# Patient Record
Sex: FEMALE | Race: WHITE | Employment: OTHER
[De-identification: names, ages, dates, MRNs, and addresses within clinical notes are randomized per-mention and may not be internally consistent; named-entity substitution may affect disease eponyms.]

---

## 2017-03-03 PROBLEM — M51.26 DISPLACEMENT OF LUMBAR INTERVERTEBRAL DISC: Status: ACTIVE | Noted: 2017-03-03

## 2017-03-03 PROBLEM — M48.00 SPINAL STENOSIS: Status: ACTIVE | Noted: 2017-03-03

## 2017-04-28 ENCOUNTER — TELEPHONE (OUTPATIENT)
Dept: CASE MANAGEMENT | Age: 62
End: 2017-04-28

## 2017-06-19 PROBLEM — Z72.0 TOBACCO USE: Status: ACTIVE | Noted: 2017-06-19

## 2017-10-23 ENCOUNTER — TELEPHONE (OUTPATIENT)
Dept: CASE MANAGEMENT | Age: 62
End: 2017-10-23

## 2017-10-23 NOTE — TELEPHONE ENCOUNTER
No call was made, encounter was opened for documentation in the lung nodule flow sheet. Lung Cancer Screening patient.     CT Lung Screening 10/28/16:  Lung - RADS Category 3 :  Probably benign finding (s) - short term   follow - up suggested, includes nodules with a low likelihood of   becoming a clinically active cancer - Follow-up CT scan in 6 months.       1. Pulmonary nodule measuring approximately 0.32 cm, continued CT   surveillance within 6 months is recommended. Further followup   guidelines provided below. 2. Mild cardiomegaly. 3. Previous cholecystectomy. 4. The remainder of the upper     are not evaluated secondary to the   specificity of the exam.          Reminder routed to NANCY CHAVIS MD 4/28/2017.     10/23/2017 -   Annual CT lung screening reminder faxed to NANCY CHAVIS MD  and mailed to patient.       [address] [facility] Imaging Services

## 2017-10-27 ENCOUNTER — TELEPHONE (OUTPATIENT)
Dept: CASE MANAGEMENT | Age: 62
End: 2017-10-27

## 2017-10-27 NOTE — LETTER
Exp Hx:  None  Silica  Arsenic Cad  Beryll Asbestos Chromium Nickel DF Radon       Family CA Hx__________________________    None  COPD       Pulm Fib            SHS    Date entered into NRDR____________________    Req Smoking Cessation_____      Packet mailed____________  Referral _________                     [facility] [address] Screening Patient Education Sheet    1)  Should I be screened for Lung Cancer?  - Are you between 54and 68years old? - Do you currently smoke or have you quit in the last 15 years? - Do you have a history of smoking a pack per day for 30 years (see back for tip sheet)? - Are you free of lung cancer symptoms (new cough, chest pain or shortness of breath)? If you answered YES to ALL of the above, you should be screened. 2) What are the risks versus benefits of having low dose CT lung screening? -  This scan can detect pulmonary nodules which may be an early sign of lung cancer:   - Radiation Exposure: A low-dose CT scan of the chest will expose you to about the same level of radiation that a mammogram does. - False Positive Results: There is, approximately, a 25% chance that a nodule will be detected on a screening CT scan in a high risk individual. Most nodules are benign.   - Emotional Stress: Some individuals may experience anxiety from finding a nodule and any associated follow-up. - Additional Testing: detecting a nodule may require more CT scans or surgical procedures for further evaluation. Lung screening may also lead to the detection of other unrelated diseases. This is an additional benefit of low-dose CT scanning for lung cancer; however, it may lead to additional testing, treatment and cost. If you are opposed to these, you should reconsider being screened.      3) Are you currently smoking?  - If you currently smoke, the best action you can take to reduce your risk of lung cancer is to stop smoking. Please contact Casey at [phone] or [phone] for help and tips. 4) What to expect after the screening? - This screening will show if a pulmonary nodule is present, but it alone cannot determine if the nodule is malignant or benign. This may require further follow up with a physician who may be your primary care provider or this service may be provided by our multidisciplinary team.   - A Radiologist will read your exam and send results to your physician. You should contact your physician to discuss any results and or follow up recommendations if needed. - If you have any concerns or questions regarding lung cancer screening, please be sure to discuss this with your primary care provider or our lung screening program navigator at [phone]. [address] [facility] Lung Screening Program Committee                    Medical Imaging Services                        10/27/2017           Flavio Martínez MD  [facility], [address]       Dear Flavio Martínez MD    An appointment has been scheduled for your patient Silvina Castellon for a CT Lung Screening. Appointment Details:  November 03 2017  [address]   Radiology Department  Time: 11:30 AM   (Please arrive 30 minutes early for registration)        Sincerely,    [address] [place] [address] Street:  [phone]  F:  [phone]                                       CT Lung Screening/ Lung Nodule Program  [address] [place] [address]. [place], [address]       TO:   Flavio Martínez MD    FAX:   [phone]        FROM:   [facility], [address] Lung Screening Program    DATE:   10/27/2017    PHONE:  [phone]    FAX:    [phone]      Comments:       Thank you for your referral to our [address].  If you have any questions please call [phone] or fax [phone]. The information contained in this fax message is intended only for Personal and Confidential use of the designated recipient(s) names above. This information is to be handled as Privileged and Confidential. If the reader of this message is not the intended recipient, you are hereby notified that you have received this document in error, and that any review, dissemination, distribution, or copying of this message is strictly prohibited. If you receive this communication in error, please notify us immediately at the above number and return the original message to us by mail. Thank you. Member of [facility].

## 2017-11-09 ENCOUNTER — TELEPHONE (OUTPATIENT)
Dept: CASE MANAGEMENT | Age: 62
End: 2017-11-09

## 2017-11-09 NOTE — LETTER
Medical Imaging Services      11/9/2017  Javier Saeed  [address] [facility]        Dear Jerald Parker,       RE: Your screening low-dose chest CT done on: 11/8/17                    We are writing to let you know that your recent CT lung screening shows one or more small/stable lung nodules which is likely benign (not cancer). Lung nodules are very common and many people without cancer have  nodules. To make sure this nodule is benign (not cancer) we recommend you have another CT scan on or around :  11/8/18      We also ask if you have questions about the results, please make sure you discuss them with your referring physician. Here are some other important points you should know:      Your full low-dose Chest CT report, including any minor observations, has been sent to your health care provider. Your exam report and images will be kept on file at [facility]) as part of your permanent record and are available for your continuing care.  Although low-dose chest CT is very effective at finding lung cancer early, it cannot find all lung cancers. If you develop any new symptoms such as shortness of breath, chest pain, or coughing up blood, please call your doctor.  Please keep in mind that good health involves quitting smoking (for help, call [address] at [phone]), an annual physical exam, and continued screening with low-dose chest CT. If you have any questions about this letter or have difficulty in contacting your health care provider please call our patient navigator, Irina Royal at [phone].       Sincerely, The [facility] Lung Screening Program                          Medical Imaging Services            11/9/2017                       Omero Escobar MD  [facility], [address] [place] [address] Dear Simran Cruz MD:                                                                                                                                                     Thank you for referring your patient, Johnny Garcia (1955) to our [address] [facility].   The results of the screening are available in [facility] and have been faxed to your office. Please contact us if you did not receive this report. Sam Calderon has been notified that her screening results have been sent to your office. We have encouraged her to contact you for these results and further instructions if necessary. Our program is a [address] [facility] designated [address] and our goal in addition to saving lives through screening is to; offer education, follow up reminders, assistance with scheduling and resources when needed. Please note, this screening examination is not intended as a diagnostic study or as a substitute for evaluation by a health care provider. We appreciate your support with this valuable and lifesaving program. If you have any questions concerning the report results or would like more information regarding our screening program, please contact our patient navigator at [phone].      With regard,        [address] Screening Program        [phone]

## 2018-03-21 DIAGNOSIS — N32.81 OVERACTIVE BLADDER: ICD-10-CM

## 2018-03-21 DIAGNOSIS — K29.70 GASTRITIS WITHOUT BLEEDING, UNSPECIFIED CHRONICITY, UNSPECIFIED GASTRITIS TYPE: ICD-10-CM

## 2018-03-21 DIAGNOSIS — Z76.0 MEDICATION REFILL: ICD-10-CM

## 2018-03-21 RX ORDER — TOLTERODINE 2 MG/1
CAPSULE, EXTENDED RELEASE ORAL
Qty: 90 CAPSULE | Refills: 0 | Status: SHIPPED
Start: 2018-03-21 | End: 2020-04-27 | Stop reason: SDUPTHER

## 2018-03-21 RX ORDER — TOPIRAMATE 100 MG/1
100 TABLET, FILM COATED ORAL 2 TIMES DAILY
Qty: 180 TABLET | Refills: 0 | Status: SHIPPED
Start: 2018-03-21 | End: 2020-04-27 | Stop reason: SDUPTHER

## 2018-03-21 RX ORDER — OMEPRAZOLE 40 MG/1
CAPSULE, DELAYED RELEASE ORAL
Qty: 90 CAPSULE | Refills: 0 | Status: SHIPPED | OUTPATIENT
Start: 2018-03-21 | End: 2020-01-29 | Stop reason: ALTCHOICE

## 2018-10-30 ENCOUNTER — TELEPHONE (OUTPATIENT)
Dept: CASE MANAGEMENT | Age: 63
End: 2018-10-30

## 2018-10-30 NOTE — TELEPHONE ENCOUNTER
No call, encounter opened to process CT Lung Screening.       CT Lung Screen 11/8/17 :  Impression   Solid 3 mm subpleural nodule along the right minor fissure.       RECOMMENDATION:   Continued annual screening with low-dose CT in 12 months.       Lung - RADS Category 2 :  Benign Appearance or Behavior - Nodules with   a very low likelihood of becoming a clinically active cancer due to   size or lack of growth.             Amada Avendano is a  58 y.o. female who is a current smoker with a 32 pack year smoking history.      Results have been faxed/routed to ordering physician Surekha Marinelli MD .  Letter mailed to patient  11/9/2017 encouraging her  to call her physician for results and follow up recommendations if needed.             10/30/2018 No call, encounter opened for documentation in the lung nodule navigator flow sheet/ Lung screening patient. Annual CT lung screening reminder faxed to ordering physician and mailed to patient.     [address]

## 2018-10-30 NOTE — LETTER
Lung Screening Program      10/30/2018      Emma Mariann Cruz  [id number]  [place] [address]      Dear Lorraine Lamar,        Our records indicate that based on your lung screen performed at [facility] ( [facility]), it is time to schedule your yearly lung screen. National guidelines for preventive care encourage patients who smoke, or who have a history of smoking, to receive yearly lung screens if you:    ? are between the ages of 50-69  ? have smoked a pack a day or more for 30 years (or its equivalent)  ? continue to smoke or have quit within the past 15 year    To schedule a lung screen you first need to have a discussion with your primary care physician and obtain an order. Screenings are now billed to your insurance, call our patient navigator for more information at [phone]. To schedule the screen call [phone] at your earliest convenience. If you had scans that were done elsewhere,  please call to inform me; we value you as a patient and want to ensure that you are getting the appropriate care. If you have any questions or need assistance in scheduling, please dont hesitate to call.            Naatlie    P:  [phone]  F:  [phone]        CC: Mala Esparza MD

## 2019-09-12 ENCOUNTER — TELEPHONE (OUTPATIENT)
Dept: ADMINISTRATIVE | Age: 64
End: 2019-09-12

## 2019-11-02 ENCOUNTER — APPOINTMENT (OUTPATIENT)
Dept: GENERAL RADIOLOGY | Age: 64
End: 2019-11-02
Payer: MEDICARE

## 2019-11-02 ENCOUNTER — HOSPITAL ENCOUNTER (EMERGENCY)
Age: 64
Discharge: HOME | End: 2019-11-02
Payer: MEDICARE

## 2019-11-02 VITALS
HEIGHT: 68 IN | BODY MASS INDEX: 44.41 KG/M2 | OXYGEN SATURATION: 100 % | DIASTOLIC BLOOD PRESSURE: 69 MMHG | RESPIRATION RATE: 18 BRPM | HEART RATE: 84 BPM | SYSTOLIC BLOOD PRESSURE: 130 MMHG | WEIGHT: 293 LBS | TEMPERATURE: 97.7 F

## 2019-11-02 DIAGNOSIS — M54.50 CHRONIC BILATERAL LOW BACK PAIN WITHOUT SCIATICA: ICD-10-CM

## 2019-11-02 DIAGNOSIS — M79.672 LEFT FOOT PAIN: Primary | ICD-10-CM

## 2019-11-02 DIAGNOSIS — G89.29 CHRONIC BILATERAL LOW BACK PAIN WITHOUT SCIATICA: ICD-10-CM

## 2019-11-02 PROCEDURE — 99283 EMERGENCY DEPT VISIT LOW MDM: CPT

## 2019-11-02 PROCEDURE — 6370000000 HC RX 637 (ALT 250 FOR IP): Performed by: NURSE PRACTITIONER

## 2019-11-02 PROCEDURE — 73630 X-RAY EXAM OF FOOT: CPT

## 2019-11-02 RX ORDER — HYDROCODONE BITARTRATE AND ACETAMINOPHEN 5; 325 MG/1; MG/1
1 TABLET ORAL EVERY 6 HOURS PRN
Qty: 12 TABLET | Refills: 0 | Status: SHIPPED | OUTPATIENT
Start: 2019-11-02 | End: 2019-11-05

## 2019-11-02 RX ORDER — HYDROCODONE BITARTRATE AND ACETAMINOPHEN 5; 325 MG/1; MG/1
1 TABLET ORAL ONCE
Status: COMPLETED | OUTPATIENT
Start: 2019-11-02 | End: 2019-11-02

## 2019-11-02 RX ADMIN — HYDROCODONE BITARTRATE AND ACETAMINOPHEN 1 TABLET: 5; 325 TABLET ORAL at 18:18

## 2019-11-02 ASSESSMENT — PAIN SCALES - GENERAL
PAINLEVEL_OUTOF10: 5
PAINLEVEL_OUTOF10: 8
PAINLEVEL_OUTOF10: 8

## 2019-11-02 ASSESSMENT — PAIN DESCRIPTION - ORIENTATION: ORIENTATION: LEFT

## 2019-11-02 ASSESSMENT — PAIN DESCRIPTION - LOCATION: LOCATION: ARM;BACK

## 2019-11-02 ASSESSMENT — PAIN DESCRIPTION - PAIN TYPE: TYPE: CHRONIC PAIN

## 2020-01-16 ENCOUNTER — OFFICE VISIT (OUTPATIENT)
Dept: INTERNAL MEDICINE CLINIC | Age: 65
End: 2020-01-16
Payer: MEDICARE

## 2020-01-16 VITALS
HEART RATE: 60 BPM | BODY MASS INDEX: 44.41 KG/M2 | SYSTOLIC BLOOD PRESSURE: 135 MMHG | HEIGHT: 68 IN | WEIGHT: 293 LBS | OXYGEN SATURATION: 96 % | TEMPERATURE: 97.7 F | DIASTOLIC BLOOD PRESSURE: 63 MMHG

## 2020-01-16 PROCEDURE — 99204 OFFICE O/P NEW MOD 45 MIN: CPT | Performed by: FAMILY MEDICINE

## 2020-01-16 PROCEDURE — G0296 VISIT TO DETERM LDCT ELIG: HCPCS | Performed by: FAMILY MEDICINE

## 2020-01-16 PROCEDURE — 99212 OFFICE O/P EST SF 10 MIN: CPT | Performed by: FAMILY MEDICINE

## 2020-01-16 RX ORDER — HYDROCHLOROTHIAZIDE 12.5 MG/1
12.5 TABLET ORAL DAILY
Qty: 30 TABLET | Refills: 0 | Status: SHIPPED
Start: 2020-01-16 | End: 2020-03-04

## 2020-01-16 ASSESSMENT — ENCOUNTER SYMPTOMS
CHOKING: 0
WHEEZING: 0
COUGH: 1
NAUSEA: 0
EYE PAIN: 0
SORE THROAT: 0
BACK PAIN: 1
SHORTNESS OF BREATH: 1
PHOTOPHOBIA: 0
TROUBLE SWALLOWING: 0
CHEST TIGHTNESS: 0
RHINORRHEA: 0
ABDOMINAL PAIN: 0
DIARRHEA: 0
EYE DISCHARGE: 0
ABDOMINAL DISTENTION: 0
VOMITING: 0
BLOOD IN STOOL: 0
CONSTIPATION: 0

## 2020-01-16 NOTE — PROGRESS NOTES
every 8 hours as needed (PRN) Yes Melissa Isbell MD   indomethacin (INDOCIN) 50 MG capsule Take 1 capsule by mouth 3 times daily Yes Melissa Isbell MD   traZODone (DESYREL) 50 MG tablet Take 1 or 2 tablets twice daily as needed for anxiety and sleep Yes Kj Medina MD   fluticasone (FLONASE) 50 MCG/ACT nasal spray 1 spray by Nasal route daily Yes Melissa Isbell MD   meclizine (ANTIVERT) 25 MG tablet Take 1 tablet by mouth 3 times daily as needed Yes Kj Medina MD        Allergies   Allergen Reactions    Mobic [Meloxicam] Other (See Comments)     Abdominal pain    Ultram [Tramadol]      Abdominal pain    Nickel Itching and Rash       Past Medical History:   Diagnosis Date    Anxiety     Arthralgia 10/8/2013    Arthritis     Bone avascular necrosis ([facility] [address] 3/23/2015    Breast lesion 12/1/2015    Chronic back pain     Chronic fatigue 10/8/2013    Daytime somnolence 10/8/2013    Depression     Fibromyalgia     Fracture, fibula     right    GERD (gastroesophageal reflux disease)     Headache     History of fractured kneecap     (R) 2 hair line fractures    History of total knee arthroplasty 3/19/2015    Hyperlipidemia     Morbid obesity with BMI of 45.0-49.9, adult ([facility] [address] 10/9/2015    Obesity     Osteoarthritis     Overactive bladder 1/22/2014    Reflux        Past Surgical History:   Procedure Laterality Date    CARPAL TUNNEL RELEASE Bilateral     CHOLECYSTECTOMY      COLONOSCOPY  2011    COLONOSCOPY  11/22/2016    Dr. Leif Georges    benign reasons     LUMBAR FUSION      L3-5    ROTATOR CUFF REPAIR Left     left    TOTAL KNEE ARTHROPLASTY Bilateral 2010    did both at the same time    TUBAL LIGATION         Social History     Socioeconomic History    Marital status:      Spouse name: Not on file    Number of children: Not on file    Years of education: Not on file    Highest education level: Associate degree: academic program Occupational History    Not on file   Social Needs    Financial resource strain: Not on file    Food insecurity:     Worry: Not on file     Inability: Not on file    Transportation needs:     Medical: Not on file     Non-medical: Not on file   Tobacco Use    Smoking status: Former Smoker     Packs/day: 1.00     Years: 35.00     Pack years: 35.00     Types: Cigarettes     Start date:      Last attempt to quit: 2020     Years since quittin.0    Smokeless tobacco: Never Used   Substance and Sexual Activity    Alcohol use: No     Alcohol/week: 0.0 standard drinks    Drug use: No    Sexual activity: Never   Lifestyle    Physical activity:     Days per week: Not on file     Minutes per session: Not on file    Stress: Not on file   Relationships    Social connections:     Talks on phone: Not on file     Gets together: Not on file     Attends Protestant service: Not on file     Active member of club or organization: Not on file     Attends meetings of clubs or organizations: Not on file     Relationship status: Not on file    Intimate partner violence:     Fear of current or ex partner: Not on file     Emotionally abused: Not on file     Physically abused: Not on file     Forced sexual activity: Not on file   Other Topics Concern    Not on file   Social History Narrative    Not on file        Family History   Problem Relation Age of Onset    High Blood Pressure Mother     Diabetes Mother     Heart Disease Mother     Diabetes Father     Heart Disease Father     High Blood Pressure Father     Cancer Other 48        breast       Vitals:    20 1345   BP: 135/63   Pulse: 60   Temp: 97.7 °F (36.5 °C)   TempSrc: Oral   SpO2: 96%   Weight: (!) 327 lb 3.2 oz (148.4 kg)   Height: 5' 8\" (1.727 m)     Estimated body mass index is 49.75 kg/m² as calculated from the following:    Height as of this encounter: 5' 8\" (1.727 m). Weight as of this encounter: 327 lb 3.2 oz (148.4 kg).     Physical Exam  Vitals signs reviewed. Constitutional:       General: She is not in acute distress. Appearance: She is well-developed. She is morbidly obese. She is not ill-appearing. HENT:      Head: Normocephalic and atraumatic. Right Ear: External ear normal.      Left Ear: External ear normal.      Nose: Nose normal.   Eyes:      Conjunctiva/sclera: Conjunctivae normal.      Pupils: Pupils are equal, round, and reactive to light. Comments: + cataract in right eye + decreased visual acuity   Neck:      Musculoskeletal: Normal range of motion and neck supple. Thyroid: No thyromegaly. Cardiovascular:      Rate and Rhythm: Normal rate and regular rhythm. Heart sounds: Normal heart sounds. No murmur. No friction rub. No gallop. Pulmonary:      Effort: Pulmonary effort is normal. No respiratory distress. Breath sounds: Decreased breath sounds, wheezing and rales (mildly coarse breath sounds) present. No rhonchi. Abdominal:      General: Bowel sounds are normal. There is no distension. Palpations: Abdomen is soft. There is no mass. Tenderness: There is no abdominal tenderness. Musculoskeletal: Normal range of motion. General: Swelling (bilateral lower extremities) present. No tenderness. Skin:     General: Skin is warm and dry. Findings: No rash. Neurological:      Mental Status: She is alert and oriented to person, place, and time. Deep Tendon Reflexes: Reflexes are normal and symmetric. Psychiatric:         Attention and Perception: Perception normal.         Mood and Affect: Mood and affect normal.         Speech: Speech is delayed. Thought Content: Thought content normal.         Cognition and Memory: Cognition is impaired. Memory is impaired. Judgment: Judgment normal.         ASSESSMENT/PLAN:  1. Encounter to establish care with new doctor  - Patient signed medical records release form for previous providers.  Records will be

## 2020-01-17 ENCOUNTER — TELEPHONE (OUTPATIENT)
Dept: INTERNAL MEDICINE CLINIC | Age: 65
End: 2020-01-17

## 2020-01-17 NOTE — TELEPHONE ENCOUNTER
Attempted to contact patient regarding appointments for cardiac stress test and echo. Patient has no voicemail set up. Appointments are set up for Wednesday, 1/29/20 @ 8:30 am at [facility].   [place] at 8:00 am.  Then dye given @ 8:30 am, then have echo done at 9:00 am then go back for cardiac stress test at 10:30 am.    Patient called back and appointment times and date were given and phone number if needs to reschedule appointment, [phone]

## 2020-01-24 NOTE — TELEPHONE ENCOUNTER
Patient called and stated that she needs her lyrica prescription called into pharmacy stating she only has a few pills left. I told her that YOU have never prescribed this medication for her nor does she have a pain contract on file. Patient stated that she needs this medication that she cant be without. I stated that I would send a message to you but I cant guarantee her that you will ok this.  Please advise

## 2020-01-29 ENCOUNTER — HOSPITAL ENCOUNTER (OUTPATIENT)
Dept: NON INVASIVE DIAGNOSTICS | Age: 65
Discharge: HOME | End: 2020-01-29
Payer: MEDICARE

## 2020-01-29 ENCOUNTER — HOSPITAL ENCOUNTER (OUTPATIENT)
Dept: NUCLEAR MEDICINE | Age: 65
Discharge: HOME | End: 2020-01-29
Payer: MEDICARE

## 2020-01-29 LAB
LV EF: 50 %
LV EF: 74 %
LVEF MODALITY: NORMAL
LVEF MODALITY: NORMAL

## 2020-01-29 PROCEDURE — A9500 TC99M SESTAMIBI: HCPCS | Performed by: RADIOLOGY

## 2020-01-29 PROCEDURE — 78452 HT MUSCLE IMAGE SPECT MULT: CPT

## 2020-01-29 PROCEDURE — 6360000002 HC RX W HCPCS: Performed by: FAMILY MEDICINE

## 2020-01-29 PROCEDURE — 93306 TTE W/DOPPLER COMPLETE: CPT

## 2020-01-29 PROCEDURE — 93017 CV STRESS TEST TRACING ONLY: CPT

## 2020-01-29 PROCEDURE — 3430000000 HC RX DIAGNOSTIC RADIOPHARMACEUTICAL: Performed by: RADIOLOGY

## 2020-01-29 RX ORDER — PANTOPRAZOLE SODIUM 40 MG/1
40 TABLET, DELAYED RELEASE ORAL 2 TIMES DAILY
COMMUNITY
End: 2020-04-27 | Stop reason: SDUPTHER

## 2020-01-29 RX ADMIN — Medication 30 MILLICURIE: at 10:29

## 2020-01-29 RX ADMIN — REGADENOSON 0.4 MG: 0.08 INJECTION, SOLUTION INTRAVENOUS at 10:25

## 2020-01-29 RX ADMIN — Medication 10 MILLICURIE: at 08:46

## 2020-01-29 NOTE — PROCEDURES
[address]                              CARDIAC STRESS TEST    PATIENT NAME: Yola Kendall                     :        1955  MED REC NO:   [id number]                            ROOM:  ACCOUNT NO:   [de-identified]                           ADMIT DATE: 2020  PROVIDER:     Georges Schneider DO    DATE OF PROCEDURE:  2020    LEXISCAN STRESS TEST    INDICATIONS:  The patient is a very polite and pleasant 70-year-old  female who follows with Dr. Fany Mckeon. She has been having intermittent  periods of shortness of breath. Her cardiac risk factors include  hyperlipidemia and a strong family history of cardiovascular disease. PROCEDURE:  The patient was brought to the cardiac stress lab and  connected to continuous cardiac monitoring. Resting EKG indicated  normal sinus rhythm. She was administered Lexiscan over 10-15 seconds  followed by injection of Cardiolite. She was placed in recovery at 1  minute. Throughout the examination, she had no unusual symptomatology. There was no active chest pain or shortness of breath. There was no  ectopy or dysrhythmia identified. There were no ST or T-wave  abnormalities identified. She had physiologic response to both blood  pressure and heart rate. She tolerated the procedure well. There was  no evidence of Lexiscan-induced ischemia. She will now be transferred  to the Imaging Lab for final stress pictures.         Dena Robertson DO    D: 2020 10:30:48       T: 2020 11:28:17     [email or web address]  Job#: [id number]     Doc#: [id number]    CC:  _____ Anton Weeks

## 2020-02-06 ENCOUNTER — TELEPHONE (OUTPATIENT)
Dept: ADMINISTRATIVE | Age: 65
End: 2020-02-06

## 2020-02-14 ENCOUNTER — HOSPITAL ENCOUNTER (OUTPATIENT)
Age: 65
Discharge: HOME | End: 2020-02-16
Payer: MEDICARE

## 2020-02-14 ENCOUNTER — OFFICE VISIT (OUTPATIENT)
Dept: FAMILY MEDICINE CLINIC | Age: 65
End: 2020-02-14
Payer: MEDICARE

## 2020-02-14 VITALS
TEMPERATURE: 98.3 F | HEART RATE: 62 BPM | WEIGHT: 293 LBS | SYSTOLIC BLOOD PRESSURE: 124 MMHG | HEIGHT: 68 IN | OXYGEN SATURATION: 95 % | DIASTOLIC BLOOD PRESSURE: 84 MMHG | RESPIRATION RATE: 18 BRPM | BODY MASS INDEX: 44.41 KG/M2

## 2020-02-14 LAB
ALBUMIN SERPL-MCNC: 4.3 G/DL (ref 3.5–5.2)
ALP BLD-CCNC: 91 U/L (ref 35–104)
ALT SERPL-CCNC: 12 U/L (ref 0–32)
ANION GAP SERPL CALCULATED.3IONS-SCNC: 16 MMOL/L (ref 7–16)
AST SERPL-CCNC: 13 U/L (ref 0–31)
BILIRUB SERPL-MCNC: 0.2 MG/DL (ref 0–1.2)
BUN BLDV-MCNC: 16 MG/DL (ref 8–23)
CALCIUM SERPL-MCNC: 9.6 MG/DL (ref 8.6–10.2)
CHLORIDE BLD-SCNC: 103 MMOL/L (ref 98–107)
CHOLESTEROL, TOTAL: 168 MG/DL (ref 0–199)
CO2: 23 MMOL/L (ref 22–29)
CREAT SERPL-MCNC: 1.2 MG/DL (ref 0.5–1)
FOLATE: 10.7 NG/ML (ref 4.8–24.2)
GFR AFRICAN AMERICAN: 55
GFR NON-AFRICAN AMERICAN: 45 ML/MIN/1.73
GLUCOSE BLD-MCNC: 93 MG/DL (ref 74–99)
HBA1C MFR BLD: 5.7 % (ref 4–5.6)
HCT VFR BLD CALC: 42 % (ref 34–48)
HDLC SERPL-MCNC: 76 MG/DL
HEMOGLOBIN: 12.7 G/DL (ref 11.5–15.5)
LDL CHOLESTEROL CALCULATED: 67 MG/DL (ref 0–99)
MCH RBC QN AUTO: 28 PG (ref 26–35)
MCHC RBC AUTO-ENTMCNC: 30.2 % (ref 32–34.5)
MCV RBC AUTO: 92.5 FL (ref 80–99.9)
PDW BLD-RTO: 14.1 FL (ref 11.5–15)
PLATELET # BLD: 126 E9/L (ref 130–450)
PMV BLD AUTO: 13.6 FL (ref 7–12)
POTASSIUM SERPL-SCNC: 3.6 MMOL/L (ref 3.5–5)
PRO-BNP: 197 PG/ML (ref 0–125)
RBC # BLD: 4.54 E12/L (ref 3.5–5.5)
SODIUM BLD-SCNC: 142 MMOL/L (ref 132–146)
TOTAL PROTEIN: 7.4 G/DL (ref 6.4–8.3)
TRIGL SERPL-MCNC: 124 MG/DL (ref 0–149)
TSH SERPL DL<=0.05 MIU/L-ACNC: 1.96 UIU/ML (ref 0.27–4.2)
VITAMIN B-12: 345 PG/ML (ref 211–946)
VITAMIN D 25-HYDROXY: 29 NG/ML (ref 30–100)
VLDLC SERPL CALC-MCNC: 25 MG/DL
WBC # BLD: 6.5 E9/L (ref 4.5–11.5)

## 2020-02-14 PROCEDURE — 3017F COLORECTAL CA SCREEN DOC REV: CPT | Performed by: FAMILY MEDICINE

## 2020-02-14 PROCEDURE — 82607 VITAMIN B-12: CPT

## 2020-02-14 PROCEDURE — G8484 FLU IMMUNIZE NO ADMIN: HCPCS | Performed by: FAMILY MEDICINE

## 2020-02-14 PROCEDURE — G8427 DOCREV CUR MEDS BY ELIG CLIN: HCPCS | Performed by: FAMILY MEDICINE

## 2020-02-14 PROCEDURE — 83036 HEMOGLOBIN GLYCOSYLATED A1C: CPT

## 2020-02-14 PROCEDURE — 1036F TOBACCO NON-USER: CPT | Performed by: FAMILY MEDICINE

## 2020-02-14 PROCEDURE — 80061 LIPID PANEL: CPT

## 2020-02-14 PROCEDURE — 85027 COMPLETE CBC AUTOMATED: CPT

## 2020-02-14 PROCEDURE — 82306 VITAMIN D 25 HYDROXY: CPT

## 2020-02-14 PROCEDURE — 80053 COMPREHEN METABOLIC PANEL: CPT

## 2020-02-14 PROCEDURE — G8417 CALC BMI ABV UP PARAM F/U: HCPCS | Performed by: FAMILY MEDICINE

## 2020-02-14 PROCEDURE — 99204 OFFICE O/P NEW MOD 45 MIN: CPT | Performed by: FAMILY MEDICINE

## 2020-02-14 PROCEDURE — 82746 ASSAY OF FOLIC ACID SERUM: CPT

## 2020-02-14 PROCEDURE — 84443 ASSAY THYROID STIM HORMONE: CPT

## 2020-02-14 PROCEDURE — 84425 ASSAY OF VITAMIN B-1: CPT

## 2020-02-14 PROCEDURE — 83880 ASSAY OF NATRIURETIC PEPTIDE: CPT

## 2020-02-14 PROCEDURE — 36415 COLL VENOUS BLD VENIPUNCTURE: CPT

## 2020-02-14 RX ORDER — PREGABALIN 150 MG/1
150 CAPSULE ORAL 2 TIMES DAILY
Qty: 40 CAPSULE | Refills: 0 | Status: CANCELLED | OUTPATIENT
Start: 2020-02-14 | End: 2020-03-05

## 2020-02-15 ENCOUNTER — PATIENT MESSAGE (OUTPATIENT)
Dept: FAMILY MEDICINE CLINIC | Age: 65
End: 2020-02-15

## 2020-02-17 RX ORDER — PREGABALIN 150 MG/1
CAPSULE ORAL
Qty: 23 CAPSULE | Refills: 0 | Status: SHIPPED
Start: 2020-02-17 | End: 2020-03-04 | Stop reason: SDUPTHER

## 2020-02-17 NOTE — TELEPHONE ENCOUNTER
From: Emma Holder  To: Lei Patel DO  Sent: 2/15/2020 6:44 PM EST  Subject: Prescription Question    Why are all my PRESCRIPTIONS locked? I need my sinus pills, and the LYRICA. I'm out of both of them since [address] [place] [address]. I get mine delivered from [employer]. They only DELIVER Monday Wednesday and Friday! Edilberto leaves at 1 to start delivery. Please I started going through withdrawals on LYRICA before and it made me sick as a dog. I don't want to end up in ER. Any questions [phone].     Quinn Lama

## 2020-02-18 PROBLEM — M79.604 PAIN IN BOTH LOWER EXTREMITIES: Status: ACTIVE | Noted: 2020-02-18

## 2020-02-18 PROBLEM — Z12.39 SCREENING FOR BREAST CANCER: Status: ACTIVE | Noted: 2020-02-18

## 2020-02-18 PROBLEM — M79.89 LEG SWELLING: Status: ACTIVE | Noted: 2020-02-18

## 2020-02-18 PROBLEM — Z72.0 TOBACCO ABUSE: Status: ACTIVE | Noted: 2020-02-18

## 2020-02-18 PROBLEM — M79.605 PAIN IN BOTH LOWER EXTREMITIES: Status: ACTIVE | Noted: 2020-02-18

## 2020-02-18 ASSESSMENT — ENCOUNTER SYMPTOMS
EYE DISCHARGE: 0
ANAL BLEEDING: 0
ABDOMINAL DISTENTION: 0
ALLERGIC/IMMUNOLOGIC NEGATIVE: 1
COUGH: 0
PHOTOPHOBIA: 0
ABDOMINAL PAIN: 0
EYE REDNESS: 0
WHEEZING: 0
FACIAL SWELLING: 0
SINUS PRESSURE: 0
BLOOD IN STOOL: 0
SHORTNESS OF BREATH: 1
EYE ITCHING: 0
SINUS PAIN: 0
BACK PAIN: 0
CHEST TIGHTNESS: 0
NAUSEA: 0
STRIDOR: 0
DIARRHEA: 0
CHOKING: 0
COLOR CHANGE: 0
SORE THROAT: 0
VOMITING: 0
EYE PAIN: 0
RECTAL PAIN: 0
VOICE CHANGE: 0
RHINORRHEA: 0
CONSTIPATION: 0
TROUBLE SWALLOWING: 0

## 2020-02-19 LAB — VITAMIN B1 WHOLE BLOOD: 121 NMOL/L (ref 70–180)

## 2020-02-19 NOTE — PROGRESS NOTES
Genitourinary: Negative. Negative for decreased urine volume, difficulty urinating, dysuria, flank pain, frequency, genital sores, hematuria, menstrual problem, pelvic pain and urgency. Musculoskeletal: Positive for arthralgias and myalgias. Negative for back pain, gait problem, joint swelling, neck pain and neck stiffness. Skin: Negative. Negative for color change, pallor, rash and wound. Allergic/Immunologic: Negative. Neurological: Negative. Negative for dizziness, tremors, seizures, syncope, facial asymmetry, speech difficulty, weakness, light-headedness, numbness and headaches. Hematological: Negative. Negative for adenopathy. Does not bruise/bleed easily. Psychiatric/Behavioral: Positive for decreased concentration, dysphoric mood and sleep disturbance. Negative for agitation, behavioral problems, confusion, hallucinations, self-injury and suicidal ideas. The patient is nervous/anxious. The patient is not hyperactive.          Past Medical/Surgical Hx;  Reviewed with patient      Diagnosis Date    Anxiety     Arthralgia 10/8/2013    Arthritis     Bone avascular necrosis ([facility] [address] 3/23/2015    Breast lesion 12/1/2015    Chronic back pain     Chronic fatigue 10/8/2013    Daytime somnolence 10/8/2013    Depression     Fibromyalgia     Fracture, fibula     right    GERD (gastroesophageal reflux disease)     H/O cardiovascular stress test 01/29/2020    Lexiscan    Headache     History of fractured kneecap     (R) 2 hair line fractures    History of total knee arthroplasty 3/19/2015    Hyperlipidemia     Morbid obesity with BMI of 45.0-49.9, adult ([facility] [address] 10/9/2015    Obesity     Osteoarthritis     Overactive bladder 1/22/2014    Reflux      Past Surgical History:   Procedure Laterality Date    CARPAL TUNNEL RELEASE Bilateral     CHOLECYSTECTOMY      COLONOSCOPY  2011    COLONOSCOPY  11/22/2016    Dr. Jose Michele    benign reasons     LUMBAR FUSION      L3-5  ROTATOR CUFF REPAIR Left     left    TOTAL KNEE ARTHROPLASTY Bilateral 2010    did both at the same time    TUBAL LIGATION         Past Family Hx:  Reviewed with patient      Problem Relation Age of Onset    High Blood Pressure Mother     Diabetes Mother     Heart Disease Mother     Diabetes Father     Heart Disease Father     High Blood Pressure Father     Cancer Other 50        breast       Social Hx:  Reviewed with patient  Social History     Tobacco Use    Smoking status: Former Smoker     Packs/day: 1.00     Years: 35.00     Pack years: 35.00     Types: Cigarettes     Start date:      Last attempt to quit: 2020     Years since quittin.1    Smokeless tobacco: Never Used   Substance Use Topics    Alcohol use: No     Alcohol/week: 0.0 standard drinks       OBJECTIVE  /84   Pulse 62   Temp 98.3 °F (36.8 °C) (Temporal)   Resp 18   Ht 5' 8\" (1.727 m)   Wt (!) 328 lb (148.8 kg)   LMP  (LMP Unknown)   SpO2 95%   Breastfeeding No   BMI 49.87 kg/m²     Problem List:  Dain Shabazz does not have any pertinent problems on file. PHYS EX:  Physical Exam  Vitals signs and nursing note reviewed. Constitutional:       General: She is not in acute distress. Appearance: Normal appearance. She is well-developed. She is obese. She is not ill-appearing, toxic-appearing or diaphoretic. Comments: Patient has morbid obesity. Patient instructed on low calorie, healthy diet. HENT:      Head: Normocephalic and atraumatic. Right Ear: Tympanic membrane, ear canal and external ear normal.      Left Ear: Tympanic membrane, ear canal and external ear normal.      Nose: Nose normal. No congestion or rhinorrhea. Mouth/Throat:      Mouth: Mucous membranes are moist.      Pharynx: Oropharynx is clear. No oropharyngeal exudate or posterior oropharyngeal erythema. Eyes:      General: No scleral icterus. Right eye: No discharge. Left eye: No discharge. Conjunctiva/sclera: Conjunctivae normal.      Pupils: Pupils are equal, round, and reactive to light. Comments: Pt has right cataract. Neck:      Musculoskeletal: Normal range of motion and neck supple. No neck rigidity or muscular tenderness. Thyroid: No thyromegaly. Vascular: No carotid bruit or JVD. Trachea: No tracheal deviation. Cardiovascular:      Rate and Rhythm: Normal rate and regular rhythm. Pulses: Normal pulses. Heart sounds: Normal heart sounds. No murmur. No friction rub. No gallop. Pulmonary:      Effort: Pulmonary effort is normal. No respiratory distress. Breath sounds: No stridor. No wheezing, rhonchi or rales. Comments: Pt has decreased breath sounds. Chest:      Chest wall: No tenderness. Abdominal:      General: Bowel sounds are normal. There is no distension. Palpations: Abdomen is soft. There is no mass. Tenderness: There is no abdominal tenderness. There is no guarding or rebound. Hernia: No hernia is present. Musculoskeletal:         General: Tenderness present. No swelling, deformity or signs of injury. Right lower leg: Edema present. Left lower leg: Edema present. Comments: Pain and decreased ROM multiple joints. Pt has mariam. Knee replacements. Lymphadenopathy:      Cervical: No cervical adenopathy. Skin:     General: Skin is warm. Coloration: Skin is not jaundiced or pale. Findings: No bruising, erythema, lesion or rash. Neurological:      General: No focal deficit present. Mental Status: She is alert and oriented to person, place, and time. Cranial Nerves: No cranial nerve deficit. Sensory: No sensory deficit. Motor: No weakness or abnormal muscle tone. Coordination: Coordination normal.      Gait: Gait normal.      Deep Tendon Reflexes: Reflexes are normal and symmetric.  Reflexes normal.   Psychiatric:         Behavior: Behavior normal.         Thought Content: Thought content normal.         Judgment: Judgment normal.         ASSESSMENT/PLAN  Sacha was seen today for chronic pain, medication refill and health maintenance. Diagnoses and all orders for this visit:    Tobacco abuse  -     CT Lung Screen (Initial/Annual); Future  Not controlled. Pt instructed to DC SMOKING. Fibromyalgia  Not controlled. Pain management. Leg swelling  -     US DUP LOWER EXTREMITIES BILATERAL VENOUS; Future  Not controlled. Pain in both lower extremities  -     US DUP LOWER EXTREMITIES BILATERAL VENOUS; Future  Not controlled. Overactive bladder  Stable. Detrol. History of total bilateral knee replacement  Stable. Nodule of left lung  Stable. Pt instructed on lung screening. Tobacco use  Not controlled. Pt instructed to DC SMOKING. Screening for breast cancer  -     JANAE DIGITAL SCREEN BILATERAL PER PROTOCOL; Future  Pt instructed on mariam. Janae.     Pt instructed if any worse go ED ASAP. Outpatient Encounter Medications as of 2/14/2020   Medication Sig Dispense Refill    pantoprazole (PROTONIX) 40 MG tablet Take 40 mg by mouth 2 times daily      hydrochlorothiazide (HYDRODIURIL) 12.5 MG tablet Take 1 tablet by mouth daily 30 tablet 0    topiramate (TOPAMAX) 100 MG tablet Take 1 tablet by mouth 2 times daily 180 tablet 0    tolterodine (DETROL LA) 2 MG extended release capsule TAKE 1 TABLET BY MOUTH EVERY DAY 90 capsule 0    busPIRone (BUSPAR) 15 MG tablet TAKE 1 TABLET BY MOUTH THREE TIMES A  tablet 1    [DISCONTINUED] pregabalin (LYRICA) 150 MG capsule Take 1 capsule by mouth 3 times daily .  270 capsule 1    albuterol sulfate HFA (VENTOLIN HFA) 108 (90 Base) MCG/ACT inhaler Inhale 2 puffs into the lungs every 6 hours as needed for Wheezing or Shortness of Breath 1 Inhaler 5    tiZANidine (ZANAFLEX) 4 MG tablet Take 1 tablet by mouth every 8 hours as needed (PRN) 270 tablet 1    fluticasone (FLONASE) 50 MCG/ACT nasal spray 1 spray by Nasal route daily 1 Bottle 3    meclizine (ANTIVERT) 25 MG tablet Take 1 tablet by mouth 3 times daily as needed 90 tablet 0    [DISCONTINUED] hydrOXYzine (ATARAX) 25 MG tablet TAKE 1 OR 2 TABLETS BY MOUTH EVERY 6 HOURS AS NEEDED FOR ANXIETY. 180 tablet 1    [DISCONTINUED] Cetirizine HCl (ZYRTEC ALLERGY) 10 MG CAPS Take 1 tablet by mouth daily 30 capsule 5    [DISCONTINUED] pravastatin (PRAVACHOL) 40 MG tablet Take 1 tablet by mouth daily 30 tablet 5     No facility-administered encounter medications on file as of 2/14/2020. Return in about 6 weeks (around 3/27/2020).         Reviewed recent labs related to Sheree's current problems      Discussed importance of regular Health Maintenance follow up  Health Maintenance   Topic    HIV screen     Shingles Vaccine (1 of 2)    Breast cancer screen     Low dose CT lung screening     Annual Wellness Visit (AWV)     Flu vaccine (1)    A1C test (Diabetic or Prediabetic)     Potassium monitoring     Creatinine monitoring     Lipid screen     DTaP/Tdap/Td vaccine (2 - Td)    Colon cancer screen colonoscopy     Hepatitis C screen     Hepatitis A vaccine     Hepatitis B vaccine     Hib vaccine     Meningococcal (ACWY) vaccine     Pneumococcal 0-64 years Vaccine

## 2020-02-24 ENCOUNTER — TELEPHONE (OUTPATIENT)
Dept: ADMINISTRATIVE | Age: 65
End: 2020-02-24

## 2020-02-26 ENCOUNTER — TELEPHONE (OUTPATIENT)
Dept: ADMINISTRATIVE | Age: 65
End: 2020-02-26

## 2020-02-26 ENCOUNTER — HOSPITAL ENCOUNTER (OUTPATIENT)
Dept: CT IMAGING | Age: 65
Discharge: HOME | End: 2020-02-26
Payer: MEDICARE

## 2020-02-26 ENCOUNTER — HOSPITAL ENCOUNTER (OUTPATIENT)
Dept: PULMONOLOGY | Age: 65
Discharge: HOME | End: 2020-02-26
Payer: MEDICARE

## 2020-02-26 PROCEDURE — 94729 DIFFUSING CAPACITY: CPT

## 2020-02-26 PROCEDURE — 94726 PLETHYSMOGRAPHY LUNG VOLUMES: CPT

## 2020-02-26 PROCEDURE — 94060 EVALUATION OF WHEEZING: CPT

## 2020-02-26 PROCEDURE — G0297 LDCT FOR LUNG CA SCREEN: HCPCS

## 2020-02-26 NOTE — TELEPHONE ENCOUNTER
Spoke with pt and let her know unable to accommodate due to meds. Advised pt she may want to contact [address] [place], [place] [address]e [facility] for further assistance and gave her their phone number.

## 2020-02-28 NOTE — PROCEDURES
[address]                               PULMONARY FUNCTION    PATIENT NAME: Gemma Rivers                     :        1955  MED REC NO:   [id number]                            ROOM:  ACCOUNT NO:   [de-identified]                           ADMIT DATE: 2020  PROVIDER:     Nicola Wiley DO    DATE OF PROCEDURE:  2020    This is a pulmonary function interpretation with regards to a request by  Dr. Kyle Foster to evaluate the patient with regards to a  pulmonary function evaluation and this is apparently with her history of  tobacco abuse. DEMOGRAPHICS ON THE PATIENT:  The patient is a 78-year-old and she is a   female. Her height is 5 feet 8 inches and her weight is 327  pounds. Body mass index of 50 is considered morbidly obese. REASON FOR THE PULMONARY FUNCTION STUDY:  To evaluate major symptom of  shortness of breath but this is also complicated by wheezing and  coughing. Has she ever had a pulmonary function in the past, the answer is no. MEDICATION ALLERGIES:  Medication that she is allergic to is ULTRAM and  apparently NICKEL and that might be topical dermatitis from the NICKEL. CURRENT MEDICATIONS:  I am going to try to list these but I will just  make note of these for her nasal airways, she takes Flonase as needed  only. She has a rescue inhaler called albuterol and uses it as needed. For GI prophylaxis, she is on Protonix. She is also on Topamax, Detrol,  BuSpar, Lyrica, Zanaflex and meclizine as needed. Those are medicines  that she is presently on. Was she in a chronic stable state, the answer is yes. She has had no  recent community respiratory infections, which may influence the  interpretation of the study. PULMONARY SYMPTOMS:  1.   COUGH:  She does have a productive cough, clear sputum and sometimes  noted to be streaked with black color which may be black because of  blood, it is usually small amount, so that needs to be concerning. She  stated she has never had any hemoptysis, but the black character to the  sputum is concerning. 2.  WHEEZING:  She says when smoking she will wheeze. 3.  SHORTNESS OF BREATH:  She says she had it for many years with  moderate activity such as increase in the pace of her walking and stair  climbing. As far as the patient's constitutional symptoms, she denies any  excessive weight loss, fever, but she does admit to night sweats which  could be related to her airway disease. SMOKING HISTORY:  She said she smoked for 35 years, two packs a day  equating to at least a 70-pack-year smoking history. She did quit two  months prior, so that would be probably the first of the year in  01/2020. ATOPIC HISTORY:  She says she might be allergic because she is sensitive  to the dust and animal dander, so she may have an atopic history. PET EXPOSURE:  She has cats. ENVIRONMENTAL FACTORS THAT INFLUENCE HER:  She says that she gets more  breathless when it is hot, when it is humid and even when it is very  cold, so that does influence her breathing. 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  work she was exposed to paint fumes, asbestos dust in the [employer]. REVIEW OF SYSTEMS:  She says that she has a history of arthritis and  apparently heart palpitations and upper respiratory sinus condition. As far as her sleep, she only sleeps on one pillow. Her house is one  that is heated with steam heat. FAMILY HISTORY:  Mother, dad, and brothers all had heart disease, but  her dad also had COPD.     TB HISTORY:  She had exposure because she did work in the health field  for short period, home health and also with  but she says  that she never had a positive PPD skin test, so she had exposure some emphysematous features with her smoking history  that could be better assessed with a chest CT scan. Now the airway  conductance is normal as is airway resistance normal.  4.  FLOW VOLUME CURVE:  She has a fairly normal flow curve. We could  see a little notching and that might be best served with a sleep study  to see if she has signs of obstructive sleep apnea but the reactivity of  the airways would benefit from medication because there appears to be  some improvement after bronchodilator. The flow curve has a little  increase in the concavity of the mid expiratory flow curve and that  might suggest a little subtle small airway changes and that could be  related to her weight, it could be related to her smoking history. Impression:  So my concluding remarks on the patient here today is that looking at  her smoking history, which is quite extensive with symptoms I would say  that she probably has based on these pulmonary functions relatively  well-preserved lung function. There is some mild airway variability  that would benefit from stabilization with bronchodilator and I guess  the question is there is gas transfer impairment of a mild degree along  with some air trapping and hyperinflation that suggest that she may have  some early changes of centrilobular emphysema. RECOMMENDATIONS:  1.   Because she has this questionable atopic disposition and that is not  well-defined but I think with her history of animal and dust, it appears  that she might have some atopy and with her underlying flows being with  some mild variability, I would suggest an ICS/LABA to start with any of  the ICS/LABAs but because of her weight I would choose an aerosol  ICS/LABA and because of her history of centrilobular emphysema and her  weight, I think that the aerosol indicated for centrilobular emphysema  and with a possible component of asthma would be an ICS/LABA, The one in  the market that is presently an aerosol form

## 2020-03-04 ENCOUNTER — HOSPITAL ENCOUNTER (OUTPATIENT)
Age: 65
Discharge: HOME | End: 2020-03-06
Payer: MEDICARE

## 2020-03-04 ENCOUNTER — OFFICE VISIT (OUTPATIENT)
Dept: PAIN MANAGEMENT | Age: 65
End: 2020-03-04
Payer: MEDICARE

## 2020-03-04 VITALS
RESPIRATION RATE: 16 BRPM | BODY MASS INDEX: 44.41 KG/M2 | DIASTOLIC BLOOD PRESSURE: 70 MMHG | HEIGHT: 68 IN | SYSTOLIC BLOOD PRESSURE: 108 MMHG | WEIGHT: 293 LBS | TEMPERATURE: 97.8 F | HEART RATE: 72 BPM

## 2020-03-04 PROBLEM — M48.061 SPINAL STENOSIS OF LUMBAR REGION WITHOUT NEUROGENIC CLAUDICATION: Status: ACTIVE | Noted: 2020-03-04

## 2020-03-04 PROBLEM — G89.4 CHRONIC PAIN SYNDROME: Status: ACTIVE | Noted: 2020-03-04

## 2020-03-04 PROBLEM — M47.816 LUMBAR FACET ARTHROPATHY: Status: ACTIVE | Noted: 2020-03-04

## 2020-03-04 PROBLEM — M79.18 MYOFASCIAL PAIN SYNDROME: Status: ACTIVE | Noted: 2020-03-04

## 2020-03-04 PROBLEM — M51.9 LUMBAR DISC DISORDER: Status: ACTIVE | Noted: 2020-03-04

## 2020-03-04 PROBLEM — M47.816 LUMBAR SPONDYLOSIS: Status: ACTIVE | Noted: 2020-03-04

## 2020-03-04 LAB — SPECIFIC GRAVITY UA: 1.01 (ref 1–1.03)

## 2020-03-04 PROCEDURE — G8417 CALC BMI ABV UP PARAM F/U: HCPCS | Performed by: PAIN MEDICINE

## 2020-03-04 PROCEDURE — G0480 DRUG TEST DEF 1-7 CLASSES: HCPCS

## 2020-03-04 PROCEDURE — 99204 OFFICE O/P NEW MOD 45 MIN: CPT | Performed by: PAIN MEDICINE

## 2020-03-04 PROCEDURE — 1036F TOBACCO NON-USER: CPT | Performed by: PAIN MEDICINE

## 2020-03-04 PROCEDURE — 80307 DRUG TEST PRSMV CHEM ANLYZR: CPT

## 2020-03-04 PROCEDURE — 3017F COLORECTAL CA SCREEN DOC REV: CPT | Performed by: PAIN MEDICINE

## 2020-03-04 PROCEDURE — 99204 OFFICE O/P NEW MOD 45 MIN: CPT

## 2020-03-04 PROCEDURE — G8484 FLU IMMUNIZE NO ADMIN: HCPCS | Performed by: PAIN MEDICINE

## 2020-03-04 PROCEDURE — G8427 DOCREV CUR MEDS BY ELIG CLIN: HCPCS | Performed by: PAIN MEDICINE

## 2020-03-04 RX ORDER — ATORVASTATIN CALCIUM 20 MG/1
20 TABLET, FILM COATED ORAL DAILY
COMMUNITY
Start: 2020-03-02 | End: 2020-04-27 | Stop reason: SDUPTHER

## 2020-03-04 RX ORDER — BACLOFEN 10 MG/1
5 TABLET ORAL NIGHTLY
COMMUNITY
Start: 2020-02-19 | End: 2020-04-01 | Stop reason: SDUPTHER

## 2020-03-04 RX ORDER — DULOXETINE 40 MG/1
40 CAPSULE, DELAYED RELEASE ORAL DAILY
COMMUNITY
Start: 2020-02-26 | End: 2020-04-27 | Stop reason: SDUPTHER

## 2020-03-04 RX ORDER — PREGABALIN 150 MG/1
CAPSULE ORAL
Qty: 90 CAPSULE | Refills: 0 | Status: SHIPPED
Start: 2020-03-04 | End: 2020-04-01 | Stop reason: SDUPTHER

## 2020-03-04 NOTE — PROGRESS NOTES
 TOTAL KNEE ARTHROPLASTY Bilateral 2010    did both at the same time    TUBAL LIGATION       Prior to Admission medications    Medication Sig Start Date End Date Taking? Authorizing Provider   LEVOCETIRIZINE DIHYDROCHLORIDE PO Take 5 mg by mouth daily   Yes Historical Provider, MD   DULoxetine HCl 40 MG CPEP Take 40 mg by mouth daily 2/26/20  Yes Historical Provider, MD   baclofen (LIORESAL) 10 MG tablet Take 5 mg by mouth nightly 2/19/20  Yes Historical Provider, MD   atorvastatin (LIPITOR) 20 MG tablet Take 20 mg by mouth daily 3/2/20  Yes Historical Provider, MD   pregabalin (LYRICA) 150 MG capsule Take one tablet twice a day for 7 days; then reduce to one tablet daily.  2/17/20 3/4/20 Yes Carlitos Egan DO   pantoprazole (PROTONIX) 40 MG tablet Take 40 mg by mouth 2 times daily   Yes Historical Provider, MD   topiramate (TOPAMAX) 100 MG tablet Take 1 tablet by mouth 2 times daily 3/21/18  Yes Tiffany Mejía MD   tolterodine (DETROL LA) 2 MG extended release capsule TAKE 1 TABLET BY MOUTH EVERY DAY 3/21/18  Yes Tiffany Mejía MD   busPIRone (BUSPAR) 15 MG tablet TAKE 1 TABLET BY MOUTH THREE TIMES A DAY 2/7/18  Yes Miki York MD   albuterol sulfate HFA (VENTOLIN HFA) 108 (90 Base) MCG/ACT inhaler Inhale 2 puffs into the lungs every 6 hours as needed for Wheezing or Shortness of Breath 12/15/17  Yes Miki York MD   tiZANidine (ZANAFLEX) 4 MG tablet Take 1 tablet by mouth every 8 hours as needed (PRN) 11/20/17  Yes Miki York MD   fluticasone [facility]) 50 MCG/ACT nasal spray 1 spray by Nasal route daily 1/23/17  Yes Miki York MD   meclizine (ANTIVERT) 25 MG tablet Take 1 tablet by mouth 3 times daily as needed 5/77/42  Yes Gerard Garcia MD      Allergies   Allergen Reactions    Mobic [Meloxicam] Other (See Comments)     Abdominal pain    Ultram [Tramadol]      Abdominal pain    Nickel Itching and Rash     Social History     Socioeconomic History    Marital status:      Spouse name: Not on file    Number of children: Not on file    Years of education: Not on file    Highest education level: Associate degree: academic program   Occupational History    Not on file   Social Needs    Financial resource strain: Not on file    Food insecurity:     Worry: Not on file     Inability: Not on file    Transportation needs:     Medical: Not on file     Non-medical: Not on file   Tobacco Use    Smoking status: Former Smoker     Packs/day: 2.00     Years: 30.00     Pack years: 60.00     Types: Cigarettes     Start date:      Last attempt to quit: 2020     Years since quittin.1    Smokeless tobacco: Never Used   Substance and Sexual Activity    Alcohol use: No     Alcohol/week: 0.0 standard drinks    Drug use: No    Sexual activity: Never   Lifestyle    Physical activity:     Days per week: Not on file     Minutes per session: Not on file    Stress: Not on file   Relationships    Social connections:     Talks on phone: Not on file     Gets together: Not on file     Attends Congregation service: Not on file     Active member of club or organization: Not on file     Attends meetings of clubs or organizations: Not on file     Relationship status: Not on file    Intimate partner violence:     Fear of current or ex partner: Not on file     Emotionally abused: Not on file     Physically abused: Not on file     Forced sexual activity: Not on file   Other Topics Concern    Not on file   Social History Narrative    Not on file     Family History   Problem Relation Age of Onset    High Blood Pressure Mother     Diabetes Mother     Heart Disease Mother     Cancer Mother     Diabetes Father     Heart Disease Father     High Blood Pressure Father     Cancer Other 50        breast    Depression Brother      REVIEW OF SYSTEMS:     Patient specifically denies fever/chills, chest pain, shortness of breath, new bowel or bladder complaints.   All other review of systems was negative. PHYSICAL EXAMINATION:      /70   Pulse 72   Temp 97.8 °F (36.6 °C)   Resp 16   Ht 5' 8\" (1.727 m)   Wt (!) 327 lb (148.3 kg)   LMP  (LMP Unknown)   BMI 49.72 kg/m²     General:      General appearance:   elderly, pleasant and well-hydrated. , in moderate discomfort and A & O x3  Build:Overweight    HEENT:    Head:normocephalic and atraumatic  Pupils:regular, round and equal.  Sclera: icterus absent,     Lungs:    Breathing:Breathing Pattern: regular, no distress    Abdomen:    Shape:non-distended and normal  Tenderness:none    Cervical spine:    Inspection:normal  Palpation:tenderness paravertebral muscles, trigger points paravertebral muscles, facet loading, left, right, negative, tenderness and non-tender paraspinals. Range of motion:normal not flexion, extension rotation bilateral and is not painful. Lumbar spine:    Spine inspection:surgical incision scar   CVA tenderness:No   Palpation:tenderness paravertebral muscles, facet loading, left, right, positive and tenderness. Range of motion:abnormal moderately Lateral bending, flexion, extension rotation bilateral and is  painful. Musculoskeletal:    Trigger points in Paraveteral:present bilaterally  Burton's:negative right, negative left   SI joint tenderness:negative right, negative left              SULTANA test:negative right, negative             left  Piriformis tenderness:negative right, negative left  Trochanteric bursa tenderness:negative right, negative left  SLR:negative right, negative left, sitting     Extremities:    Tremors:None bilaterally upper and lower  Range of motion:Generally normal shoulders, pain with internal rotation of hips negative. Intact:Yes  Edema:Normal    Knee:     Inspection:bilateral knee replaced    Neurological:    Sensory:normal to light touch bilateral upper and lower extremities  Motor:              Right Bicep5/5           Left Bicep5/5              Right Triceps5/5 Left Triceps5/5          Right Deltoid5/5     Left Deltoid5/5                  Right Quadriceps5/5          Left Quadriceps5/5           Right Gastrocnemius5/5    Left Gastrocnemius5/5  Right Ant Tibialis5/5  Left Ant Tibialis5/5  Reflexes:    Right Brachioradialis reflex2+  Left Brachioradialis reflex2+  Right Biceps reflex2+  Left Biceps reflex2+  Right Triceps reflex2+  Left Triceps reflex2+  Right Quadriceps reflexNOT DONE  Left Quadriceps reflexNOT DONE  Right Achilles reflex1+  Left Achilles reflex1+  Gait:antalgic    Dermatology:    Skin:no unusual rashes and no skin lesions    Impression:  Chronic low back and allover body pain with h/o L3-5 fusion/bilateral knees replaced  Apprehensive about interventional procedures   Plan:  Myofascial body pain   Discussed treatment options and expectations from pain management   Will schedule patient for lumbar spine Xray  Will take over Lyrica 150 mg TID  Currently on Zanaflex 4mg QHS/Baclofen 5 mg QD  Continue with Cymbalta 40 mg QD  Urine screen today  [facility] report reviewed  Patient encouraged to stay active and to lose weight  Against referral to physical therapy  Treatment plan discussed with the patient including medications side effects     We discussed with the patient that combining opioids, benzodiazepines, alcohol, illicit drugs or sleep aids increases the risk of respiratory depression including death. We discussed that these medications may cause drowsiness, sedation or dizziness and have counseled the patient not to drive or operate machinery. We have discussed that these medications will be prescribed only by one provider. We have discussed with the patient about age related risk factors and have thoroughly discussed the importance of taking these medications as prescribed. The patient verbalizes understanding. lucius Naik M.D.

## 2020-03-04 NOTE — PROGRESS NOTES
Donald Shields presents to the [address] on 3/4/2020. Chichi Hannon is complaining of fibromyalgia pain . The pain is persistent. The pain is described as burning. Pain is rated on her best day at a 5, on her worst day at a 8, and on average at a 8 on the VAS scale. She took her last dose of Lyrica last night. Chichi Hannon does not have issues with constipation. She is not on NSAIDS and  is  on anticoagulation medications to include ASA and is managed by Juan Luna,      Pacemaker or defibrilator: No Physician managing device is N/A.       /70   Pulse 72   Temp 97.8 °F (36.6 °C)   Resp 16   Ht 5' 8\" (1.727 m)   Wt (!) 327 lb (148.3 kg)   LMP  (LMP Unknown)   BMI 49.72 kg/m²      No LMP recorded (lmp unknown). Patient has had a hysterectomy.

## 2020-03-08 LAB
6AM URINE: <10 NG/ML
7-AMINOCLONAZEPAM, URINE: <5 NG/ML
ALPHA-HYDROXYALPRAZOLAM, URINE: <5 NG/ML
ALPHA-HYDROXYMIDAZOLAM, URINE: <20 NG/ML
ALPRAZOLAM, URINE: <5 NG/ML
CHLORDIAZEPOXIDE, URINE: <20 NG/ML
CLONAZEPAM, URINE: <5 NG/ML
CODEINE, URINE: <20 NG/ML
DIAZEPAM, URINE: <20 NG/ML
HYDROCODONE, URINE: <20 NG/ML
HYDROMORPHONE, URINE: <20 NG/ML
LORAZEPAM, URINE: <20 NG/ML
MIDAZOLAM, URINE: <20 NG/ML
MORPHINE URINE: <20 NG/ML
NORDIAZEPAM, URINE: <20 NG/ML
NORHYDROCODONE, URINE: <20 NG/ML
NOROXYCODONE, URINE: <20 NG/ML
NOROXYMORPHONE, URINE: <20 NG/ML
OXAZEPAM, URINE: <20 NG/ML
OXYCODONE, URINE CONFIRMATION: <20 NG/ML
OXYMORPHONE, URINE: <20 NG/ML
TEMAZEPAM, URINE: <20 NG/ML

## 2020-03-09 LAB
Lab: NORMAL
REPORT: NORMAL
THIS TEST SENT TO: NORMAL

## 2020-03-19 PROBLEM — Z12.39 SCREENING FOR BREAST CANCER: Status: RESOLVED | Noted: 2020-02-18 | Resolved: 2020-03-19

## 2020-03-26 ENCOUNTER — TELEPHONE (OUTPATIENT)
Dept: PAIN MANAGEMENT | Age: 65
End: 2020-03-26

## 2020-04-01 ENCOUNTER — VIRTUAL VISIT (OUTPATIENT)
Dept: PAIN MANAGEMENT | Age: 65
End: 2020-04-01
Payer: MEDICARE

## 2020-04-01 PROCEDURE — 99442 PR PHYS/QHP TELEPHONE EVALUATION 11-20 MIN: CPT | Performed by: PHYSICIAN ASSISTANT

## 2020-04-01 RX ORDER — BACLOFEN 10 MG/1
5 TABLET ORAL NIGHTLY
Qty: 15 TABLET | Refills: 2 | Status: SHIPPED | OUTPATIENT
Start: 2020-04-01 | End: 2020-05-01

## 2020-04-01 RX ORDER — BUDESONIDE AND FORMOTEROL FUMARATE DIHYDRATE 160; 4.5 UG/1; UG/1
2 AEROSOL RESPIRATORY (INHALATION) 2 TIMES DAILY
Qty: 1 INHALER | Refills: 3 | Status: SHIPPED
Start: 2020-04-01 | End: 2020-04-27

## 2020-04-01 RX ORDER — PREGABALIN 150 MG/1
CAPSULE ORAL
Qty: 90 CAPSULE | Refills: 2 | Status: SHIPPED
Start: 2020-04-06 | End: 2020-06-30 | Stop reason: SDUPTHER

## 2020-04-01 NOTE — PROGRESS NOTES
Zuly Beck was read the following message We want to confirm that, for purposes of billing, this is a virtual visit with your provider for which we will submit a claim for reimbursement with your insurance company. You will be responsible for any copays, coinsurance amounts or other amounts not covered by your insurance company. If you do not accept this, unfortunately we will not be able to schedule a virtual visit with the provider. Do you accept? Judi Mclean responded Yes .

## 2020-04-01 NOTE — PROGRESS NOTES
[address]  [address] [facility], [address] [place] [address] Alberto  [phone]  Telephone follow up visit      Date of Visit:  2020    CC: Follow Up    Consent:  The patient and/or health care decision maker is aware that that he may receive a bill for this telephone service, depending on his insurance coverage, and has provided verbal consent to proceed: Yes  I have considered the risks of abuse, dependence, addiction and diversion. My patient is aware that they will need a follow-up visit (in-person or virtually) at the appropriate time indicated for continued medications. Further, my patient is aware that when this acute crisis has lifted, they will be expected to return for an in-person visit and all elements of standard local and hospital guidelines in order to continue this medication. HPI:  Pain is unchanged to the lower back. Lyrica is helping. Reports that she feels stable on her current regimen. Appropriate analgesia with current medications regimen: yes. Change in quality of symptoms:no. Medication side effects:none. Recent diagnostic testing:none. Recent interventional procedures:none. .    Imaging:    Lumbar spine CT 2016      1. Status post lumbar spine fusion from L3-L5 appearing in stable,   satisfactory alignment.       2. Lumbar spondylosis as described above in part due to congenital   shortened pedicles and notable stenosis             Potential Aberrant Drug-Related Behavior:  None    Urine Drug Screenin2020 Negative for all which is consistent    OARRS report:  2020 Consistent    Past Medical History: Reviewed    Past Surgical History: Reviewed     Home Medications: Reviewed    Allergies: Reviewed     Social History: Reviewed     REVIEW OF SYSTEMS:     Peg Courser denies fever/chills, chest pain, shortness of breath, new bowel or bladder complaints. All other review of systems was negative.     PHYSICAL EXAMINATION:     General:       A & O

## 2020-04-27 ENCOUNTER — OFFICE VISIT (OUTPATIENT)
Dept: FAMILY MEDICINE CLINIC | Age: 65
End: 2020-04-27
Payer: MEDICARE

## 2020-04-27 VITALS
BODY MASS INDEX: 44.41 KG/M2 | OXYGEN SATURATION: 98 % | HEIGHT: 68 IN | SYSTOLIC BLOOD PRESSURE: 141 MMHG | TEMPERATURE: 98 F | DIASTOLIC BLOOD PRESSURE: 68 MMHG | HEART RATE: 68 BPM | WEIGHT: 293 LBS | RESPIRATION RATE: 16 BRPM

## 2020-04-27 PROCEDURE — 99204 OFFICE O/P NEW MOD 45 MIN: CPT | Performed by: PHYSICIAN ASSISTANT

## 2020-04-27 PROCEDURE — 1090F PRES/ABSN URINE INCON ASSESS: CPT | Performed by: PHYSICIAN ASSISTANT

## 2020-04-27 PROCEDURE — 4040F PNEUMOC VAC/ADMIN/RCVD: CPT | Performed by: PHYSICIAN ASSISTANT

## 2020-04-27 PROCEDURE — G8427 DOCREV CUR MEDS BY ELIG CLIN: HCPCS | Performed by: PHYSICIAN ASSISTANT

## 2020-04-27 PROCEDURE — 3017F COLORECTAL CA SCREEN DOC REV: CPT | Performed by: PHYSICIAN ASSISTANT

## 2020-04-27 PROCEDURE — G8400 PT W/DXA NO RESULTS DOC: HCPCS | Performed by: PHYSICIAN ASSISTANT

## 2020-04-27 PROCEDURE — 1036F TOBACCO NON-USER: CPT | Performed by: PHYSICIAN ASSISTANT

## 2020-04-27 PROCEDURE — 1123F ACP DISCUSS/DSCN MKR DOCD: CPT | Performed by: PHYSICIAN ASSISTANT

## 2020-04-27 PROCEDURE — G8417 CALC BMI ABV UP PARAM F/U: HCPCS | Performed by: PHYSICIAN ASSISTANT

## 2020-04-27 RX ORDER — FLUTICASONE PROPIONATE 50 MCG
1 SPRAY, SUSPENSION (ML) NASAL DAILY
Qty: 3 BOTTLE | Refills: 1 | Status: SHIPPED | OUTPATIENT
Start: 2020-04-27 | End: 2021-11-09 | Stop reason: SDUPTHER

## 2020-04-27 RX ORDER — TIZANIDINE 4 MG/1
4 TABLET ORAL EVERY 8 HOURS PRN
Qty: 270 TABLET | Refills: 1 | Status: SHIPPED
Start: 2020-04-27 | End: 2020-12-14

## 2020-04-27 RX ORDER — CETIRIZINE HYDROCHLORIDE 10 MG/1
10 TABLET ORAL DAILY
Qty: 90 TABLET | Refills: 1 | Status: SHIPPED
Start: 2020-04-27 | End: 2020-10-14

## 2020-04-27 RX ORDER — ATORVASTATIN CALCIUM 20 MG/1
20 TABLET, FILM COATED ORAL NIGHTLY
Qty: 90 TABLET | Refills: 1 | Status: SHIPPED
Start: 2020-04-27 | End: 2020-07-27

## 2020-04-27 RX ORDER — PANTOPRAZOLE SODIUM 40 MG/1
40 TABLET, DELAYED RELEASE ORAL 2 TIMES DAILY
Qty: 180 TABLET | Refills: 1 | Status: SHIPPED
Start: 2020-04-27 | End: 2020-10-06 | Stop reason: SDUPTHER

## 2020-04-27 RX ORDER — BUSPIRONE HYDROCHLORIDE 15 MG/1
TABLET ORAL
Qty: 270 TABLET | Refills: 1 | Status: SHIPPED
Start: 2020-04-27 | End: 2020-10-06 | Stop reason: SDUPTHER

## 2020-04-27 RX ORDER — DULOXETINE 40 MG/1
40 CAPSULE, DELAYED RELEASE ORAL DAILY
Qty: 90 CAPSULE | Refills: 1 | Status: SHIPPED
Start: 2020-04-27 | End: 2020-08-27 | Stop reason: ALTCHOICE

## 2020-04-27 RX ORDER — TOPIRAMATE 100 MG/1
100 TABLET, FILM COATED ORAL 2 TIMES DAILY
Qty: 180 TABLET | Refills: 0 | Status: SHIPPED
Start: 2020-04-27 | End: 2020-07-12

## 2020-04-27 RX ORDER — TOLTERODINE 2 MG/1
CAPSULE, EXTENDED RELEASE ORAL
Qty: 90 CAPSULE | Refills: 1 | Status: SHIPPED
Start: 2020-04-27 | End: 2020-10-06 | Stop reason: SDUPTHER

## 2020-04-27 RX ORDER — BUDESONIDE AND FORMOTEROL FUMARATE DIHYDRATE 80; 4.5 UG/1; UG/1
2 AEROSOL RESPIRATORY (INHALATION) 2 TIMES DAILY
Qty: 1 INHALER | Refills: 1 | Status: SHIPPED
Start: 2020-04-27 | End: 2021-02-24

## 2020-04-27 ASSESSMENT — ENCOUNTER SYMPTOMS
STRIDOR: 0
VOMITING: 0
CHEST TIGHTNESS: 0
CONSTIPATION: 0
SINUS PRESSURE: 0
EYE DISCHARGE: 0
BACK PAIN: 1
NAUSEA: 0
ABDOMINAL PAIN: 0
WHEEZING: 0
EYE PAIN: 0
SHORTNESS OF BREATH: 0
EYE REDNESS: 0
BLOOD IN STOOL: 0
SINUS PAIN: 0
DIARRHEA: 0
COUGH: 1
SORE THROAT: 0

## 2020-04-27 NOTE — PATIENT INSTRUCTIONS
go to all appointments, and call your doctor if you are having problems. It's also a good idea to know your test results and keep a list of the medicines you take. How can you care for yourself at home? · Exercise often. Walk, swim, or bike to help with pain and sleep problems and to make you feel better. · Try to get a good night's sleep. Go to bed and get up at the same time each day, whether you feel rested or not. Make sure you have a good mattress and pillow. · Reduce stress. Avoid things that cause you stress, if you can. If not, work at making them less stressful. Learn to use biofeedback, guided imagery, meditation, or other methods to relax. · Make healthy changes. Eat a balanced diet, quit smoking, and limit alcohol and caffeine. · Use a heating pad set on low or take warm baths or showers for pain. Using cold packs for up to 20 minutes at a time can also relieve pain. Put a thin cloth between the cold pack and your skin. A gentle massage might help too. · Be safe with medicines. Take your medicines exactly as prescribed. Call your doctor if you think you are having a problem with your medicine. Your doctor may talk to you about taking antidepressant medicines. These medicines may improve sleep, relieve pain, and in some cases treat depression. · Learn about fibromyalgia. This makes coping easier. Then, take an active role in your treatment. · Think about joining a support group with others who have fibromyalgia to learn more and get support. When should you call for help? Watch closely for changes in your health, and be sure to contact your doctor if:    · You feel sad, helpless, or hopeless; lose interest in things you used to enjoy; or have other symptoms of depression.     · Your fibromyalgia symptoms get worse. Where can you learn more? Go to [email or web address].[email or web address] org and sign in to your [employer] account.  Enter V003 in the [employer] box to learn more about as of: June 26, 2019Content Version: 12.4  © 1305-9706 [employer]. Care instructions adapted under license by [facility] ([facility]). If you have questions about a medical condition or this instruction, always ask your healthcare professional. [address] any warranty or liability for your use of this information.

## 2020-04-27 NOTE — PROGRESS NOTES
Effort: Pulmonary effort is normal.      Breath sounds: Normal breath sounds. No wheezing, rhonchi or rales. Abdominal:      General: Bowel sounds are normal.      Palpations: Abdomen is soft. Tenderness: There is no abdominal tenderness. There is no right CVA tenderness or left CVA tenderness. Comments: Obese, no localized tenderness     Musculoskeletal: Normal range of motion. General: Swelling (trace swelling to lower legs/ankles) and tenderness (diffuse tenderness to palpation of upper and lower extremities to light tough) present. Lymphadenopathy:      Cervical: No cervical adenopathy. Skin:     General: Skin is warm and dry. Capillary Refill: Capillary refill takes less than 2 seconds. Findings: No bruising or erythema. Neurological:      General: No focal deficit present. Mental Status: She is alert and oriented to person, place, and time. Cranial Nerves: No cranial nerve deficit. Psychiatric:         Attention and Perception: Attention and perception normal.         Mood and Affect: Mood is depressed. Speech: Speech normal.         Behavior: Behavior normal. Behavior is not withdrawn. Thought Content: Thought content normal.         Cognition and Memory: Cognition and memory normal.         Judgment: Judgment normal.         ASSESSMENT/PLAN:  Haim Horton was seen today for medication refill and abdominal pain. Diagnoses and all orders for this visit:    Morbid obesity with BMI of 45.0-49.9, adult [facility])  -     Santiago Frederick MD, Bariatrics[facility]    Borderline diabetes  -     COMPREHENSIVE METABOLIC PANEL; Future  -     HEMOGLOBIN A1C; Future    Fibromyalgia  -     DULoxetine HCl 40 MG CPEP; Take 40 mg by mouth daily    Chronic low back pain, unspecified back pain laterality, unspecified whether sciatica present  -     tiZANidine (ZANAFLEX) 4 MG tablet;  Take 1 tablet by mouth every 8 hours as needed (PRN)  -

## 2020-05-11 ENCOUNTER — TELEPHONE (OUTPATIENT)
Dept: FAMILY MEDICINE CLINIC | Age: 65
End: 2020-05-11

## 2020-05-11 RX ORDER — SUCRALFATE 1 G/1
1 TABLET ORAL 4 TIMES DAILY
Qty: 120 TABLET | Refills: 0 | Status: SHIPPED
Start: 2020-05-11 | End: 2020-10-06 | Stop reason: ALTCHOICE

## 2020-05-11 RX ORDER — ONDANSETRON 4 MG/1
4 TABLET, FILM COATED ORAL 3 TIMES DAILY PRN
Qty: 30 TABLET | Refills: 0 | Status: SHIPPED
Start: 2020-05-11 | End: 2020-09-28

## 2020-05-11 NOTE — TELEPHONE ENCOUNTER
Call placed to patient about My chart message I sent her about need for medication change. Patient reports she has been having some abdominal pain and reflux symptoms. She does have problems having BM. She has tried Miralax in the past, but it made her go \"too much\". She denies any fever,  No vomiting, but some nausea. She reports some abdominal bloating. She reports no chest pain or SOB. Her colonoscopy is UTD. She was advised to continue her Protonix, will send in some Carafate and Zofran. She will set up Video visit for this Friday to recheck as well as discuss medication change due to her insurance- letters I received.

## 2020-05-26 ENCOUNTER — TELEPHONE (OUTPATIENT)
Dept: FAMILY MEDICINE CLINIC | Age: 65
End: 2020-05-26

## 2020-05-26 NOTE — TELEPHONE ENCOUNTER
Pt needs a new A1c order due to she changed from [employer] to [address]. Please notify patient when order is placed.     [phone]

## 2020-06-09 ENCOUNTER — TELEPHONE (OUTPATIENT)
Dept: ADMINISTRATIVE | Age: 65
End: 2020-06-09

## 2020-06-09 NOTE — TELEPHONE ENCOUNTER
Patient called stated she has a mole on her back that she wants Dr Diaz Heller to look at, and it seems to be getting larger

## 2020-06-12 NOTE — TELEPHONE ENCOUNTER
Not aware of a Dr Cecilia Barnhart dermatology is this an [place] patient?     Thanks,  Yen Weldon

## 2020-06-16 NOTE — TELEPHONE ENCOUNTER
Siria Neville MD   Doctor in [facility]   Address: [address] [facility] [address], [address]   Hours:   [address]   Phone: [phone]

## 2020-06-30 NOTE — TELEPHONE ENCOUNTER
Carolyn Honeycutt had to change her appointment to July 22nd, but will be out of the Pregabalin. I did pend it for you.

## 2020-07-01 RX ORDER — PREGABALIN 150 MG/1
150 CAPSULE ORAL 3 TIMES DAILY
Qty: 90 CAPSULE | Refills: 0 | Status: SHIPPED
Start: 2020-07-01 | End: 2020-07-22 | Stop reason: SDUPTHER

## 2020-07-08 RX ORDER — BACLOFEN 10 MG/1
5 TABLET ORAL NIGHTLY
Qty: 15 TABLET | Refills: 0 | Status: SHIPPED
Start: 2020-07-08 | End: 2020-07-23

## 2020-07-12 RX ORDER — TOPIRAMATE 100 MG/1
100 TABLET, FILM COATED ORAL 2 TIMES DAILY
Qty: 180 TABLET | Refills: 0 | Status: SHIPPED
Start: 2020-07-12 | End: 2020-10-08 | Stop reason: SDUPTHER

## 2020-07-22 ENCOUNTER — OFFICE VISIT (OUTPATIENT)
Dept: PAIN MANAGEMENT | Age: 65
End: 2020-07-22
Payer: MEDICARE

## 2020-07-22 ENCOUNTER — HOSPITAL ENCOUNTER (OUTPATIENT)
Age: 65
Discharge: HOME | End: 2020-07-24
Payer: MEDICARE

## 2020-07-22 VITALS
OXYGEN SATURATION: 98 % | HEIGHT: 68 IN | HEART RATE: 100 BPM | WEIGHT: 293 LBS | RESPIRATION RATE: 16 BRPM | SYSTOLIC BLOOD PRESSURE: 130 MMHG | DIASTOLIC BLOOD PRESSURE: 76 MMHG | BODY MASS INDEX: 44.41 KG/M2 | TEMPERATURE: 97.3 F

## 2020-07-22 LAB
ALBUMIN SERPL-MCNC: 4.2 G/DL (ref 3.5–5.2)
ALP BLD-CCNC: 174 U/L (ref 35–104)
ALT SERPL-CCNC: 118 U/L (ref 0–32)
ANION GAP SERPL CALCULATED.3IONS-SCNC: 14 MMOL/L (ref 7–16)
AST SERPL-CCNC: 154 U/L (ref 0–31)
BILIRUB SERPL-MCNC: 0.5 MG/DL (ref 0–1.2)
BUN BLDV-MCNC: 13 MG/DL (ref 8–23)
CALCIUM SERPL-MCNC: 9.3 MG/DL (ref 8.6–10.2)
CHLORIDE BLD-SCNC: 108 MMOL/L (ref 98–107)
CO2: 22 MMOL/L (ref 22–29)
CREAT SERPL-MCNC: 1.1 MG/DL (ref 0.5–1)
GFR AFRICAN AMERICAN: >60
GFR NON-AFRICAN AMERICAN: 50 ML/MIN/1.73
GLUCOSE BLD-MCNC: 114 MG/DL (ref 74–99)
HBA1C MFR BLD: 6.1 % (ref 4–5.6)
POTASSIUM SERPL-SCNC: 4.1 MMOL/L (ref 3.5–5)
SODIUM BLD-SCNC: 144 MMOL/L (ref 132–146)
TOTAL PROTEIN: 7 G/DL (ref 6.4–8.3)

## 2020-07-22 PROCEDURE — 99213 OFFICE O/P EST LOW 20 MIN: CPT | Performed by: PHYSICIAN ASSISTANT

## 2020-07-22 PROCEDURE — 36415 COLL VENOUS BLD VENIPUNCTURE: CPT

## 2020-07-22 PROCEDURE — 80053 COMPREHEN METABOLIC PANEL: CPT

## 2020-07-22 PROCEDURE — 99214 OFFICE O/P EST MOD 30 MIN: CPT | Performed by: NURSE PRACTITIONER

## 2020-07-22 PROCEDURE — 83036 HEMOGLOBIN GLYCOSYLATED A1C: CPT

## 2020-07-22 RX ORDER — PREGABALIN 150 MG/1
150 CAPSULE ORAL 3 TIMES DAILY
Qty: 90 CAPSULE | Refills: 2 | Status: CANCELLED | OUTPATIENT
Start: 2020-07-31 | End: 2020-08-30

## 2020-07-22 RX ORDER — PREGABALIN 150 MG/1
150 CAPSULE ORAL 3 TIMES DAILY
Qty: 90 CAPSULE | Refills: 0 | Status: SHIPPED
Start: 2020-07-31 | End: 2020-08-26 | Stop reason: SDUPTHER

## 2020-07-22 NOTE — PROGRESS NOTES
[address] [place] [address], [address] [place] [address]  [phone]    Follow up Note      [facility]     Date of Visit:  2020    CC:  Patient presents for follow up   Chief Complaint   Patient presents with    Follow-up    Facial Pain    Pain       HPI:  Pain is unchanged to the lower back, mainly axial. Intermittent n/t feet  Pt notes she received a call from [facility] about interest product. Pt interested in learning more. I brought Dr Shan Ross in the room to speak with pt. Explained to the patient that first step is obtaining MRI of L/S and then revisit. Lyrica is helping. Reports that she feels stable on her current regimen. Appropriate analgesia with current medications regimen: yes. Change in quality of symptoms:no. Medication side effects:none. Recent diagnostic testing: YES, xray of L/S 2020. Recent interventional procedures:none.     Imaging:      LUMBAR SPINE XRAY 2020  Intact lumbar vertebral height and alignment. Relative severe    multilevel disc and facet joint narrowing with subchondral sclerosis    and spurring. Previous discectomy with posterior pedicle screw plates    extending from L3 through L5. Symmetric osteoarthritis at the    sacroiliac joints manifesting as joint space narrowing and subchondral    sclerosis.           Lumbar spine CT 2016      1.  Status post lumbar spine fusion from L3-L5 appearing in stable,   satisfactory alignment.       2. Lumbar spondylosis as described above in part due to congenital   shortened pedicles and notable stenosis                                                Potential Aberrant Drug-Related Behavior:  None     Urine Drug Screenin2020 Negative for all which is consistent     OARRS report:  2020-2020:Consistent      Past Medical History:   Diagnosis Date    Anxiety     Arthralgia 10/8/2013    Arthritis     Bone avascular necrosis ([address] 3/23/2015    Breast lesion 12/1/2015    Chronic back pain     Chronic fatigue 10/8/2013    Daytime somnolence 10/8/2013    Depression     Fibromyalgia     Fracture, fibula     right    GERD (gastroesophageal reflux disease)     H/O cardiovascular stress test 01/29/2020    Lexiscan    Headache     History of fractured kneecap     (R) 2 hair line fractures    History of total knee arthroplasty 3/19/2015    Hyperlipidemia     Morbid obesity with BMI of 45.0-49.9, adult ([facility] [address] 10/9/2015    Obesity     Osteoarthritis     Overactive bladder 1/22/2014    Reflux        Past Surgical History:   Procedure Laterality Date    CARPAL TUNNEL RELEASE Bilateral     CHOLECYSTECTOMY      COLONOSCOPY  2011    COLONOSCOPY  11/22/2016    Dr. [facility]ins [facility]    benign reasons     LUMBAR FUSION      L3-5    ROTATOR CUFF REPAIR Left     left    TOTAL KNEE ARTHROPLASTY Bilateral 2010    did both at the same time    TUBAL LIGATION         Prior to Admission medications    Medication Sig Start Date End Date Taking? Authorizing Provider   topiramate (TOPAMAX) 100 MG tablet Take 1 tablet by mouth 2 times daily 7/12/20  Yes Andrew Tafoya, APRN - CNP   baclofen (LIORESAL) 10 MG tablet Take 0.5 tablets by mouth nightly 7/8/20  Yes DEANDRE Simon   pregabalin (LYRICA) 150 MG capsule Take 1 capsule by mouth 3 times daily for 30 days.  TID 7/1/20 7/31/20 Yes DEANDRE Simon   ondansetron (ZOFRAN) 4 MG tablet Take 1 tablet by mouth 3 times daily as needed for Nausea or Vomiting 5/11/20  Yes Salvador Mayes PA-C   busPIRone (BUSPAR) 15 MG tablet TAKE 1 TABLET BY MOUTH THREE TIMES A DAY 4/27/20  Yes Salvador Mayes PA-C   fluticasone [facility]) 50 MCG/ACT nasal spray 1 spray by Nasal route daily 4/27/20  Yes Salvador Mayes PA-C   tiZANidine (ZANAFLEX) 4 MG tablet Take 1 tablet by mouth every 8 hours as needed (PRN) 4/27/20  Yes Salvador Mayes PA-C   tolterodine (DETROL [place]) 2 MG extended release capsule TAKE 1 TABLET BY MOUTH EVERY DAY 4/27/20  Yes Diya Roman PA-C   atorvastatin (LIPITOR) 20 MG tablet Take 1 tablet by mouth nightly 4/27/20  Yes Diya Roman PA-C   DULoxetine HCl 40 MG CPEP Take 40 mg by mouth daily 4/27/20  Yes Diya Roman PA-C   pantoprazole (PROTONIX) 40 MG tablet Take 1 tablet by mouth 2 times daily 4/27/20  Yes Diya Roman PA-C   budesonide-formoterol [facility]) 80-4.5 MCG/ACT AERO Inhale 2 puffs into the lungs 2 times daily 4/27/20  Yes Diya Roman PA-C   Handicap Placard MISC Handicap Placard  Patient unable to walk more than 200ft without stopping.   Dx: Chronic lower back pain, spinal stenosis, lumbar spondylosis  Recommend 5 year placard 4/27/20  Yes Diya Roman PA-C   cetirizine (ZYRTEC) 10 MG tablet Take 1 tablet by mouth daily 4/27/20  Yes Diya Roman PA-C   albuterol sulfate HFA (VENTOLIN HFA) 108 (90 Base) MCG/ACT inhaler Inhale 2 puffs into the lungs every 6 hours as needed for Wheezing or Shortness of Breath 12/15/17  Yes Katlyn Schultz MD   meclizine (ANTIVERT) 25 MG tablet Take 1 tablet by mouth 3 times daily as needed 9/83/80  Yes Javed Moore MD   sucralfate (CARAFATE) 1 GM tablet Take 1 tablet by mouth 4 times daily  Patient not taking: Reported on 7/22/2020 5/11/20   Diya Roman PA-C       Allergies   Allergen Reactions    Mobic [Meloxicam] Other (See Comments)     Abdominal pain    Ultram [Tramadol]      Abdominal pain    Nickel Itching and Rash       Social History     Socioeconomic History    Marital status:      Spouse name: Not on file    Number of children: Not on file    Years of education: Not on file    Highest education level: Associate degree: academic program   Occupational History    Not on file   Social Needs    Financial resource strain: Not on file    Food insecurity     Worry: Not on file     Inability: Not on file    Transportation needs     Medical: Not on file     Non-medical: Not on file   Tobacco Use    Smoking status: Current Every Day Smoker     Packs/day: 1.00     Years: 30.00     Pack years: 30.00     Types: Cigarettes     Start date: 5    Smokeless tobacco: Never Used   Substance and Sexual Activity    Alcohol use: No     Alcohol/week: 0.0 standard drinks    Drug use: No    Sexual activity: Never   Lifestyle    Physical activity     Days per week: Not on file     Minutes per session: Not on file    Stress: Not on file   Relationships    Social connections     Talks on phone: Not on file     Gets together: Not on file     Attends Yarsani service: Not on file     Active member of club or organization: Not on file     Attends meetings of clubs or organizations: Not on file     Relationship status: Not on file    Intimate partner violence     Fear of current or ex partner: Not on file     Emotionally abused: Not on file     Physically abused: Not on file     Forced sexual activity: Not on file   Other Topics Concern    Not on file   Social History Narrative    Not on file       Family History   Problem Relation Age of Onset    High Blood Pressure Mother     Diabetes Mother     Heart Disease Mother     Cancer Mother     Diabetes Father     Heart Disease Father     High Blood Pressure Father     Cancer Other 50        breast    Depression Brother        REVIEW OF SYSTEMS:     Omie Ganser denies fever/chills, chest pain, shortness of breath, new bowel or bladder complaints or suicidal ideations. All other review of systems wasnegative.       PHYSICAL EXAMINATION:      /76   Pulse 100   Temp 97.3 °F (36.3 °C) (Oral)   Resp 16   Ht 5' 8\" (1.727 m)   Wt (!) 326 lb (147.9 kg)   LMP  (LMP Unknown)   SpO2 98%   BMI 49.57 kg/m²     General:       General appearance:   elderly, pleasant and well-hydrated.   ,in moderate discomfort and A & O x3  Build:Overweight     HEENT:     Head:normocephalic and atraumatic  Pupils:regular, round and equal.  Sclera: icterus absent,    Lungs:     Breathing:Breathing Pattern: regular, no distress     Abdomen:     Shape:non-distended and normal  Tenderness:none     Cervical spine:     Inspection:normal  Palpation:tenderness paravertebral muscles, trigger points paravertebral muscles, facet loading, left, right, negative, tenderness and non-tender paraspinals. Range of motion:normal not flexion, extension rotation bilateral and is not painful.     Lumbar spine:     Spine inspection:surgical incision scar   CVA tenderness:No   Palpation:tenderness paravertebral muscles, facet loading, left and right, positive and tenderness. Range of motion:abnormal moderately Lateral bending, flexion, extension rotation bilateral and is  painful.     Musculoskeletal:     Trigger points in Paraveteral:present bilaterally  Burton's:negative right, negative left   SI joint tenderness:positive right, positive left              SULTANA test:negative right, negative left  Piriformis tenderness:negative right, negative left  Trochanteric bursa tenderness:positve right, positive left  SLR:negative right, negative left, sitting      Extremities:     Tremors:None bilaterally upper and lower  Range of motion:Generally normal shoulders, pain with internal rotation of hips negative.   Intact:Yes  Edema: +2 pitting edema bilateral LE     Knee:     Inspection:bilateral knee replaced     Neurological:     Sensory:normal to light touch bilateral upper and lower extremities  Motor:              Right Bicep5/5           Left Bicep5/5              Right Triceps5/5       Left Triceps5/5          Right Deltoid5/5     Left Deltoid5/5                  Right Quadriceps 5/5          Left Quadriceps4/5           Right Gastrocnemius5/5    Left Gastrocnemius5/5  Right Ant Tibialis5/5  Left Ant Tibialis5/5    Gait:antalgic     Dermatology:     Skin:no unusual rashes and no skin lesions  Redness noted bilateral LE calf to ankles      Impression:      Chronic low back with h/o L3-5 fusion/bilateral knees replaced  Refuses to have anymore injections, [place] and reports so painful during and after, refuses to have again      Plan:               Chronic low back and all over body pain               Stable on this mediation. MRI of L/S with/without,   Dr. Liudmila Amato here to discuss SCS but explained MRI first then can discuss,   pt verbalized understanding and agreed  Myofascial body pain and chronic low back pain axial in nature    Continue and refill Lyrica 150 mg TID. Ordered with 2 refills. RTC in 3 months for medication refill and follow up,   Continue and refill Baclofen 5 mg QD - with 1 refill (takes only when pain severe). Continue with Cymbalta 40 mg QD from PCP                Currently on Zanaflex 4mg QHS gets from PCP  OARRS report reviewed 07/2020  Patient encouraged to stay active and to lose weight  Against referral to physical therapy  Treatment plan discussed with the patient including medications side effects                       Seen with DR Liudmila Amato     We discussed with the patient that combining opioids, benzodiazepines, alcohol, illicit drugs or sleep aids increases the risk of respiratory depression including death. We discussed that these medications may cause drowsiness, sedation or dizziness and have counseled the patient not to drive or operate machinery. We have discussed that these medications will be prescribed only by one provider. We have discussed with the patient about age related risk factors and have thoroughly discussed the importance of taking these medications as prescribed. The patient verbalizes understanding.             Electronically signed by GIUSEPPE Bolton CNP on 7/22/20 at 1:52 PM EDT

## 2020-07-22 NOTE — PROGRESS NOTES
Do you currently have any of the following:    Fever: No  Headache:  No  Cough: No  Shortness of breath: No  Exposed to anyone with these symptoms: No                                                                                                                Zhanna Jeong presents to the [facility] on 7/22/2020. Lavonne Valenzuela is complaining of pain fibromyalgia. The pain is persistent. The pain is described as colicky and burning. Pain is rated on her best day at a 6, on her worst day at a 10, and on average at a 8 on the VAS scale. She took her last dose of Lyrica this morning. Lavonne Valenzuela does not have issues with constipation. Any procedures since your last visit:  She is not on NSAIDS and  is not on anticoagulation medications to include none. Pacemaker or defibrilator: No.       /76   Pulse 100   Temp 97.3 °F (36.3 °C) (Oral)   Resp 16   Ht 5' 8\" (1.727 m)   Wt (!) 326 lb (147.9 kg)   LMP  (LMP Unknown)   SpO2 98%   BMI 49.57 kg/m²      No LMP recorded (lmp unknown). Patient has had a hysterectomy.

## 2020-07-23 RX ORDER — BACLOFEN 10 MG/1
5 TABLET ORAL NIGHTLY
Qty: 15 TABLET | Refills: 1 | Status: SHIPPED
Start: 2020-07-23 | End: 2020-09-04 | Stop reason: SDUPTHER

## 2020-07-27 ENCOUNTER — TELEPHONE (OUTPATIENT)
Dept: FAMILY MEDICINE CLINIC | Age: 65
End: 2020-07-27

## 2020-07-27 NOTE — TELEPHONE ENCOUNTER
Called patient and advised the patient, She stated she would come in tomorrow to get blood work done.

## 2020-07-27 NOTE — TELEPHONE ENCOUNTER
Liver enzymes are crazy elevated stop the atorvastatin immediately. I also ordered hepatitis panel on you please come tomorrow to get drawn.

## 2020-07-28 ENCOUNTER — HOSPITAL ENCOUNTER (OUTPATIENT)
Age: 65
Discharge: HOME | End: 2020-07-30
Payer: MEDICARE

## 2020-07-28 PROCEDURE — 80074 ACUTE HEPATITIS PANEL: CPT

## 2020-07-28 PROCEDURE — 36415 COLL VENOUS BLD VENIPUNCTURE: CPT

## 2020-07-29 LAB
HAV IGM SER IA-ACNC: NORMAL
HEPATITIS B CORE IGM ANTIBODY: NORMAL
HEPATITIS B SURFACE ANTIGEN INTERPRETATION: NORMAL
HEPATITIS C ANTIBODY INTERPRETATION: NORMAL

## 2020-08-03 ENCOUNTER — TELEPHONE (OUTPATIENT)
Dept: FAMILY MEDICINE CLINIC | Age: 65
End: 2020-08-03

## 2020-08-04 ENCOUNTER — TELEPHONE (OUTPATIENT)
Dept: FAMILY MEDICINE CLINIC | Age: 65
End: 2020-08-04

## 2020-08-04 ENCOUNTER — VIRTUAL VISIT (OUTPATIENT)
Dept: FAMILY MEDICINE CLINIC | Age: 65
End: 2020-08-04
Payer: MEDICARE

## 2020-08-04 ENCOUNTER — TELEPHONE (OUTPATIENT)
Dept: PAIN MANAGEMENT | Age: 65
End: 2020-08-04

## 2020-08-04 PROCEDURE — G2012 BRIEF CHECK IN BY MD/QHP: HCPCS | Performed by: PHYSICIAN ASSISTANT

## 2020-08-04 RX ORDER — ZOLPIDEM TARTRATE 5 MG/1
5 TABLET ORAL NIGHTLY PRN
Qty: 30 TABLET | Refills: 0 | Status: SHIPPED | OUTPATIENT
Start: 2020-08-04 | End: 2020-09-03

## 2020-08-04 ASSESSMENT — ENCOUNTER SYMPTOMS
DIARRHEA: 0
ABDOMINAL PAIN: 0
VOMITING: 0
NAUSEA: 0
EYE REDNESS: 0
BACK PAIN: 1
CHEST TIGHTNESS: 0
SHORTNESS OF BREATH: 0
COUGH: 0
EYE DISCHARGE: 0
EYE PAIN: 0

## 2020-08-04 NOTE — TELEPHONE ENCOUNTER
I called patient and scheduled F/U 9/15/20 at 12:30 pm.  Advised patient Cira Cabrera will mail info on diabetes, handicap placard RX and lab orders.

## 2020-08-04 NOTE — PATIENT INSTRUCTIONS
Patient Education        Insomnia: Care Instructions  Your Care Instructions     Insomnia is the inability to sleep well. It is a common problem for most people at some time. Insomnia may make it hard for you to get to sleep, stay asleep, or sleep as long as you need to. This can make you tired and grouchy during the day. It can also make you forgetful, less effective at work, and unhappy. Insomnia can be caused by conditions such as depression or anxiety. Pain can also affect your ability to sleep. When these problems are solved, the insomnia usually clears up. But sometimes bad sleep habits can cause insomnia. If insomnia is affecting your work or your enjoyment of life, you can take steps to improve your sleep. Follow-up care is a key part of your treatment and safety. Be sure to make and go to all appointments, and call your doctor if you are having problems. It's also a good idea to know your test results and keep a list of the medicines you take. How can you care for yourself at home? What to avoid   · Do not have drinks with caffeine, such as coffee or black tea, for 8 hours before bed. · Do not smoke or use other types of tobacco near bedtime. Nicotine is a stimulant and can keep you awake. · Avoid drinking alcohol late in the evening, because it can cause you to wake in the middle of the night. · Do not eat a big meal close to bedtime. If you are hungry, eat a light snack. · Do not drink a lot of water close to bedtime, because the need to urinate may wake you up during the night. · Do not read or watch TV in bed. Use the bed only for sleeping and sexual activity. What to try   · Go to bed at the same time every night, and wake up at the same time every morning. Do not take naps during the day. · Keep your bedroom quiet, dark, and cool. · Sleep on a comfortable pillow and mattress. · If watching the clock makes you anxious, turn it facing away from you so you cannot see the time.   · If you average total nightly sleep time is 7½ to 8 hours. Healthy adults may need a little more or a little less than this. Why is getting enough sleep important? Getting enough quality sleep is a basic part of good health. When your sleep suffers, your mood and your thoughts can suffer too. You may find yourself feeling more grumpy or stressed. Not getting enough sleep also can lead to serious problems, including injury, accidents, anxiety, and depression. What might cause poor sleeping? Many things can cause sleep problems, including:  · Stress. Stress can be caused by fear about a single event, such as giving a speech. Or you may have ongoing stress, such as worry about work or school. · Depression, anxiety, and other mental or emotional conditions. · Changes in your sleep habits or surroundings. This includes changes that happen where you sleep, such as noise, light, or sleeping in a different bed. It also includes changes in your sleep pattern, such as having jet lag or working a late shift. · Health problems, such as pain, breathing problems, and restless legs syndrome. · Lack of regular exercise. How can you help yourself? Here are some tips that may help you sleep more soundly and wake up feeling more refreshed. Your sleeping area   · Use your bedroom only for sleeping and sex. A bit of light reading may help you fall asleep. But if it doesn't, do your reading elsewhere in the house. Don't watch TV in bed. · Be sure your bed is big enough to stretch out comfortably, especially if you have a sleep partner. · Keep your bedroom quiet, dark, and cool. Use curtains, blinds, or a sleep mask to block out light. To block out noise, use earplugs, soothing music, or a \"white noise\" machine. Your evening and bedtime routine   · Create a relaxing bedtime routine. You might want to take a warm shower or bath, listen to soothing music, or drink a cup of noncaffeinated tea. · Go to bed at the same time every night. Instructions     Stenosis in the spine is a narrowing of the canal that is around the spinal cord and nerve roots in your back. It can happen as part of aging. Sometimes bone and other tissue grow into this canal and press on the nerves that branch out from the spinal cord. This can cause pain, numbness, and weakness. When it happens in the lower part of your back, it is called lumbar spinal stenosis. It can cause problems in the legs, feet, and rear end (buttocks). You may be able to get relief from the symptoms of spinal stenosis by taking pain medicine. Your doctor may suggest physical therapy and exercises to keep your spine strong and flexible. Some people try steroid shots to reduce swelling. If pain and numbness in your legs are still so bad that you cannot do your normal activities, you may need surgery. Follow-up care is a key part of your treatment and safety. Be sure to make and go to all appointments, and call your doctor if you are having problems. It's also a good idea to know your test results and keep a list of the medicines you take. How can you care for yourself at home? · Take an over-the-counter pain medicine. Nonsteroidal anti-inflammatory drugs (NSAIDs) such as ibuprofen or naproxen seem to work best. But if you can't take NSAIDs, you can try acetaminophen. Be safe with medicines. Read and follow all instructions on the label. · Do not take two or more pain medicines at the same time unless the doctor told you to. Many pain medicines have acetaminophen, which is Tylenol. Too much acetaminophen (Tylenol) can be harmful. · Stay at a healthy weight. Being overweight puts extra strain on your spine. · Change positions often when you sit or stand. This can ease pain. It may also reduce pressure on the spinal cord and its nerves. · Avoid doing things that make your symptoms worse. Walking downhill and standing for a long time may cause pain.   · Stretch and strengthen your back muscles as your doctor or physical therapist recommends. If your doctor says it is okay to do them, these exercises may help. ? Lie on your back with your knees bent. Gently pull one bent knee to your chest. Put that foot back on the floor, and then pull the other knee to your chest.  ? Do pelvic tilts. Lie on your back with your knees bent. Tighten your stomach muscles. Pull your belly button (navel) in and up toward your ribs. You should feel like your back is pressing to the floor and your hips and pelvis are slightly lifting off the floor. Hold for 6 seconds while breathing smoothly. ? Stand with your back flat against a wall. Slowly slide down until your knees are slightly bent. Hold for 10 seconds, then slide back up the wall. · Remove or change anything in your house that may cause you to fall. Keep walkways clear of clutter, electrical cords, and throw rugs. When should you call for help? [id number] anytime you think you may need emergency care. For example, call if:  · You are unable to move a leg at all. Call your doctor now or seek immediate medical care if:  · You have new or worse symptoms in your legs, belly, or buttocks. Symptoms may include:  ? Numbness or tingling. ? Weakness. ? Pain. · You lose bladder or bowel control. Watch closely for changes in your health, and be sure to contact your doctor if:  · You have a fever, lose weight, or don't feel well. · You are not getting better as expected. Where can you learn more? Go to https://TrelliSoft.[employer]. org and sign in to your [employer] account. Enter P565 in the Expert Networks box to learn more about \"Lumbar Spinal Stenosis: Care Instructions. \"     If you do not have an account, please click on the \"Sign Up Now\" link. Current as of: March 2, 2020               Content Version: 12.5  © 0635-3175 Healthwise, Incorporated. Care instructions adapted under license by [facility] ([facility]).  If you have questions about a medical condition or this of carbs you eat. This is called carbohydrate counting. · If you are not sure how to count carbohydrate grams, use the Plate Method to plan meals. It is a good, quick way to make sure that you have a balanced meal. It also helps you spread carbs throughout the day. ? Divide your plate by types of foods. Put non-starchy vegetables on half the plate, meat or other protein food on one-quarter of the plate, and a grain or starchy vegetable in the final quarter of the plate. To this you can add a small piece of fruit and 1 cup of milk or yogurt, depending on how many carbs you are supposed to eat at a meal.  · Try to eat about the same amount of carbs at each meal. Do not \"save up\" your daily allowance of carbs to eat at one meal.  · Proteins have very little or no carbs per serving. Examples of proteins are beef, chicken, turkey, fish, eggs, tofu, cheese, cottage cheese, and peanut butter. A serving size of meat is 3 ounces, which is about the size of a deck of cards. Examples of meat substitute serving sizes (equal to 1 ounce of meat) are 1/4 cup of cottage cheese, 1 egg, 1 tablespoon of peanut butter, and ½ cup of tofu. How can you eat out and still eat healthy? · Learn to estimate the serving sizes of foods that have carbohydrate. If you measure food at home, it will be easier to estimate the amount in a serving of restaurant food. · If the meal you order has too much carbohydrate (such as potatoes, corn, or baked beans), ask to have a low-carbohydrate food instead. Ask for a salad or green vegetables. · If you use insulin, check your blood sugar before and after eating out to help you plan how much to eat in the future. · If you eat more carbohydrate at a meal than you had planned, take a walk or do other exercise. This will help lower your blood sugar. What else should you know? · Limit saturated fat, such as the fat from meat and dairy products.  This is a healthy choice because people who have diabetes are at higher risk of heart disease. So choose lean cuts of meat and nonfat or low-fat dairy products. Use olive or canola oil instead of butter or shortening when cooking. · Don't skip meals. Your blood sugar may drop too low if you skip meals and take insulin or certain medicines for diabetes. · Check with your doctor before you drink alcohol. Alcohol can cause your blood sugar to drop too low. Alcohol can also cause a bad reaction if you take certain diabetes medicines. Follow-up care is a key part of your treatment and safety. Be sure to make and go to all appointments, and call your doctor if you are having problems. It's also a good idea to know your test results and keep a list of the medicines you take. Where can you learn more? Go to https://LionWorks.[employer]. org and sign in to your [email or web address] account. Enter K984 in the TopFun box to learn more about \"Learning About Diabetes Food Guidelines. \"     If you do not have an account, please click on the \"Sign Up Now\" link. Current as of: December 20, 2019               Content Version: 12.5  © 5134-5642 Healthwise, [employer]. Care instructions adapted under license by [facility] ([facility]). If you have questions about a medical condition or this instruction, always ask your healthcare professional. [address] any warranty or liability for your use of this information. Patient Education        Learning About Meal Planning for Diabetes  Why plan your meals? Meal planning can be a key part of managing diabetes. Planning meals and snacks with the right balance of carbohydrate, protein, and fat can help you keep your blood sugar at the target level you set with your doctor. You don't have to eat special foods. You can eat what your family eats, including sweets once in a while. But you do have to pay attention to how often you eat and how much you eat of certain foods.   You may want to work with a dietitian or a certified diabetes educator. He or she can give you tips and meal ideas and can answer your questions about meal planning. This health professional can also help you reach a healthy weight if that is one of your goals. What plan is right for you? Your dietitian or diabetes educator may suggest that you start with the plate format or carbohydrate counting. The plate format  The plate format is a simple way to help you manage how you eat. You plan meals by learning how much space each food should take on a plate. Using the plate format helps you spread carbohydrate throughout the day. It can make it easier to keep your blood sugar level within your target range. It also helps you see if you're eating healthy portion sizes. To use the plate format, you put non-starchy vegetables on half your plate. Add meat or meat substitutes on one-quarter of the plate. Put a grain or starchy vegetable (such as brown rice or a potato) on the final quarter of the plate. You can add a small piece of fruit and some low-fat or fat-free milk or yogurt, depending on your carbohydrate goal for each meal.  Here are some tips for using the plate format:  · Make sure that you are not using an oversized plate. A 9-inch plate is best. Many restaurants use larger plates. · Get used to using the plate format at home. Then you can use it when you eat out. · Write down your questions about using the plate format. Talk to your doctor, a dietitian, or a diabetes educator about your concerns. Carbohydrate counting  With carbohydrate counting, you plan meals based on the amount of carbohydrate in each food. Carbohydrate raises blood sugar higher and more quickly than any other nutrient. It is found in desserts, breads and cereals, and fruit. It's also found in starchy vegetables such as potatoes and corn, grains such as rice and pasta, and milk and yogurt.  Spreading carbohydrate throughout the day helps keep your blood sugar levels within your target range. Your daily amount depends on several things, including your weight, how active you are, which diabetes medicines you take, and what your goals are for your blood sugar levels. A registered dietitian or diabetes educator can help you plan how much carbohydrate to include in each meal and snack. A guideline for your daily amount of carbohydrate is:  · 45 to 60 grams at each meal. That's about the same as 3 to 4 carbohydrate servings. · 15 to 20 grams at each snack. That's about the same as 1 carbohydrate serving. The Nutrition Facts label on packaged foods tells you how much carbohydrate is in a serving of the food. First, look at the serving size on the food label. Is that the amount you eat in a serving? All of the nutrition information on a food label is based on that serving size. So if you eat more or less than that, you'll need to adjust the other numbers. Total carbohydrate is the next thing you need to look for on the label. If you count carbohydrate servings, one serving of carbohydrate is 15 grams. For foods that don't come with labels, such as fresh fruits and vegetables, you'll need a guide that lists carbohydrate in these foods. Ask your doctor, dietitian, or diabetes educator about books or other nutrition guides you can use. If you take insulin, you need to know how many grams of carbohydrate are in a meal. This lets you know how much rapid-acting insulin to take before you eat. If you use an insulin pump, you get a constant rate of insulin during the day. So the pump must be programmed at meals to give you extra insulin to cover the rise in blood sugar after meals. When you know how much carbohydrate you will eat, you can take the right amount of insulin. Or, if you always use the same amount of insulin, you need to make sure that you eat the same amount of carbohydrate at meals.   If you need more help to understand carbohydrate counting and food labels, ask your doctor, dietitian, or diabetes educator. How do you get started with meal planning? Here are some tips to get started:  · Plan your meals a week at a time. Don't forget to include snacks too. · Use cookbooks or online recipes to plan several main meals. Plan some quick meals for busy nights. You also can double some recipes that freeze well. Then you can save half for other busy nights when you don't have time to cook. · Make sure you have the ingredients you need for your recipes. If you're running low on basic items, put these items on your shopping list too. · List foods that you use to make breakfasts, lunches, and snacks. List plenty of fruits and vegetables. · Post this list on the refrigerator. Add to it as you think of more things you need. · Take the list to the store to do your weekly shopping. Follow-up care is a key part of your treatment and safety. Be sure to make and go to all appointments, and call your doctor if you are having problems. It's also a good idea to know your test results and keep a list of the medicines you take. Where can you learn more? Go to https://ChainalyticspepicewSarsys.BeautyCon. org and sign in to your [employer] account. Enter E401 in the XipLink box to learn more about \"Learning About Meal Planning for Diabetes. \"     If you do not have an account, please click on the \"Sign Up Now\" link. Current as of: December 20, 2019               Content Version: 12.5  © 1817-1933 Healthwise, Incorporated. Care instructions adapted under license by [facility] [place] ([facility]). If you have questions about a medical condition or this instruction, always ask your healthcare professional. [place] [address] any warranty or liability for your use of this information. Patient Education        metformin  Pronunciation:  met FOR min  Brand:  Fortamet, Glucophage, Glucophage XR, Glumetza, Riomet  What is the most important information I should know about metformin?   You risk.  Follow your doctor's instructions about using this medicine if you are pregnant. Blood sugar control is very important during pregnancy, and your dose needs may be different during each trimester of pregnancy. Tell your doctor if you become pregnant while taking metformin. Metformin may stimulate ovulation in a premenopausal woman and may increase the risk of unintended pregnancy. Talk to your doctor about your risk. You should not breastfeed while using this medicine. Metformin should not be given to a child younger than 8years old. Some forms of metformin are not approved for use by anyone younger than 25years old. How should I take metformin? Follow all directions on your prescription label and read all medication guides or instruction sheets. Your doctor may occasionally change your dose. Use the medicine exactly as directed. Take metformin with a meal, unless your doctor tells you otherwise. Some forms of metformin are taken only once daily with the evening meal. Follow your doctor's instructions. Do not crush, chew, or break an extended-release tablet. Swallow it whole. Measure liquid medicine carefully. Use the dosing syringe provided, or use a medicine dose-measuring device (not a kitchen spoon). Some tablets are made with a shell that is not absorbed or melted in the body. Part of this shell may appear in your stool. This is normal and will not make the medicine less effective. You may have low blood sugar (hypoglycemia) and feel very hungry, dizzy, irritable, confused, anxious, or shaky. To quickly treat hypoglycemia, eat or drink a fast-acting source of sugar (fruit juice, hard candy, crackers, raisins, or non-diet soda). Your doctor may prescribe a glucagon injection kit in case you have severe hypoglycemia. Be sure your family or close friends know how to give you this injection in an emergency.   Blood sugar levels can be affected by stress, illness, surgery, exercise, alcohol use, or skipping meals. Ask your doctor before changing your dose or medication schedule. Metformin is only part of a complete treatment program that may also include diet, exercise, weight control, blood sugar testing, and special medical care. Follow your doctor's instructions very closely. Store at room temperature away from moisture, heat, and light. Your doctor may have you take extra vitamin B12 while you are taking metformin. Take only the amount of vitamin B12 that your doctor has prescribed. What happens if I miss a dose? Take the medicine as soon as you can, but skip the missed dose if it is almost time for your next dose. Do not take two doses at one time. What happens if I overdose? Seek emergency medical attention or call the Poison Help line at 1-603.909.5590. An overdose can cause severe hypoglycemia or lactic acidosis. What should I avoid while taking metformin? Avoid drinking alcohol. It lowers blood sugar and may increase your risk of lactic acidosis. What are the possible side effects of metformin? Get emergency medical help if you have signs of an allergic reaction:  hives; difficult breathing; swelling of your face, lips, tongue, or throat. Some people using metformin develop lactic acidosis, which can be fatal. Get emergency medical help if you have even mild symptoms such as:  · unusual muscle pain;  · feeling cold;  · trouble breathing;  · feeling dizzy, light-headed, tired, or very weak;  · stomach pain, vomiting; or  · slow or irregular heart rate. Common side effects may include:  · low blood sugar;  · nausea, upset stomach; or  · diarrhea. This is not a complete list of side effects and others may occur. Call your doctor for medical advice about side effects. You may report side effects to FDA at 5-902-YFX-5056. What other drugs will affect metformin? Many drugs can affect metformin, making this medicine less effective or increasing your risk of lactic acidosis.  This includes prescription and over-the-counter medicines, vitamins, and herbal products. Not all possible interactions are listed here. Tell your doctor about all your current medicines and any medicine you start or stop using. Where can I get more information? Your pharmacist can provide more information about metformin. Remember, keep this and all other medicines out of the reach of children, never share your medicines with others, and use this medication only for the indication prescribed. Every effort has been made to ensure that the information provided by [address] is accurate, up-to-date, and complete, but no guarantee is made to that effect. Drug information contained herein may be time sensitive. [facility] information has been compiled for use by healthcare practitioners and consumers in the [place] and therefore [facility] does not warrant that uses outside of the [place] are appropriate, unless specifically indicated otherwise. [facility]'s drug information does not endorse drugs, diagnose patients or recommend therapy. [facility] drug information is an informational resource designed to assist licensed healthcare practitioners in caring for their patients and/or to serve consumers viewing this service as a supplement to, and not a substitute for, the expertise, skill, knowledge and judgment of healthcare practitioners. The absence of a warning for a given drug or drug combination in no way should be construed to indicate that the drug or drug combination is safe, effective or appropriate for any given patient. [facility] does not assume any responsibility for any aspect of healthcare administered with the aid of information [facility] provides. The information contained herein is not intended to cover all possible uses, directions, precautions, warnings, drug interactions, allergic reactions, or adverse effects.  If you have questions about the drugs you are taking, check with your doctor, nurse or pharmacist.  Copyright 5635-6006 [employer] Version: 17.01. Revision date: 9/10/2019. Care instructions adapted under license by [facility] ([facility]). If you have questions about a medical condition or this instruction, always ask your healthcare professional. [address] any warranty or liability for your use of this information.

## 2020-08-04 NOTE — TELEPHONE ENCOUNTER
I did call Kalin Gonzalez and explained that she is not to take the Zanaflex at [facility] while she is on the Ambien. Kalin Gonzalez verbalized understanding.

## 2020-08-04 NOTE — TELEPHONE ENCOUNTER
----- Message from Alejandrina Quiñonez PA-C sent at 8/4/2020 10:42 AM EDT -----  Regarding: [place] patient Oscar Randolph,  This is Marr Kenton. Hope all is well with you. Just reaching out as I just finished a telephone visit with Martha Ashley. She is struggling to sleep at night. She is going through a lot and I know she sees Dr. Zoraida Vasquez. I am wanting to try for just a short time some Ambien at night so she can rest. I just wanted to check with office if it is ok with doctor since she is on a pain management agreement. She is already taking Buspar and she is unable to tolerate Trazadone. If it is not ok, I can try and come up with another option, but I know she is able to tolerate the Ambien and therefore hoping this can just be a short term fix until I work up some other things with her once I am able to see her face to face. Please let me know at your earliest convenience.   Marni Neves PA-C

## 2020-08-04 NOTE — PROGRESS NOTES
Zhanna Jeong is a 72 y.o. female evaluated via telephone on 2020. Consent:  She and/or health care decision maker is aware that that she may receive a bill for this telephone service, depending on her insurance coverage, and has provided verbal consent to proceed: Yes      Documentation:  I communicated with the patient and/or health care decision maker about her telephone communication and addressed her current concerns. Details of this discussion including any medical advice provided:Please see chart documentation      I affirm this is a Patient Initiated Episode with a Patient who has not had a related appointment within my department in the past 7 days or scheduled within the next 24 hours. Patient identification was verified at the start of the visit: Yes    Total Time: minutes: 5-10 minutes    Note: not billable if this call serves to triage the patient into an appointment for the relevant concern      Salvador Sugey          2020     Zhanna Jeong (:  1955) is a 72 y.o. female, requested evaluation of the following medical concerns:    HPI  Patient known to me. Patient with extensive PMH of multiple medical issues. She is currently struggling with insomnia and extreme fatigue. She currently is seeing Dr. Judy Flanagan from pain management and pending MRI this week for her lumbar spine. She did not do well with previous injections to her back. She reports that she is \"sleepy all the time\" and then cannot sleep at night. She does take several medications during the day that have sedative effects, but if she does not take them, she cannot function \"at all\". She did recently have some labs and noted to have elevated LFT's. Her cholesterol medication was held. She is having no current complaints of abdominal pain, nausea or vomiting.     She reports with all the stress she has been having she is back to smoking tobacco, not that much but she is upset that she went back to smoking. I did discuss with patient that her hemoglobin A1c was elevated at 6.1. She admits that she has not been following a very good diet. I advised her that we can try her on metformin 500 mg once daily with her breakfast and this may help some with her weight. I will also send her written information about diabetes and how to \"balance her plate\", portion sizes, etc..    She reports that for the insomnia she has taken Ambien in the past and tolerated it very well for sleep. She is aware that she is on a lot of sedating medications and if we would use this, it would only be temporary and with much caution. Review of Systems   Constitutional: Positive for activity change (reports very limited activity due to her back issues) and fatigue (tired \"all the time\"). Negative for chills, fever and unexpected weight change (can't lose weight, reports she is eating too much). HENT: Negative for congestion. Eyes: Negative for pain, discharge, redness and visual disturbance. Respiratory: Negative for cough, chest tightness and shortness of breath. Cardiovascular: Negative for chest pain. Gastrointestinal: Negative for abdominal pain, diarrhea, nausea and vomiting. Genitourinary: Negative for dysuria. Musculoskeletal: Positive for arthralgias, back pain (constant per patient) and gait problem (due to chronic spinal stenosis). Skin: Negative for rash and wound. Neurological: Positive for weakness (generalized). Negative for dizziness, syncope and headaches. Hematological: Negative for adenopathy. Psychiatric/Behavioral: Positive for sleep disturbance. The patient is nervous/anxious. Admits to feeling depressed, she doesn't like the apartment she is currently in. She has to climb 2 sets of stairs to do her laundry, having problems taking care of herself. All other systems reviewed and are negative. Prior to Visit Medications    Medication Sig Taking?  Authorizing Provider baclofen (LIORESAL) 10 MG tablet Take 0.5 tablets by mouth nightly  GIUSEPPE Gipson CNP   pregabalin (LYRICA) 150 MG capsule Take 1 capsule by mouth 3 times daily for 30 days. DEANDRE Khan   topiramate (TOPAMAX) 100 MG tablet Take 1 tablet by mouth 2 times daily  GIUSEPPE Bangura CNP   sucralfate (CARAFATE) 1 GM tablet Take 1 tablet by mouth 4 times daily  Patient not taking: Reported on 7/22/2020  Daylin Church PA-C   ondansetron (ZOFRAN) 4 MG tablet Take 1 tablet by mouth 3 times daily as needed for Nausea or Vomiting  Daylin Church PA-C   busPIRone (BUSPAR) 15 MG tablet TAKE 1 TABLET BY MOUTH THREE TIMES A DAY  Daylin Church PA-C   fluticasone (FLONASE) 50 MCG/ACT nasal spray 1 spray by Nasal route daily  Daylin Church PA-C   tiZANidine (ZANAFLEX) 4 MG tablet Take 1 tablet by mouth every 8 hours as needed (PRN)  Daylin Church PA-C   tolterodine (DETROL LA) 2 MG extended release capsule TAKE 1 TABLET BY MOUTH EVERY DAY  Daylin Church PA-C   DULoxetine HCl 40 MG CPEP Take 40 mg by mouth daily  Daylin Church PA-C   pantoprazole (PROTONIX) 40 MG tablet Take 1 tablet by mouth 2 times daily  Daylin Church PA-C   budesonide-formoterol (SYMBICORT) 80-4.5 MCG/ACT AERO Inhale 2 puffs into the lungs 2 times daily  Daylin Church PA-C   Handicap Placard MISC Handicap Placard  Patient unable to walk more than 200ft without stopping.   Dx: Chronic lower back pain, spinal stenosis, lumbar spondylosis  Recommend 5 year raghavendra Church PA-C   cetirizine (ZYRTEC) 10 MG tablet Take 1 tablet by mouth daily  Daylin Church PA-C   albuterol sulfate HFA (VENTOLIN HFA) 108 (90 Base) MCG/ACT inhaler Inhale 2 puffs into the lungs every 6 hours as needed for Wheezing or Shortness of Breath  Danika Acosta MD   meclizine (ANTIVERT) 25 MG tablet Take 1 tablet by mouth 3 times daily as needed  Levon Lundborg, MD          There were no vitals filed for this visit. Telephone call only. Recent vitals noted from July visit in [address] Estimated body mass index is 49.57 kg/m² as calculated from the following:    Height as of 7/22/20: 5' 8\" (1.727 m). Weight as of 7/22/20: 326 lb (147.9 kg). No physical exam, telephone visit only. ASSESSMENT/PLAN:   Diagnosis Orders   1. Insomnia, unspecified type     2. Borderline diabetes     3. Elevated LFTs     4. Spinal stenosis of lumbar region, unspecified whether neurogenic claudication present         Controlled Substance Monitoring:    Acute and Chronic Pain Monitoring:   RX Monitoring 8/4/2020   Attestation -   Periodic Controlled Substance Monitoring No signs of potential drug abuse or diversion identified. Long discussion with patient about my concerns with her current medical issues as well as verbal review of most recent labs. Patient is currently holding her cholesterol medication due to elevated LFT's. Will recheck lab for Liver function in a few weeks. If not improving, will need to check imaging of her liver/RUQ and possible referral for evaluation by specialist. She has had previous cholecystectomy, denies any current abdominal pain or symptoms. She was noted to have HGBA1C of 6.1, has been increasing each time. Will start patient on Metformin 500mg once daily in the morning, take with food/breakfast. She was advised of potential diarrhea at first which should improve. She was advised I do not feel she needs to check daily home blood sugars at this time, but I will send her out information via mail concerning diabetes as well as balancing her plate, etc.. tomorrow. She also was given Rx previous visit for a handicap placard and she reportedly sent it in to [address] [facility], but It was apparently lost and she is need of a replacement.  I advised her that I will print and send via mail tomorrow when I am at the office/facility as I am currently working remotely and trying to return to in office appointments. She does have My chart and will communicate any concerns with myself. I advised her that I would agree for a short term Rx for the Ambien, but I would need to check with pain management first to make sure it is ok as I don't want to jeopardize her agreement with them. Patient aware if I hear back from office, I will send in Ambien for her to try, but only short term. She will make follow up in the office for September and we will repeat lab at that time, even prior to her appointment. I will send her lab slip as well with her handicap placard tomorrow    She will start the Metformin tomorrow and let me know how she is doing and continue her current medications for now. We will discuss and review further health maintenance next visit. An electronic signature was used to authenticate this note.     --Felipe Hernandez PA-C on 8/4/2020 at 11:29 AM

## 2020-08-04 NOTE — PROGRESS NOTES
Message received from Erendira at Dr. Shanthi Silverman. They are ok with temporary Rx for Ambien, but patient to hold the Zanaflex if taking the Ambien to sleep.     Rx sent to [facility] pharmacy for patient at 4:49PM.    Dionne Wu PA-C

## 2020-08-12 ENCOUNTER — TELEPHONE (OUTPATIENT)
Dept: FAMILY MEDICINE CLINIC | Age: 65
End: 2020-08-12

## 2020-08-13 NOTE — PROGRESS NOTES
Call placed to patient at 1:25PM after multiple my chart messages. Patient reports she is having extreme issues with daytime sleepiness and she gets up in the morning, drinks 3 cups of coffee and then falls asleep. She had sleep study done about 3 years ago in [place], but never had follow up and never got machine. She is NOT taking the Tizanidine or the Ambien and only taking the Baclofen at . She was advised of need to have sleep study locally and stressed importance how sleep apnea can affect her chronic pain as well as her heart, BP and many other medical issues. - her doctor at that time was [facility]. She also reports she has a history of IBS and saw a GI in [place] and all they gave her was Dicyclomine. She is requesting to see Dr. Ricardo Cha for possible scope and further recommendations.

## 2020-08-14 ENCOUNTER — TELEPHONE (OUTPATIENT)
Dept: SLEEP CENTER | Age: 65
End: 2020-08-14

## 2020-08-24 RX ORDER — GLIPIZIDE 2.5 MG/1
2.5 TABLET, EXTENDED RELEASE ORAL DAILY
Qty: 30 TABLET | Refills: 1 | Status: SHIPPED
Start: 2020-08-24 | End: 2020-09-24 | Stop reason: SDUPTHER

## 2020-08-26 RX ORDER — PREGABALIN 150 MG/1
150 CAPSULE ORAL 3 TIMES DAILY
Qty: 90 CAPSULE | Refills: 0 | Status: SHIPPED
Start: 2020-08-28 | End: 2020-09-04 | Stop reason: SDUPTHER

## 2020-08-27 DIAGNOSIS — G47.33 OSA (OBSTRUCTIVE SLEEP APNEA): Primary | ICD-10-CM

## 2020-08-27 RX ORDER — DULOXETIN HYDROCHLORIDE 30 MG/1
30 CAPSULE, DELAYED RELEASE ORAL DAILY
Qty: 90 CAPSULE | Refills: 1 | Status: SHIPPED
Start: 2020-08-27 | End: 2021-01-11

## 2020-08-28 ENCOUNTER — HOSPITAL ENCOUNTER (OUTPATIENT)
Age: 65
Discharge: HOME | End: 2020-08-30
Payer: MEDICARE

## 2020-08-28 PROCEDURE — U0003 INFECTIOUS AGENT DETECTION BY NUCLEIC ACID (DNA OR RNA); SEVERE ACUTE RESPIRATORY SYNDROME CORONAVIRUS 2 (SARS-COV-2) (CORONAVIRUS DISEASE [COVID-19]), AMPLIFIED PROBE TECHNIQUE, MAKING USE OF HIGH THROUGHPUT TECHNOLOGIES AS DESCRIBED BY CMS-2020-01-R: HCPCS

## 2020-08-31 ENCOUNTER — TELEPHONE (OUTPATIENT)
Dept: SLEEP CENTER | Age: 65
End: 2020-08-31

## 2020-08-31 LAB
SARS-COV-2: NOT DETECTED
SOURCE: NORMAL

## 2020-09-03 ENCOUNTER — TELEPHONE (OUTPATIENT)
Dept: SLEEP CENTER | Age: 65
End: 2020-09-03

## 2020-09-04 ENCOUNTER — HOSPITAL ENCOUNTER (OUTPATIENT)
Dept: SLEEP CENTER | Age: 65
Discharge: HOME | End: 2020-09-04
Payer: MEDICARE

## 2020-09-04 PROCEDURE — 95810 POLYSOM 6/> YRS 4/> PARAM: CPT

## 2020-09-04 RX ORDER — BACLOFEN 10 MG/1
5 TABLET ORAL NIGHTLY
Qty: 15 TABLET | Refills: 1 | Status: SHIPPED
Start: 2020-09-04 | End: 2020-10-14

## 2020-09-04 RX ORDER — PREGABALIN 150 MG/1
150 CAPSULE ORAL 3 TIMES DAILY
Qty: 90 CAPSULE | Refills: 0 | Status: SHIPPED
Start: 2020-09-04 | End: 2020-11-03 | Stop reason: SDUPTHER

## 2020-09-04 RX ORDER — HYDROCODONE BITARTRATE AND ACETAMINOPHEN 5; 325 MG/1; MG/1
1 TABLET ORAL DAILY
Qty: 30 TABLET | Refills: 0 | Status: SHIPPED
Start: 2020-09-04 | End: 2020-10-14 | Stop reason: SDUPTHER

## 2020-09-04 NOTE — TELEPHONE ENCOUNTER
I called patient to discuss recent MRI report findings. Discussed case with DR Arina Reeder who agreed to call patient to inform and to initiate referral to neurosurgeon. I called and spoke to patient about MRI report and red flag symptoms in regards to cauda equina symptoms. Pt is experiencing severe low back pain but denies Saddle anesthesia, bowel/bladder incontinence or sudden lower limb weakness. Pt advised if she experiences any symptoms above to go to the ER immediately. Pt asking for refill on Cymbalta and baclofen and medication sent. Pt asking for something for pain. Pt cannot take Tramadol. Initial urine drug screen in chart and consistent. Pt had hydrocodone in the past and agreed to send 30 tablets only at this time. Pt okay with plan and also advised that I will also initiate referral to neurosurgeon at this time. Pt thanked me and no further question or concerns noted at this time.

## 2020-09-10 NOTE — PROGRESS NOTES
occasional.  The periodic limb movement index was normal.    OXYGEN SATURATION:  Average oxygen saturation while awake was not  recorded. Lowest saturation was 77%. The patient spent 3% of the time  with saturation less than 90%. HEART RATE SUMMARY:  Average heart rate while awake was 74 beats per  minute. Maximum heart rate during sleep was 93 beats per minute and  minimum heart rate during sleep was 51 beats per minute. The patient  was in sinus rhythm. MISCELLANEOUS:  Logan Sleepiness Scale score is 16/24. Snoring was  moderately loud. This was rated as 7 on a 1 to 10 scale. There was no  bruxism present. Bedtime is listed as 11 p.m. with rise time 5 a.m. IMPRESSION:  1. Mild obstructive sleep apnea. 2.  Borderline insufficient sleep time. 3.  Good oxygen saturation. 4.  No cardiac dysrhythmia. 5.  Moderately loud snoring. 6.  Hypersomnolence indicated by a high sleep efficiency and a short   sleep latency    DISCUSSION:  This patient has an apnea/hypopnea index of 9 and a  respiratory disturbance index of 15. Normal is less than 5 and mild is  5-15 leaving this patient on the border between mild and moderate  disease. As the Logan Sleepiness Scale score is greater than 10,  treatment is indicated as the patient is felt to be symptomatic. Also,  it should be noted that the patient gets approximately six hours of  sleep per night. A normal adult requires at least 6 and preferably 7 to  8 hours of sleep on a nightly basis. Therefore, it is likely that a  portion of this patient's sleepiness may be related to insufficient  sleep time. SUGGESTIONS:  1. Charmayne Morales, PA-C, to discuss results of study with the patient. 2.  The patient should return to [facility] for positive  airway pressure titration. 3.  Increased sleep time should be encouraged.         Vasu Duran MD    D: 09/10/2020 9:10:28       T: 09/10/2020 9:50:31     [email or web address]  Job#: [id number]     Doc#:

## 2020-09-14 ENCOUNTER — INITIAL CONSULT (OUTPATIENT)
Dept: SURGERY | Age: 65
End: 2020-09-14
Payer: MEDICARE

## 2020-09-14 VITALS
WEIGHT: 293 LBS | SYSTOLIC BLOOD PRESSURE: 143 MMHG | HEART RATE: 71 BPM | HEIGHT: 68 IN | DIASTOLIC BLOOD PRESSURE: 71 MMHG | OXYGEN SATURATION: 97 % | BODY MASS INDEX: 44.41 KG/M2 | TEMPERATURE: 97.2 F

## 2020-09-14 DIAGNOSIS — G47.33 OSA (OBSTRUCTIVE SLEEP APNEA): Primary | ICD-10-CM

## 2020-09-14 PROCEDURE — G8427 DOCREV CUR MEDS BY ELIG CLIN: HCPCS | Performed by: SURGERY

## 2020-09-14 PROCEDURE — G8400 PT W/DXA NO RESULTS DOC: HCPCS | Performed by: SURGERY

## 2020-09-14 PROCEDURE — 4040F PNEUMOC VAC/ADMIN/RCVD: CPT | Performed by: SURGERY

## 2020-09-14 PROCEDURE — 99204 OFFICE O/P NEW MOD 45 MIN: CPT | Performed by: SURGERY

## 2020-09-14 PROCEDURE — G8417 CALC BMI ABV UP PARAM F/U: HCPCS | Performed by: SURGERY

## 2020-09-14 PROCEDURE — 4004F PT TOBACCO SCREEN RCVD TLK: CPT | Performed by: SURGERY

## 2020-09-14 PROCEDURE — 1090F PRES/ABSN URINE INCON ASSESS: CPT | Performed by: SURGERY

## 2020-09-14 PROCEDURE — 1123F ACP DISCUSS/DSCN MKR DOCD: CPT | Performed by: SURGERY

## 2020-09-14 PROCEDURE — 3017F COLORECTAL CA SCREEN DOC REV: CPT | Performed by: SURGERY

## 2020-09-14 NOTE — PROGRESS NOTES
General Surgery History and Physical    Patient's Name/Date of Birth: Adalgisa Leiva / 1955    Date: September 14, 2020     Surgeon: Flakita Duran M.D.    PCP: Vandana Jack PA-C     Chief Complaint: hx of colon polyps    HPI:   Adalgisa Leiva is a 72 y.o. female who presents for evaluation of hx of colon polyps. And time for repeat colonoscopy and has hx of IBS. Past Medical History:   Diagnosis Date    Anxiety     Arthralgia 10/8/2013    Arthritis     Bone avascular necrosis ([facility] [address] 3/23/2015    Breast lesion 12/1/2015    Chronic back pain     Chronic fatigue 10/8/2013    Daytime somnolence 10/8/2013    Depression     Fibromyalgia     Fracture, fibula     right    GERD (gastroesophageal reflux disease)     H/O cardiovascular stress test 01/29/2020    Lexiscan    Headache     History of fractured kneecap     (R) 2 hair line fractures    History of total knee arthroplasty 3/19/2015    Hyperlipidemia     Morbid obesity with BMI of 45.0-49.9, adult ([facility] [address] 10/9/2015    Obesity     Osteoarthritis     Overactive bladder 1/22/2014    Reflux        Past Surgical History:   Procedure Laterality Date    CARPAL TUNNEL RELEASE Bilateral     CHOLECYSTECTOMY      COLONOSCOPY  2011    COLONOSCOPY  11/22/2016    Dr. Claudio Helder    benign reasons     LUMBAR FUSION      L3-5    ROTATOR CUFF REPAIR Left     left    TOTAL KNEE ARTHROPLASTY Bilateral 2010    did both at the same time    TUBAL LIGATION         Current Outpatient Medications   Medication Sig Dispense Refill    baclofen (LIORESAL) 10 MG tablet Take 0.5 tablets by mouth nightly 15 tablet 1    pregabalin (LYRICA) 150 MG capsule Take 1 capsule by mouth 3 times daily for 30 days. TID 90 capsule 0    HYDROcodone-acetaminophen (NORCO) 5-325 MG per tablet Take 1 tablet by mouth Daily for 30 days.  Take lowest dose possible to manage pain 30 tablet 0    DULoxetine (CYMBALTA) 30 MG extended release capsule Take 1 Ultram [Tramadol]      Abdominal pain    Nickel Itching and Rash       The patient has a family history that is negative for severe cardiovascular or respiratory issues, negative for reaction to anesthesia.     Social History     Socioeconomic History    Marital status:      Spouse name: Not on file    Number of children: Not on file    Years of education: Not on file    Highest education level: Associate degree: academic program   Occupational History    Not on file   Social Needs    Financial resource strain: Not on file    Food insecurity     Worry: Not on file     Inability: Not on file   [employer] needs     Medical: Not on file     Non-medical: Not on file   Tobacco Use    Smoking status: Current Every Day Smoker     Packs/day: 1.00     Years: 30.00     Pack years: 30.00     Types: Cigarettes     Start date: 1967    Smokeless tobacco: Never Used   Substance and Sexual Activity    Alcohol use: No     Alcohol/week: 0.0 standard drinks    Drug use: No    Sexual activity: Never   Lifestyle    Physical activity     Days per week: Not on file     Minutes per session: Not on file    Stress: Not on file   Relationships    Social connections     Talks on phone: Not on file     Gets together: Not on file     Attends Christianity service: Not on file     Active member of club or organization: Not on file     Attends meetings of clubs or organizations: Not on file     Relationship status: Not on file    Intimate partner violence     Fear of current or ex partner: Not on file     Emotionally abused: Not on file     Physically abused: Not on file     Forced sexual activity: Not on file   Other Topics Concern    Not on file   Social History Narrative    Not on file           Review of Systems  Review of Systems -  General ROS: negative for - chills, fatigue or malaise  ENT ROS: negative for - hearing change, nasal congestion or nasal discharge  Allergy and Immunology ROS: negative for - hives, itchy/watery eyes or nasal congestion  Hematological and Lymphatic ROS: negative for - blood clots, blood transfusions, bruising or fatigue  Endocrine ROS: negative for - malaise/lethargy, mood swings, palpitations or polydipsia/polyuria  Respiratory ROS: negative for - sputum changes, stridor, tachypnea or wheezing  Cardiovascular ROS: negative for - irregular heartbeat, loss of consciousness, murmur or orthopnea  Gastrointestinal ROS: negative for - constipation, diarrhea, gas/bloating, heartburn or hematemesis  Genito-Urinary ROS: negative for -  genital discharge, genital ulcers or hematuria  Musculoskeletal ROS: negative for - gait disturbance, muscle pain or muscular weakness    Physical exam:   BP (!) 143/71   Pulse 71   Temp 97.2 °F (36.2 °C) (Temporal)   Ht 5' 8\" (1.727 m)   Wt (!) 326 lb (147.9 kg)   LMP  (LMP Unknown)   SpO2 97%   BMI 49.57 kg/m²   General appearance:  NAD  Head: NCAT, PERRLA, EOMI, red conjunctiva  Neck: supple, no masses  Lungs: CTAB, equal chest rise bilateral  Heart: Reg rate  Abdomen: soft, nontender, nondistended  Skin; no lesions  Gu: no cva tenderness  Extremities: extremities normal, atraumatic, no cyanosis or edema      Assessment:  72 y.o. female with hx of colon polyps and due for colonoscopy, ibs    Plan:  We discussed repeat colonoscopy and she refused. I discussed upper endoscopy and CT scan and she did not want these either. I asked if there was any test she wanted me to do or what she thought she was coming in for to see me and she said she didn't know.   Mary Castro MD  3:53 PM  9/14/2020

## 2020-09-16 ENCOUNTER — TELEPHONE (OUTPATIENT)
Dept: SLEEP CENTER | Age: 65
End: 2020-09-16

## 2020-09-17 ENCOUNTER — TELEPHONE (OUTPATIENT)
Dept: FAMILY MEDICINE CLINIC | Age: 65
End: 2020-09-17

## 2020-09-17 NOTE — TELEPHONE ENCOUNTER
Reached out to Dr. Dereck Mccrary via [facility] to let him know that patient is willing to do EGD and CT imaging. He will contact patient and get these scheduled. I advised him that patient had misunderstanding and she continues to have abdominal pain and is ok with testing, but refuses just colonoscopy for now.     Amrit Junior PA-C

## 2020-09-17 NOTE — TELEPHONE ENCOUNTER
Call placed to patient as she did not get her labs and wanted patient to get her labs when she came to office for visit today. Patient had day mixed up for visit and thought it was tomorrow. Patient asked as to why she did not consent to any testing with Dr. Maddison Pedersen at her visit on 9-14 and she reports she does not want colonoscopy, but will do other testing. She agrees for me to reach out to Dr. Maddison Pedersen concerning this as she said she would do EGD and/or CT imaging of abdomen. I also advised her to keep her sleep study appointment as well as to [id number] [facility]. We will reschedule her for October 14th with me in the office. We will cancel her appointment today.     Trenia Skiff PA-C

## 2020-09-18 ENCOUNTER — TELEPHONE (OUTPATIENT)
Dept: SURGERY | Age: 65
End: 2020-09-18

## 2020-09-18 NOTE — TELEPHONE ENCOUNTER
Patient is scheduled for a CT abdomen and pelvis with IV and oral contrast at [address] [place] [address] on 09/23/2020 at [phone] with an arrival time of 1445 and is to be NPO after 1245. Patient is to follow up on 10/12/2020 for results.     Electronically signed by Kelsea Cohen MA on 9/18/2020 at 12:17 PM

## 2020-09-18 NOTE — TELEPHONE ENCOUNTER
Prior Authorization Form:      DEMOGRAPHICS:                     Patient Name:  Shraddha Morris  Patient :  1955            Insurance:  Payor: [facility] / Plan: [facility] HMO / Product Type: *No Product type* /   Insurance ID Number:    Payor/Plan Subscr  Sex Relation Sub. Ins. ID Effective Group Num   1.  [facility] MEDICABetha Mate 1955 Female  [id number]                                    [id number]         DIAGNOSIS & PROCEDURE:                       Procedure/Operation: EGD           CPT Code: 82415    Diagnosis:  diffuse abdominal pain    ICD10 Code: R10.84    Location:  [address] [place] [address]    Surgeon:  Jose Armando Stewart INFORMATION:                          Date: 2020    Time:               Anesthesia:                                                         Status:  Outpatient        Special Comments:         Electronically signed by Get Sheldon MA on 2020 at 12:11 PM

## 2020-09-22 ENCOUNTER — PREP FOR PROCEDURE (OUTPATIENT)
Dept: SURGERY | Age: 65
End: 2020-09-22

## 2020-09-22 RX ORDER — SODIUM CHLORIDE, SODIUM LACTATE, POTASSIUM CHLORIDE, CALCIUM CHLORIDE 600; 310; 30; 20 MG/100ML; MG/100ML; MG/100ML; MG/100ML
INJECTION, SOLUTION INTRAVENOUS CONTINUOUS
Status: CANCELLED | OUTPATIENT
Start: 2020-09-22

## 2020-09-23 ENCOUNTER — HOSPITAL ENCOUNTER (OUTPATIENT)
Age: 65
Discharge: HOME | End: 2020-09-23
Payer: MEDICARE

## 2020-09-23 ENCOUNTER — HOSPITAL ENCOUNTER (OUTPATIENT)
Age: 65
Discharge: HOME | End: 2020-09-25
Payer: MEDICARE

## 2020-09-23 ENCOUNTER — HOSPITAL ENCOUNTER (OUTPATIENT)
Dept: CT IMAGING | Age: 65
Discharge: HOME | End: 2020-09-23
Payer: MEDICARE

## 2020-09-23 LAB
ALBUMIN SERPL-MCNC: 4.1 G/DL (ref 3.5–5.2)
ALP BLD-CCNC: 91 U/L (ref 35–104)
ALT SERPL-CCNC: 13 U/L (ref 0–32)
ANION GAP SERPL CALCULATED.3IONS-SCNC: 11 MMOL/L (ref 7–16)
AST SERPL-CCNC: 15 U/L (ref 0–31)
BILIRUB SERPL-MCNC: 0.3 MG/DL (ref 0–1.2)
BUN BLDV-MCNC: 16 MG/DL (ref 8–23)
CALCIUM SERPL-MCNC: 9.4 MG/DL (ref 8.6–10.2)
CHLORIDE BLD-SCNC: 108 MMOL/L (ref 98–107)
CO2: 22 MMOL/L (ref 22–29)
CREAT SERPL-MCNC: 1.2 MG/DL (ref 0.5–1)
FOLATE: 13.7 NG/ML (ref 4.8–24.2)
GFR AFRICAN AMERICAN: 54
GFR NON-AFRICAN AMERICAN: 45 ML/MIN/1.73
GLUCOSE BLD-MCNC: 99 MG/DL (ref 74–99)
HBA1C MFR BLD: 5.7 % (ref 4–5.6)
MAGNESIUM: 2 MG/DL (ref 1.6–2.6)
POTASSIUM SERPL-SCNC: 4.3 MMOL/L (ref 3.5–5)
SODIUM BLD-SCNC: 141 MMOL/L (ref 132–146)
TOTAL PROTEIN: 7 G/DL (ref 6.4–8.3)
TSH SERPL DL<=0.05 MIU/L-ACNC: 4.28 UIU/ML (ref 0.27–4.2)
VITAMIN B-12: 324 PG/ML (ref 211–946)

## 2020-09-23 PROCEDURE — 82607 VITAMIN B-12: CPT

## 2020-09-23 PROCEDURE — U0003 INFECTIOUS AGENT DETECTION BY NUCLEIC ACID (DNA OR RNA); SEVERE ACUTE RESPIRATORY SYNDROME CORONAVIRUS 2 (SARS-COV-2) (CORONAVIRUS DISEASE [COVID-19]), AMPLIFIED PROBE TECHNIQUE, MAKING USE OF HIGH THROUGHPUT TECHNOLOGIES AS DESCRIBED BY CMS-2020-01-R: HCPCS

## 2020-09-23 PROCEDURE — 6360000004 HC RX CONTRAST MEDICATION: Performed by: RADIOLOGY

## 2020-09-23 PROCEDURE — 83735 ASSAY OF MAGNESIUM: CPT

## 2020-09-23 PROCEDURE — 80053 COMPREHEN METABOLIC PANEL: CPT

## 2020-09-23 PROCEDURE — 84443 ASSAY THYROID STIM HORMONE: CPT

## 2020-09-23 PROCEDURE — 82746 ASSAY OF FOLIC ACID SERUM: CPT

## 2020-09-23 PROCEDURE — 36415 COLL VENOUS BLD VENIPUNCTURE: CPT

## 2020-09-23 PROCEDURE — 83036 HEMOGLOBIN GLYCOSYLATED A1C: CPT

## 2020-09-23 PROCEDURE — 74177 CT ABD & PELVIS W/CONTRAST: CPT

## 2020-09-23 RX ADMIN — IOHEXOL 50 ML: 240 INJECTION, SOLUTION INTRATHECAL; INTRAVASCULAR; INTRAVENOUS; ORAL at 15:14

## 2020-09-23 RX ADMIN — IOPAMIDOL 75 ML: 755 INJECTION, SOLUTION INTRAVENOUS at 15:14

## 2020-09-24 ENCOUNTER — TELEPHONE (OUTPATIENT)
Dept: FAMILY MEDICINE CLINIC | Age: 65
End: 2020-09-24

## 2020-09-24 RX ORDER — GLIPIZIDE 2.5 MG/1
2.5 TABLET, EXTENDED RELEASE ORAL DAILY
Qty: 30 TABLET | Refills: 1 | Status: CANCELLED | OUTPATIENT
Start: 2020-09-24

## 2020-09-24 RX ORDER — GLIPIZIDE 2.5 MG/1
2.5 TABLET, EXTENDED RELEASE ORAL DAILY
Qty: 90 TABLET | Refills: 1 | Status: SHIPPED
Start: 2020-09-24 | End: 2021-02-01

## 2020-09-24 NOTE — TELEPHONE ENCOUNTER
Joe Watson, [email or web address], called to ask if new RX for Glipizide can be sent for #90, since patient has same co-pay for #30.     Last seen 8/4/2020  Next appt 10/14/2020  [facility]

## 2020-09-25 LAB
SARS-COV-2: NOT DETECTED
SOURCE: NORMAL

## 2020-09-25 RX ORDER — LEVOTHYROXINE SODIUM 0.03 MG/1
25 TABLET ORAL DAILY
Qty: 30 TABLET | Refills: 2 | Status: SHIPPED
Start: 2020-09-25 | End: 2021-02-24

## 2020-09-28 RX ORDER — ASPIRIN 81 MG/1
81 TABLET ORAL NIGHTLY
COMMUNITY
End: 2021-04-28

## 2020-09-28 NOTE — PROGRESS NOTES
[address] [facility]        Date: 9/28/2020    Date of surgery:  9/29/2020 9  Arrival Time: Hospital will call you between 5pm and 7pm with your final arrival time for surgery    1. Do not eat or drink anything after 12 prior to surgery. This includes no water, chewing gum, mints or ice chips. 2. Take the following medications with a small sip of water on the morning of Surgery: inhalers if needed, buspar, cymbalta, pain pill if needed, lyrica , synthroid     3. Diabetics may take evening dose of insulin but none after midnight. If you feel symptomatic or low blood sugar morning of surgery drink 1-2 ounces of apple juice only. 4. Aspirin, Ibuprofen, Advil, Naproxen, Vitamin E and other Anti-inflammatory products should be stopped  before surgery  as directed by your physician. Take Tylenol only unless instructed otherwise by your surgeon. 5. Check with your Doctor regarding stopping Plavix, Coumadin, Lovenox, Eliquis, Effient, or other blood thinners. 6. Do not smoke,use illicit drugs and do not drink any alcoholic beverages 24 hours prior to surgery. 7. You may brush your teeth the morning of surgery. DO NOT SWALLOW WATER    8. You MUST make arrangements for a responsible adult to take you home after your surgery. You will not be allowed to leave alone or drive yourself home. It is strongly suggested someone stay with you the first 24 hrs. Your surgery will be cancelled if you do not have a ride home. 9. PEDIATRIC PATIENTS ONLY:  A parent/legal guardian must accompany a child scheduled for surgery and plan to stay at the hospital until the child is discharged. Please do not bring other children with you.     10. Please wear simple, loose fitting clothing to the hospital.  Do not bring valuables (money, credit cards, checkbooks, etc.) Do not wear any makeup (including no eye makeup) or nail polish on your fingers or toes. 11. DO NOT wear any jewelry or piercings on day of surgery. All body piercing jewelry must be removed. 12. Shower the night before surgery with __x_Antibacterial soap /PATI WIPES________    13. TOTAL JOINT REPLACEMENT/HYSTERECTOMY PATIENTS ONLY---Remember to bring Blood Bank bracelet to the hospital on the day of surgery. 14. If you have a Living Will and Durable Power of  for Healthcare, please bring in a copy. 15. If appropriate bring crutches, inspirex, WALKER, CANE etc... 12. Notify your Surgeon if you develop any illness between now and surgery time, cough, cold, fever, sore throat, nausea, vomiting, etc.  Please notify your surgeon if you experience dizziness, shortness of breath or blurred vision between now & the time of your surgery. 17. If you have ___dentures, they will be removed before going to the OR; we will provide you a container. If you wear ___contact lenses or ___glasses, they will be removed; please bring a case for them. 18. To provide excellent care visitors will be limited to 2 in the room at any given time. 19. Please bring picture ID and insurance card. 20. Sleep apnea patients need to bring CPAP AND SETTINGS to hospital on day of surgery. 21. During flu season no children under the age of 15 are permitted in the hospital for the safety of all patients. 22. Other                  Please call AMBULATORY CARE if you have any further questions.    [address]     [address]

## 2020-09-29 ENCOUNTER — ANESTHESIA EVENT (OUTPATIENT)
Dept: ENDOSCOPY | Age: 65
End: 2020-09-29
Payer: MEDICARE

## 2020-09-29 ENCOUNTER — HOSPITAL ENCOUNTER (OUTPATIENT)
Age: 65
Setting detail: OUTPATIENT SURGERY
Discharge: HOME | End: 2020-09-29
Attending: SURGERY | Admitting: SURGERY
Payer: MEDICARE

## 2020-09-29 ENCOUNTER — ANESTHESIA (OUTPATIENT)
Dept: ENDOSCOPY | Age: 65
End: 2020-09-29
Payer: MEDICARE

## 2020-09-29 ENCOUNTER — TELEPHONE (OUTPATIENT)
Dept: SURGERY | Age: 65
End: 2020-09-29

## 2020-09-29 VITALS — SYSTOLIC BLOOD PRESSURE: 130 MMHG | DIASTOLIC BLOOD PRESSURE: 54 MMHG | OXYGEN SATURATION: 99 %

## 2020-09-29 VITALS
RESPIRATION RATE: 16 BRPM | BODY MASS INDEX: 44.41 KG/M2 | HEIGHT: 68 IN | OXYGEN SATURATION: 99 % | HEART RATE: 66 BPM | WEIGHT: 293 LBS | TEMPERATURE: 97.6 F | SYSTOLIC BLOOD PRESSURE: 101 MMHG | DIASTOLIC BLOOD PRESSURE: 65 MMHG

## 2020-09-29 LAB — METER GLUCOSE: 81 MG/DL (ref 74–99)

## 2020-09-29 PROCEDURE — 88305 TISSUE EXAM BY PATHOLOGIST: CPT

## 2020-09-29 PROCEDURE — 88342 IMHCHEM/IMCYTCHM 1ST ANTB: CPT

## 2020-09-29 PROCEDURE — 6360000002 HC RX W HCPCS: Performed by: NURSE ANESTHETIST, CERTIFIED REGISTERED

## 2020-09-29 PROCEDURE — 3609012400 HC EGD TRANSORAL BIOPSY SINGLE/MULTIPLE: Performed by: SURGERY

## 2020-09-29 PROCEDURE — 2709999900 HC NON-CHARGEABLE SUPPLY: Performed by: SURGERY

## 2020-09-29 PROCEDURE — 82962 GLUCOSE BLOOD TEST: CPT

## 2020-09-29 PROCEDURE — 2580000003 HC RX 258: Performed by: SURGERY

## 2020-09-29 PROCEDURE — 43239 EGD BIOPSY SINGLE/MULTIPLE: CPT | Performed by: SURGERY

## 2020-09-29 PROCEDURE — 7100000010 HC PHASE II RECOVERY - FIRST 15 MIN: Performed by: SURGERY

## 2020-09-29 PROCEDURE — 3700000001 HC ADD 15 MINUTES (ANESTHESIA): Performed by: SURGERY

## 2020-09-29 PROCEDURE — 3700000000 HC ANESTHESIA ATTENDED CARE: Performed by: SURGERY

## 2020-09-29 PROCEDURE — 99024 POSTOP FOLLOW-UP VISIT: CPT | Performed by: SURGERY

## 2020-09-29 PROCEDURE — 7100000011 HC PHASE II RECOVERY - ADDTL 15 MIN: Performed by: SURGERY

## 2020-09-29 RX ORDER — PROPOFOL 10 MG/ML
INJECTION, EMULSION INTRAVENOUS PRN
Status: DISCONTINUED | OUTPATIENT
Start: 2020-09-29 | End: 2020-09-29 | Stop reason: SDUPTHER

## 2020-09-29 RX ORDER — SODIUM CHLORIDE, SODIUM LACTATE, POTASSIUM CHLORIDE, CALCIUM CHLORIDE 600; 310; 30; 20 MG/100ML; MG/100ML; MG/100ML; MG/100ML
INJECTION, SOLUTION INTRAVENOUS CONTINUOUS
Status: DISCONTINUED | OUTPATIENT
Start: 2020-09-29 | End: 2020-09-29 | Stop reason: HOSPADM

## 2020-09-29 RX ADMIN — PROPOFOL 220 MG: 10 INJECTION, EMULSION INTRAVENOUS at 09:11

## 2020-09-29 RX ADMIN — SODIUM CHLORIDE, POTASSIUM CHLORIDE, SODIUM LACTATE AND CALCIUM CHLORIDE: 600; 310; 30; 20 INJECTION, SOLUTION INTRAVENOUS at 08:24

## 2020-09-29 RX ADMIN — SODIUM CHLORIDE, POTASSIUM CHLORIDE, SODIUM LACTATE AND CALCIUM CHLORIDE: 600; 310; 30; 20 INJECTION, SOLUTION INTRAVENOUS at 09:08

## 2020-09-29 ASSESSMENT — PAIN SCALES - GENERAL: PAINLEVEL_OUTOF10: 0

## 2020-09-29 ASSESSMENT — LIFESTYLE VARIABLES: SMOKING_STATUS: 1

## 2020-09-29 ASSESSMENT — PAIN - FUNCTIONAL ASSESSMENT: PAIN_FUNCTIONAL_ASSESSMENT: 0-10

## 2020-09-29 NOTE — ANESTHESIA PRE PROCEDURE
Department of Anesthesiology  Preprocedure Note       Name:  Chandu Duque   Age:  72 y.o.  :  1955                                          MRN:  [id number]         Date:  2020      Surgeon: Morris Vanegas):  Amrit Alvarado MD    Procedure: Procedure(s):  EGD ESOPHAGOGASTRODUODENOSCOPY    Medications prior to admission:   Prior to Admission medications    Medication Sig Start Date End Date Taking? Authorizing Provider   aspirin 81 MG EC tablet Take 81 mg by mouth nightly   Yes Historical Provider, MD   levothyroxine (SYNTHROID) 25 MCG tablet Take 1 tablet by mouth daily 20  Yes Julian Stiles PA-C   glipiZIDE (GLUCOTROL XL) 2.5 MG extended release tablet Take 1 tablet by mouth daily 20  Yes Julian Stiles PA-C   baclofen (LIORESAL) 10 MG tablet Take 0.5 tablets by mouth nightly 20  Yes GIUSEPPE Roach CNP   pregabalin (LYRICA) 150 MG capsule Take 1 capsule by mouth 3 times daily for 30 days. TID 9/4/20 10/4/20 Yes GIUSEPPE Roach CNP   HYDROcodone-acetaminophen (NORCO) 5-325 MG per tablet Take 1 tablet by mouth Daily for 30 days. Take lowest dose possible to manage pain 9/4/20 10/4/20 Yes GIUSEPPE Roach CNP   DULoxetine (CYMBALTA) 30 MG extended release capsule Take 1 capsule by mouth daily 20  Yes Julian Stiles PA-C   Handicap Placard MISC Handicap Placard  Patient unable to walk more than 200ft without stopping. Dx: Chronic lower back pain, spinal stenosis, lumbar spondylosis, Fibromyalgia.   Recommend 5 year placard 20  Yes Julian Stiles PA-C   topiramate (TOPAMAX) 100 MG tablet Take 1 tablet by mouth 2 times daily 20  Yes GIUSEPPE Diaz CNP   busPIRone (BUSPAR) 15 MG tablet TAKE 1 TABLET BY MOUTH THREE TIMES A DAY 20  Yes Julian Stiles PA-C   fluticasone [facility]) 50 MCG/ACT nasal spray 1 spray by Nasal route daily  Patient taking differently: 1 spray by Nasal route as needed  20  Yes Julian Stiles PA-C Depression F32.9    History of total knee arthroplasty Z96.659    Bone avascular necrosis (HCC) M87.00    Morbid obesity with BMI of 45.0-49.9, adult (HCC) E66.01, Z68.42    Breast lesion N64.9    Nodule of left lung R91.1    History of colon polyps Z86.010    Displacement of lumbar intervertebral disc M51.26    Spinal stenosis M48.00    Tobacco use Z72.0    Tobacco abuse Z72.0    Leg swelling M79.89    Pain in both lower extremities M79.604, M79.605    Chronic pain syndrome G89.4    Myofascial pain syndrome M79.18    Lumbar disc disorder M51.9    Lumbar spondylosis M47.816    Lumbar facet arthropathy M47.816    Spinal stenosis of lumbar region without neurogenic claudication M48.061       Past Medical History:        Diagnosis Date    Anxiety     Arthralgia 10/8/2013    Arthritis     Bone avascular necrosis (HCC) 3/23/2015    Breast lesion 12/1/2015    Chronic back pain     Chronic fatigue 10/8/2013    Daytime somnolence 10/8/2013    Depression     Diabetes mellitus ([facility] [address]     Fibromyalgia     Fracture, fibula     right    GERD (gastroesophageal reflux disease)     H/O cardiovascular stress test 01/29/2020    Lexiscan    Headache     History of blood transfusion     History of fractured kneecap     (R) 2 hair line fractures    History of total knee arthroplasty 3/19/2015    Hyperlipidemia     Morbid obesity with BMI of 45.0-49.9, adult ([facility] [address] 10/9/2015    Obesity     Osteoarthritis     Overactive bladder 1/22/2014    Reflux        Past Surgical History:        Procedure Laterality Date    CARPAL TUNNEL RELEASE Bilateral     CHOLECYSTECTOMY      COLONOSCOPY  2011    COLONOSCOPY  11/22/2016    Dr. Ramon Willams    benign reasons     LUMBAR FUSION      L3-5    ROTATOR CUFF REPAIR Left     left    TOTAL KNEE ARTHROPLASTY Bilateral 2010    did both at the same time    TUBAL LIGATION         Social History:    Social History     Tobacco Use    Smoking status: Current Every Day Smoker     Packs/day: 1.00     Years: 30.00     Pack years: 30.00     Types: Cigarettes     Start date: 5    Smokeless tobacco: Never Used   Substance Use Topics    Alcohol use: No     Alcohol/week: 0.0 standard drinks                                Ready to quit: Not Answered  Counseling given: Not Answered      Vital Signs (Current):   Vitals:    09/28/20 1449 09/29/20 0809   BP:  132/69   Pulse:  65   Resp:  18   Temp:  96.2 °F (35.7 °C)   SpO2:  98%   Weight: (!) 326 lb (147.9 kg) (!) 326 lb (147.9 kg)   Height: 5' 8\" (1.727 m) 5' 8\" (1.727 m)                                              BP Readings from Last 3 Encounters:   09/29/20 132/69   09/14/20 (!) 143/71   07/22/20 130/76       NPO Status: Time of last liquid consumption: 2100                        Time of last solid consumption: 1815                        Date of last liquid consumption: 09/28/20                        Date of last solid food consumption: 09/28/20    BMI:   Wt Readings from Last 3 Encounters:   09/29/20 (!) 326 lb (147.9 kg)   09/14/20 (!) 326 lb (147.9 kg)   07/22/20 (!) 326 lb (147.9 kg)     Body mass index is 49.57 kg/m². CBC:   Lab Results   Component Value Date    WBC 6.5 02/14/2020    RBC 4.54 02/14/2020    HGB 12.7 02/14/2020    HCT 42.0 02/14/2020    MCV 92.5 02/14/2020    RDW 14.1 02/14/2020     02/14/2020       CMP:   Lab Results   Component Value Date     09/23/2020    K 4.3 09/23/2020     09/23/2020    CO2 22 09/23/2020    BUN 16 09/23/2020    CREATININE 1.2 09/23/2020    GFRAA 54 09/23/2020    LABGLOM 45 09/23/2020    GLUCOSE 99 09/23/2020    GLUCOSE 93 02/16/2011    PROT 7.0 09/23/2020    CALCIUM 9.4 09/23/2020    BILITOT 0.3 09/23/2020    ALKPHOS 91 09/23/2020    AST 15 09/23/2020    ALT 13 09/23/2020       POC Tests: No results for input(s): POCGLU, POCNA, POCK, POCCL, POCBUN, POCHEMO, POCHCT in the last 72 hours.     Coags:   Lab Results   Component Value Date PROTIME 10.2 07/26/2016    PROTIME 10.2 02/16/2011    INR 0.9 07/26/2016    APTT 30.7 02/16/2011       HCG (If Applicable): No results found for: PREGTESTUR, PREGSERUM, HCG, HCGQUANT     ABGs: No results found for: PHART, PO2ART, VYH8GSU, BBV7SAG, BEART, K5VXDXXV     Type & Screen (If Applicable):  No results found for: LABABO, LABRH    Drug/Infectious Status (If Applicable):  No results found for: HIV, HEPCAB    COVID-19 Screening (If Applicable):   Lab Results   Component Value Date    COVID19 Not Detected 09/23/2020         Anesthesia Evaluation  Patient summary reviewed  Airway: Mallampati: II  TM distance: >3 FB   Neck ROM: full  Mouth opening: > = 3 FB Dental:          Pulmonary:normal exam  breath sounds clear to auscultation  (+) current smoker (1 PPD. )                           Cardiovascular:    (+) hyperlipidemia      ECG reviewed  Rhythm: regular  Rate: normal  Echocardiogram reviewed               ROS comment: EKG: Normal Sinus Rhythm 72. ECHO:   Left ventricular size is grossly normal.   Mild left ventricular concentric hypertrophy noted. Ejection fraction is visually estimated at 50%. No evidence of left ventricular mass or thrombus noted. No regional wall motion abnormalities seen. Normal sized left atrium. Interatrial septum appears intact. No evidence of thrombus within left atrium. Borderline dilated right ventricle. No evidence of a thrombus in the right ventricle. Mildly enlarged right atrium size. Physiologic and/or trace mitral regurgitation is present. No evidence of mitral valve stenosis. Aortic valve opens well. The aortic valve is trileaflet. No hemodynamically significant aortic stenosis is present. Physiologic and/or trace aortic regurgitation is noted. Physiologic and/or trace tricuspid regurgitation. Regular rhythm.      Neuro/Psych:   (+) neuromuscular disease:, headaches:, psychiatric history:depression/anxiety              ROS comment: S/P TKA, with Fracture Knee cap. S/P Fracture Fibula. Daytime somnolence. Chronic Back Problems. Avascular Necrosis. GI/Hepatic/Renal:   (+) GERD:,           Endo/Other:    (+) DiabetesType II DM, , : arthritis: OA., .                  ROS comment: Obesity  Breast lesion. Abdominal:   (+) obese,         Vascular: negative vascular ROS. Anesthesia Plan      MAC     ASA 3       Induction: intravenous. Anesthetic plan and risks discussed with patient. Plan discussed with CRNA.     Attending anesthesiologist reviewed and agrees with Annamary Mohs, MD   9/29/2020

## 2020-09-29 NOTE — H&P
General Surgery History and Physical     Patient's Name/Date of Birth: Garcia Last / 1955     Date: September 29, 2020      Surgeon: Tulio Arreola M.D.     PCP: Olive Patel PA-C     Chief Complaint: hx of colon polyps     HPI:   Garcia Last is a 72 y.o. female who presents for evaluation of hx of colon polyps. And time for repeat colonoscopy and has hx of IBS.       Past Medical History        Past Medical History:   Diagnosis Date    Anxiety      Arthralgia 10/8/2013    Arthritis      Bone avascular necrosis (HCC) 3/23/2015    Breast lesion 12/1/2015    Chronic back pain      Chronic fatigue 10/8/2013    Daytime somnolence 10/8/2013    Depression      Fibromyalgia      Fracture, fibula       right    GERD (gastroesophageal reflux disease)      H/O cardiovascular stress test 01/29/2020     Lexiscan    Headache      History of fractured kneecap       (R) 2 hair line fractures    History of total knee arthroplasty 3/19/2015    Hyperlipidemia      Morbid obesity with BMI of 45.0-49.9, adult ([facility] [address] 10/9/2015    Obesity      Osteoarthritis      Overactive bladder 1/22/2014    Reflux             Past Surgical History         Past Surgical History:   Procedure Laterality Date    CARPAL TUNNEL RELEASE Bilateral      CHOLECYSTECTOMY        COLONOSCOPY   2011    COLONOSCOPY   11/22/2016     Dr. Maggy Ricks     benign reasons     LUMBAR FUSION         L3-5    ROTATOR CUFF REPAIR Left       left    TOTAL KNEE ARTHROPLASTY Bilateral 2010     did both at the same time    TUBAL LIGATION               Current Facility-Administered Medications          Current Outpatient Medications   Medication Sig Dispense Refill    baclofen (LIORESAL) 10 MG tablet Take 0.5 tablets by mouth nightly 15 tablet 1    pregabalin (LYRICA) 150 MG capsule Take 1 capsule by mouth 3 times daily for 30 days.  TID 90 capsule 0    HYDROcodone-acetaminophen (NORCO) 5-325 MG per tablet Take 1 tablet by mouth Daily for 30 days. Take lowest dose possible to manage pain 30 tablet 0    DULoxetine (CYMBALTA) 30 MG extended release capsule Take 1 capsule by mouth daily 90 capsule 1    glipiZIDE (GLUCOTROL XL) 2.5 MG extended release tablet Take 1 tablet by mouth daily 30 tablet 1    Handicap Placard MISC Handicap Placard  Patient unable to walk more than 200ft without stopping. Dx: Chronic lower back pain, spinal stenosis, lumbar spondylosis, Fibromyalgia.   Recommend 5 year placard 1 each 0    topiramate (TOPAMAX) 100 MG tablet Take 1 tablet by mouth 2 times daily 180 tablet 0    sucralfate (CARAFATE) 1 GM tablet Take 1 tablet by mouth 4 times daily 120 tablet 0    ondansetron (ZOFRAN) 4 MG tablet Take 1 tablet by mouth 3 times daily as needed for Nausea or Vomiting 30 tablet 0    busPIRone (BUSPAR) 15 MG tablet TAKE 1 TABLET BY MOUTH THREE TIMES A  tablet 1    fluticasone (FLONASE) 50 MCG/ACT nasal spray 1 spray by Nasal route daily 3 Bottle 1    tiZANidine (ZANAFLEX) 4 MG tablet Take 1 tablet by mouth every 8 hours as needed (PRN) 270 tablet 1    tolterodine (DETROL LA) 2 MG extended release capsule TAKE 1 TABLET BY MOUTH EVERY DAY 90 capsule 1    pantoprazole (PROTONIX) 40 MG tablet Take 1 tablet by mouth 2 times daily 180 tablet 1    budesonide-formoterol (SYMBICORT) 80-4.5 MCG/ACT AERO Inhale 2 puffs into the lungs 2 times daily 1 Inhaler 1    cetirizine (ZYRTEC) 10 MG tablet Take 1 tablet by mouth daily 90 tablet 1    albuterol sulfate HFA (VENTOLIN HFA) 108 (90 Base) MCG/ACT inhaler Inhale 2 puffs into the lungs every 6 hours as needed for Wheezing or Shortness of Breath 1 Inhaler 5    meclizine (ANTIVERT) 25 MG tablet Take 1 tablet by mouth 3 times daily as needed 90 tablet 0      No current facility-administered medications for this visit.                  Allergies   Allergen Reactions    Glucophage [Metformin Hcl]         Severe abdominal pain, constipation       Mobic [Meloxicam] Other (See Comments)       Abdominal pain    Trazodone And Nefazodone         Unable to tolerate, patient reports made her physically ill with abdominal pain       Ultram [Tramadol]         Abdominal pain    Nickel Itching and Rash         The patient has a family history that is negative for severe cardiovascular or respiratory issues, negative for reaction to anesthesia.     Social History               Socioeconomic History    Marital status:        Spouse name: Not on file    Number of children: Not on file    Years of education: Not on file    Highest education level: Associate degree: academic program   Occupational History    Not on file   Social Needs    Financial resource strain: Not on file    Food insecurity       Worry: Not on file       Inability: Not on file    Transportation needs       Medical: Not on file       Non-medical: Not on file   Tobacco Use    Smoking status: Current Every Day Smoker       Packs/day: 1.00       Years: 30.00       Pack years: 30.00       Types: Cigarettes       Start date: 5    Smokeless tobacco: Never Used   Substance and Sexual Activity    Alcohol use:  No       Alcohol/week: 0.0 standard drinks    Drug use: No    Sexual activity: Never   Lifestyle    Physical activity       Days per week: Not on file       Minutes per session: Not on file    Stress: Not on file   Relationships    Social connections       Talks on phone: Not on file       Gets together: Not on file       Attends Zoroastrianism service: Not on file       Active member of club or organization: Not on file       Attends meetings of clubs or organizations: Not on file       Relationship status: Not on file    Intimate partner violence       Fear of current or ex partner: Not on file       Emotionally abused: Not on file       Physically abused: Not on file       Forced sexual activity: Not on file   Other Topics Concern    Not on file   Social History Narrative    Not on

## 2020-09-29 NOTE — TELEPHONE ENCOUNTER
Patient is scheduled for a CT abdomen and pelvis with IV and oral contrast at [facility] on 10/05/2020 at [phone] with an arrival time of 1145 and is to be NPO after 0945. Patient is to follow up on 10/12/2020 for results.     Electronically signed by Roxana Ferreira MA on 9/29/2020 at 3:20 PM

## 2020-09-30 ENCOUNTER — TELEPHONE (OUTPATIENT)
Dept: SLEEP CENTER | Age: 65
End: 2020-09-30

## 2020-09-30 ENCOUNTER — HOSPITAL ENCOUNTER (OUTPATIENT)
Dept: SLEEP CENTER | Age: 65
Discharge: HOME | End: 2020-09-30
Payer: MEDICARE

## 2020-09-30 PROCEDURE — 95811 POLYSOM 6/>YRS CPAP 4/> PARM: CPT

## 2020-10-03 NOTE — PROGRESS NOTES
[address] [place] [address]                               SLEEP STUDY REPORT    PATIENT NAME: Haleigh Hernandez                     :        1955  MED REC NO:   [id number]                            ROOM:  ACCOUNT NO:   [de-identified]                           ADMIT DATE: 2020  PROVIDER:     Varun Sosa MD    DATE OF STUDY:  2020    POLYSOMNOGRAPHY    PATIENT OF:  DEANDRE Morales    INDICATION FOR POLYSOMNOGRAPHY:  Known sleep apnea - for positive airway  pressure titration. CURRENT MEDICATIONS:  Pantoprazole, Pregabalin, topiramate, cetirizine,  glipizide, tizanidine, baclofen, Fluticisone nasal spray. INTERPRETATION:  SLEEP ARCHITECTURE:  This patient had a total time in bed of 430  minutes. Total sleep time was 378 minutes. Sleep efficiency was 88%. Sleep latency was less than 4 minutes. REM latency was 191 minutes. SLEEP STAGING:  The patient was awake 12% of the time in bed. Stage N1  was 2% and N2 was 80% of the total sleep time. Slow-wave sleep was 5%  of the sleep time and stage REM sleep was 13% of the sleep time. RESPIRATION SUMMARY:  APNEA:  There were 57 apneic events, including 9 central and 48  obstructive. Three events occurred during REM stage sleep and maximum  duration was 35 seconds. HYPOPNEA:  There were 3 hypopneic events, all occurred during REM stage  sleep and maximum duration was 82 seconds. APNEA/HYPOPNEA INDEX:  The apnea/hypopnea index was 10. TITRATION OF POSITIVE AIRWAY PRESSURE:  This patient was started on a  CPAP of 5 which was gradually increased to 17 before changing to  Bilevel. The Bilevel was increased to a maximum of 19/15 cm water  pressure. At that level, the patient slept for 20 minutes in REM stage  sleep and 55 minutes in non-REM stage sleep. The apnea/hypopnea index  was less than 1.     AROUSAL ANALYSIS:  There were 50 arousals/awakenings, the

## 2020-10-06 ENCOUNTER — OFFICE VISIT (OUTPATIENT)
Dept: FAMILY MEDICINE CLINIC | Age: 65
End: 2020-10-06
Payer: MEDICARE

## 2020-10-06 VITALS
RESPIRATION RATE: 20 BRPM | HEART RATE: 79 BPM | WEIGHT: 293 LBS | OXYGEN SATURATION: 96 % | TEMPERATURE: 97.5 F | BODY MASS INDEX: 44.41 KG/M2 | HEIGHT: 68 IN | DIASTOLIC BLOOD PRESSURE: 70 MMHG | SYSTOLIC BLOOD PRESSURE: 110 MMHG

## 2020-10-06 PROCEDURE — G8427 DOCREV CUR MEDS BY ELIG CLIN: HCPCS | Performed by: PHYSICIAN ASSISTANT

## 2020-10-06 PROCEDURE — 4004F PT TOBACCO SCREEN RCVD TLK: CPT | Performed by: PHYSICIAN ASSISTANT

## 2020-10-06 PROCEDURE — 1123F ACP DISCUSS/DSCN MKR DOCD: CPT | Performed by: PHYSICIAN ASSISTANT

## 2020-10-06 PROCEDURE — G0008 ADMIN INFLUENZA VIRUS VAC: HCPCS | Performed by: PHYSICIAN ASSISTANT

## 2020-10-06 PROCEDURE — 3017F COLORECTAL CA SCREEN DOC REV: CPT | Performed by: PHYSICIAN ASSISTANT

## 2020-10-06 PROCEDURE — 2022F DILAT RTA XM EVC RTNOPTHY: CPT | Performed by: PHYSICIAN ASSISTANT

## 2020-10-06 PROCEDURE — 99214 OFFICE O/P EST MOD 30 MIN: CPT | Performed by: PHYSICIAN ASSISTANT

## 2020-10-06 PROCEDURE — G8417 CALC BMI ABV UP PARAM F/U: HCPCS | Performed by: PHYSICIAN ASSISTANT

## 2020-10-06 PROCEDURE — 90694 VACC AIIV4 NO PRSRV 0.5ML IM: CPT | Performed by: PHYSICIAN ASSISTANT

## 2020-10-06 PROCEDURE — 3044F HG A1C LEVEL LT 7.0%: CPT | Performed by: PHYSICIAN ASSISTANT

## 2020-10-06 PROCEDURE — 1090F PRES/ABSN URINE INCON ASSESS: CPT | Performed by: PHYSICIAN ASSISTANT

## 2020-10-06 PROCEDURE — G8400 PT W/DXA NO RESULTS DOC: HCPCS | Performed by: PHYSICIAN ASSISTANT

## 2020-10-06 PROCEDURE — 4040F PNEUMOC VAC/ADMIN/RCVD: CPT | Performed by: PHYSICIAN ASSISTANT

## 2020-10-06 PROCEDURE — G8484 FLU IMMUNIZE NO ADMIN: HCPCS | Performed by: PHYSICIAN ASSISTANT

## 2020-10-06 RX ORDER — ZOSTER VACCINE RECOMBINANT, ADJUVANTED 50 MCG/0.5
0.5 KIT INTRAMUSCULAR SEE ADMIN INSTRUCTIONS
Qty: 0.5 ML | Refills: 0 | Status: SHIPPED | OUTPATIENT
Start: 2020-10-06 | End: 2021-02-24

## 2020-10-06 RX ORDER — PANTOPRAZOLE SODIUM 40 MG/1
40 TABLET, DELAYED RELEASE ORAL 2 TIMES DAILY
Qty: 180 TABLET | Refills: 1 | Status: SHIPPED
Start: 2020-10-06 | End: 2021-02-10

## 2020-10-06 RX ORDER — BUSPIRONE HYDROCHLORIDE 15 MG/1
TABLET ORAL
Qty: 270 TABLET | Refills: 1 | Status: SHIPPED
Start: 2020-10-06 | End: 2021-02-10

## 2020-10-06 RX ORDER — MONTELUKAST SODIUM 10 MG/1
10 TABLET ORAL NIGHTLY
Qty: 90 TABLET | Refills: 1 | Status: SHIPPED
Start: 2020-10-06 | End: 2021-02-10

## 2020-10-06 RX ORDER — AZELASTINE 1 MG/ML
2 SPRAY, METERED NASAL 2 TIMES DAILY
Qty: 3 BOTTLE | Refills: 1 | Status: SHIPPED
Start: 2020-10-06 | End: 2021-03-18

## 2020-10-06 RX ORDER — TOLTERODINE 2 MG/1
CAPSULE, EXTENDED RELEASE ORAL
Qty: 90 CAPSULE | Refills: 1 | Status: SHIPPED
Start: 2020-10-06 | End: 2021-03-05

## 2020-10-06 ASSESSMENT — ENCOUNTER SYMPTOMS
COUGH: 0
EYE REDNESS: 0
EYE PAIN: 0
RHINORRHEA: 1
ABDOMINAL PAIN: 0
SHORTNESS OF BREATH: 0
NAUSEA: 0
SORE THROAT: 0
DIARRHEA: 0
VOMITING: 0
CHEST TIGHTNESS: 0
SINUS PRESSURE: 0
BACK PAIN: 1
EYE DISCHARGE: 0

## 2020-10-06 NOTE — PROGRESS NOTES
10/6/2020     Garfield Keith (:  1955) is a 72 y.o. female, here for evaluation of the following medical concerns:    HPI  Patient to office for follow up, recent sleep study. Patient has been seeing Dr. Jenni Miller as well and missed her CT scan yesterday and has to be rescheduled. 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 Jenni Miller was concerned. I advised patient that I did message him today and he agrees that patient should have another EGD in about 3 years to keep a close eye on it. She is taking the Protonix twice daily. I had given her Carafate, but she did not do well with it and she stopped it. She did have recent labs and her HGBA1C was improved, but noted slight elevation of her TSH and small dose of Levothyroxine was given. She reports she has not had her toenails taken care or diabetic foot exam and was trying to take care of them on her own. She agrees to see podiatry. She has been having more issues with her allergies and looking to try something different as well as a different nasal spray. She needs to schedule mammogram with the [facility] as well as Dexa Scan which was never completed. She also sees pain management and they give her several medicattions for her chronic pain issues and fibromyalgia. Review of Systems   Constitutional: Positive for fatigue (all the time). Negative for chills and fever. HENT: Positive for congestion, postnasal drip, rhinorrhea (clear) and sneezing (on and off). Negative for ear pain, sinus pressure and sore throat. Eyes: Negative for pain, discharge, redness and visual disturbance. Respiratory: Negative for cough, chest tightness and shortness of breath. Cardiovascular: Negative for chest pain, palpitations and leg swelling. Gastrointestinal: Negative for abdominal pain, diarrhea, nausea and vomiting.    Endocrine: Negative for polydipsia, polyphagia and polyuria. Genitourinary: Negative for dysuria, flank pain and frequency. Takes medication for her bladder control     Musculoskeletal: Positive for arthralgias, back pain (chronic) and neck pain. Skin: Negative for rash and wound. Allergic/Immunologic: Positive for environmental allergies. Neurological: Positive for numbness (intermittent). Negative for dizziness, syncope, weakness, light-headedness and headaches. Hematological: Negative for adenopathy. Psychiatric/Behavioral: Negative. All other systems reviewed and are negative. Prior to Visit Medications    Medication Sig Taking? Authorizing Provider   busPIRone (BUSPAR) 15 MG tablet TAKE 1 TABLET BY MOUTH THREE TIMES A DAY Yes Santa Padilla PA-C   pantoprazole (PROTONIX) 40 MG tablet Take 1 tablet by mouth 2 times daily Yes Santa Padilla PA-C   tolterodine (DETROL LA) 2 MG extended release capsule TAKE 1 TABLET BY MOUTH EVERY DAY Yes Santa Padilla PA-C   azelastine (ASTELIN) 0.1 % nasal spray 2 sprays by Nasal route 2 times daily Use in each nostril as directed Yes Santa Padilla PA-C   montelukast (SINGULAIR) 10 MG tablet Take 1 tablet by mouth nightly Yes Santa Padilla PA-C   zoster recombinant adjuvanted vaccine [facility]) 50 MCG/0.5ML SUSR injection Inject 0.5 mLs into the muscle See Admin Instructions 1 dose now and repeat in 2-6 months Yes Santa Padilla PA-C   aspirin 81 MG EC tablet Take 81 mg by mouth nightly Yes Historical Provider, MD   levothyroxine (SYNTHROID) 25 MCG tablet Take 1 tablet by mouth daily Yes Santa Padilla PA-C   glipiZIDE (GLUCOTROL XL) 2.5 MG extended release tablet Take 1 tablet by mouth daily Yes Santa Padilla PA-C   baclofen (LIORESAL) 10 MG tablet Take 0.5 tablets by mouth nightly Yes GIUSEPPE Waite CNP   pregabalin (LYRICA) 150 MG capsule Take 1 capsule by mouth 3 times daily for 30 days.  TID Yes GIUSEPPE Waite CNP   DULoxetine (CYMBALTA) 30 MG extended release capsule Take 1 capsule by mouth daily Yes Alfonso Chavez PA-C   Handicap Placard MISC Handicap Placard  Patient unable to walk more than 200ft without stopping. Dx: Chronic lower back pain, spinal stenosis, lumbar spondylosis, Fibromyalgia. Recommend 5 year placard Yes Alfonso Chavez PA-C   topiramate (TOPAMAX) 100 MG tablet Take 1 tablet by mouth 2 times daily Yes GIUSEPPE Shanks - CNP   fluticasone (FLONASE) 50 MCG/ACT nasal spray 1 spray by Nasal route daily  Patient taking differently: 1 spray by Nasal route as needed  Yes Alfonso Chavez PA-C   tiZANidine (ZANAFLEX) 4 MG tablet Take 1 tablet by mouth every 8 hours as needed (PRN) Yes Alfnoso Chavez PA-C   budesonide-formoterol (SYMBICORT) 80-4.5 MCG/ACT AERO Inhale 2 puffs into the lungs 2 times daily  Patient taking differently: Inhale 2 puffs into the lungs as needed  Yes Alfonso Chavez PA-C   cetirizine (ZYRTEC) 10 MG tablet Take 1 tablet by mouth daily Yes Alfonso Chavez PA-C   albuterol sulfate HFA (VENTOLIN HFA) 108 (90 Base) MCG/ACT inhaler Inhale 2 puffs into the lungs every 6 hours as needed for Wheezing or Shortness of Breath Yes Madie Sotelo MD   meclizine (ANTIVERT) 25 MG tablet Take 1 tablet by mouth 3 times daily as needed Yes Nina Phillips MD          Vitals:    10/06/20 1530   BP: 110/70   Site: Left Upper Arm   Position: Sitting   Cuff Size: Large Adult   Pulse: 79   Resp: 20   Temp: 97.5 °F (36.4 °C)   TempSrc: Temporal   SpO2: 96%   Weight: (!) 330 lb (149.7 kg)   Height: 5' 8\" (1.727 m)     Estimated body mass index is 50.18 kg/m² as calculated from the following:    Height as of this encounter: 5' 8\" (1.727 m). Weight as of this encounter: 330 lb (149.7 kg). Physical Exam  Vitals signs and nursing note reviewed. Constitutional:       General: She is not in acute distress. Appearance: She is well-developed. She is obese.    HENT:      Head: Normocephalic and atraumatic. Nose: Nose normal.      Mouth/Throat:      Mouth: Mucous membranes are moist.   Eyes:      General:         Right eye: No discharge. Left eye: No discharge. Extraocular Movements: Extraocular movements intact. Conjunctiva/sclera: Conjunctivae normal.      Pupils: Pupils are equal, round, and reactive to light. Neck:      Musculoskeletal: Normal range of motion and neck supple. No muscular tenderness. Cardiovascular:      Rate and Rhythm: Normal rate and regular rhythm. Pulses: Normal pulses. Heart sounds: Normal heart sounds. Comments: Appears regular    Pulmonary:      Effort: Pulmonary effort is normal.      Breath sounds: Normal breath sounds. No wheezing, rhonchi or rales. Abdominal:      General: Bowel sounds are normal. There is no distension. Palpations: Abdomen is soft. Tenderness: There is no abdominal tenderness. There is no right CVA tenderness or left CVA tenderness. Comments: Obese. Musculoskeletal: Normal range of motion. Right lower leg: Edema (trace) present. Left lower leg: Edema (trace) present. Comments: Toe nails very thickened. Skin to bilateral feet dry and some callous noted. No open sores or lesions noted. Lymphadenopathy:      Cervical: No cervical adenopathy. Skin:     General: Skin is warm and dry. Capillary Refill: Capillary refill takes less than 2 seconds. Findings: No rash. Comments: Skin discoloration to anterior lower legs at ankle area. No open sores or lesions. Neurological:      General: No focal deficit present. Mental Status: She is alert and oriented to person, place, and time. Cranial Nerves: No cranial nerve deficit. Psychiatric:         Attention and Perception: Attention and perception normal.         Mood and Affect: Affect is flat. Speech: Speech normal.         Behavior: Behavior normal. Behavior is cooperative.          Thought Content: Thought content normal.         Cognition and Memory: Cognition and memory normal.         Judgment: Judgment normal.         ASSESSMENT/PLAN:  Trinidad Pandya was seen today for hyperlipidemia, headache and stress. Diagnoses and all orders for this visit:    Type 2 diabetes mellitus with other specified complication, without long-term current use of insulin ([facility].)  -     [facility], [place], [place], Podiatry, [place]    Chronic gastritis without bleeding, unspecified gastritis type    Depression, unspecified depression type  -     busPIRone (BUSPAR) 15 MG tablet; TAKE 1 TABLET BY MOUTH THREE TIMES A DAY    Gastroesophageal reflux disease without esophagitis  -     pantoprazole (PROTONIX) 40 MG tablet; Take 1 tablet by mouth 2 times daily    Overactive bladder  -     tolterodine (DETROL LA) 2 MG extended release capsule; TAKE 1 TABLET BY MOUTH EVERY DAY    Obstructive sleep apnea syndrome    Allergic rhinitis, unspecified seasonality, unspecified trigger  -     azelastine (ASTELIN) 0.1 % nasal spray; 2 sprays by Nasal route 2 times daily Use in each nostril as directed  -     montelukast (SINGULAIR) 10 MG tablet; Take 1 tablet by mouth nightly    Need for shingles vaccine  -     zoster recombinant adjuvanted vaccine [facility]) 50 MCG/0.5ML SUSR injection; Inject 0.5 mLs into the muscle See Admin Instructions 1 dose now and repeat in 2-6 months    Need for influenza vaccination  -     INFLUENZA, QUADV, ADJUVANTED, 65 YRS =, IM, PF, PREFILL SYR, 0.5ML (FLUAD)    Patient given several medication refills. Reviewed recent labs and recent EGD. Patient will reschedule CT imaging. Patient needs to get Mammogram and Dexa scan done, ordered quite some time ago. Patient agrees to see Podiatry for foot and nail care and diabetic check. She has seen ophthalmology and has cataracts, but reports they are not ready as of yet. ( she cannot recall name of eye specialist).   Some medication changes for her allergies given for patient to try. Rx for shingles vaccine given as well as flu shot given today as well. She will recheck in office in about 6 weeks, to call sooner if any problems. An electronic signature was used to authenticate this note.     --Dipika Tompkins PA-C on 10/6/2020 at 8:43 PM

## 2020-10-06 NOTE — PATIENT INSTRUCTIONS
Patient Education        Gastroesophageal Reflux Disease (GERD): Care Instructions  Your Care Instructions     Gastroesophageal reflux disease (GERD) is the backward flow of stomach acid into the esophagus. The esophagus is the tube that leads from your throat to your stomach. A one-way valve prevents the stomach acid from backing up into this tube. When you have GERD, this valve does not close tightly enough. This can also cause pain and swelling in your esophagus (esophagitis). If you have mild GERD symptoms including heartburn, you may be able to control the problem with antacids or over-the-counter medicine. Changing your diet and eating habits, such as not eating late at night, losing weight, and making other lifestyle changes can also help reduce symptoms. Follow-up care is a key part of your treatment and safety. Be sure to make and go to all appointments, and call your doctor if you are having problems. It's also a good idea to know your test results and keep a list of the medicines you take. How can you care for yourself at home? · Take your medicines exactly as prescribed. Call your doctor if you think you are having a problem with your medicine. · Your doctor may recommend over-the-counter medicine. For mild or occasional indigestion, antacids, such as Tums, Gaviscon, Mylanta, or Maalox, may help. Your doctor also may recommend over-the-counter acid reducers, such as Pepcid AC (famotidine), Tagamet HB (cimetidine), or Prilosec (omeprazole). Read and follow all instructions on the label. If you use these medicines often, talk with your doctor. · Change your eating habits. ? It's best to eat several small meals instead of two or three large meals. ? After you eat, wait 2 to 3 hours before you lie down. ? Chocolate, mint, and alcohol can make GERD worse. ? Spicy foods, foods that have a lot of acid (like tomatoes and oranges), and coffee can make GERD symptoms worse in some people.  If your or very bloody stools. Call your doctor now or seek immediate medical care if:  · You start breathing fast and have not produced urine in the last 8 hours. · You cannot keep fluids down. Watch closely for changes in your health, and be sure to contact your doctor if:  · You do not get better as expected. Where can you learn more? Go to https://[employer]pepiceweb."[employer]". org and sign in to your [employer] account. Enter [id number] in the Pango box to learn more about \"Gastritis: Care Instructions. \"     If you do not have an account, please click on the \"Sign Up Now\" link. Current as of: August 12, 2019               Content Version: 12.5  © 5240-9566 [employer]wise, Incorporated. Care instructions adapted under license by [facility] ([facility]). If you have questions about a medical condition or this instruction, always ask your healthcare professional. [address] any warranty or liability for your use of this information.

## 2020-10-07 ENCOUNTER — TELEPHONE (OUTPATIENT)
Dept: FAMILY MEDICINE CLINIC | Age: 65
End: 2020-10-07

## 2020-10-07 NOTE — TELEPHONE ENCOUNTER
Called and left message for patient, I sent out scripts for her to get a Mammo/Dexqa scan. They will be mailed out to her.  Electronically signed by Emely Gomez MA on 10/7/20 at 4:18 PM EDT

## 2020-10-08 ENCOUNTER — TELEPHONE (OUTPATIENT)
Dept: SLEEP CENTER | Age: 65
End: 2020-10-08

## 2020-10-08 RX ORDER — TOPIRAMATE 100 MG/1
100 TABLET, FILM COATED ORAL 2 TIMES DAILY
Qty: 180 TABLET | Refills: 0 | Status: SHIPPED
Start: 2020-10-08 | End: 2021-01-05

## 2020-10-08 NOTE — PROGRESS NOTES
Patient requested refill of her Topamax via My chart message. Sent to [facility] 6:23pm and will be delivered to patient tomorrow.     Blaze Osorio PA-C

## 2020-10-12 ENCOUNTER — HOSPITAL ENCOUNTER (OUTPATIENT)
Dept: CT IMAGING | Age: 65
Discharge: HOME | End: 2020-10-12
Payer: MEDICARE

## 2020-10-12 PROCEDURE — 6360000004 HC RX CONTRAST MEDICATION: Performed by: RADIOLOGY

## 2020-10-12 PROCEDURE — 74177 CT ABD & PELVIS W/CONTRAST: CPT

## 2020-10-12 RX ADMIN — IOPAMIDOL 75 ML: 755 INJECTION, SOLUTION INTRAVENOUS at 14:29

## 2020-10-12 RX ADMIN — IOHEXOL 50 ML: 240 INJECTION, SOLUTION INTRATHECAL; INTRAVASCULAR; INTRAVENOUS; ORAL at 14:29

## 2020-10-13 ENCOUNTER — PATIENT MESSAGE (OUTPATIENT)
Dept: FAMILY MEDICINE CLINIC | Age: 65
End: 2020-10-13

## 2020-10-13 NOTE — RESULT ENCOUNTER NOTE
Lisseth Herr  Your CAT scan was stable, a hiatal hernia was noted. Please keep your appointment with Dr. Kindra Erwin on the 15th as scheduled.     Take Care  Catalina Click

## 2020-10-13 NOTE — TELEPHONE ENCOUNTER
From: Gil Alcantara  To: Aubrey Larios PA-C  Sent: 10/13/2020 2:23 PM EDT  Subject: Visit Follow-Up Question    Sleep apnea machine well that's going to be put on hold!! Didn't know that my insurance only pays 80%! Don't have the rest of it! Not much for the 20%! With my tight budget that's a problem GOEL. .. LMAO I NEED A LIFTOFF OUT A HERE! TALK TO YOU LATER! HAVE A GREAT DAY!      Tasneem Collier  (In case you forgot)  Jose

## 2020-10-14 ENCOUNTER — OFFICE VISIT (OUTPATIENT)
Dept: PAIN MANAGEMENT | Age: 65
End: 2020-10-14
Payer: MEDICARE

## 2020-10-14 VITALS
BODY MASS INDEX: 44.41 KG/M2 | WEIGHT: 293 LBS | TEMPERATURE: 96 F | RESPIRATION RATE: 16 BRPM | HEIGHT: 68 IN | HEART RATE: 63 BPM | DIASTOLIC BLOOD PRESSURE: 72 MMHG | OXYGEN SATURATION: 98 % | SYSTOLIC BLOOD PRESSURE: 118 MMHG

## 2020-10-14 PROCEDURE — 99213 OFFICE O/P EST LOW 20 MIN: CPT | Performed by: NURSE PRACTITIONER

## 2020-10-14 RX ORDER — HYDROCODONE BITARTRATE AND ACETAMINOPHEN 5; 325 MG/1; MG/1
1 TABLET ORAL DAILY
Qty: 30 TABLET | Refills: 0 | Status: SHIPPED
Start: 2020-10-14 | End: 2020-11-13 | Stop reason: SDUPTHER

## 2020-10-14 NOTE — PROGRESS NOTES
Do you currently have any of the following:    Fever: No  Headache:  No  Cough: No  Shortness of breath: No  Exposed to anyone with these symptoms: No                                                                                                                Rosalea Bennett presents to the [facility] on 10/14/2020. Nikki Garibay is complaining of pain in her left lower back, left hip and leg. . The pain is constant. The pain is described as aching, throbbing, shooting, stabbing and sharp. Pain is rated on her best day at a 4, on her worst day at a 8, and on average at a 7 on the VAS scale. She took her last dose of Norco a couple days ago. Debby Solorio does not have issues with constipation. Any procedures since your last visit: No,     She is not on NSAIDS and  is  on anticoagulation medications to include ASA and is managed by Ying Quinonez PA-C  . Pacemaker or defibrilator: No Physician managing device is NA.       /72   Pulse 63   Temp 96 °F (35.6 °C) (Infrared)   Resp 16   Ht 5' 8\" (1.727 m)   Wt (!) 330 lb (149.7 kg)   LMP  (LMP Unknown)   SpO2 98%   BMI 50.18 kg/m²      No LMP recorded (lmp unknown). Patient has had a hysterectomy.

## 2020-10-14 NOTE — PROGRESS NOTES
[facility], [address] [place] [address]  [phone]    Follow up Note      Dal Koyanagi     Date of Visit:  10/14/2020    CC:  Patient presents for follow up low back pain       HPI: Pt referred to Neurosurgeon last month and has appointment this Friday with Iván to discuss recent MRI findings and increased low back pain. Pt notes low back pain with left leg spasms that are very painful started last night. Pt notes she does NOT take medication everyday and only takes when she needs it. Last dose was 3 days ago. Appropriate analgesia with current medications regimen: somewhat    Change in quality of symptoms: yes spasms  Medication side effects:none. Recent diagnostic testing: none  Recent interventional procedures:none.     Imaging:    MRI of Lumbar Spine 09/2020:  1. Postsurgical changes status post L3-L5 posterior spinal fusion. 2. Severe multifactorial spinal canal stenosis at L2-L3 with mass effect on    the cauda equina nerve roots. 3. Moderate multifactorial L1-L2 spinal canal stenosis with moderate right    neural foraminal stenosis. 4. Moderate left L5-S1 neural foraminal stenosis.                 LUMBAR SPINE XRAY 03/4/2020  Intact lumbar vertebral height and alignment. Relative severe    multilevel disc and facet joint narrowing with subchondral sclerosis    and spurring. Previous discectomy with posterior pedicle screw plates    extending from L3 through L5. Symmetric osteoarthritis at the    sacroiliac joints manifesting as joint space narrowing and subchondral    sclerosis.         Lumbar spine CT 07/2016      1.  Status post lumbar spine fusion from L3-L5 appearing in stable,   satisfactory alignment.       2. Lumbar spondylosis as described above in part due to congenital   shortened pedicles and notable stenosis                                                Potential Aberrant Drug-Related Behavior:  None     Urine Drug Screenin2020 Negative for all which is consistent, pt does NOT take every day and does NOT fill exactly 30 days, can go longer than a month for refill      OARRS report:  2020-10/2020:Consistent      Past Medical History:   Diagnosis Date    Anxiety     Arthralgia 10/8/2013    Arthritis     Bone avascular necrosis ([facility] 3/23/2015    Breast lesion 2015    Chronic back pain     Chronic fatigue 10/8/2013    Daytime somnolence 10/8/2013    Depression     Diabetes mellitus ([facility]     Fibromyalgia     Fracture, fibula     right    GERD (gastroesophageal reflux disease)     H/O cardiovascular stress test 2020    Lexiscan    Headache     History of blood transfusion     History of fractured kneecap     (R) 2 hair line fractures    History of total knee arthroplasty 3/19/2015    Hyperlipidemia     Morbid obesity with BMI of 45.0-49.9, adult ([facility] 10/9/2015    Obesity     Osteoarthritis     Overactive bladder 2014    Reflux        Past Surgical History:   Procedure Laterality Date    CARPAL TUNNEL RELEASE Bilateral     CHOLECYSTECTOMY      COLONOSCOPY  2011    COLONOSCOPY  2016    Dr. Kristi Gifford    benign reasons     LUMBAR FUSION      L3-5    ROTATOR CUFF REPAIR Left     left    TOTAL KNEE ARTHROPLASTY Bilateral 2010    did both at the same time   [address] 2020    EGD BIOPSY performed by Adan Ohara MD at [facility]       Prior to Admission medications    Medication Sig Start Date End Date Taking?  Authorizing Provider   topiramate (TOPAMAX) 100 MG tablet Take 1 tablet by mouth 2 times daily 10/8/20   Aubrey Larios PA-C   busPIRone (BUSPAR) 15 MG tablet TAKE 1 TABLET BY MOUTH THREE TIMES A DAY 10/6/20   Aubrey Larios PA-C   pantoprazole (PROTONIX) 40 MG tablet Take 1 tablet by mouth 2 times daily 10/6/20   Aubrey Larios PA-C   tolterodine (DETROL LA) 2 MG extended release capsule TAKE 1 TABLET BY MOUTH EVERY DAY 10/6/20   Aubrey Larios PA-C   azelastine (ASTELIN) 0.1 % nasal spray 2 sprays by Nasal route 2 times daily Use in each nostril as directed 10/6/20   Aubrey Larios PA-C   montelukast (SINGULAIR) 10 MG tablet Take 1 tablet by mouth nightly 10/6/20   Aubrey Larios PA-C   zoster recombinant adjuvanted vaccine [facility]) 50 MCG/0.5ML SUSR injection Inject 0.5 mLs into the muscle See Admin Instructions 1 dose now and repeat in 2-6 months 10/6/20 4/4/21  Aubrey Larios PA-C   aspirin 81 MG EC tablet Take 81 mg by mouth nightly    Historical Provider, MD   levothyroxine (SYNTHROID) 25 MCG tablet Take 1 tablet by mouth daily 9/25/20   Aubrey Larios PA-C   glipiZIDE (GLUCOTROL XL) 2.5 MG extended release tablet Take 1 tablet by mouth daily 9/24/20   Aubrey Larios PA-C   baclofen (LIORESAL) 10 MG tablet Take 0.5 tablets by mouth nightly 9/4/20   GIUSEPPE Mathew CNP   pregabalin (LYRICA) 150 MG capsule Take 1 capsule by mouth 3 times daily for 30 days. TID 9/4/20 10/6/20  GIUSEPPE Mathew CNP   DULoxetine (CYMBALTA) 30 MG extended release capsule Take 1 capsule by mouth daily 8/27/20   Aubrey Larios PA-C   Handicap Placard MISC Handicap Placard  Patient unable to walk more than 200ft without stopping. Dx: Chronic lower back pain, spinal stenosis, lumbar spondylosis, Fibromyalgia.   Recommend 5 year placard 8/5/20   Aubrey Larios PA-C   fluticasone [facility]) 50 MCG/ACT nasal spray 1 spray by Nasal route daily  Patient taking differently: 1 spray by Nasal route as needed  4/27/20   Aubrey Larios PA-C   tiZANidine (ZANAFLEX) 4 MG tablet Take 1 tablet by mouth every 8 hours as needed (PRN) 4/27/20   Aubrey Larios PA-C   budesonide-formoterol [facility]) 80-4.5 MCG/ACT AERO Inhale 2 puffs into the lungs 2 times daily  Patient taking differently: Inhale 2 puffs into the lungs as needed  4/27/20   Aubrey Larios PA-C cetirizine (ZYRTEC) 10 MG tablet Take 1 tablet by mouth daily 4/27/20   Tricia Morales PA-C   albuterol sulfate HFA (VENTOLIN HFA) 108 (90 Base) MCG/ACT inhaler Inhale 2 puffs into the lungs every 6 hours as needed for Wheezing or Shortness of Breath 12/15/17   Deandre Bhagat MD   meclizine (ANTIVERT) 25 MG tablet Take 1 tablet by mouth 3 times daily as needed 3/31/58   Marcella Triana MD       Allergies   Allergen Reactions    Carafate [Sucralfate]      Patient unable to tolerate. Unable to recall her reaction.     Glucophage [Metformin Hcl]      Severe abdominal pain, constipation      Mobic [Meloxicam] Other (See Comments)     Abdominal pain    Trazodone And Nefazodone      Unable to tolerate, patient reports made her physically ill with abdominal pain      Ultram [Tramadol]      Abdominal pain    Nickel Itching and Rash       Social History     Socioeconomic History    Marital status:      Spouse name: Not on file    Number of children: Not on file    Years of education: Not on file    Highest education level: Associate degree: academic program   Occupational History    Not on file   Social Needs    Financial resource strain: Not on file    Food insecurity     Worry: Not on file     Inability: Not on file   [place] needs     Medical: Not on file     Non-medical: Not on file   Tobacco Use    Smoking status: Current Every Day Smoker     Packs/day: 1.00     Years: 30.00     Pack years: 30.00     Types: Cigarettes     Start date: 1967    Smokeless tobacco: Never Used   Substance and Sexual Activity    Alcohol use: No     Alcohol/week: 0.0 standard drinks    Drug use: No    Sexual activity: Never   Lifestyle    Physical activity     Days per week: Not on file     Minutes per session: Not on file    Stress: Not on file   Relationships    Social connections     Talks on phone: Not on file     Gets together: Not on file     Attends [employer] service: Not on file Active member of club or organization: Not on file     Attends meetings of clubs or organizations: Not on file     Relationship status: Not on file    Intimate partner violence     Fear of current or ex partner: Not on file     Emotionally abused: Not on file     Physically abused: Not on file     Forced sexual activity: Not on file   Other Topics Concern    Not on file   Social History Narrative    Not on file       Family History   Problem Relation Age of Onset    High Blood Pressure Mother     Diabetes Mother     Heart Disease Mother     Cancer Mother     Diabetes Father     Heart Disease Father     High Blood Pressure Father     Cancer Other 50        breast    Depression Brother        REVIEW OF SYSTEMS:     Lisseth Herr denies fever/chills, chest pain, shortness of breath, new bowel or bladder complaints or suicidal ideations. All other review of systems wasnegative. PHYSICAL EXAMINATION:      LMP  (LMP Unknown)     General:       General appearance:   elderly, pleasant and well-hydrated.   ,in moderate discomfort and A & O x3  Build:obese     HEENT:     Head:normocephalic and atraumatic  Pupils:regular, round and equal.  Sclera: icterus absent,      Lungs:     Breathing:Breathing Pattern: regular, no distress     Abdomen:     Shape:non-distended and normal  Tenderness:none     Cervical spine:     Inspection:normal  Palpation:tenderness paravertebral muscles, trigger points paravertebral muscles, facet loading, left, right, negative, tenderness and non-tender paraspinals. Range of motion:normal not flexion, extension rotation bilateral and is not painful.     Lumbar spine:     Spine inspection:surgical incision scar   CVA tenderness:No   Palpation:tenderness paravertebral muscles, facet loading, left and right, positive and tenderness.   Range of motion:abnormal moderately Lateral bending, flexion, extension rotation bilateral and is moderately painful.     Musculoskeletal:     Trigger points in Paraveteral:present bilaterally  Burton's:negative right, negative left   SI joint tenderness:positive right, positive left              SULTANA test:negative right, negative left  Piriformis tenderness:negative right, negative left  Trochanteric bursa tenderness:positve right, positive left  SLR:negative right, negative left, sitting      Extremities:     Tremors:None bilaterally upper and lower  Range of motion:Generally normal shoulders, pain with internal rotation of hips negative. Intact:Yes  Edema: +1 pitting edema bilateral LE     Knee:     Inspection:bilateral knee replaced     Neurological:     Sensory:normal to light touch bilateral upper and lower extremities  Motor:              Right Bicep5/5           Left Bicep5/5              Right Triceps5/5       Left Triceps5/5          Right Deltoid5/5     Left Deltoid5/5                  Right Quadriceps 5/5          Left Quadriceps4/5           Right Gastrocnemius5/5    Left Gastrocnemius5/5  Right Ant Tibialis5/5  Left Ant Tibialis5/5    Gait:antalgic     Dermatology:     Skin:no unusual rashes and no skin lesions  Redness noted bilateral LE calf to ankles      Impression:      Chronic low back with h/o L3-5 fusion/bilateral knees replaced  Refuses to have anymore injections, [place] and reports so painful during and after, refuses to have again      Plan:               Chronic low back and all over body pain with left leg spasms started yesterday              Buccal swab today for compliance, last done March 2020, last dose 3 days ago    Appointment scheduled with Neurosurgeon Jennifer Jin 10/16/20  MRI of L/S previously discussed and reviewed with patient, collaborated with Dr Shnu Miller who also recommends waiting to see what Neurosurgeon decides   Continue and  Lyrica 150 mg TID with 1 refill.      Refill Norco 5/325mg po daily prn pain   Increase Baclofen from 5mg to 10mg prn spasms, has meds on hand  Continue with Cymbalta 40 mg QD from PCP                Currently on Zanaflex 4mg QHS gets from PCP  OARRS report reviewed 10/2020   Patient encouraged to stay active and to lose weight  Against referral to physical therapy  Treatment plan discussed with the patient including medications side effects                       We discussed with the patient that combining opioids, benzodiazepines, alcohol, illicit drugs or sleep aids increases the risk of respiratory depression including death. We discussed that these medications may cause drowsiness, sedation or dizziness and have counseled the patient not to drive or operate machinery. We have discussed that these medications will be prescribed only by one provider. We have discussed with the patient about age related risk factors and have thoroughly discussed the importance of taking these medications as prescribed. The patient verbalizes understanding.             Electronically signed by GIUSEPPE Gilliland CNP on 10/14/20 at 1:02 PM EDT

## 2020-10-15 ENCOUNTER — TELEPHONE (OUTPATIENT)
Dept: FAMILY MEDICINE CLINIC | Age: 65
End: 2020-10-15

## 2020-10-15 ENCOUNTER — OFFICE VISIT (OUTPATIENT)
Dept: SURGERY | Age: 65
End: 2020-10-15
Payer: MEDICARE

## 2020-10-15 VITALS
HEART RATE: 66 BPM | TEMPERATURE: 96.8 F | SYSTOLIC BLOOD PRESSURE: 135 MMHG | DIASTOLIC BLOOD PRESSURE: 75 MMHG | BODY MASS INDEX: 44.41 KG/M2 | WEIGHT: 293 LBS | OXYGEN SATURATION: 96 % | HEIGHT: 68 IN

## 2020-10-15 PROCEDURE — G8427 DOCREV CUR MEDS BY ELIG CLIN: HCPCS | Performed by: SURGERY

## 2020-10-15 PROCEDURE — G8484 FLU IMMUNIZE NO ADMIN: HCPCS | Performed by: SURGERY

## 2020-10-15 PROCEDURE — 4040F PNEUMOC VAC/ADMIN/RCVD: CPT | Performed by: SURGERY

## 2020-10-15 PROCEDURE — 1036F TOBACCO NON-USER: CPT | Performed by: SURGERY

## 2020-10-15 PROCEDURE — 1123F ACP DISCUSS/DSCN MKR DOCD: CPT | Performed by: SURGERY

## 2020-10-15 PROCEDURE — 1090F PRES/ABSN URINE INCON ASSESS: CPT | Performed by: SURGERY

## 2020-10-15 PROCEDURE — G8400 PT W/DXA NO RESULTS DOC: HCPCS | Performed by: SURGERY

## 2020-10-15 PROCEDURE — 3017F COLORECTAL CA SCREEN DOC REV: CPT | Performed by: SURGERY

## 2020-10-15 PROCEDURE — 99213 OFFICE O/P EST LOW 20 MIN: CPT | Performed by: SURGERY

## 2020-10-15 PROCEDURE — G8417 CALC BMI ABV UP PARAM F/U: HCPCS | Performed by: SURGERY

## 2020-10-15 NOTE — TELEPHONE ENCOUNTER
My MA Huey Bradley spoke with patient and she only takes the Tizanidine as needed and she is NOT in need of the medication. I will adjust her Rx in her medication list and paperwork faxed to [facility] concerning no need for refill.     John Barrera PA-C

## 2020-10-15 NOTE — TELEPHONE ENCOUNTER
Called and spoke to patient about how she takes her Tizanidine medication. She states she only takes it as needed and that she does not need a refill. We had gotten a request for a refill from her pharmacy and her last note from pain management states she takes it only at night.  Electronically signed by Mehreen Duque MA on 10/15/20 at 8:59 AM EDT

## 2020-10-15 NOTE — PROGRESS NOTES
General Surgery History and Physical    Patient's Name/Date of Birth: Rowdy Blanco / 1955    Date: October 15, 2020     Surgeon: Roney Wall M.D.    PCP: Jasvir Mckinney PA-C     Chief Complaint: hiatal hernia    HPI:   Rowdy Blanco is a 72 y.o. female who presents for evaluation of symptomatic hiatal hernia that was responsive to medical treatment but has become recalcitrant. Has some GERD, EGD showed hh, Has no had manometry. Has had CT imaging.  Timing is constant, radiation to epigastrium, alleviated by nothing and started years ago and severity is 2-7/10       Past Medical History:   Diagnosis Date    Anxiety     Arthralgia 10/8/2013    Arthritis     Bone avascular necrosis ([facility] 3/23/2015    Breast lesion 12/1/2015    Chronic back pain     Chronic fatigue 10/8/2013    Daytime somnolence 10/8/2013    Depression     Diabetes mellitus ([address]     Fibromyalgia     Fracture, fibula     right    GERD (gastroesophageal reflux disease)     H/O cardiovascular stress test 01/29/2020    Lexiscan    Headache     History of blood transfusion     History of fractured kneecap     (R) 2 hair line fractures    History of total knee arthroplasty 3/19/2015    Hyperlipidemia     Morbid obesity with BMI of 45.0-49.9, adult ([facility] 10/9/2015    Obesity     Osteoarthritis     Overactive bladder 1/22/2014    Reflux        Past Surgical History:   Procedure Laterality Date    CARPAL TUNNEL RELEASE Bilateral     CHOLECYSTECTOMY      COLONOSCOPY  2011    COLONOSCOPY  11/22/2016    Dr. Fátima Jones    benign reasons     LUMBAR FUSION      L3-5    ROTATOR CUFF REPAIR Left     left    TOTAL KNEE ARTHROPLASTY Bilateral 2010    did both at the same time   [facility] 9/29/2020    EGD BIOPSY performed by Alexis Goel MD at [facility] ENDOSCOPY       Current Outpatient Medications   Medication Sig Dispense Refill    HYDROcodone-acetaminophen (NORCO) 5-325 MG per tablet Take 1 tablet by mouth Daily for 30 days. Take lowest dose possible to manage pain 30 tablet 0    cetirizine (ZYRTEC) 10 MG tablet Take 1 tablet by mouth daily 90 tablet 1    topiramate (TOPAMAX) 100 MG tablet Take 1 tablet by mouth 2 times daily (Patient taking differently: Take 100 mg by mouth daily as needed Patient reports only taking rarely as needed as she is on Baclofen as well.) 180 tablet 0    busPIRone (BUSPAR) 15 MG tablet TAKE 1 TABLET BY MOUTH THREE TIMES A  tablet 1    pantoprazole (PROTONIX) 40 MG tablet Take 1 tablet by mouth 2 times daily 180 tablet 1    tolterodine (DETROL LA) 2 MG extended release capsule TAKE 1 TABLET BY MOUTH EVERY DAY 90 capsule 1    azelastine (ASTELIN) 0.1 % nasal spray 2 sprays by Nasal route 2 times daily Use in each nostril as directed 3 Bottle 1    montelukast (SINGULAIR) 10 MG tablet Take 1 tablet by mouth nightly 90 tablet 1    aspirin 81 MG EC tablet Take 81 mg by mouth nightly      levothyroxine (SYNTHROID) 25 MCG tablet Take 1 tablet by mouth daily 30 tablet 2    glipiZIDE (GLUCOTROL XL) 2.5 MG extended release tablet Take 1 tablet by mouth daily 90 tablet 1    DULoxetine (CYMBALTA) 30 MG extended release capsule Take 1 capsule by mouth daily 90 capsule 1    Handicap Placard MISC Handicap Placard  Patient unable to walk more than 200ft without stopping. Dx: Chronic lower back pain, spinal stenosis, lumbar spondylosis, Fibromyalgia.   Recommend 5 year placard 1 each 0    fluticasone (FLONASE) 50 MCG/ACT nasal spray 1 spray by Nasal route daily (Patient taking differently: 1 spray by Nasal route as needed ) 3 Bottle 1    tiZANidine (ZANAFLEX) 4 MG tablet Take 1 tablet by mouth every 8 hours as needed (PRN) 270 tablet 1    budesonide-formoterol (SYMBICORT) 80-4.5 MCG/ACT AERO Inhale 2 puffs into the lungs 2 times daily (Patient taking differently: Inhale 2 puffs into the lungs as needed ) 1 since quittin.0    Smokeless tobacco: Never Used   Substance and Sexual Activity    Alcohol use: No     Alcohol/week: 0.0 standard drinks    Drug use: No    Sexual activity: Never   Lifestyle    Physical activity     Days per week: Not on file     Minutes per session: Not on file    Stress: Not on file   Relationships    Social connections     Talks on phone: Not on file     Gets together: Not on file     Attends Congregational service: Not on file     Active member of club or organization: Not on file     Attends meetings of clubs or organizations: Not on file     Relationship status: Not on file    Intimate partner violence     Fear of current or ex partner: Not on file     Emotionally abused: Not on file     Physically abused: Not on file     Forced sexual activity: Not on file   Other Topics Concern    Not on file   Social History Narrative    Not on file           Review of Systems  Review of Systems -  General ROS: negative for - chills, fatigue or malaise  ENT ROS: negative for - hearing change, nasal congestion or nasal discharge  Allergy and Immunology ROS: negative for - hives, itchy/watery eyes or nasal congestion  Hematological and Lymphatic ROS: negative for - blood clots, blood transfusions, bruising or fatigue  Endocrine ROS: negative for - malaise/lethargy, mood swings, palpitations or polydipsia/polyuria  Respiratory ROS: negative for - sputum changes, stridor, tachypnea or wheezing  Cardiovascular ROS: negative for - irregular heartbeat, loss of consciousness, murmur or orthopnea  Gastrointestinal ROS: negative for - constipation, diarrhea, gas/bloating, or hematemesis, positive for heartburn and other epigastric pain  Genito-Urinary ROS: negative for -  genital discharge, genital ulcers or hematuria  Musculoskeletal ROS: negative for - gait disturbance, muscle pain or muscular weakness    Physical exam:   /75   Pulse 66   Temp 96.8 °F (36 °C) (Temporal)   Ht 5' 8\" (1.727 m)   Wt (!) 330 lb (149.7 kg)   LMP  (LMP Unknown)   SpO2 96%   BMI 50.18 kg/m²   General appearance:  NAD  Pyscho/social: negative for tremors and hallucinations  Head: NCAT, PERRLA, EOMI, red conjunctiva  Neck: supple, no masses  Lungs: CTAB, equal chest rise bilateral  Heart: Reg rate  Abdomen: soft, nontender, nondistended  Skin; no lesions  Gu: no cva tenderness  Extremities: extremities normal, atraumatic, no cyanosis or edema      Assessment:  72 y.o. female with  symptomatic hiatal hernia    Plan: we discussed the results and surgery and she will think about how to proceed  Discussed the risk, benefits and alternatives of surgery including wound infections, bleeding, scar and hernia formation and the risks of general anesthetic including MI, CVA, sudden death or reactions to anesthetic medications. The patient understands the risks and alternatives and the possibility of converting to an open procedure. All questions were answered to the patient's satisfaction and they freely signed the consent.       Alexis Goel MD  10:30 AM  10/15/2020

## 2020-10-16 ENCOUNTER — TELEPHONE (OUTPATIENT)
Dept: NEUROSURGERY | Age: 65
End: 2020-10-16

## 2020-10-19 RX ORDER — BACLOFEN 10 MG/1
10 TABLET ORAL 2 TIMES DAILY
Qty: 60 TABLET | Refills: 0 | Status: SHIPPED
Start: 2020-10-19 | End: 2020-11-03

## 2020-10-22 ENCOUNTER — OFFICE VISIT (OUTPATIENT)
Dept: PODIATRY | Age: 65
End: 2020-10-22
Payer: MEDICARE

## 2020-10-22 VITALS — HEIGHT: 68 IN | BODY MASS INDEX: 44.41 KG/M2 | WEIGHT: 293 LBS

## 2020-10-22 PROBLEM — E11.51 TYPE II DIABETES MELLITUS WITH PERIPHERAL CIRCULATORY DISORDER (HCC): Status: ACTIVE | Noted: 2020-10-22

## 2020-10-22 PROBLEM — I87.2 VENOUS INSUFFICIENCY (CHRONIC) (PERIPHERAL): Status: ACTIVE | Noted: 2020-10-22

## 2020-10-22 PROBLEM — B35.3 TINEA PEDIS OF BOTH FEET: Status: ACTIVE | Noted: 2020-10-22

## 2020-10-22 PROBLEM — B35.1 ONYCHOMYCOSIS: Status: ACTIVE | Noted: 2020-10-22

## 2020-10-22 PROCEDURE — 11721 DEBRIDE NAIL 6 OR MORE: CPT | Performed by: PODIATRIST

## 2020-10-22 PROCEDURE — G8427 DOCREV CUR MEDS BY ELIG CLIN: HCPCS | Performed by: PODIATRIST

## 2020-10-22 PROCEDURE — G8417 CALC BMI ABV UP PARAM F/U: HCPCS | Performed by: PODIATRIST

## 2020-10-22 PROCEDURE — G8400 PT W/DXA NO RESULTS DOC: HCPCS | Performed by: PODIATRIST

## 2020-10-22 PROCEDURE — 2022F DILAT RTA XM EVC RTNOPTHY: CPT | Performed by: PODIATRIST

## 2020-10-22 PROCEDURE — 3017F COLORECTAL CA SCREEN DOC REV: CPT | Performed by: PODIATRIST

## 2020-10-22 PROCEDURE — 4040F PNEUMOC VAC/ADMIN/RCVD: CPT | Performed by: PODIATRIST

## 2020-10-22 PROCEDURE — 99203 OFFICE O/P NEW LOW 30 MIN: CPT | Performed by: PODIATRIST

## 2020-10-22 PROCEDURE — 1090F PRES/ABSN URINE INCON ASSESS: CPT | Performed by: PODIATRIST

## 2020-10-22 PROCEDURE — 3044F HG A1C LEVEL LT 7.0%: CPT | Performed by: PODIATRIST

## 2020-10-22 PROCEDURE — 1123F ACP DISCUSS/DSCN MKR DOCD: CPT | Performed by: PODIATRIST

## 2020-10-22 PROCEDURE — G8484 FLU IMMUNIZE NO ADMIN: HCPCS | Performed by: PODIATRIST

## 2020-10-22 PROCEDURE — 1036F TOBACCO NON-USER: CPT | Performed by: PODIATRIST

## 2020-10-22 RX ORDER — CLOTRIMAZOLE AND BETAMETHASONE DIPROPIONATE 10; .64 MG/G; MG/G
CREAM TOPICAL
Qty: 45 G | Refills: 3 | Status: SHIPPED
Start: 2020-10-22 | End: 2021-02-24

## 2020-10-22 NOTE — PROGRESS NOTES
Patient is in today as a new patient for evaluation of diabetic foot exam. Patient has no concerns at this time.  pcp is Alfonso Chavez PA-C

## 2020-10-23 NOTE — PROGRESS NOTES
10/22/20     Rosangela Fields    : 1955 Sex: female   Age: 72 y.o. Patient was referred by: Amparo Miranda PA-C  Patient's PCP/Provider is: Amparo Miranda PA-C    Subjective:    Patient is seen today for diabetic foot evaluation and care. Chief Complaint   Patient presents with    Diabetes     diabetic foot exam        HPI: Patient stated that she is unable to debride her nails appropriately due to edematous issues and arthritic issues into both lower extremities. She has noticed increased thickening and dystrophy to the digital nails which causes discomfort with certain shoe gear and activities. No other additional abnormalities noted at this time. ROS:  Const: Positives and pertinent negatives as per HPI. Musculo: Denies symptoms other than stated above. Neuro: Denies symptoms other than stated above. Skin: Denies symptoms other than stated above. Current Medications:    Current Outpatient Medications:     clotrimazole-betamethasone (LOTRISONE) 1-0.05 % cream, Apply topically 2 times daily. , Disp: 45 g, Rfl: 3    baclofen (LIORESAL) 10 MG tablet, Take 1 tablet by mouth 2 times daily, Disp: 60 tablet, Rfl: 0    HYDROcodone-acetaminophen (NORCO) 5-325 MG per tablet, Take 1 tablet by mouth Daily for 30 days.  Take lowest dose possible to manage pain, Disp: 30 tablet, Rfl: 0    cetirizine (ZYRTEC) 10 MG tablet, Take 1 tablet by mouth daily, Disp: 90 tablet, Rfl: 1    topiramate (TOPAMAX) 100 MG tablet, Take 1 tablet by mouth 2 times daily (Patient taking differently: Take 100 mg by mouth daily as needed Patient reports only taking rarely as needed as she is on Baclofen as well.), Disp: 180 tablet, Rfl: 0    busPIRone (BUSPAR) 15 MG tablet, TAKE 1 TABLET BY MOUTH THREE TIMES A DAY, Disp: 270 tablet, Rfl: 1    pantoprazole (PROTONIX) 40 MG tablet, Take 1 tablet by mouth 2 times daily, Disp: 180 tablet, Rfl: 1    tolterodine (DETROL LA) 2 MG extended release capsule, TAKE 1 TABLET BY MOUTH EVERY DAY, Disp: 90 capsule, Rfl: 1    azelastine (ASTELIN) 0.1 % nasal spray, 2 sprays by Nasal route 2 times daily Use in each nostril as directed, Disp: 3 Bottle, Rfl: 1    montelukast (SINGULAIR) 10 MG tablet, Take 1 tablet by mouth nightly, Disp: 90 tablet, Rfl: 1    zoster recombinant adjuvanted vaccine [facility]) 50 MCG/0.5ML SUSR injection, Inject 0.5 mLs into the muscle See Admin Instructions 1 dose now and repeat in 2-6 months (Patient not taking: Reported on 10/15/2020), Disp: 0.5 mL, Rfl: 0    aspirin 81 MG EC tablet, Take 81 mg by mouth nightly, Disp: , Rfl:     levothyroxine (SYNTHROID) 25 MCG tablet, Take 1 tablet by mouth daily, Disp: 30 tablet, Rfl: 2    glipiZIDE (GLUCOTROL XL) 2.5 MG extended release tablet, Take 1 tablet by mouth daily, Disp: 90 tablet, Rfl: 1    pregabalin (LYRICA) 150 MG capsule, Take 1 capsule by mouth 3 times daily for 30 days. TID, Disp: 90 capsule, Rfl: 0    DULoxetine (CYMBALTA) 30 MG extended release capsule, Take 1 capsule by mouth daily, Disp: 90 capsule, Rfl: 1    Handicap Placard MISC, Handicap Placard Patient unable to walk more than 200ft without stopping. Dx: Chronic lower back pain, spinal stenosis, lumbar spondylosis, Fibromyalgia.  Recommend 5 year placard, Disp: 1 each, Rfl: 0    fluticasone (FLONASE) 50 MCG/ACT nasal spray, 1 spray by Nasal route daily (Patient taking differently: 1 spray by Nasal route as needed ), Disp: 3 Bottle, Rfl: 1    tiZANidine (ZANAFLEX) 4 MG tablet, Take 1 tablet by mouth every 8 hours as needed (PRN), Disp: 270 tablet, Rfl: 1    budesonide-formoterol (SYMBICORT) 80-4.5 MCG/ACT AERO, Inhale 2 puffs into the lungs 2 times daily (Patient taking differently: Inhale 2 puffs into the lungs as needed ), Disp: 1 Inhaler, Rfl: 1    albuterol sulfate HFA (VENTOLIN HFA) 108 (90 Base) MCG/ACT inhaler, Inhale 2 puffs into the lungs every 6 hours as needed for Wheezing or Shortness of Breath, Disp: 1 Inhaler, Rfl: 5   meclizine (ANTIVERT) 25 MG tablet, Take 1 tablet by mouth 3 times daily as needed, Disp: 90 tablet, Rfl: 0    Allergies: Allergies   Allergen Reactions    Carafate [Sucralfate]      Patient unable to tolerate. Unable to recall her reaction.     Glucophage [Metformin Hcl]      Severe abdominal pain, constipation      Mobic [Meloxicam] Other (See Comments)     Abdominal pain    Trazodone And Nefazodone      Unable to tolerate, patient reports made her physically ill with abdominal pain      Ultram [Tramadol]      Abdominal pain    Nickel Itching and Rash       Vitals:    10/22/20 1454   Weight: (!) 330 lb (149.7 kg)   Height: 5' 8\" (1.727 m)        Past Medical History:   Diagnosis Date    Anxiety     Arthralgia 10/8/2013    Arthritis     Bone avascular necrosis (HCC) 3/23/2015    Breast lesion 12/1/2015    Chronic back pain     Chronic fatigue 10/8/2013    Daytime somnolence 10/8/2013    Depression     Diabetes mellitus ([facility])     Fibromyalgia     Fracture, fibula     right    GERD (gastroesophageal reflux disease)     H/O cardiovascular stress test 01/29/2020    Lexiscan    Headache     History of blood transfusion     History of fractured kneecap     (R) 2 hair line fractures    History of total knee arthroplasty 3/19/2015    Hyperlipidemia     Morbid obesity with BMI of 45.0-49.9, adult ([facility]) 10/9/2015    Obesity     Osteoarthritis     Overactive bladder 1/22/2014    Reflux      Family History   Problem Relation Age of Onset    High Blood Pressure Mother     Diabetes Mother     Heart Disease Mother     Cancer Mother     Diabetes Father     Heart Disease Father     High Blood Pressure Father     Cancer Other 50        breast    Depression Brother      Past Surgical History:   Procedure Laterality Date    CARPAL TUNNEL RELEASE Bilateral     CHOLECYSTECTOMY      COLONOSCOPY  2011    COLONOSCOPY  11/22/2016    Dr. Christian Salinas    benign reasons     LUMBAR (LOTRISONE) 1-0.05 % cream; Apply topically 2 times daily. 1. New patient evaluation and management  2. Mycotic nail debridement was performed on today's visit as described above. It was discussed in detail with the patient proper hygiene for the diabetic foot. They are to get in the habit of looking at their feet or have someone look at them. If they are unable to do daily, they are to look for any signs of redness, blistering, cracking, swelling, drainage, open lesions, etc.  They are to dry in between the toes after each bath or shower gently. The water should be tested with the elbow to prevent burns. The patient is to refrain from soaking their feet unless instructed by myself to do otherwise. They are to refrain from going barefoot. Shoe gear should be inspected for any foreign objects. Shoes should have a deep wide toe box. With any type of shoe, the feet should be inspected for any signs of pressure, i.e. redness, blistering, or open sores. Further instructional guidelines were dispensed to the patient. 3. Prescription was given for topical Lotrisone to be applied to the affected areas twice daily. 4. Patient will be followed up in 2 months time or sooner if needed for reevaluation. She was advised to call the office with any questions or concerns in the interim. Seen By:    Jennifer Verduzco DPM    Electronically signed by Jennifer Verduzco DPM on 10/22/2020 at 9:10 PM      This note was created using voice recognition software. The note was reviewed however may contain grammatical errors.

## 2020-11-03 PROBLEM — M47.816 LUMBAR FACET ARTHROPATHY: Status: RESOLVED | Noted: 2020-03-04 | Resolved: 2020-11-03

## 2020-11-03 RX ORDER — BACLOFEN 10 MG/1
10 TABLET ORAL 3 TIMES DAILY PRN
Qty: 90 TABLET | Refills: 0 | Status: SHIPPED
Start: 2020-11-03 | End: 2020-11-13

## 2020-11-03 RX ORDER — PREGABALIN 150 MG/1
150 CAPSULE ORAL 3 TIMES DAILY
Qty: 90 CAPSULE | Refills: 0 | Status: SHIPPED
Start: 2020-11-03 | End: 2020-11-13 | Stop reason: SDUPTHER

## 2020-11-05 ENCOUNTER — HOSPITAL ENCOUNTER (OUTPATIENT)
Dept: GENERAL RADIOLOGY | Age: 65
Discharge: HOME | End: 2020-11-07
Payer: MEDICARE

## 2020-11-05 PROCEDURE — 77080 DXA BONE DENSITY AXIAL: CPT

## 2020-11-05 PROCEDURE — 77067 SCR MAMMO BI INCL CAD: CPT

## 2020-11-13 ENCOUNTER — OFFICE VISIT (OUTPATIENT)
Dept: PAIN MANAGEMENT | Age: 65
End: 2020-11-13
Payer: MEDICARE

## 2020-11-13 VITALS
OXYGEN SATURATION: 98 % | BODY MASS INDEX: 44.41 KG/M2 | SYSTOLIC BLOOD PRESSURE: 126 MMHG | DIASTOLIC BLOOD PRESSURE: 82 MMHG | HEIGHT: 68 IN | TEMPERATURE: 95.7 F | RESPIRATION RATE: 16 BRPM | WEIGHT: 293 LBS | HEART RATE: 55 BPM

## 2020-11-13 PROCEDURE — 99213 OFFICE O/P EST LOW 20 MIN: CPT | Performed by: NURSE PRACTITIONER

## 2020-11-13 RX ORDER — METHOCARBAMOL 500 MG/1
500 TABLET, FILM COATED ORAL 3 TIMES DAILY
Qty: 90 TABLET | Refills: 0 | Status: SHIPPED
Start: 2020-11-13 | End: 2020-12-14 | Stop reason: SDUPTHER

## 2020-11-13 RX ORDER — HYDROCODONE BITARTRATE AND ACETAMINOPHEN 5; 325 MG/1; MG/1
1 TABLET ORAL EVERY 12 HOURS PRN
Qty: 35 TABLET | Refills: 0 | Status: SHIPPED
Start: 2020-11-13 | End: 2020-12-14 | Stop reason: SDUPTHER

## 2020-11-13 RX ORDER — PREGABALIN 150 MG/1
150 CAPSULE ORAL 3 TIMES DAILY
Qty: 90 CAPSULE | Refills: 0 | Status: SHIPPED
Start: 2020-11-13 | End: 2020-12-14 | Stop reason: SDUPTHER

## 2020-11-13 NOTE — PROGRESS NOTES
Do you currently have any of the following:    Fever: No  Headache:  No  Cough: No  Shortness of breath: No  Exposed to anyone with these symptoms: No                                                                                                                Malini Hollingsworth presents to the [address] [address] on 11/13/2020. Karoline Reyna is complaining of pain in her right leg, buttocks, and hip. . The pain is constant. The pain is described as aching, throbbing, shooting, stabbing and sharp. Pain is rated on her best day at a 6, on her worst day at a 9, and on average at a 8 on the VAS scale. She took her last dose of Tylenol Baclofen and Tizanidine. Kareen Hoover does not have issues with constipation. Any procedures since your last visit: No,    She is not on NSAIDS and  is  on anticoagulation medications to include ASA and is managed by Navya Calderon PA-C  . Pacemaker or defibrilator: No Physician managing device is NA.       /82   Pulse 55   Temp 95.7 °F (35.4 °C) (Infrared)   Resp 16   Ht 5' 8\" (1.727 m)   Wt (!) 330 lb (149.7 kg)   LMP  (LMP Unknown)   SpO2 98%   BMI 50.18 kg/m²      No LMP recorded (lmp unknown). Patient has had a hysterectomy.

## 2020-11-13 NOTE — PROGRESS NOTES
[address] [facility], [address] [place] [address]  [phone]    Follow up Note      Garcia Last     Date of Visit:  11/13/2020    CC:  Patient presents for follow up low back pain       HPI: Pt here for follow up for low back pain with worsening spasms. Pt has appointment with Dr Samson Jiménez mid November. Pt notes medication helps some but baclofen not helping like it use to for spasms. Pt notes occasionally has days where she takes 1/2 tab extra of norco.   Appropriate analgesia with current medications regimen: somewhat    Change in quality of symptoms: yes spasms  Medication side effects:none. Recent diagnostic testing: none  Recent interventional procedures:none.     Imaging:    MRI of Lumbar Spine 09/2020:  1. Postsurgical changes status post L3-L5 posterior spinal fusion. 2. Severe multifactorial spinal canal stenosis at L2-L3 with mass effect on    the cauda equina nerve roots. 3. Moderate multifactorial L1-L2 spinal canal stenosis with moderate right    neural foraminal stenosis. 4. Moderate left L5-S1 neural foraminal stenosis.             LUMBAR SPINE XRAY 03/4/2020  Intact lumbar vertebral height and alignment. Relative severe    multilevel disc and facet joint narrowing with subchondral sclerosis    and spurring. Previous discectomy with posterior pedicle screw plates    extending from L3 through L5. Symmetric osteoarthritis at the    sacroiliac joints manifesting as joint space narrowing and subchondral    sclerosis.         Lumbar spine CT 07/2016      1.  Status post lumbar spine fusion from L3-L5 appearing in stable,   satisfactory alignment.       2. Lumbar spondylosis as described above in part due to congenital   shortened pedicles and notable stenosis                                                Potential Aberrant Drug-Related Behavior:  None    Previous medication: baclofen helps some, tizanidine sleepiness, flexeril no relief,      Urine Drug Screenin2020 Negative for all which is consistent, pt does NOT take every day and does NOT fill exactly 30 days, can go longer than a month for refill   10/2020: consistent for hydrocodone     OARRS report:  2020-2020:Consistent      Past Medical History:   Diagnosis Date    Anxiety     Arthralgia 10/8/2013    Arthritis     Bone avascular necrosis ([facility] [address] [address] 3/23/2015    Breast lesion 2015    Chronic back pain     Chronic fatigue 10/8/2013    Daytime somnolence 10/8/2013    Depression     Diabetes mellitus ([facility] [address]     Fibromyalgia     Fracture, fibula     right    GERD (gastroesophageal reflux disease)     H/O cardiovascular stress test 2020    Lexiscan    Headache     History of blood transfusion     History of fractured kneecap     (R) 2 hair line fractures    History of total knee arthroplasty 3/19/2015    Hyperlipidemia     Morbid obesity with BMI of 45.0-49.9, adult ([facility] [address] 10/9/2015    Obesity     Osteoarthritis     Overactive bladder 2014    Reflux        Past Surgical History:   Procedure Laterality Date    CARPAL TUNNEL RELEASE Bilateral     CHOLECYSTECTOMY      COLONOSCOPY  2011    COLONOSCOPY  2016    Dr. Christian Salinas    benign reasons     LUMBAR FUSION      L3-5    ROTATOR CUFF REPAIR Left     left    TOTAL KNEE ARTHROPLASTY Bilateral 2010    did both at the same time   [facility] 2020    EGD BIOPSY performed by Radha Driver MD at [facility]       Prior to Admission medications    Medication Sig Start Date End Date Taking? Authorizing Provider   baclofen (LIORESAL) 10 MG tablet Take 1 tablet by mouth 3 times daily as needed (spasms) 11/3/20 12/3/20  GIUSEPPE Ashby CNP   pregabalin (LYRICA) 150 MG capsule Take 1 capsule by mouth 3 times daily for 30 days.  TID 11/3/20 12/3/20  GIUSEPPE Ashby CNP   clotrimazole-betamethasone (LOTRISONE) 1-0.05 % cream Apply topically 2 times daily. 10/22/20   Jesse Rodriguez, ANILA   HYDROcodone-acetaminophen (NORCO) 5-325 MG per tablet Take 1 tablet by mouth Daily for 30 days. Take lowest dose possible to manage pain 10/14/20 11/13/20  GIUSEPPE Larose - CNP   cetirizine (ZYRTEC) 10 MG tablet Take 1 tablet by mouth daily 10/14/20   Chante Worrell PA-C   topiramate (TOPAMAX) 100 MG tablet Take 1 tablet by mouth 2 times daily  Patient taking differently: Take 100 mg by mouth daily as needed Patient reports only taking rarely as needed as she is on Baclofen as well.  10/8/20   Chante Worrell PA-C   busPIRone (BUSPAR) 15 MG tablet TAKE 1 TABLET BY MOUTH THREE TIMES A DAY 10/6/20   Chante Worrell PA-C   pantoprazole (PROTONIX) 40 MG tablet Take 1 tablet by mouth 2 times daily 10/6/20   Chante Worrell PA-C   tolterodine (DETROL LA) 2 MG extended release capsule TAKE 1 TABLET BY MOUTH EVERY DAY 10/6/20   Chante Worrell PA-C   azelastine (ASTELIN) 0.1 % nasal spray 2 sprays by Nasal route 2 times daily Use in each nostril as directed 10/6/20   Chante Worrell PA-C   montelukast (SINGULAIR) 10 MG tablet Take 1 tablet by mouth nightly 10/6/20   Chante Worrell PA-C   zoster recombinant adjuvanted vaccine [facility]) 50 MCG/0.5ML SUSR injection Inject 0.5 mLs into the muscle See Admin Instructions 1 dose now and repeat in 2-6 months  Patient not taking: Reported on 10/15/2020 10/6/20 4/4/21  Chante Worrell PA-C   aspirin 81 MG EC tablet Take 81 mg by mouth nightly    Historical Provider, MD   levothyroxine (SYNTHROID) 25 MCG tablet Take 1 tablet by mouth daily 9/25/20   Chante Worrell PA-C   glipiZIDE (GLUCOTROL XL) 2.5 MG extended release tablet Take 1 tablet by mouth daily 9/24/20   Chante Worrell PA-C   DULoxetine (CYMBALTA) 30 MG extended release capsule Take 1 capsule by mouth daily 8/27/20   Chante Worrell PA-C   Handicap Placard MISC Handicap Placard  Patient unable to walk more than 200ft without stopping. Dx: Chronic lower back pain, spinal stenosis, lumbar spondylosis, Fibromyalgia. Recommend 5 year placard 8/5/20   Scot Roll, PA-C   fluticasone [facility]) 50 MCG/ACT nasal spray 1 spray by Nasal route daily  Patient taking differently: 1 spray by Nasal route as needed  4/27/20   Scot Roll, PA-C   tiZANidine (ZANAFLEX) 4 MG tablet Take 1 tablet by mouth every 8 hours as needed (PRN) 4/27/20   Scot Roll, PA-C   budesonide-formoterol [facility]) 80-4.5 MCG/ACT AERO Inhale 2 puffs into the lungs 2 times daily  Patient taking differently: Inhale 2 puffs into the lungs as needed  4/27/20   Scot Roll, PA-C   albuterol sulfate HFA (VENTOLIN HFA) 108 (90 Base) MCG/ACT inhaler Inhale 2 puffs into the lungs every 6 hours as needed for Wheezing or Shortness of Breath 12/15/17   Terri Recinos MD   meclizine (ANTIVERT) 25 MG tablet Take 1 tablet by mouth 3 times daily as needed 3/92/87   Kyle Key MD       Allergies   Allergen Reactions    Carafate [Sucralfate]      Patient unable to tolerate. Unable to recall her reaction.     Glucophage [Metformin Hcl]      Severe abdominal pain, constipation      Mobic [Meloxicam] Other (See Comments)     Abdominal pain    Trazodone And Nefazodone      Unable to tolerate, patient reports made her physically ill with abdominal pain      Ultram [Tramadol]      Abdominal pain    Nickel Itching and Rash       Social History     Socioeconomic History    Marital status:      Spouse name: Not on file    Number of children: Not on file    Years of education: Not on file    Highest education level: Associate degree: academic program   Occupational History    Not on file   Social Needs    Financial resource strain: Not on file    Food insecurity     Worry: Not on file     Inability: Not on file    Transportation needs     Medical: Not on file     Non-medical: Not on file   Tobacco Use    Smoking status: Former Smoker     Packs/day: 1.00     Years: 30.00     Pack years: 30.00     Types: Cigarettes     Start date: 5     Last attempt to quit: 2020     Years since quittin.1    Smokeless tobacco: Never Used   Substance and Sexual Activity    Alcohol use: No     Alcohol/week: 0.0 standard drinks    Drug use: No    Sexual activity: Never   Lifestyle    Physical activity     Days per week: Not on file     Minutes per session: Not on file    Stress: Not on file   Relationships    Social connections     Talks on phone: Not on file     Gets together: Not on file     Attends Jain service: Not on file     Active member of club or organization: Not on file     Attends meetings of clubs or organizations: Not on file     Relationship status: Not on file    Intimate partner violence     Fear of current or ex partner: Not on file     Emotionally abused: Not on file     Physically abused: Not on file     Forced sexual activity: Not on file   Other Topics Concern    Not on file   Social History Narrative    Not on file       Family History   Problem Relation Age of Onset    High Blood Pressure Mother     Diabetes Mother     Heart Disease Mother     Cancer Mother     Diabetes Father     Heart Disease Father     High Blood Pressure Father     Cancer Other 50        breast    Depression Brother        REVIEW OF SYSTEMS:     Morris Baez denies fever/chills, chest pain, shortness of breath, new bowel or bladder complaints or suicidal ideations. All other review of systems wasnegative.       PHYSICAL EXAMINATION:      LMP  (LMP Unknown)     General:       General appearance:   elderly, pleasant and well-hydrated.   ,in moderate discomfort and A & O x3  Build:obese     HEENT:     Head:normocephalic and atraumatic  Pupils:regular, round and equal.  Sclera: icterus absent,      Lungs:     Breathing:Breathing Pattern: regular, no distress     Abdomen:     Shape:non-distended and normal  Tenderness:none     Cervical

## 2020-12-01 ENCOUNTER — PREP FOR PROCEDURE (OUTPATIENT)
Dept: SURGERY | Age: 65
End: 2020-12-01

## 2020-12-01 ENCOUNTER — TELEPHONE (OUTPATIENT)
Dept: SURGERY | Age: 65
End: 2020-12-01

## 2020-12-01 RX ORDER — SODIUM CHLORIDE 0.9 % (FLUSH) 0.9 %
10 SYRINGE (ML) INJECTION EVERY 12 HOURS SCHEDULED
Status: CANCELLED | OUTPATIENT
Start: 2020-12-01

## 2020-12-01 RX ORDER — SODIUM CHLORIDE, SODIUM LACTATE, POTASSIUM CHLORIDE, CALCIUM CHLORIDE 600; 310; 30; 20 MG/100ML; MG/100ML; MG/100ML; MG/100ML
INJECTION, SOLUTION INTRAVENOUS CONTINUOUS
Status: CANCELLED | OUTPATIENT
Start: 2020-12-01

## 2020-12-01 RX ORDER — SODIUM CHLORIDE 0.9 % (FLUSH) 0.9 %
10 SYRINGE (ML) INJECTION PRN
Status: CANCELLED | OUTPATIENT
Start: 2020-12-01

## 2020-12-01 NOTE — TELEPHONE ENCOUNTER
Patient called to  her surgery from 12/04 to 12/08 due to not being able to get her COVID test today. Becky in Surgery Scheduling notified.     Electronically signed by Loan Zimmer MA on 12/1/2020 at 11:22 AM

## 2020-12-01 NOTE — TELEPHONE ENCOUNTER
Prior Authorization Form:      DEMOGRAPHICS:                     Patient Name:  Kristal Dickson  Patient :  1955            Insurance:  Payor: [facility] / Plan: [facility] HMO / Product Type: *No Product type* /   Insurance ID Number:    Payor/Plan Subscr  Sex Relation Sub. Ins. ID Effective Group Num   1.  Leonora Acuña 1955 Female  [id number]                                    [id number]         DIAGNOSIS & PROCEDURE:                       Procedure/Operation: lap hiatal hernia repair w/ mesh           CPT Code: 79412    Diagnosis:  Hiatal hernia    ICD10 Code: K44.9    Location:  [address] [place] [address]    Surgeon:  Ismael Medina INFORMATION:                          Date: 2020    Time:               Anesthesia:                                                         Status:  Outpatient        Special Comments:           Electronically signed by Bradly King MA on 2020 at 9:16 AM

## 2020-12-01 NOTE — TELEPHONE ENCOUNTER
Patient provided with surgery instructions during office visit. Patient scheduled for follow up visit with Dr. Kindra Erwin on 12/14/2020. Surgery scheduling form faxed and confirmation received.     Electronically signed by Mallika Ortiz MA on 12/1/2020 at 9:17 AM

## 2020-12-02 ENCOUNTER — HOSPITAL ENCOUNTER (OUTPATIENT)
Age: 65
Discharge: HOME | End: 2020-12-04
Payer: MEDICARE

## 2020-12-02 PROCEDURE — U0003 INFECTIOUS AGENT DETECTION BY NUCLEIC ACID (DNA OR RNA); SEVERE ACUTE RESPIRATORY SYNDROME CORONAVIRUS 2 (SARS-COV-2) (CORONAVIRUS DISEASE [COVID-19]), AMPLIFIED PROBE TECHNIQUE, MAKING USE OF HIGH THROUGHPUT TECHNOLOGIES AS DESCRIBED BY CMS-2020-01-R: HCPCS

## 2020-12-04 LAB
SARS-COV-2: NOT DETECTED
SOURCE: NORMAL

## 2020-12-07 ENCOUNTER — PREP FOR PROCEDURE (OUTPATIENT)
Dept: SURGERY | Age: 65
End: 2020-12-07

## 2020-12-07 ENCOUNTER — HOSPITAL ENCOUNTER (OUTPATIENT)
Dept: PREADMISSION TESTING | Age: 65
Discharge: HOME | End: 2020-12-07
Payer: MEDICARE

## 2020-12-07 VITALS
OXYGEN SATURATION: 99 % | HEART RATE: 73 BPM | HEIGHT: 68 IN | RESPIRATION RATE: 18 BRPM | BODY MASS INDEX: 44.41 KG/M2 | TEMPERATURE: 96.9 F | SYSTOLIC BLOOD PRESSURE: 118 MMHG | DIASTOLIC BLOOD PRESSURE: 64 MMHG | WEIGHT: 293 LBS

## 2020-12-07 LAB
ANION GAP SERPL CALCULATED.3IONS-SCNC: 11 MMOL/L (ref 7–16)
BUN BLDV-MCNC: 18 MG/DL (ref 8–23)
CALCIUM SERPL-MCNC: 9 MG/DL (ref 8.6–10.2)
CHLORIDE BLD-SCNC: 109 MMOL/L (ref 98–107)
CO2: 23 MMOL/L (ref 22–29)
CREAT SERPL-MCNC: 1.2 MG/DL (ref 0.5–1)
EKG ATRIAL RATE: 56 BPM
EKG P AXIS: 22 DEGREES
EKG P-R INTERVAL: 178 MS
EKG Q-T INTERVAL: 432 MS
EKG QRS DURATION: 100 MS
EKG QTC CALCULATION (BAZETT): 416 MS
EKG R AXIS: 13 DEGREES
EKG T AXIS: 4 DEGREES
EKG VENTRICULAR RATE: 56 BPM
GFR AFRICAN AMERICAN: 54
GFR NON-AFRICAN AMERICAN: 45 ML/MIN/1.73
GLUCOSE BLD-MCNC: 129 MG/DL (ref 74–99)
HCT VFR BLD CALC: 40.2 % (ref 34–48)
HEMOGLOBIN: 12.8 G/DL (ref 11.5–15.5)
MCH RBC QN AUTO: 28.9 PG (ref 26–35)
MCHC RBC AUTO-ENTMCNC: 31.8 % (ref 32–34.5)
MCV RBC AUTO: 90.7 FL (ref 80–99.9)
PDW BLD-RTO: 13.7 FL (ref 11.5–15)
PLATELET # BLD: 110 E9/L (ref 130–450)
PMV BLD AUTO: 12.1 FL (ref 7–12)
POTASSIUM REFLEX MAGNESIUM: 4 MMOL/L (ref 3.5–5)
RBC # BLD: 4.43 E12/L (ref 3.5–5.5)
SODIUM BLD-SCNC: 143 MMOL/L (ref 132–146)
WBC # BLD: 4.8 E9/L (ref 4.5–11.5)

## 2020-12-07 PROCEDURE — 36415 COLL VENOUS BLD VENIPUNCTURE: CPT

## 2020-12-07 PROCEDURE — 93005 ELECTROCARDIOGRAM TRACING: CPT | Performed by: ANESTHESIOLOGY

## 2020-12-07 PROCEDURE — 80048 BASIC METABOLIC PNL TOTAL CA: CPT

## 2020-12-07 PROCEDURE — 85027 COMPLETE CBC AUTOMATED: CPT

## 2020-12-07 RX ORDER — SODIUM CHLORIDE, SODIUM LACTATE, POTASSIUM CHLORIDE, CALCIUM CHLORIDE 600; 310; 30; 20 MG/100ML; MG/100ML; MG/100ML; MG/100ML
INJECTION, SOLUTION INTRAVENOUS CONTINUOUS
Status: CANCELLED | OUTPATIENT
Start: 2020-12-07

## 2020-12-07 RX ORDER — SODIUM CHLORIDE 0.9 % (FLUSH) 0.9 %
10 SYRINGE (ML) INJECTION PRN
Status: CANCELLED | OUTPATIENT
Start: 2020-12-07

## 2020-12-07 RX ORDER — SODIUM CHLORIDE 0.9 % (FLUSH) 0.9 %
10 SYRINGE (ML) INJECTION EVERY 12 HOURS SCHEDULED
Status: CANCELLED | OUTPATIENT
Start: 2020-12-07

## 2020-12-07 ASSESSMENT — PAIN DESCRIPTION - LOCATION: LOCATION: ABDOMEN

## 2020-12-07 ASSESSMENT — PAIN DESCRIPTION - FREQUENCY: FREQUENCY: INTERMITTENT

## 2020-12-07 ASSESSMENT — PAIN SCALES - GENERAL: PAINLEVEL_OUTOF10: 3

## 2020-12-07 ASSESSMENT — PAIN DESCRIPTION - DESCRIPTORS: DESCRIPTORS: ACHING

## 2020-12-07 ASSESSMENT — PAIN DESCRIPTION - ORIENTATION: ORIENTATION: MID

## 2020-12-07 ASSESSMENT — PAIN DESCRIPTION - PAIN TYPE: TYPE: CHRONIC PAIN

## 2020-12-07 NOTE — PROGRESS NOTES
[address]        Date: 12/7/2020    Date of surgery: 12/8/20   Arrival Time: Hospital will call you tonight between 5pm and 7pm with your final arrival time for surgery    1. Do not eat or drink anything after midnight prior to surgery. This includes no water, chewing gum, mints or ice chips. 2. Take the following medications with a small sip of water on the morning of Surgery: buspar, lyrica, topamax, cymbalta, protonix, use flonase, bring resue inhaler. May take methocarbamol if needed. 3. Diabetics may take evening dose of insulin but none after midnight. If you feel symptomatic or low blood sugar morning of surgery drink 1-2 ounces of apple juice only. 4. Aspirin, Ibuprofen, Advil, Naproxen, Vitamin E and other Anti-inflammatory products should be stopped  before surgery  as directed by your physician. Take Tylenol only unless instructed otherwise by your surgeon. 5. Check with your Doctor regarding stopping Plavix, Coumadin, Lovenox, Eliquis, Effient, or other blood thinners. 6. Do not smoke,use illicit drugs and do not drink any alcoholic beverages 24 hours prior to surgery. 7. You may brush your teeth the morning of surgery. DO NOT SWALLOW WATER    8. You MUST make arrangements for a responsible adult to take you home after your surgery. You will not be allowed to leave alone or drive yourself home. It is strongly suggested someone stay with you the first 24 hrs. Your surgery will be cancelled if you do not have a ride home. 9. PEDIATRIC PATIENTS ONLY:  A parent/legal guardian must accompany a child scheduled for surgery and plan to stay at the hospital until the child is discharged. Please do not bring other children with you.     10. Please wear simple, loose fitting clothing to the hospital.  Do not bring valuables (money, credit cards, checkbooks, etc.) Do not wear any makeup (including no eye makeup) or nail polish on your fingers or toes. 11. DO NOT wear any jewelry or piercings on day of surgery. All body piercing jewelry must be removed. 12. Shower the night before surgery with _x__Antibacterial soap /PATI WIPES________    13. TOTAL JOINT REPLACEMENT/HYSTERECTOMY PATIENTS ONLY---Remember to bring Blood Bank bracelet to the hospital on the day of surgery. 14. If you have a Living Will and Durable Power of  for Healthcare, please bring in a copy. 15. If appropriate bring crutches, inspirex, WALKER, CANE etc... 12. Notify your Surgeon if you develop any illness between now and surgery time, cough, cold, fever, sore throat, nausea, vomiting, etc.  Please notify your surgeon if you experience dizziness, shortness of breath or blurred vision between now & the time of your surgery. 17. If you have __x_dentures, they will be removed before going to the OR; we will provide you a container. If you wear _x__contact lenses or ___glasses, they will be removed; please bring a case for them. 18. To provide excellent care visitors will be limited to 1 in the room at any given time. 19. Please bring picture ID and insurance card. 20. Sleep apnea patients need to bring CPAP AND SETTINGS to hospital on day of surgery. 21. During flu season no children under the age of 15 are permitted in the hospital for the safety of all patients. 22. Other Please check in at the main lobby/information desk. Wear a mask. Please call AMBULATORY CARE if you have any further questions.    [address]     [address]

## 2020-12-08 ENCOUNTER — ANESTHESIA (OUTPATIENT)
Dept: OPERATING ROOM | Age: 65
End: 2020-12-08
Payer: MEDICARE

## 2020-12-08 ENCOUNTER — ANESTHESIA EVENT (OUTPATIENT)
Dept: OPERATING ROOM | Age: 65
End: 2020-12-08
Payer: MEDICARE

## 2020-12-08 ENCOUNTER — HOSPITAL ENCOUNTER (OUTPATIENT)
Age: 65
Setting detail: OUTPATIENT SURGERY
Discharge: HOME | End: 2020-12-08
Attending: SURGERY | Admitting: SURGERY
Payer: MEDICARE

## 2020-12-08 VITALS
DIASTOLIC BLOOD PRESSURE: 72 MMHG | RESPIRATION RATE: 16 BRPM | SYSTOLIC BLOOD PRESSURE: 141 MMHG | OXYGEN SATURATION: 96 % | HEART RATE: 57 BPM | TEMPERATURE: 97 F

## 2020-12-08 VITALS
DIASTOLIC BLOOD PRESSURE: 97 MMHG | OXYGEN SATURATION: 99 % | RESPIRATION RATE: 2 BRPM | SYSTOLIC BLOOD PRESSURE: 176 MMHG

## 2020-12-08 LAB — METER GLUCOSE: 76 MG/DL (ref 74–99)

## 2020-12-08 PROCEDURE — 15734 MUSCLE-SKIN GRAFT TRUNK: CPT | Performed by: SURGERY

## 2020-12-08 PROCEDURE — 2580000003 HC RX 258: Performed by: SURGERY

## 2020-12-08 PROCEDURE — 6360000002 HC RX W HCPCS: Performed by: SURGERY

## 2020-12-08 PROCEDURE — 6360000002 HC RX W HCPCS

## 2020-12-08 PROCEDURE — 6360000002 HC RX W HCPCS: Performed by: NURSE ANESTHETIST, CERTIFIED REGISTERED

## 2020-12-08 PROCEDURE — 7100000010 HC PHASE II RECOVERY - FIRST 15 MIN: Performed by: SURGERY

## 2020-12-08 PROCEDURE — 82962 GLUCOSE BLOOD TEST: CPT

## 2020-12-08 PROCEDURE — 2500000003 HC RX 250 WO HCPCS: Performed by: NURSE ANESTHETIST, CERTIFIED REGISTERED

## 2020-12-08 PROCEDURE — 99024 POSTOP FOLLOW-UP VISIT: CPT | Performed by: SURGERY

## 2020-12-08 PROCEDURE — 3700000001 HC ADD 15 MINUTES (ANESTHESIA): Performed by: SURGERY

## 2020-12-08 PROCEDURE — 7100000011 HC PHASE II RECOVERY - ADDTL 15 MIN: Performed by: SURGERY

## 2020-12-08 PROCEDURE — 7100000000 HC PACU RECOVERY - FIRST 15 MIN: Performed by: SURGERY

## 2020-12-08 PROCEDURE — 3600000014 HC SURGERY LEVEL 4 ADDTL 15MIN: Performed by: SURGERY

## 2020-12-08 PROCEDURE — 43282 LAP PARAESOPH HER RPR W/MESH: CPT | Performed by: SURGERY

## 2020-12-08 PROCEDURE — 2709999900 HC NON-CHARGEABLE SUPPLY: Performed by: SURGERY

## 2020-12-08 PROCEDURE — 3600000004 HC SURGERY LEVEL 4 BASE: Performed by: SURGERY

## 2020-12-08 PROCEDURE — 2500000003 HC RX 250 WO HCPCS: Performed by: SURGERY

## 2020-12-08 PROCEDURE — C1781 MESH (IMPLANTABLE): HCPCS | Performed by: SURGERY

## 2020-12-08 PROCEDURE — 3700000000 HC ANESTHESIA ATTENDED CARE: Performed by: SURGERY

## 2020-12-08 PROCEDURE — 7100000001 HC PACU RECOVERY - ADDTL 15 MIN: Performed by: SURGERY

## 2020-12-08 DEVICE — MESH SURG W8XL8CM FLAT SHT BIO-A: Type: IMPLANTABLE DEVICE | Site: ABDOMEN | Status: FUNCTIONAL

## 2020-12-08 RX ORDER — PROPOFOL 10 MG/ML
INJECTION, EMULSION INTRAVENOUS PRN
Status: DISCONTINUED | OUTPATIENT
Start: 2020-12-08 | End: 2020-12-08 | Stop reason: SDUPTHER

## 2020-12-08 RX ORDER — GLYCOPYRROLATE 1 MG/5 ML
SYRINGE (ML) INTRAVENOUS PRN
Status: DISCONTINUED | OUTPATIENT
Start: 2020-12-08 | End: 2020-12-08 | Stop reason: SDUPTHER

## 2020-12-08 RX ORDER — NEOSTIGMINE METHYLSULFATE 1 MG/ML
INJECTION, SOLUTION INTRAVENOUS PRN
Status: DISCONTINUED | OUTPATIENT
Start: 2020-12-08 | End: 2020-12-08 | Stop reason: SDUPTHER

## 2020-12-08 RX ORDER — DIPHENHYDRAMINE HYDROCHLORIDE 50 MG/ML
12.5 INJECTION INTRAMUSCULAR; INTRAVENOUS
Status: DISCONTINUED | OUTPATIENT
Start: 2020-12-08 | End: 2020-12-08 | Stop reason: HOSPADM

## 2020-12-08 RX ORDER — METOCLOPRAMIDE HYDROCHLORIDE 5 MG/ML
10 INJECTION INTRAMUSCULAR; INTRAVENOUS ONCE
Status: CANCELLED | OUTPATIENT
Start: 2020-12-08 | End: 2020-12-08

## 2020-12-08 RX ORDER — SODIUM CHLORIDE, SODIUM LACTATE, POTASSIUM CHLORIDE, CALCIUM CHLORIDE 600; 310; 30; 20 MG/100ML; MG/100ML; MG/100ML; MG/100ML
INJECTION, SOLUTION INTRAVENOUS CONTINUOUS
Status: DISCONTINUED | OUTPATIENT
Start: 2020-12-08 | End: 2020-12-08 | Stop reason: HOSPADM

## 2020-12-08 RX ORDER — MEPERIDINE HYDROCHLORIDE 25 MG/ML
12.5 INJECTION INTRAMUSCULAR; INTRAVENOUS; SUBCUTANEOUS EVERY 5 MIN PRN
Status: DISCONTINUED | OUTPATIENT
Start: 2020-12-08 | End: 2020-12-08 | Stop reason: HOSPADM

## 2020-12-08 RX ORDER — HYDROCODONE BITARTRATE AND ACETAMINOPHEN 5; 325 MG/1; MG/1
1 TABLET ORAL EVERY 4 HOURS PRN
Qty: 18 TABLET | Refills: 0 | Status: SHIPPED | OUTPATIENT
Start: 2020-12-08 | End: 2020-12-11

## 2020-12-08 RX ORDER — SODIUM CHLORIDE 0.9 % (FLUSH) 0.9 %
10 SYRINGE (ML) INJECTION EVERY 12 HOURS SCHEDULED
Status: DISCONTINUED | OUTPATIENT
Start: 2020-12-08 | End: 2020-12-08 | Stop reason: HOSPADM

## 2020-12-08 RX ORDER — FENTANYL CITRATE 50 UG/ML
INJECTION, SOLUTION INTRAMUSCULAR; INTRAVENOUS PRN
Status: DISCONTINUED | OUTPATIENT
Start: 2020-12-08 | End: 2020-12-08 | Stop reason: SDUPTHER

## 2020-12-08 RX ORDER — PROCHLORPERAZINE EDISYLATE 5 MG/ML
5 INJECTION INTRAMUSCULAR; INTRAVENOUS
Status: DISCONTINUED | OUTPATIENT
Start: 2020-12-08 | End: 2020-12-08 | Stop reason: HOSPADM

## 2020-12-08 RX ORDER — FENTANYL CITRATE 50 UG/ML
25 INJECTION, SOLUTION INTRAMUSCULAR; INTRAVENOUS EVERY 5 MIN PRN
Status: DISCONTINUED | OUTPATIENT
Start: 2020-12-08 | End: 2020-12-08 | Stop reason: HOSPADM

## 2020-12-08 RX ORDER — SODIUM CHLORIDE 0.9 % (FLUSH) 0.9 %
10 SYRINGE (ML) INJECTION PRN
Status: DISCONTINUED | OUTPATIENT
Start: 2020-12-08 | End: 2020-12-08 | Stop reason: HOSPADM

## 2020-12-08 RX ORDER — LIDOCAINE HYDROCHLORIDE 20 MG/ML
INJECTION, SOLUTION INTRAVENOUS PRN
Status: DISCONTINUED | OUTPATIENT
Start: 2020-12-08 | End: 2020-12-08 | Stop reason: SDUPTHER

## 2020-12-08 RX ORDER — LIDOCAINE HYDROCHLORIDE AND EPINEPHRINE 5; 5 MG/ML; UG/ML
INJECTION, SOLUTION INFILTRATION; PERINEURAL PRN
Status: DISCONTINUED | OUTPATIENT
Start: 2020-12-08 | End: 2020-12-08 | Stop reason: ALTCHOICE

## 2020-12-08 RX ORDER — FENTANYL CITRATE 50 UG/ML
INJECTION, SOLUTION INTRAMUSCULAR; INTRAVENOUS
Status: COMPLETED
Start: 2020-12-08 | End: 2020-12-08

## 2020-12-08 RX ORDER — HYDROCODONE BITARTRATE AND ACETAMINOPHEN 5; 325 MG/1; MG/1
1 TABLET ORAL
Status: DISCONTINUED | OUTPATIENT
Start: 2020-12-08 | End: 2020-12-08 | Stop reason: HOSPADM

## 2020-12-08 RX ORDER — ROCURONIUM BROMIDE 10 MG/ML
INJECTION, SOLUTION INTRAVENOUS PRN
Status: DISCONTINUED | OUTPATIENT
Start: 2020-12-08 | End: 2020-12-08 | Stop reason: SDUPTHER

## 2020-12-08 RX ORDER — MIDAZOLAM HYDROCHLORIDE 1 MG/ML
INJECTION INTRAMUSCULAR; INTRAVENOUS PRN
Status: DISCONTINUED | OUTPATIENT
Start: 2020-12-08 | End: 2020-12-08 | Stop reason: SDUPTHER

## 2020-12-08 RX ORDER — IBUPROFEN 800 MG/1
800 TABLET ORAL EVERY 6 HOURS PRN
Qty: 20 TABLET | Refills: 0 | Status: SHIPPED | OUTPATIENT
Start: 2020-12-08 | End: 2020-12-14

## 2020-12-08 RX ORDER — ONDANSETRON 2 MG/ML
INJECTION INTRAMUSCULAR; INTRAVENOUS PRN
Status: DISCONTINUED | OUTPATIENT
Start: 2020-12-08 | End: 2020-12-08 | Stop reason: SDUPTHER

## 2020-12-08 RX ADMIN — SODIUM CHLORIDE, POTASSIUM CHLORIDE, SODIUM LACTATE AND CALCIUM CHLORIDE: 600; 310; 30; 20 INJECTION, SOLUTION INTRAVENOUS at 12:14

## 2020-12-08 RX ADMIN — CEFAZOLIN 3 G: 10 INJECTION, POWDER, FOR SOLUTION INTRAVENOUS at 11:33

## 2020-12-08 RX ADMIN — ROCURONIUM BROMIDE 10 MG: 10 SOLUTION INTRAVENOUS at 11:57

## 2020-12-08 RX ADMIN — FENTANYL CITRATE 25 MCG: 50 INJECTION, SOLUTION INTRAMUSCULAR; INTRAVENOUS at 14:05

## 2020-12-08 RX ADMIN — Medication 0.5 MG: at 13:33

## 2020-12-08 RX ADMIN — LIDOCAINE HYDROCHLORIDE 100 MG: 20 INJECTION, SOLUTION INTRAVENOUS at 11:45

## 2020-12-08 RX ADMIN — HYDROMORPHONE HYDROCHLORIDE 0.5 MG: 1 INJECTION, SOLUTION INTRAMUSCULAR; INTRAVENOUS; SUBCUTANEOUS at 13:06

## 2020-12-08 RX ADMIN — PROPOFOL 150 MG: 10 INJECTION, EMULSION INTRAVENOUS at 11:45

## 2020-12-08 RX ADMIN — ONDANSETRON 4 MG: 2 INJECTION INTRAMUSCULAR; INTRAVENOUS at 12:25

## 2020-12-08 RX ADMIN — MIDAZOLAM 2 MG: 1 INJECTION INTRAMUSCULAR; INTRAVENOUS at 11:33

## 2020-12-08 RX ADMIN — Medication 0.5 MG: at 13:06

## 2020-12-08 RX ADMIN — Medication 0.2 MG: at 11:55

## 2020-12-08 RX ADMIN — Medication 3 MG: at 12:25

## 2020-12-08 RX ADMIN — FENTANYL CITRATE 50 MCG: 50 INJECTION, SOLUTION INTRAMUSCULAR; INTRAVENOUS at 12:41

## 2020-12-08 RX ADMIN — FENTANYL CITRATE 100 MCG: 50 INJECTION, SOLUTION INTRAMUSCULAR; INTRAVENOUS at 11:45

## 2020-12-08 RX ADMIN — ROCURONIUM BROMIDE 40 MG: 10 SOLUTION INTRAVENOUS at 11:45

## 2020-12-08 RX ADMIN — SODIUM CHLORIDE, POTASSIUM CHLORIDE, SODIUM LACTATE AND CALCIUM CHLORIDE: 600; 310; 30; 20 INJECTION, SOLUTION INTRAVENOUS at 10:56

## 2020-12-08 RX ADMIN — HYDROMORPHONE HYDROCHLORIDE 0.5 MG: 1 INJECTION, SOLUTION INTRAMUSCULAR; INTRAVENOUS; SUBCUTANEOUS at 13:33

## 2020-12-08 RX ADMIN — Medication 0.6 MG: at 12:25

## 2020-12-08 ASSESSMENT — PULMONARY FUNCTION TESTS
PIF_VALUE: 3
PIF_VALUE: 19
PIF_VALUE: 22
PIF_VALUE: 4
PIF_VALUE: 22
PIF_VALUE: 24
PIF_VALUE: 5
PIF_VALUE: 3
PIF_VALUE: 3
PIF_VALUE: 19
PIF_VALUE: 18
PIF_VALUE: 22
PIF_VALUE: 22
PIF_VALUE: 2
PIF_VALUE: 21
PIF_VALUE: 17
PIF_VALUE: 22
PIF_VALUE: 18
PIF_VALUE: 22
PIF_VALUE: 1
PIF_VALUE: 21
PIF_VALUE: 4
PIF_VALUE: 0
PIF_VALUE: 22
PIF_VALUE: 23
PIF_VALUE: 19
PIF_VALUE: 2
PIF_VALUE: 3
PIF_VALUE: 21
PIF_VALUE: 0
PIF_VALUE: 3
PIF_VALUE: 18
PIF_VALUE: 22
PIF_VALUE: 22
PIF_VALUE: 18
PIF_VALUE: 21
PIF_VALUE: 21
PIF_VALUE: 15
PIF_VALUE: 19
PIF_VALUE: 0
PIF_VALUE: 21
PIF_VALUE: 22
PIF_VALUE: 21
PIF_VALUE: 22
PIF_VALUE: 15
PIF_VALUE: 21
PIF_VALUE: 19
PIF_VALUE: 26
PIF_VALUE: 2
PIF_VALUE: 26
PIF_VALUE: 27
PIF_VALUE: 2
PIF_VALUE: 5
PIF_VALUE: 22
PIF_VALUE: 23
PIF_VALUE: 5
PIF_VALUE: 2
PIF_VALUE: 13
PIF_VALUE: 7
PIF_VALUE: 3
PIF_VALUE: 21
PIF_VALUE: 22
PIF_VALUE: 21
PIF_VALUE: 22
PIF_VALUE: 22

## 2020-12-08 ASSESSMENT — PAIN DESCRIPTION - PROGRESSION
CLINICAL_PROGRESSION: GRADUALLY IMPROVING
CLINICAL_PROGRESSION: RAPIDLY WORSENING
CLINICAL_PROGRESSION: GRADUALLY IMPROVING

## 2020-12-08 ASSESSMENT — PAIN DESCRIPTION - PAIN TYPE
TYPE: ACUTE PAIN;SURGICAL PAIN

## 2020-12-08 ASSESSMENT — PAIN DESCRIPTION - LOCATION
LOCATION: ABDOMEN

## 2020-12-08 ASSESSMENT — PAIN SCALES - GENERAL
PAINLEVEL_OUTOF10: 7
PAINLEVEL_OUTOF10: 8
PAINLEVEL_OUTOF10: 2
PAINLEVEL_OUTOF10: 5
PAINLEVEL_OUTOF10: 0

## 2020-12-08 ASSESSMENT — PAIN DESCRIPTION - ORIENTATION: ORIENTATION: MID

## 2020-12-08 ASSESSMENT — PAIN DESCRIPTION - FREQUENCY: FREQUENCY: CONTINUOUS

## 2020-12-08 ASSESSMENT — PAIN DESCRIPTION - DESCRIPTORS
DESCRIPTORS: ACHING;DISCOMFORT
DESCRIPTORS: ACHING;DISCOMFORT

## 2020-12-08 ASSESSMENT — ENCOUNTER SYMPTOMS: SHORTNESS OF BREATH: 1

## 2020-12-08 ASSESSMENT — PAIN DESCRIPTION - DIRECTION: RADIATING_TOWARDS: 1510

## 2020-12-08 NOTE — ANESTHESIA POSTPROCEDURE EVALUATION
Department of Anesthesiology  Postprocedure Note    Patient: Janette Lane  MRN: [id number]  YOB: 1955  Date of evaluation: 12/8/2020  Time:  12:59 PM     Procedure Summary     Date:  12/08/20 Room / Location:  [address] [place] [address]    Anesthesia Start:  1977 Anesthesia Stop:  [id number]    Procedure:  [facility] (N/A ) Diagnosis:  (HIATAL HERNIA)    Surgeon:  Samson Grubbs MD Responsible Provider:  Juliette Devine DO    Anesthesia Type:  general ASA Status:  3          Anesthesia Type: general    Christine Phase I: Christine Score: 8    Christine Phase II:      Last vitals: Reviewed and per EMR flowsheets.        Anesthesia Post Evaluation    Patient location during evaluation: bedside  Patient participation: complete - patient participated  Level of consciousness: awake  Pain score: 4  Airway patency: patent  Nausea & Vomiting: no vomiting and no nausea  Complications: no  Cardiovascular status: hemodynamically stable  Respiratory status: acceptable  Hydration status: stable

## 2020-12-08 NOTE — OP NOTE
DATE OF PROCEDURE: 12/8/2020    SURGEON: MARIBEL Becerra    PREOPERATIVE DIAGNOSIS: Large paraesophageal midline hiatal hernia with severe reflux and muscle disruption. POSTOPERATIVE DIAGNOSIS: same    OPERATION:   1.)Laparoscopic midline paraesophageal hiatal hernia repair  with mesh  2.) kristen cutaneous flap and      ANESTHESIA: General.    ESTIMATED BLOOD LOSS: 40 mL. COMPLICATIONS: None. FLUIDS: Crystalloid. DISPOSITION: Will be admitted to the floor for routine postoperative care. SPECIMEN: None. INDICATION: This is a patient who came in with the aforementioned  diagnosis. The patient had severe reflux. I explained the risks, benefits,  potential outcomes, alternative treatment to aforementioned procedure, and  she agreed to proceed, understanding those risks and potential outcomes. DESCRIPTION OF PROCEDURE: The patient was brought to the operative suite,  was placed under general anesthesia, was given preoperative antibiotics  within 30 minutes of incision, then had bilateral PCDs placed. Once this was done a 5 mm incision was made in the supraumbilical area. After local anesthetic was infiltrated into the abdominal wall, a Veress  needle was used to pass into the peritoneum. CO2 was then used to insufflate  the abdomen to a pressure of 15 mmHg. At this time, the Veress needle was  removed, and an 5 mm trocar was placed through this incision. Four  additional trocars were placed, 2 in the right lateral abdomen, one a 5 mm  trocar that was scythed through the rectus sheath at 2 different levels. An  additional 5 mm trocar was placed in the right lateral abdomen. An 5 mm  trocar was also placed in the left upper quadrant of the abdomen. At this time,  a Chelo retractor was put into this to retract the liver,  exposing the hiatal hernia. Hiatal hernia was identified.  The hernia sac  and its crural attachments were taken down using electrocautery and blunt  dissection. We were able to expose the entire esophagus and stomach that was  into this hiatal hernia and reduce it into the abdomen. We bluntly dissected  around the entire esophagus, mobilizing that down into the abdomen to have  approximately 3 cm of intraabdominal esophagus. At this time, we repaired the crura in a posterior fashion with a running,  nonabsorbable V-Loc suture. Once this was completed, a Bio A mesh was cut to keyhole around the esophagus and was placed posteriorly and sewn in place with v lock vicryl sutures. A falciform myofascial flap was then placed by taking the falciform down from abdominal wall and was placed posteriorly around to buttress our repair and protect the esophagus from the mesh. This was to support our repair as well as to  anchor the stomach and esophagus intra-abdominally. This was also done with  V donny sutures. Once this was completed, an EGD scope was done to assure that  this was proper. The repair did not appear to be too tight, and the  endoscope passed easily into the stomach. It was retroflexed at that point  and the hiatus was intact without appearing to be too tight. The endoscope  was then removed. . Pneumoperitoneum was  evacuated. All lap and suture counts were correct, and the trocar sites were  closed with 4-0 Monocryl sutures in a subcuticular fashion, and then  Dermabond was placed. The patient tolerated the procedure well and was taken to PACU.

## 2020-12-08 NOTE — ANESTHESIA PRE PROCEDURE
Department of Anesthesiology  Preprocedure Note       Name:  Dal Koyanagi   Age:  72 y.o.  :  1955                                          MRN:  [id number]         Date:  2020      Surgeon: Melinda Bush):  Ulysses Silva, MD    Procedure: Procedure(s):  LAPAROSCOPIC HERNIA HIATAL REPAIR WITH MESH    Medications prior to admission:   Prior to Admission medications    Medication Sig Start Date End Date Taking? Authorizing Provider   methocarbamol (ROBAXIN) 500 MG tablet Take 1 tablet by mouth 3 times daily 20  GIUSEPPE Jeffries CNP   pregabalin (LYRICA) 150 MG capsule Take 1 capsule by mouth 3 times daily for 30 days. TID 20  GIUSEPPE Jeffries CNP   HYDROcodone-acetaminophen (NORCO) 5-325 MG per tablet Take 1 tablet by mouth every 12 hours as needed for Pain for up to 30 days. Take lowest dose possible to manage pain 20  GIUSEPPE Jeffries CNP   clotrimazole-betamethasone (LOTRISONE) 1-0.05 % cream Apply topically 2 times daily. 10/22/20   Jose Manuel Coffee., DPM   cetirizine (ZYRTEC) 10 MG tablet Take 1 tablet by mouth daily 10/14/20   Brendon Croft PA-C   topiramate (TOPAMAX) 100 MG tablet Take 1 tablet by mouth 2 times daily  Patient taking differently: Take 100 mg by mouth daily as needed Patient reports only taking rarely as needed as she is on Baclofen as well.  10/8/20   Brendon Croft PA-C   busPIRone (BUSPAR) 15 MG tablet TAKE 1 TABLET BY MOUTH THREE TIMES A DAY 10/6/20   Brendon Croft PA-C   pantoprazole (PROTONIX) 40 MG tablet Take 1 tablet by mouth 2 times daily 10/6/20   Brendon Croft PA-C   tolterodine (DETROL LA) 2 MG extended release capsule TAKE 1 TABLET BY MOUTH EVERY DAY 10/6/20   Brendon Croft PA-C   azelastine (ASTELIN) 0.1 % nasal spray 2 sprays by Nasal route 2 times daily Use in each nostril as directed 10/6/20   Brendon Croft PA-C   montelukast (SINGULAIR) 10 MG tablet Take 1 tablet by Current medications:    No current facility-administered medications for this encounter. Current Outpatient Medications   Medication Sig Dispense Refill    methocarbamol (ROBAXIN) 500 MG tablet Take 1 tablet by mouth 3 times daily 90 tablet 0    pregabalin (LYRICA) 150 MG capsule Take 1 capsule by mouth 3 times daily for 30 days. TID 90 capsule 0    HYDROcodone-acetaminophen (NORCO) 5-325 MG per tablet Take 1 tablet by mouth every 12 hours as needed for Pain for up to 30 days. Take lowest dose possible to manage pain 35 tablet 0    clotrimazole-betamethasone (LOTRISONE) 1-0.05 % cream Apply topically 2 times daily.  45 g 3    cetirizine (ZYRTEC) 10 MG tablet Take 1 tablet by mouth daily 90 tablet 1    topiramate (TOPAMAX) 100 MG tablet Take 1 tablet by mouth 2 times daily (Patient taking differently: Take 100 mg by mouth daily as needed Patient reports only taking rarely as needed as she is on Baclofen as well.) 180 tablet 0    busPIRone (BUSPAR) 15 MG tablet TAKE 1 TABLET BY MOUTH THREE TIMES A  tablet 1    pantoprazole (PROTONIX) 40 MG tablet Take 1 tablet by mouth 2 times daily 180 tablet 1    tolterodine (DETROL LA) 2 MG extended release capsule TAKE 1 TABLET BY MOUTH EVERY DAY 90 capsule 1    azelastine (ASTELIN) 0.1 % nasal spray 2 sprays by Nasal route 2 times daily Use in each nostril as directed 3 Bottle 1    montelukast (SINGULAIR) 10 MG tablet Take 1 tablet by mouth nightly 90 tablet 1    zoster recombinant adjuvanted vaccine (SHINGRIX) 50 MCG/0.5ML SUSR injection Inject 0.5 mLs into the muscle See Admin Instructions 1 dose now and repeat in 2-6 months (Patient not taking: Reported on 11/13/2020) 0.5 mL 0    aspirin 81 MG EC tablet Take 81 mg by mouth nightly      levothyroxine (SYNTHROID) 25 MCG tablet Take 1 tablet by mouth daily (Patient taking differently: Take 25 mcg by mouth daily Pt states not taking) 30 tablet 2    glipiZIDE (GLUCOTROL XL) 2.5 MG extended release tablet Take 1 tablet by mouth daily 90 tablet 1    DULoxetine (CYMBALTA) 30 MG extended release capsule Take 1 capsule by mouth daily 90 capsule 1    Handicap Placard MISC Handicap Placard  Patient unable to walk more than 200ft without stopping. Dx: Chronic lower back pain, spinal stenosis, lumbar spondylosis, Fibromyalgia. Recommend 5 year placard 1 each 0    fluticasone (FLONASE) 50 MCG/ACT nasal spray 1 spray by Nasal route daily (Patient taking differently: 1 spray by Nasal route as needed ) 3 Bottle 1    tiZANidine (ZANAFLEX) 4 MG tablet Take 1 tablet by mouth every 8 hours as needed (PRN) (Patient taking differently: Take 4 mg by mouth every 8 hours as needed (PRN) Pt not taking.) 270 tablet 1    budesonide-formoterol (SYMBICORT) 80-4.5 MCG/ACT AERO Inhale 2 puffs into the lungs 2 times daily (Patient taking differently: Inhale 2 puffs into the lungs as needed ) 1 Inhaler 1    albuterol sulfate HFA (VENTOLIN HFA) 108 (90 Base) MCG/ACT inhaler Inhale 2 puffs into the lungs every 6 hours as needed for Wheezing or Shortness of Breath 1 Inhaler 5    meclizine (ANTIVERT) 25 MG tablet Take 1 tablet by mouth 3 times daily as needed 90 tablet 0       Allergies: Allergies   Allergen Reactions    Carafate [Sucralfate]      Patient unable to tolerate. Unable to recall her reaction.     Glucophage [Metformin Hcl]      Severe abdominal pain, constipation      Mobic [Meloxicam] Other (See Comments)     Abdominal pain    Trazodone And Nefazodone      Unable to tolerate, patient reports made her physically ill with abdominal pain      Ultram [Tramadol]      Abdominal pain    Nickel Itching and Rash       Problem List:    Patient Active Problem List   Diagnosis Code    Arthralgia M25.50    Daytime somnolence R40.0    Chronic fatigue R53.82    Chronic back pain M54.9, G89.29    Fibromyalgia M79.7    Overactive bladder N32.81    Depression F32.9    History of total knee arthroplasty Z96.659    Bone avascular necrosis (HCC) M87.00    Morbid obesity with BMI of 45.0-49.9, adult ([facility]) E66.01, Z68.42    Breast lesion N64.9    Nodule of left lung R91.1    History of colon polyps Z86.010    Displacement of lumbar intervertebral disc M51.26    Spinal stenosis M48.00    Tobacco use Z72.0    Tobacco abuse Z72.0    Leg swelling M79.89    Pain in both lower extremities M79.604, M79.605    Chronic pain syndrome G89.4    Myofascial pain syndrome M79.18    Lumbar disc disorder M51.9    Lumbar spondylosis M47.816    Spinal stenosis of lumbar region without neurogenic claudication M48.061    Epigastric pain R10.13    Onychomycosis B35.1    Type II diabetes mellitus with peripheral circulatory disorder (HCC) E11.51    Venous insufficiency (chronic) (peripheral) I87.2    Tinea pedis of both feet B35.3       Past Medical History:        Diagnosis Date    Anxiety     Arthralgia 10/8/2013    Arthritis     Bone avascular necrosis (HCC) 3/23/2015    Breast lesion 12/1/2015    Chronic back pain     Chronic fatigue 10/8/2013    Daytime somnolence 10/8/2013    Depression     Diabetes mellitus ([address]     Fibromyalgia     Fracture, fibula     right    GERD (gastroesophageal reflux disease)     H/O cardiovascular stress test 01/29/2020    Lexiscan    Headache     History of blood transfusion     History of fractured kneecap     (R) 2 hair line fractures    History of total knee arthroplasty 3/19/2015    Hyperlipidemia     Morbid obesity with BMI of 45.0-49.9, adult ([facility] [address] 10/9/2015    Obesity     Osteoarthritis     Overactive bladder 1/22/2014    Reflux        Past Surgical History:        Procedure Laterality Date    CARPAL TUNNEL RELEASE Bilateral     CHOLECYSTECTOMY      COLONOSCOPY  2011    COLONOSCOPY  11/22/2016    Dr. Johnston     benign reasons     JOINT REPLACEMENT Bilateral 2010    LUMBAR FUSION      L3-5    ROTATOR CUFF REPAIR Left     left    TOTAL KNEE ARTHROPLASTY Bilateral 2010    did both at the same time   [address] N/A 2020    EGD BIOPSY performed by Vasu San MD at [address] History:    Social History     Tobacco Use    Smoking status: Former Smoker     Packs/day: 1.00     Years: 30.00     Pack years: 30.00     Types: Cigarettes     Start date:      Last attempt to quit: 2020     Years since quittin.1    Smokeless tobacco: Never Used   Substance Use Topics    Alcohol use: No     Alcohol/week: 0.0 standard drinks                                Counseling given: Not Answered      Vital Signs (Current): There were no vitals filed for this visit. BP Readings from Last 3 Encounters:   20 118/64   20 126/82   10/15/20 135/75       NPO Status:                                                                                 BMI:   Wt Readings from Last 3 Encounters:   20 (!) 336 lb 0.6 oz (152.4 kg)   20 (!) 330 lb (149.7 kg)   10/22/20 (!) 330 lb (149.7 kg)     There is no height or weight on file to calculate BMI.    CBC:   Lab Results   Component Value Date    WBC 4.8 2020    RBC 4.43 2020    HGB 12.8 2020    HCT 40.2 2020    MCV 90.7 2020    RDW 13.7 2020     2020       CMP:   Lab Results   Component Value Date     2020    K 4.0 2020     2020    CO2 23 2020    BUN 18 2020    CREATININE 1.2 2020    GFRAA 54 2020    LABGLOM 45 2020    GLUCOSE 129 2020    GLUCOSE 93 2011    PROT 7.0 2020    CALCIUM 9.0 2020    BILITOT 0.3 2020    ALKPHOS 91 2020    AST 15 2020    ALT 13 2020       POC Tests: No results for input(s): POCGLU, POCNA, POCK, POCCL, POCBUN, POCHEMO, POCHCT in the last 72 hours.     Coags:   Lab Results   Component Value Date    PROTIME 10.2 07/26/2016    PROTIME 10.2 02/16/2011    INR 0.9 07/26/2016    APTT 30.7 02/16/2011       HCG (If Applicable): No results found for: PREGTESTUR, PREGSERUM, HCG, HCGQUANT     ABGs: No results found for: PHART, PO2ART, LEG7ELN, PVS3QRB, BEART, L8AIRVDW     Type & Screen (If Applicable):  No results found for: LABABO, LABRH    Drug/Infectious Status (If Applicable):  No results found for: HIV, HEPCAB    COVID-19 Screening (If Applicable):   Lab Results   Component Value Date    COVID19 Not Detected 12/02/2020     Sinus bradycardia with PAC  Otherwise normal ECG  No previous ECGs available  Confirmed by Rhianna Gee ([id number]) on 12/7/2020 1:28:27 PM    LEXISCAN STRESS TEST     INDICATIONS:  The patient is a very polite and pleasant 60-year-old  female who follows with Dr. Keke Salcedo. She has been having intermittent  periods of shortness of breath. Her cardiac risk factors include  hyperlipidemia and a strong family history of cardiovascular disease.     PROCEDURE:  The patient was brought to the cardiac stress lab and  connected to continuous cardiac monitoring. Resting EKG indicated  normal sinus rhythm. She was administered Lexiscan over 10-15 seconds  followed by injection of Cardiolite. She was placed in recovery at 1  minute. Throughout the examination, she had no unusual symptomatology. There was no active chest pain or shortness of breath. There was no  ectopy or dysrhythmia identified. There were no ST or T-wave  abnormalities identified. She had physiologic response to both blood  pressure and heart rate. She tolerated the procedure well. There was  no evidence of Lexiscan-induced ischemia. She will now be transferred  to the Imaging Lab for final stress pictures. Conclusions      Summary   Left ventricular size is grossly normal.   Mild left ventricular concentric hypertrophy noted. Ejection fraction is visually estimated at 50%.    No evidence of left ventricular mass or thrombus noted. No regional wall motion abnormalities seen. Normal sized left atrium. Interatrial septum appears intact. No evidence of thrombus within left atrium. Borderline dilated right ventricle. No evidence of a thrombus in the right ventricle. Mildly enlarged right atrium size. Physiologic and/or trace mitral regurgitation is present. No evidence of mitral valve stenosis. Aortic valve opens well. The aortic valve is trileaflet. No hemodynamically significant aortic stenosis is present. Physiologic and/or trace aortic regurgitation is noted. Physiologic and/or trace tricuspid regurgitation. Regular rhythm. Anesthesia Evaluation  Patient summary reviewed and Nursing notes reviewed   history of anesthetic complications: PONV. Airway: Mallampati: III  TM distance: >3 FB   Neck ROM: full  Mouth opening: > = 3 FB Dental:          Pulmonary:   (+) shortness of breath (Mild exertional dyspnea which doesn't limit ADL's): no interval change,  sleep apnea: on noncompliant,  decreased breath sounds,                            ROS comment: Tobacco abuse w/o dx. Of COPD. Symptoms managed with Ventolin and Symbicort   Cardiovascular:  Exercise tolerance: good (>4 METS),   (+) hyperlipidemia      ECG reviewed  Rhythm: regular  Rate: normal  Echocardiogram reviewed         Beta Blocker:  Not on Beta Blocker         Neuro/Psych:   (+) neuromuscular disease:, headaches: migraine headaches, psychiatric history: stable with treatmentdepression/anxiety              ROS comment: Vertigo GI/Hepatic/Renal:   (+) hiatal hernia, GERD: well controlled, morbid obesity (SMO with a BMI of 51.1 kg/m2 and excessive daytime somnolence)         ROS comment: Overactive bladder. Endo/Other:    (+) Diabetes (A1C 5.7%)Type II DM, no interval change, , hypothyroidism::., .          Pt had no PAT visit        ROS comment: OA, DJD, spinal stenosis with fiibromyalgia and chronic pain. Chronic fatigue syndrome.  Abdominal: (+) obese,         Vascular:   + PVD, aortic or cerebral, . Anesthesia Plan      general     ASA 3     (PONV prophylaxis)  Induction: intravenous. MIPS: Postoperative opioids intended and Prophylactic antiemetics administered. Anesthetic plan and risks discussed with patient. Plan discussed with CRNA.                   Marina Nash DO   12/8/2020

## 2020-12-08 NOTE — H&P
General Surgery History and Physical     Patient's Name/Date of Birth: Duane Fick / 1955     Date: 12/8/20      Surgeon: Janet Lomas M.D.     PCP: Gisella Brasher PA-C     Chief Complaint: hiatal hernia     HPI:   Duane Fick is a 72 y.o. female who presents for evaluation of symptomatic hiatal hernia that was responsive to medical treatment but has become recalcitrant. Has some GERD, EGD showed hh, Has no had manometry. Has had CT imaging.  Timing is constant, radiation to epigastrium, alleviated by nothing and started years ago and severity is 2-7/10         Past Medical History        Past Medical History:   Diagnosis Date    Anxiety      Arthralgia 10/8/2013    Arthritis      Bone avascular necrosis (HCC) 3/23/2015    Breast lesion 12/1/2015    Chronic back pain      Chronic fatigue 10/8/2013    Daytime somnolence 10/8/2013    Depression      Diabetes mellitus ([facility]      Fibromyalgia      Fracture, fibula       right    GERD (gastroesophageal reflux disease)      H/O cardiovascular stress test 01/29/2020     Lexiscan    Headache      History of blood transfusion      History of fractured kneecap       (R) 2 hair line fractures    History of total knee arthroplasty 3/19/2015    Hyperlipidemia      Morbid obesity with BMI of 45.0-49.9, adult ([facility] [facility] 10/9/2015    Obesity      Osteoarthritis      Overactive bladder 1/22/2014    Reflux             Past Surgical History         Past Surgical History:   Procedure Laterality Date    CARPAL TUNNEL RELEASE Bilateral      CHOLECYSTECTOMY        COLONOSCOPY   2011    COLONOSCOPY   11/22/2016     Dr. Don Lew     benign reasons     LUMBAR FUSION         L3-5    ROTATOR CUFF REPAIR Left       left    TOTAL KNEE ARTHROPLASTY Bilateral 2010     did both at the same time    TUBAL LIGATION        UPPER GASTROINTESTINAL ENDOSCOPY N/A 9/29/2020     EGD BIOPSY performed by Jaleesa Scott MD at [place] [address]    Current Facility-Administered Medications          Current Outpatient Medications   Medication Sig Dispense Refill    HYDROcodone-acetaminophen (NORCO) 5-325 MG per tablet Take 1 tablet by mouth Daily for 30 days. Take lowest dose possible to manage pain 30 tablet 0    cetirizine (ZYRTEC) 10 MG tablet Take 1 tablet by mouth daily 90 tablet 1    topiramate (TOPAMAX) 100 MG tablet Take 1 tablet by mouth 2 times daily (Patient taking differently: Take 100 mg by mouth daily as needed Patient reports only taking rarely as needed as she is on Baclofen as well.) 180 tablet 0    busPIRone (BUSPAR) 15 MG tablet TAKE 1 TABLET BY MOUTH THREE TIMES A  tablet 1    pantoprazole (PROTONIX) 40 MG tablet Take 1 tablet by mouth 2 times daily 180 tablet 1    tolterodine (DETROL LA) 2 MG extended release capsule TAKE 1 TABLET BY MOUTH EVERY DAY 90 capsule 1    azelastine (ASTELIN) 0.1 % nasal spray 2 sprays by Nasal route 2 times daily Use in each nostril as directed 3 Bottle 1    montelukast (SINGULAIR) 10 MG tablet Take 1 tablet by mouth nightly 90 tablet 1    aspirin 81 MG EC tablet Take 81 mg by mouth nightly        levothyroxine (SYNTHROID) 25 MCG tablet Take 1 tablet by mouth daily 30 tablet 2    glipiZIDE (GLUCOTROL XL) 2.5 MG extended release tablet Take 1 tablet by mouth daily 90 tablet 1    DULoxetine (CYMBALTA) 30 MG extended release capsule Take 1 capsule by mouth daily 90 capsule 1    [facility]  Patient unable to walk more than 200ft without stopping. Dx: Chronic lower back pain, spinal stenosis, lumbar spondylosis, Fibromyalgia.   Recommend 5 year placard 1 each 0    fluticasone (FLONASE) 50 MCG/ACT nasal spray 1 spray by Nasal route daily (Patient taking differently: 1 spray by Nasal route as needed ) 3 Bottle 1    tiZANidine (ZANAFLEX) 4 MG tablet Take 1 tablet by mouth every 8 hours as needed (PRN) 270 tablet 1    budesonide-formoterol (SYMBICORT) 80-4.5  Smoking status: Former Smoker       Packs/day: 1.00       Years: 30.00       Pack years: 30.00       Types: Cigarettes       Start date: 5       Last attempt to quit: 2020       Years since quittin.0    Smokeless tobacco: Never Used   Substance and Sexual Activity    Alcohol use:  No       Alcohol/week: 0.0 standard drinks    Drug use: No    Sexual activity: Never   Lifestyle    Physical activity       Days per week: Not on file       Minutes per session: Not on file    Stress: Not on file   Relationships    Social connections       Talks on phone: Not on file       Gets together: Not on file       Attends [employer] service: Not on file       Active member of club or organization: Not on file       Attends meetings of clubs or organizations: Not on file       Relationship status: Not on file    Intimate partner violence       Fear of current or ex partner: Not on file       Emotionally abused: Not on file       Physically abused: Not on file       Forced sexual activity: Not on file   Other Topics Concern    Not on file   Social History Narrative    Not on file                 Review of Systems  Review of Systems -  General ROS: negative for - chills, fatigue or malaise  ENT ROS: negative for - hearing change, nasal congestion or nasal discharge  Allergy and Immunology ROS: negative for - hives, itchy/watery eyes or nasal congestion  Hematological and Lymphatic ROS: negative for - blood clots, blood transfusions, bruising or fatigue  Endocrine ROS: negative for - malaise/lethargy, mood swings, palpitations or polydipsia/polyuria  Respiratory ROS: negative for - sputum changes, stridor, tachypnea or wheezing  Cardiovascular ROS: negative for - irregular heartbeat, loss of consciousness, murmur or orthopnea  Gastrointestinal ROS: negative for - constipation, diarrhea, gas/bloating, or hematemesis, positive for heartburn and other epigastric pain  Genito-Urinary ROS: negative for -  genital discharge, genital ulcers or hematuria  Musculoskeletal ROS: negative for - gait disturbance, muscle pain or muscular weakness     Physical exam:   LMP  (LMP Unknown)     General appearance:  NAD  Pyscho/social: negative for tremors and hallucinations  Head: NCAT, PERRLA, EOMI, red conjunctiva  Neck: supple, no masses  Lungs: CTAB, equal chest rise bilateral  Heart: Reg rate  Abdomen: soft, nontender, nondistended  Skin; no lesions  Gu: no cva tenderness  Extremities: extremities normal, atraumatic, no cyanosis or edema        Assessment:  72 y.o. female with  symptomatic hiatal hernia     Plan:   Discussed the risk, benefits and alternatives of surgery including wound infections, bleeding, scar and hernia formation and the risks of general anesthetic including MI, CVA, sudden death or reactions to anesthetic medications. The patient understands the risks and alternatives and the possibility of converting to an open procedure.  All questions were answered to the patient's satisfaction and they freely signed the consent.        Ramakrishna Castillo MD

## 2020-12-08 NOTE — PROGRESS NOTES
12/8/20 1640 reviewed discharge instructions with pt and her friend Steph Garrido.  Both verbalized understanding, signed in agreement and given copy. melecio morales

## 2020-12-14 ENCOUNTER — VIRTUAL VISIT (OUTPATIENT)
Dept: PAIN MANAGEMENT | Age: 65
End: 2020-12-14
Payer: MEDICARE

## 2020-12-14 ENCOUNTER — TELEPHONE (OUTPATIENT)
Dept: FAMILY MEDICINE CLINIC | Age: 65
End: 2020-12-14

## 2020-12-14 PROCEDURE — 99421 OL DIG E/M SVC 5-10 MIN: CPT | Performed by: NURSE PRACTITIONER

## 2020-12-14 RX ORDER — METHOCARBAMOL 500 MG/1
500 TABLET, FILM COATED ORAL 3 TIMES DAILY
Qty: 90 TABLET | Refills: 2 | Status: SHIPPED | OUTPATIENT
Start: 2020-12-14 | End: 2021-01-13

## 2020-12-14 RX ORDER — HYDROCODONE BITARTRATE AND ACETAMINOPHEN 5; 325 MG/1; MG/1
1 TABLET ORAL EVERY 12 HOURS PRN
Qty: 35 TABLET | Refills: 0 | Status: SHIPPED
Start: 2020-12-14 | End: 2021-04-08 | Stop reason: SDUPTHER

## 2020-12-14 RX ORDER — PREGABALIN 150 MG/1
150 CAPSULE ORAL 3 TIMES DAILY
Qty: 90 CAPSULE | Refills: 0 | Status: SHIPPED
Start: 2020-12-14 | End: 2021-01-11 | Stop reason: SDUPTHER

## 2020-12-14 NOTE — PROGRESS NOTES
[facility], [address] [place] [address]  [phone]    Virtual telehealth visit       Daisy Stratton     Date of Visit:  12/14/2020    CC:  Patient presents for follow up low back pain     Consent:  Telehealth Visit due to Kate 19 pandemic  The patient and/or health care decision maker is aware that he/she may receive a bill for this tele-health service Doxy Me, depending on his insurance coverage, and has provided verbal consent to proceed: Yes  I have considered the risks of abuse, dependence, addiction and diversion. My patient is aware that they will need a follow-up visit (in-person or virtually) at the appropriate time indicated for continued medications. Further, my patient is aware that when this acute crisis has lifted, they will be expected to return for an in-person visit and all elements of standard local and hospital guidelines in order to continue this medication. Patient Location:Home   Physician Location: Other address in [place]    HPI: Pt here for follow up for low back pain with resolution of spasms with addition of robaxin. Pt rescheduled visit with Dr Donna Stacy since low back pain improved significantly. Appropriate analgesia with current medications regimen:yes  Change in quality of symptoms: yes improved   Medication side effects:none. Recent diagnostic testing: none  Recent interventional procedures:12/8/20: LAPAROSCOPIC HERNIA HIATAL REPAIR WITH MESH     Imaging:    MRI of Lumbar Spine 09/2020:  1. Postsurgical changes status post L3-L5 posterior spinal fusion. 2. Severe multifactorial spinal canal stenosis at L2-L3 with mass effect on    the cauda equina nerve roots. 3. Moderate multifactorial L1-L2 spinal canal stenosis with moderate right    neural foraminal stenosis. 4. Moderate left L5-S1 neural foraminal stenosis.             LUMBAR SPINE XRAY 03/4/2020  Intact lumbar vertebral height and alignment.  Relative severe multilevel disc and facet joint narrowing with subchondral sclerosis    and spurring. Previous discectomy with posterior pedicle screw plates    extending from L3 through L5. Symmetric osteoarthritis at the    sacroiliac joints manifesting as joint space narrowing and subchondral    sclerosis.         Lumbar spine CT 2016      1.  Status post lumbar spine fusion from L3-L5 appearing in stable,   satisfactory alignment.       2. Lumbar spondylosis as described above in part due to congenital   shortened pedicles and notable stenosis          Potential Aberrant Drug-Related Behavior:  None    Previous medication: baclofen helps some, tizanidine sleepiness, flexeril no relief,      Urine Drug Screenin2020 Negative for all which is consistent, pt does NOT take every day and does NOT fill exactly 30 days, can go longer than a month for refill   10/2020: consistent for hydrocodone     OARRS report:  2020-2020:Consistent      Past Medical History:   Diagnosis Date    Anxiety     Arthralgia 10/8/2013    Arthritis     Bone avascular necrosis ([address] 3/23/2015    Breast lesion 2015    Chronic back pain     Chronic fatigue 10/8/2013    Daytime somnolence 10/8/2013    Depression     Diabetes mellitus ([address]     Fibromyalgia     Fracture, fibula     right    GERD (gastroesophageal reflux disease)     H/O cardiovascular stress test 2020    Lexiscan    Headache     History of blood transfusion     History of fractured kneecap     (R) 2 hair line fractures    History of total knee arthroplasty 3/19/2015    Hyperlipidemia     Morbid obesity with BMI of 45.0-49.9, adult ([address] 10/9/2015    Obesity     Osteoarthritis     Overactive bladder 2014    Reflux        Past Surgical History:   Procedure Laterality Date    CARPAL TUNNEL RELEASE Bilateral     CHOLECYSTECTOMY      COLONOSCOPY      COLONOSCOPY  2016    Dr. Harvel Olszewski N/A 2020 LAPAROSCOPIC HERNIA HIATAL REPAIR WITH MESH performed by Samson Grubbs MD at [address]    benign reasons     JOINT REPLACEMENT Bilateral 2010    LUMBAR FUSION      L3-5    ROTATOR CUFF REPAIR Left     left    TOTAL KNEE ARTHROPLASTY Bilateral 2010    did both at the same time   [address] [employer] [facility] N/A 9/29/2020    [facility]       Prior to Admission medications    Medication Sig Start Date End Date Taking? Authorizing Provider   ibuprofen (ADVIL;MOTRIN) 800 MG tablet Take 1 tablet by mouth every 6 hours as needed for Pain 12/8/20   Samson Grubbs MD   pregabalin (LYRICA) 150 MG capsule Take 1 capsule by mouth 3 times daily for 30 days. TID 11/13/20 12/13/20  GIUSEPPE Hurst - CNP   clotrimazole-betamethasone (LOTRISONE) 1-0.05 % cream Apply topically 2 times daily. 10/22/20   Marilyn Wood, ANILA   cetirizine (ZYRTEC) 10 MG tablet Take 1 tablet by mouth daily 10/14/20   Tatum Mireles PA-C   topiramate (TOPAMAX) 100 MG tablet Take 1 tablet by mouth 2 times daily  Patient taking differently: Take 100 mg by mouth daily as needed Patient reports only taking rarely as needed as she is on Baclofen as well.  10/8/20   Tatum Mireles PA-C   busPIRone (BUSPAR) 15 MG tablet TAKE 1 TABLET BY MOUTH THREE TIMES A DAY 10/6/20   Tatum Mireles PA-C   pantoprazole (PROTONIX) 40 MG tablet Take 1 tablet by mouth 2 times daily 10/6/20   Tatum Mireles PA-C   tolterodine (DETROL LA) 2 MG extended release capsule TAKE 1 TABLET BY MOUTH EVERY DAY 10/6/20   Tatum Mireles PA-C   azelastine (ASTELIN) 0.1 % nasal spray 2 sprays by Nasal route 2 times daily Use in each nostril as directed 10/6/20   Tatum Mireles PA-C   montelukast (SINGULAIR) 10 MG tablet Take 1 tablet by mouth nightly 10/6/20   Tatum Mireles PA-C zoster recombinant adjuvanted vaccine [facility]) 50 MCG/0.5ML SUSR injection Inject 0.5 mLs into the muscle See Admin Instructions 1 dose now and repeat in 2-6 months  Patient not taking: Reported on 11/13/2020 10/6/20 4/4/21  Alfonso Chavez PA-C   aspirin 81 MG EC tablet Take 81 mg by mouth nightly    Historical Provider, MD   levothyroxine (SYNTHROID) 25 MCG tablet Take 1 tablet by mouth daily  Patient taking differently: Take 25 mcg by mouth daily Pt states not taking 9/25/20   Alfonso Chavez PA-C   glipiZIDE (GLUCOTROL XL) 2.5 MG extended release tablet Take 1 tablet by mouth daily 9/24/20   Alfonso Chavez PA-C   DULoxetine (CYMBALTA) 30 MG extended release capsule Take 1 capsule by mouth daily 8/27/20   Alfonso Chavez PA-C   Handicap Placard MISC Handicap Placard  Patient unable to walk more than 200ft without stopping. Dx: Chronic lower back pain, spinal stenosis, lumbar spondylosis, Fibromyalgia. Recommend 5 year placard 8/5/20   Alfonso Chavez PA-C   fluticasone [facility]) 50 MCG/ACT nasal spray 1 spray by Nasal route daily  Patient taking differently: 1 spray by Nasal route as needed  4/27/20   Alfonso Chavez PA-C   tiZANidine (ZANAFLEX) 4 MG tablet Take 1 tablet by mouth every 8 hours as needed (PRN)  Patient taking differently: Take 4 mg by mouth every 8 hours as needed (PRN) Pt not taking.  4/27/20   Alfonso Chavez PA-C   budesonide-formoterol [facility]) 80-4.5 MCG/ACT AERO Inhale 2 puffs into the lungs 2 times daily  Patient taking differently: Inhale 2 puffs into the lungs as needed  4/27/20   Alfonso Chavez PA-C   albuterol sulfate HFA (VENTOLIN HFA) 108 (90 Base) MCG/ACT inhaler Inhale 2 puffs into the lungs every 6 hours as needed for Wheezing or Shortness of Breath 12/15/17   Madie Sotelo MD   meclizine (ANTIVERT) 25 MG tablet Take 1 tablet by mouth 3 times daily as needed 5/99/21   Nina Phillips MD       Allergies   Allergen Reactions  Carafate [Sucralfate]      Patient unable to tolerate. Unable to recall her reaction.     Glucophage [Metformin Hcl]      Severe abdominal pain, constipation      Mobic [Meloxicam] Other (See Comments)     Abdominal pain    Trazodone And Nefazodone      Unable to tolerate, patient reports made her physically ill with abdominal pain      Ultram [Tramadol]      Abdominal pain    Nickel Itching and Rash       Social History     Socioeconomic History    Marital status:      Spouse name: Not on file    Number of children: Not on file    Years of education: Not on file    Highest education level: Associate degree: academic program   Occupational History    Not on file   Social Needs    Financial resource strain: Not on file    Food insecurity     Worry: Not on file     Inability: Not on file   [email or web address] needs     Medical: Not on file     Non-medical: Not on file   Tobacco Use    Smoking status: Former Smoker     Packs/day: 1.00     Years: 30.00     Pack years: 30.00     Types: Cigarettes     Start date: 5     Quit date: 2020     Years since quittin.2    Smokeless tobacco: Never Used   Substance and Sexual Activity    Alcohol use: No     Alcohol/week: 0.0 standard drinks    Drug use: No    Sexual activity: Never   Lifestyle    Physical activity     Days per week: Not on file     Minutes per session: Not on file    Stress: Not on file   Relationships    Social connections     Talks on phone: Not on file     Gets together: Not on file     Attends Anabaptist service: Not on file     Active member of club or organization: Not on file     Attends meetings of clubs or organizations: Not on file     Relationship status: Not on file    Intimate partner violence     Fear of current or ex partner: Not on file     Emotionally abused: Not on file     Physically abused: Not on file     Forced sexual activity: Not on file   Other Topics Concern    Not on file   Social History Narrative  Not on file       Family History   Problem Relation Age of Onset    High Blood Pressure Mother     Diabetes Mother     Heart Disease Mother     Cancer Mother     Diabetes Father     Heart Disease Father     High Blood Pressure Father     Cancer Other 50        breast    Depression Brother        REVIEW OF SYSTEMS:     Artemio Britt denies fever/chills, chest pain, shortness of breath, new bowel or bladder complaints or suicidal ideations. All other review of systems wasnegative. PHYSICAL EXAMINATION:      LMP  (LMP Unknown)     General:       General appearance:   elderly, pleasant and well-hydrated.   ,in mild discomfort and A & O x3  Build:obese     HEENT:     Head:normocephalic and atraumatic  Pupils:regular, round and equal.  Sclera: icterus absent,      Lungs:     Breathing:Breathing Pattern: regular, no distress     Abdomen:     Shape:non-distended and normal  Tenderness:none     Cervical spine:     Inspection:normal  Palpation:tenderness paravertebral muscles, trigger points paravertebral muscles, facet loading, left, right, negative, tenderness and non-tender paraspinals. Range of motion:normal not flexion, extension rotation bilateral and is not painful.     Lumbar spine:     Spine inspection:surgical incision scar   CVA tenderness:No   Palpation:tenderness paravertebral muscles, facet loading, left and right, positive and tenderness.   Range of motion:abnormal moderately Lateral bending, flexion, extension rotation bilateral and is mildly painful.     Musculoskeletal:     Trigger points in Paraveteral:present bilaterally  Burton's:negative right, negative left   SI joint tenderness:positive right, positive left              SULTANA test:negative right, negative left  Piriformis tenderness:negative right, negative left  Trochanteric bursa tenderness:positve right, positive left  SLR:negative right, negative left, sitting      Extremities:     Tremors:None bilaterally upper and lower Range of motion:Generally normal shoulders, pain with internal rotation of hips negative. Intact:Yes  Edema: +1 pitting edema bilateral LE     Knee:     Inspection:bilateral knee replaced     Neurological:     Sensory:normal to light touch bilateral upper and lower extremities  Motor:              Right Bicep5/5           Left Bicep5/5              Right Triceps5/5       Left Triceps5/5          Right Deltoid5/5     Left Deltoid5/5                  Right Quadriceps 5/5          Left Quadriceps4/5           Right Gastrocnemius5/5    Left Gastrocnemius5/5  Right Ant Tibialis5/5  Left Ant Tibialis5/5    Gait:antalgic     Dermatology:     Skin:no unusual rashes and no skin lesions  Redness noted bilateral LE calf to ankles      Impression:     Chronic low back with h/o L3-5 fusion/bilateral knees replaced  Refuses to have anymore injections, [place] and reports so painful during and after, refuses to have again      Plan:               Chronic low back and all over body pain with left leg   spasms improved since addition of robaxin    Rescheduled Neurosurgeon Fede Yao until Jan 2021  Refill Lyrica 150 mg TID with 1 refill.      Refill Norco 5/325mg po daily-bid prn #35  Refill Robaxin 500mg po tid prn spasms   Continue with Cymbalta 40 mg QD from PCP                Currently on Zanaflex 4mg QHS gets from PCP  OARRS report reviewed 12/2020   Patient encouraged to stay active and to lose weight  Against referral to physical therapy  Treatment plan discussed with the patient including medications side effects  We discussed with the patient that combining opioids, benzodiazepines, alcohol, illicit drugs or sleep aids increases the risk of respiratory depression including death. We discussed that these medications may cause drowsiness, sedation or dizziness and have counseled the patient not to drive or operate machinery. We have discussed that these medications will be prescribed only by one provider. We have discussed with the patient about age related risk factors and have thoroughly discussed the importance of taking these medications as prescribed. The patient verbalizes understanding.         Patient advised regarding steps to help prevent the spread of COVID-19   SOURCE - https://liliana-gonzalez.info/. html     1-Stay home except to get medical care  2-Clean your hands often for atleast 20 seconds, avoid touching: Avoid touching your eyes, nose, and mouth with unwashed hands. 3-Seek medical attention: Seek prompt medical attention if your illness is worsening (e.g., difficulty breathing). Call you doctor first.  3-Wear a facemask if you are sick   4-Cover your coughs and sneezes           I affirm this is a Patient Initiated Episode with an established Patient who has not had a related appointment within my department in the past 7 days or scheduled within the next 24 hours.     Total Time: 6 min    Cc: Referring physician          Electronically signed by GIUSEPPE Scherer CNP on 12/14/20 at 2:02 PM EDT

## 2020-12-17 ENCOUNTER — VIRTUAL VISIT (OUTPATIENT)
Dept: FAMILY MEDICINE CLINIC | Age: 65
End: 2020-12-17
Payer: MEDICARE

## 2020-12-17 PROCEDURE — 99213 OFFICE O/P EST LOW 20 MIN: CPT | Performed by: PHYSICIAN ASSISTANT

## 2020-12-17 ASSESSMENT — ENCOUNTER SYMPTOMS
EYE REDNESS: 0
DIARRHEA: 0
BLOOD IN STOOL: 0
COUGH: 0
SHORTNESS OF BREATH: 0
BACK PAIN: 1
VOMITING: 0
SINUS PRESSURE: 0
EYE PAIN: 0
VOICE CHANGE: 0
CHEST TIGHTNESS: 0
NAUSEA: 1
CONSTIPATION: 0
SORE THROAT: 0
ABDOMINAL PAIN: 1
EYE DISCHARGE: 0
TROUBLE SWALLOWING: 1

## 2020-12-17 NOTE — PROGRESS NOTES
TeleMedicine Patient Consent    This visit was performed as a virtual video visit using a synchronous, two-way, audio-video telehealth technology platform. Patient identification was verified at the start of the visit, including the patient's telephone number and physical location. I discussed with the patient the nature of our telehealth visits, that:     1. Due to the nature of an audio- video modality, the only components of a physical exam that could be done are the elements supported by direct observation. 2. I would evaluate the patient and recommend diagnostics and treatments based on my assessment. 3. If it was felt that the patient should be evaluated in clinic or an emergency room setting, then they would be directed there. 4. Our sessions are not being recorded and that personal health information is protected. 5. Our team would provide follow up care in person if/when the patient needs it. Patient does agree to proceed with telemedicine consultation. Patient's location: home address in [facility]  Physician  location [facility] . Time spent: Greater than Not billed by time    This visit was completed virtually using Doxy. me             2020     Mp Narayan (:  1955) is a 72 y.o. female, here for evaluation of the following medical concerns:    HPI  Patient originally scheduled visit to discuss how she was doing with her sleep apnea. She did undergo her sleep study and she was prescribed a Bipap, but she was unable to fit or afford the copay in her budget and therefore is not using it at this time. She admits to being very sad and depressed- very tearful. She reports it is the 'holidays'. No suicidal or homicidal ideations. She is trying to find a new place to live that is easier for her to do her laundry and to get around and cheaper rent. methocarbamol (ROBAXIN) 500 MG tablet Take 1 tablet by mouth 3 times daily Yes GIUSEPPE Page CNP   HYDROcodone-acetaminophen (NORCO) 5-325 MG per tablet Take 1 tablet by mouth every 12 hours as needed for Pain for up to 30 days. Take lowest dose possible to manage pain Yes GIUSEPPE Page CNP   clotrimazole-betamethasone (LOTRISONE) 1-0.05 % cream Apply topically 2 times daily. Yes Rosanna Cassette., DPM   cetirizine (ZYRTEC) 10 MG tablet Take 1 tablet by mouth daily Yes Nilda Reyes PA-C   topiramate (TOPAMAX) 100 MG tablet Take 1 tablet by mouth 2 times daily  Patient taking differently: Take 100 mg by mouth daily as needed Patient reports only taking rarely as needed as she is on Baclofen as well.  Yes Nilda Reyes PA-C   busPIRone (BUSPAR) 15 MG tablet TAKE 1 TABLET BY MOUTH THREE TIMES A DAY Yes Nilda Reyes PA-C   pantoprazole (PROTONIX) 40 MG tablet Take 1 tablet by mouth 2 times daily Yes Nilda Reyes PA-C   tolterodine (DETROL LA) 2 MG extended release capsule TAKE 1 TABLET BY MOUTH EVERY DAY Yes Nilda Reyes PA-C   azelastine (ASTELIN) 0.1 % nasal spray 2 sprays by Nasal route 2 times daily Use in each nostril as directed Yes Nilda Reyes PA-C   montelukast (SINGULAIR) 10 MG tablet Take 1 tablet by mouth nightly Yes Nilda Reyes PA-C   zoster recombinant adjuvanted vaccine [facility]) 50 MCG/0.5ML SUSR injection Inject 0.5 mLs into the muscle See Admin Instructions 1 dose now and repeat in 2-6 months Yes Nilda Reyes PA-C   aspirin 81 MG EC tablet Take 81 mg by mouth nightly Yes Historical Provider, MD   levothyroxine (SYNTHROID) 25 MCG tablet Take 1 tablet by mouth daily  Patient taking differently: Take 25 mcg by mouth daily Pt states not taking Yes Nilda Reyes PA-C   glipiZIDE (GLUCOTROL XL) 2.5 MG extended release tablet Take 1 tablet by mouth daily Yes Nilda Reyes PA-C DULoxetine (CYMBALTA) 30 MG extended release capsule Take 1 capsule by mouth daily Yes Jasvir Mckinney PA-C   Handicap Placard MISC Handicap Placard  Patient unable to walk more than 200ft without stopping. Dx: Chronic lower back pain, spinal stenosis, lumbar spondylosis, Fibromyalgia. Recommend 5 year placard Yes Jasvir Mckinney PA-C   fluticasone (FLONASE) 50 MCG/ACT nasal spray 1 spray by Nasal route daily  Patient taking differently: 1 spray by Nasal route as needed  Yes Jasvir Mckinney PA-C   budesonide-formoterol (SYMBICORT) 80-4.5 MCG/ACT AERO Inhale 2 puffs into the lungs 2 times daily  Patient taking differently: Inhale 2 puffs into the lungs as needed  Yes Jasvir Mckinney PA-C   albuterol sulfate HFA (VENTOLIN HFA) 108 (90 Base) MCG/ACT inhaler Inhale 2 puffs into the lungs every 6 hours as needed for Wheezing or Shortness of Breath Yes Humphrey Ribeiro MD   meclizine (ANTIVERT) 25 MG tablet Take 1 tablet by mouth 3 times daily as needed Yes Annette Velazquez MD          There were no vitals filed for this visit. Estimated body mass index is 51.09 kg/m² as calculated from the following:    Height as of 12/7/20: 5' 8\" (1.727 m). Weight as of 12/7/20: 336 lb 0.6 oz (152.4 kg). Physical Exam    ASSESSMENT/PLAN:  Giuliana Garner was seen today for sleep apnea, choking and depression. Diagnoses and all orders for this visit:    Type II diabetes mellitus with peripheral circulatory disorder ([facility] [address]    Lumbar spondylosis    Dysphagia, unspecified type    Situational depression      Message send to Dr. Shanique Pineda- at 2:17PM concerning patient issue with choking on food as she missed her virtual visit with him today. I will reach out to patient to let her know that I contacted him and to reach out to office if she does not hear back from him or office today. Patient reports no current refills needed at this time. She will follow up with myself in the next few months and she knows to contact me via my chart at any time if any questions, problems or concerns. An electronic signature was used to authenticate this note.     --Dipika Tompkins PA-C on 12/17/2020 at 2:40 PM

## 2020-12-17 NOTE — Clinical Note
Kaylynn Griffin would like to try and see her in January via Virtual for Annual Wellness visit if she is up for it.  Then we can schedule in person visit at that time for March or Early April for in person visit for her diabetes, etc..   Thanks  Suhail Amos

## 2020-12-17 NOTE — PATIENT INSTRUCTIONS
Patient Education        Learning About the Diet for Swallowing Problems  What are swallowing problems? Difficulty swallowing is also called dysphagia (say \"zzi-JWL-kbu-uh\"). It is most often a sign of a problem with your throat or esophagus. This is the tube that moves food and liquids from the back of your mouth to your stomach. Trouble swallowing can occur when the muscles and nerves that move food through the throat and esophagus are not working right. To help you swallow food, your doctor or speech therapist may advise a special dysphagia diet for you. Why is a special diet important? A dysphagia diet can help you handle some problems that can occur when it's hard to swallow food and liquids easily. These problems can include:  · Malnutrition. This means you aren't getting enough healthy foods to keep your body working well. · Dehydration. This means you aren't getting enough liquids to keep your body healthy. · Aspiration. This means that food, liquid, or saliva goes down your windpipe (trachea) into your lungs, instead of down your esophagus to your stomach. This can lead to aspiration pneumonia, which is an inflammation of the lungs. What is the dysphagia diet? In the dysphagia diet, you change the foods you eat and the liquids you drink to make it easier to swallow them. You may:  · Change the texture of the foods you eat. Your doctor or speech therapist may advise you to eat one of these types of foods:  ? Easy-to-chew foods. These are foods that are soft or tender. ? Soft and bite-sized foods. These are soft foods that have been cut into small pieces. ? Minced and moist foods. These are very soft, small, and moist lumps of food that need very little chewing. ? Pureed foods. These are foods that have been blended smooth. The puree must be thick enough to hold its shape on a spoon. These foods don't need to be chewed. ? Spicy foods, foods that have a lot of acid (like tomatoes and oranges), and coffee can make heartburn symptoms worse in some people. If your symptoms are worse after you eat a certain food, you may want to stop eating that food to see if your symptoms get better. · Do not smoke or chew tobacco.  · If you get heartburn at night, raise the head of your bed 6 to 8 inches by putting the frame on blocks or placing a foam wedge under the head of your mattress. (Adding extra pillows does not work.)  · Do not wear tight clothing around your middle. · Lose weight if you need to. Losing just 5 to 10 pounds can help. When should you call for help? Call your doctor now or seek immediate medical care if:    · You have new or worse belly pain.     · You are vomiting. Watch closely for changes in your health, and be sure to contact your doctor if:    · You have new or worse symptoms of indigestion.     · You have trouble or pain swallowing.     · You are losing weight.     · You do not get better as expected. Where can you learn more? Go to https://Carweez.GoGoPin. org and sign in to your Choosly account. Enter A830 in the Populis box to learn more about \"Hiatal Hernia: Care Instructions. \"     If you do not have an account, please click on the \"Sign Up Now\" link. Current as of: April 15, 2020               Content Version: 12.6  © 6687-9899 [employer]. Care instructions adapted under license by [facility] ([facility]). If you have questions about a medical condition or this instruction, always ask your healthcare professional. [place] [address] any warranty or liability for your use of this information.          Patient Education        Recovering From Depression: Care Instructions  Your Care Instructions Taking good care of yourself is important as you recover from depression. In time, your symptoms will fade as your treatment takes hold. Do not give up. Instead, focus your energy on getting better. Your mood will improve. It just takes some time. Focus on things that can help you feel better, such as being with friends and family, eating well, and getting enough rest. But take things slowly. Do not do too much too soon. You will begin to feel better gradually. Follow-up care is a key part of your treatment and safety. Be sure to make and go to all appointments, and call your doctor if you are having problems. It's also a good idea to know your test results and keep a list of the medicines you take. How can you care for yourself at home? Be realistic  · If you have a large task to do, break it up into smaller steps you can handle, and just do what you can. · You may want to put off important decisions until your depression has lifted. If you have plans that will have a major impact on your life, such as marriage, divorce, or a job change, try to wait a bit. Talk it over with friends and loved ones who can help you look at the overall picture first.  · Reaching out to people for help is important. Do not isolate yourself. Let your family and friends help you. Find someone you can trust and confide in, and talk to that person. · Be patient, and be kind to yourself. Remember that depression is not your fault and is not something you can overcome with willpower alone. Treatment is important for depression, just like for any other illness. Feeling better takes time, and your mood will improve little by little. Stay active  · Stay busy and get outside. Take a walk, or try some other light exercise. · Talk with your doctor about an exercise program. Exercise can help with mild depression. · Go to a movie or concert. Take part in a [employer] activity or other social gathering. Go to a ball game. · Ask a friend to have dinner with you. Take care of yourself  · Eat a balanced diet with plenty of fresh fruits and vegetables, whole grains, and lean protein. If you have lost your appetite, eat small snacks rather than large meals. · Avoid using illegal drugs or marijuana and drinking alcohol. Do not take medicines that have not been prescribed for you. They may interfere with medicines you may be taking for depression, or they may make your depression worse. · Take your medicines exactly as they are prescribed. You may start to feel better within 1 to 3 weeks of taking antidepressant medicine. But it can take as many as 6 to 8 weeks to see more improvement. If you have questions or concerns about your medicines, or if you do not notice any improvement by 3 weeks, talk to your doctor. · Continue to take your medicine after your symptoms improve. Taking your medicine for at least 6 months after you feel better can help keep you from getting depressed again. If this isn't the first time you have been depressed, your doctor may recommend you to take medicine even longer. · If you have any side effects from your medicine, tell your doctor. Many side effects are mild and will go away on their own after you have been taking the medicine for a few weeks. Some may last longer. Talk to your doctor if side effects are bothering you too much. You might be able to try a different medicine. · Continue counseling. It may help prevent depression from returning, especially if you've had multiple episodes of depression. Talk with your counselor if you are having a hard time attending your sessions or you think the sessions aren't working. Don't just stop going. · Get enough sleep. Talk to your doctor if you are having problems sleeping. · Avoid sleeping pills unless they are prescribed by the doctor treating your depression. Sleeping pills may make you groggy during the day, and they may interact with other medicine you are taking. · If you have any other illnesses, such as diabetes, heart disease, or high blood pressure, make sure to continue with your treatment. Tell your doctor about all of the medicines you take, including those with or without a prescription. · If you or someone you know talks about suicide, self-harm, or feeling hopeless, get help right away. Call the [address] [place] [address] at 9-401-139-VALX (1-115.882.9016) or text HOME to 244652 to access the Crisis Text Line. Consider saving these numbers in your phone. When should you call for help? Call 396 anytime you think you may need emergency care. For example, call if:    · You feel like hurting yourself or someone else.     · Someone you know has depression and is about to attempt or is attempting suicide. Call your doctor now or seek immediate medical care if:    · You hear voices.     · Someone you know has depression and:  ? Starts to give away his or her possessions. ? Uses illegal drugs or drinks alcohol heavily. ? Talks or writes about death, including writing suicide notes or talking about guns, knives, or pills. ? Starts to spend a lot of time alone. ? Acts very aggressively or suddenly appears calm. Watch closely for changes in your health, and be sure to contact your doctor if:    · You do not get better as expected. Where can you learn more? Go to https://Prescreenmagda.Zyken - NightCove. org and sign in to your SeeSpace account. Enter I922 in the [employer] box to learn more about \"Recovering From Depression: Care Instructions. \"     If you do not have an account, please click on the \"Sign Up Now\" link. Current as of: January 31, 2020               Content Version: 12.6  © 1471-9485 [employer]. Care instructions adapted under license by [facility] ([facility]). If you have questions about a medical condition or this instruction, always ask your healthcare professional. [address] any warranty or liability for your use of this information.

## 2020-12-21 ENCOUNTER — VIRTUAL VISIT (OUTPATIENT)
Dept: SURGERY | Age: 65
End: 2020-12-21

## 2020-12-21 PROCEDURE — 99024 POSTOP FOLLOW-UP VISIT: CPT | Performed by: SURGERY

## 2020-12-21 NOTE — PROGRESS NOTES
Surgery Progress Note            Chief complaint:   Patient Active Problem List   Diagnosis    Arthralgia    Daytime somnolence    Chronic fatigue    Chronic back pain    Fibromyalgia    Overactive bladder    Depression    History of total knee arthroplasty    Bone avascular necrosis (HCC)    Morbid obesity with BMI of 45.0-49.9, adult (HCC)    Breast lesion    Nodule of left lung    History of colon polyps    Displacement of lumbar intervertebral disc    Spinal stenosis    Tobacco use    Tobacco abuse    Leg swelling    Pain in both lower extremities    Chronic pain syndrome    Myofascial pain syndrome    Lumbar disc disorder    Lumbar spondylosis    Spinal stenosis of lumbar region without neurogenic claudication    Epigastric pain    Onychomycosis    Type II diabetes mellitus with peripheral circulatory disorder (HCC)    Venous insufficiency (chronic) (peripheral)    Tinea pedis of both feet    Hiatal hernia       S: doing well    O: There were no vitals filed for this visit. No intake or output data in the 24 hours ending 12/21/20 1527        Labs:  No results for input(s): WBC, HGB, HCT in the last 72 hours. Invalid input(s): PLR  Lab Results   Component Value Date    CREATININE 1.2 (H) 12/07/2020    BUN 18 12/07/2020     12/07/2020    K 4.0 12/07/2020     (H) 12/07/2020    CO2 23 12/07/2020     No results for input(s): LIPASE, AMYLASE in the last 72 hours. Physical exam:   Not done due to video visit    A:  Post op lap HHR with mesh and some dysphagia    P: follow up as needed.      Mel Santiago MD  12/21/2020    TeleMedicine Patient Consent

## 2021-01-04 DIAGNOSIS — Z76.0 MEDICATION REFILL: ICD-10-CM

## 2021-01-05 RX ORDER — TOPIRAMATE 100 MG/1
TABLET, FILM COATED ORAL
Qty: 180 TABLET | Refills: 0 | Status: SHIPPED
Start: 2021-01-05 | End: 2021-02-10

## 2021-01-11 DIAGNOSIS — M79.7 FIBROMYALGIA: ICD-10-CM

## 2021-01-11 RX ORDER — PREGABALIN 150 MG/1
150 CAPSULE ORAL 3 TIMES DAILY
Qty: 90 CAPSULE | Refills: 0 | Status: SHIPPED
Start: 2021-01-11 | End: 2021-02-02 | Stop reason: SDUPTHER

## 2021-01-12 ENCOUNTER — VIRTUAL VISIT (OUTPATIENT)
Dept: PAIN MANAGEMENT | Age: 66
End: 2021-01-12
Payer: MEDICARE

## 2021-01-12 DIAGNOSIS — M79.7 FIBROMYALGIA: ICD-10-CM

## 2021-01-12 DIAGNOSIS — M51.9 LUMBAR DISC DISORDER: ICD-10-CM

## 2021-01-12 DIAGNOSIS — M51.26 DISPLACEMENT OF LUMBAR INTERVERTEBRAL DISC: ICD-10-CM

## 2021-01-12 DIAGNOSIS — G89.29 CHRONIC LOW BACK PAIN WITH SCIATICA, SCIATICA LATERALITY UNSPECIFIED, UNSPECIFIED BACK PAIN LATERALITY: ICD-10-CM

## 2021-01-12 DIAGNOSIS — M54.40 CHRONIC LOW BACK PAIN WITH SCIATICA, SCIATICA LATERALITY UNSPECIFIED, UNSPECIFIED BACK PAIN LATERALITY: ICD-10-CM

## 2021-01-12 DIAGNOSIS — G89.4 CHRONIC PAIN SYNDROME: ICD-10-CM

## 2021-01-12 DIAGNOSIS — M47.816 LUMBAR SPONDYLOSIS: ICD-10-CM

## 2021-01-12 DIAGNOSIS — M48.061 SPINAL STENOSIS OF LUMBAR REGION WITHOUT NEUROGENIC CLAUDICATION: Primary | ICD-10-CM

## 2021-01-12 DIAGNOSIS — M79.18 MYOFASCIAL PAIN SYNDROME: ICD-10-CM

## 2021-01-12 PROCEDURE — 99421 OL DIG E/M SVC 5-10 MIN: CPT | Performed by: NURSE PRACTITIONER

## 2021-01-12 NOTE — PROGRESS NOTES
Sunitha Sharma was read the following message We want to confirm that, for purposes of billing, this is a virtual visit with your provider for which we will submit a claim for reimbursement with your insurance company. You will be responsible for any copays, coinsurance amounts or other amounts not covered by your insurance company. If you do not accept this, unfortunately we will not be able to schedule or proceed with a virtual visit with the provider. Do you accept? Rahat Borges responded Yes .

## 2021-01-12 NOTE — PROGRESS NOTES
[address] [place] [address], [address] [place], [place] [id number]  [phone]    Virtual telehealth visit       Imtiaz Teran     Date of Visit:  1/12/2021    CC:  Patient presents for follow up low back pain     Consent:  Telehealth Visit due to Kate 19 pandemic  The patient and/or health care decision maker is aware that he/she may receive a bill for this tele-health service Doxy Me, depending on his insurance coverage, and has provided verbal consent to proceed: Yes  I have considered the risks of abuse, dependence, addiction and diversion. My patient is aware that they will need a follow-up visit (in-person or virtually) at the appropriate time indicated for continued medications. Further, my patient is aware that when this acute crisis has lifted, they will be expected to return for an in-person visit and all elements of standard local and hospital guidelines in order to continue this medication. Patient Location:Home   Physician Location: Other address in [place]    HPI: Pt here for follow up for low back pain with significant improvement since addition of Robaxin and reports she doesn't need a refill on Norco this month because combination of robaxin and lyrica helps to manage pain. Pt rescheduled visit with Dr Etelvina Starkey for tomorrow for options. Appropriate analgesia with current medications regimen:yes  Change in quality of symptoms: yes improved   Medication side effects:none. Recent diagnostic testing: none  Recent interventional procedures:none     Imaging:    MRI of Lumbar Spine 09/2020:  1. Postsurgical changes status post L3-L5 posterior spinal fusion. 2. Severe multifactorial spinal canal stenosis at L2-L3 with mass effect on    the cauda equina nerve roots. 3. Moderate multifactorial L1-L2 spinal canal stenosis with moderate right    neural foraminal stenosis. 4. Moderate left L5-S1 neural foraminal stenosis.                 LUMBAR SPINE XRAY tablet Take 1 tablet by mouth nightly 10/6/20  Yes Fer Shell PA-C   zoster recombinant adjuvanted vaccine [facility]) 50 MCG/0.5ML SUSR injection Inject 0.5 mLs into the muscle See Admin Instructions 1 dose now and repeat in 2-6 months 10/6/20 4/4/21 Yes Fer Shell PA-C   aspirin 81 MG EC tablet Take 81 mg by mouth nightly   Yes Historical Provider, MD   glipiZIDE (GLUCOTROL XL) 2.5 MG extended release tablet Take 1 tablet by mouth daily 9/24/20  Yes Fer Shell PA-C   Handicap Placard MISC Handicap Placard  Patient unable to walk more than 200ft without stopping. Dx: Chronic lower back pain, spinal stenosis, lumbar spondylosis, Fibromyalgia. Recommend 5 year placard 8/5/20  Yes Fer Shell PA-C   fluticasone [facility] [place]) 50 MCG/ACT nasal spray 1 spray by Nasal route daily  Patient taking differently: 1 spray by Nasal route as needed  4/27/20  Yes Fer Shell PA-C   budesonide-formoterol (SYMBICORT) 80-4.5 MCG/ACT AERO Inhale 2 puffs into the lungs 2 times daily  Patient taking differently: Inhale 2 puffs into the lungs as needed  4/27/20  Yes Fer Shell PA-C   albuterol sulfate HFA (VENTOLIN HFA) 108 (90 Base) MCG/ACT inhaler Inhale 2 puffs into the lungs every 6 hours as needed for Wheezing or Shortness of Breath 12/15/17  Yes Dennis Gilford, MD   meclizine (ANTIVERT) 25 MG tablet Take 1 tablet by mouth 3 times daily as needed 5/79/99  Yes Giovanny Saab MD   azelastine (ASTELIN) 0.1 % nasal spray 2 sprays by Nasal route 2 times daily Use in each nostril as directed  Patient not taking: Reported on 1/12/2021 10/6/20   Fer Shell PA-C   levothyroxine (SYNTHROID) 25 MCG tablet Take 1 tablet by mouth daily  Patient not taking: Reported on 1/12/2021 9/25/20   Fer Shell PA-C       Allergies   Allergen Reactions    Carafate [Sucralfate]      Patient unable to tolerate. Unable to recall her reaction.     Glucophage [Metformin Hcl]      Severe abdominal pain, constipation      Mobic [Meloxicam] Other (See Comments)     Abdominal pain    Trazodone And Nefazodone      Unable to tolerate, patient reports made her physically ill with abdominal pain      Ultram [Tramadol]      Abdominal pain    Nickel Itching and Rash       Social History     Socioeconomic History    Marital status:      Spouse name: Not on file    Number of children: Not on file    Years of education: Not on file    Highest education level: Associate degree: academic program   Occupational History    Not on file   Social Needs    Financial resource strain: Not on file    Food insecurity     Worry: Not on file     Inability: Not on file   Minuum needs     Medical: Not on file     Non-medical: Not on file   Tobacco Use    Smoking status: Former Smoker     Packs/day: 1.00     Years: 30.00     Pack years: 30.00     Types: Cigarettes     Start date:      Quit date: 2020     Years since quittin.2    Smokeless tobacco: Never Used   Substance and Sexual Activity    Alcohol use: No     Alcohol/week: 0.0 standard drinks    Drug use: No    Sexual activity: Never   Lifestyle    Physical activity     Days per week: Not on file     Minutes per session: Not on file    Stress: Not on file   Relationships    Social connections     Talks on phone: Not on file     Gets together: Not on file     Attends Buddhism service: Not on file     Active member of club or organization: Not on file     Attends meetings of clubs or organizations: Not on file     Relationship status: Not on file    Intimate partner violence     Fear of current or ex partner: Not on file     Emotionally abused: Not on file     Physically abused: Not on file     Forced sexual activity: Not on file   Other Topics Concern    Not on file   Social History Narrative    Not on file       Family History   Problem Relation Age of Onset    High Blood Pressure Mother     Diabetes Mother     Heart Disease Mother     Cancer Mother     Diabetes Father     Heart Disease Father     High Blood Pressure Father     Cancer Other 50        breast    Depression Brother        REVIEW OF SYSTEMS:     Molly Hernandez denies fever/chills, chest pain, shortness of breath, new bowel or bladder complaints or suicidal ideations. All other review of systems wasnegative. PHYSICAL EXAMINATION:      LMP  (LMP Unknown)     General:       General appearance:   elderly, pleasant and well-hydrated.   ,in mild discomfort and A & O x3  Build:obese     HEENT:     Head:normocephalic and atraumatic  Pupils:regular, round and equal.  Sclera: icterus absent,      Lungs:     Breathing:Breathing Pattern: regular, no distress     Abdomen:     Shape:non-distended and normal  Tenderness:none     Cervical spine:     Inspection:normal  Palpation:tenderness paravertebral muscles, trigger points paravertebral muscles, facet loading, left, right, negative, tenderness and non-tender paraspinals. Range of motion:normal not flexion, extension rotation bilateral and is not painful.     Lumbar spine:     Spine inspection:surgical incision scar   CVA tenderness:No   Palpation:tenderness paravertebral muscles, facet loading, left and right, positive and tenderness. Range of motion:abnormal moderately Lateral bending, flexion, extension rotation bilateral and is mildly painful.     Musculoskeletal:     Trigger points in Paraveteral:present bilaterally  Burton's:negative right, negative left   SI joint tenderness:positive right, positive left              SULTANA test:negative right, negative left  Piriformis tenderness:negative right, negative left  Trochanteric bursa tenderness:positve right, positive left  SLR:negative right, negative left, sitting      Extremities:     Tremors:None bilaterally upper and lower  Range of motion:Generally normal shoulders, pain with internal rotation of hips negative.   Intact:Yes  Edema: +1 pitting edema bilateral LE     Knee:     Inspection:bilateral knee replaced     Neurological:     Sensory:normal to light touch bilateral upper and lower extremities  Motor:              Right Bicep5/5           Left Bicep5/5              Right Triceps5/5       Left Triceps5/5          Right Deltoid5/5     Left Deltoid5/5                  Right Quadriceps 5/5          Left Quadriceps4/5           Right Gastrocnemius5/5    Left Gastrocnemius5/5  Right Ant Tibialis5/5  Left Ant Tibialis5/5    Gait:antalgic     Dermatology:     Skin:no unusual rashes and no skin lesions  Redness noted bilateral LE calf to ankles      Impression:     Chronic low back with h/o L3-5 fusion/bilateral knees replaced  Refuses to have anymore injections, [place] and reports so painful during and after, refuses to have again      Plan:               Chronic low back and all over body pain with left leg   spasms improved since addition of robaxin    Rescheduled Neurosurgeon Iván Jan 12th 2021  No Refill Lyrica 150 mg TID needs refill in Feb     No Refill Norco 5/325mg po daily-bid prn #35  No Refill Robaxin 500mg po tid prn spasms 1 refill on file  Continue with Cymbalta 40 mg QD from PCP                Currently on Zanaflex 4mg QHS gets from PCP  [facility] report reviewed 01/2021   Patient encouraged to stay active and to lose weight  Against referral to physical therapy  Treatment plan discussed with the patient including medications side effects                  We discussed with the patient that combining opioids, benzodiazepines, alcohol, illicit drugs or sleep aids increases the risk of respiratory depression including death. We discussed that these medications may cause drowsiness, sedation or dizziness and have counseled the patient not to drive or operate machinery. We have discussed that these medications will be prescribed only by one provider.  We have discussed with the patient about age related risk factors and have thoroughly discussed the importance of taking these medications as prescribed. The patient verbalizes understanding.         Patient advised regarding steps to help prevent the spread of COVID-19   SOURCE - https://liliana-lisa.info/. html     1-Stay home except to get medical care  2-Clean your hands often for atleast 20 seconds, avoid touching: Avoid touching your eyes, nose, and mouth with unwashed hands. 3-Seek medical attention: Seek prompt medical attention if your illness is worsening (e.g., difficulty breathing). Call you doctor first.  3-Wear a facemask if you are sick   4-Cover your coughs and sneezes           I affirm this is a Patient Initiated Episode with an established Patient who has not had a related appointment within my department in the past 7 days or scheduled within the next 24 hours.     Total Time: 8:44 min    Cc: Referring physician          Electronically signed by GIUSEPPE Espinosa CNP on 01/12/20 at1:22 PM EDT

## 2021-01-13 ENCOUNTER — INITIAL CONSULT (OUTPATIENT)
Dept: NEUROSURGERY | Age: 66
End: 2021-01-13
Payer: MEDICARE

## 2021-01-13 VITALS
TEMPERATURE: 97.9 F | HEIGHT: 68 IN | DIASTOLIC BLOOD PRESSURE: 65 MMHG | BODY MASS INDEX: 44.41 KG/M2 | HEART RATE: 65 BPM | SYSTOLIC BLOOD PRESSURE: 119 MMHG | WEIGHT: 293 LBS

## 2021-01-13 DIAGNOSIS — Z98.1 HISTORY OF LUMBAR SPINAL FUSION: ICD-10-CM

## 2021-01-13 DIAGNOSIS — M48.061 SPINAL STENOSIS OF LUMBAR REGION WITHOUT NEUROGENIC CLAUDICATION: Primary | ICD-10-CM

## 2021-01-13 PROCEDURE — 99214 OFFICE O/P EST MOD 30 MIN: CPT | Performed by: PHYSICIAN ASSISTANT

## 2021-01-13 ASSESSMENT — ENCOUNTER SYMPTOMS
SHORTNESS OF BREATH: 0
PHOTOPHOBIA: 0
TROUBLE SWALLOWING: 0
BACK PAIN: 1
ABDOMINAL PAIN: 0

## 2021-01-13 NOTE — PROGRESS NOTES
Subjective:      Patient ID: Sunitha Sharma is a 72 y.o. female. Martha Hedrick is a 72year old female with an extensive past medical history including morbid obesity and hx of L3-L5 fusion. She was previously seen in our office in 2016 by Dr. 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 At that time it was recommended she lose weight and go to the pain clinic before discussing any type of surgery. She presents to the office today c/o worsening low back pain and leg pain. Describes the pain as sharp, dull, aching, and burning. Pain goes across her low back and down the side of her left leg. Admits to associated bilateral leg cramping. She has tried Norco, Robaxin, Lyrica, and pain clinic without significant relief. No recent PT. Does not wish to have injections. Denies loss of bowel or bladder, saddle anesthesia, numbness, tingling, fever, chills, cough, SOB, or chest pain. MRI lumbar spine 9/5/20 again demonstrate adjacent segment disease at L2-L3 with severe stenosis and moderate stenosis at L1-L2      Review of Systems   Constitutional: Negative for fever. HENT: Negative for trouble swallowing. Eyes: Negative for photophobia. Respiratory: Negative for shortness of breath. Cardiovascular: Negative for chest pain. Gastrointestinal: Negative for abdominal pain. Endocrine: Negative for heat intolerance. Genitourinary: Negative for flank pain. Musculoskeletal: Positive for arthralgias, back pain, gait problem and myalgias. Skin: Negative for wound. Neurological: Positive for weakness. Negative for numbness and headaches. Psychiatric/Behavioral: Negative for confusion. Objective:   Physical Exam  Constitutional:       Appearance: She is well-developed. She is obese. HENT:      Head: Normocephalic and atraumatic. Eyes:      Extraocular Movements: Extraocular movements intact.       Conjunctiva/sclera: Conjunctivae normal.      Pupils: Pupils are equal, round, and reactive to light. Neck:      Musculoskeletal: Normal range of motion and neck supple. Cardiovascular:      Rate and Rhythm: Normal rate. Pulmonary:      Effort: Pulmonary effort is normal.   Abdominal:      General: There is no distension. Musculoskeletal:      Comments: Pain with ROM and diffuse tenderness to palpation of lumbar spine   Skin:     General: Skin is warm and dry. Neurological:      Mental Status: She is alert. Comments: Alert and oriented x3  CN3-12 intact  Upper strength full  BLE 4/5 strength  Sensation intact to light touch     Psychiatric:         Thought Content: Thought content normal.         Assessment: This is a 72year old female presenting with worsening back and leg pain. Hx L3-L5 fusion. Adjacent segment disease at L2-L3 noted on MRI. Plan:      -Patient has a current BMI 51. Ideal BMI for surgery is 45 or under due to increased risk of complications.   -Discussed weight loss and following up with bariatric surgery.   -Patient does not wish to have any injections and is unsure if she can do PT.  She will let us know when she has lost weight and will make an appointment with Dr. Diandra Ramos to discuss surgery.   -Continue with pain management        Rob Zimmerman PA-C

## 2021-01-19 ENCOUNTER — TELEPHONE (OUTPATIENT)
Dept: FAMILY MEDICINE CLINIC | Age: 66
End: 2021-01-19

## 2021-01-19 ENCOUNTER — VIRTUAL VISIT (OUTPATIENT)
Dept: FAMILY MEDICINE CLINIC | Age: 66
End: 2021-01-19
Payer: MEDICARE

## 2021-01-19 DIAGNOSIS — K59.04 CHRONIC IDIOPATHIC CONSTIPATION: Primary | ICD-10-CM

## 2021-01-19 DIAGNOSIS — F41.9 ANXIETY: ICD-10-CM

## 2021-01-19 PROCEDURE — 99213 OFFICE O/P EST LOW 20 MIN: CPT | Performed by: PHYSICIAN ASSISTANT

## 2021-01-19 ASSESSMENT — ENCOUNTER SYMPTOMS
NAUSEA: 0
COUGH: 0
DIARRHEA: 0
CHEST TIGHTNESS: 1
CONSTIPATION: 1
SINUS PRESSURE: 0
VOMITING: 0
BACK PAIN: 1
ABDOMINAL PAIN: 1
SORE THROAT: 0
SHORTNESS OF BREATH: 0

## 2021-01-19 NOTE — TELEPHONE ENCOUNTER
Called and left message for patient to call and scheduled her AWV on a month.  Electronically signed by Denys Alonso MA on 1/19/21 at 2:59 PM EST

## 2021-01-19 NOTE — PROGRESS NOTES
TeleMedicine Patient Consent    This visit was performed as a virtual video visit using a synchronous, two-way, audio-video telehealth technology platform. Patient identification was verified at the start of the visit, including the patient's telephone number and physical location. I discussed with the patient the nature of our telehealth visits, that:     1. Due to the nature of an audio- video modality, the only components of a physical exam that could be done are the elements supported by direct observation. 2. I would evaluate the patient and recommend diagnostics and treatments based on my assessment. 3. If it was felt that the patient should be evaluated in clinic or an emergency room setting, then they would be directed there. 4. Our sessions are not being recorded and that personal health information is protected. 5. Our team would provide follow up care in person if/when the patient needs it. Patient does agree to proceed with telemedicine consultation. Patient's location: home address in [facility]  Physician  location [facility] office       Time spent: Greater than Not billed by time    This visit was completed virtually using Doxy. me             2021     Sherlyn Hernandez (:  1955) is a 72 y.o. female,  for evaluation of the following medical concerns:    HPI  Patient requested visit to discuss issues with her bowel movements. She has been having problems with constipation. She did have surgery with Dr. Swathi Gonzalez in early 2020. She did not change her diet in any way and has been drinking plenty of water. She reports last BM about 1 week ago. She has not taken any stool softners as they usually don't work well for her. She is able to eat and drink, she denies any nausea or vomiting. She reports no fever or chills. No urinary symptoms.     She has had a lot of stress over her neighbor and she needs to get out of her apartment as it is very expensive for her and causes her lack of sleep due to noise, etc..    She is taking her medications for her anxiety, she denies any suicidal or homicidal ideations. Review of Systems   Constitutional: Negative for appetite change, chills (denies) and fever (denies). HENT: Negative for ear pain, sinus pressure and sore throat. Respiratory: Positive for chest tightness (due to stress). Negative for cough and shortness of breath (denies). Cardiovascular: Negative for chest pain (denies). Gastrointestinal: Positive for abdominal pain (lower abdomen, feels full of stool) and constipation (for the last 1 week). Negative for diarrhea, nausea (denies) and vomiting (denies). Genitourinary: Negative for dysuria, flank pain and frequency. Musculoskeletal: Positive for back pain (some pressure due to constipation). Negative for arthralgias. Skin: Negative for rash and wound. Neurological: Negative for weakness and headaches. Hematological: Negative for adenopathy. Psychiatric/Behavioral: Positive for sleep disturbance (due to her neighbor). Negative for suicidal ideas. The patient is nervous/anxious (upset due to her neighbor). All other systems reviewed and are negative. Prior to Visit Medications    Medication Sig Taking? Authorizing Provider   linaclotide Glegail Medina) 145 MCG capsule Take 1 capsule by mouth every morning (before breakfast) Yes Andrzej Stanford PA-C   DULoxetine (CYMBALTA) 30 MG extended release capsule Take 1 capsule by mouth daily Yes Andrzej Stanford PA-C   pregabalin (LYRICA) 150 MG capsule Take 1 capsule by mouth 3 times daily for 30 days. TID Yes GIUSEPPE Ballesteros - CNP   topiramate (TOPAMAX) 100 MG tablet TAKE ONE TABLET BY MOUTH TWICE A DAY Yes Andrzej Stanford PA-C   clotrimazole-betamethasone (LOTRISONE) 1-0.05 % cream Apply topically 2 times daily.  Yes Junie Arenas Santhoshalireza Hausera., DPM   cetirizine (ZYRTEC) 10 MG tablet Take 1 tablet by mouth daily Yes Andrzej Stanford PA-C   busPIRone (BUSPAR) 15 MG tablet TAKE 1 TABLET BY MOUTH THREE TIMES A DAY Yes Qing Watson PA-C   pantoprazole (PROTONIX) 40 MG tablet Take 1 tablet by mouth 2 times daily Yes Qing Watson PA-C   tolterodine (DETROL LA) 2 MG extended release capsule TAKE 1 TABLET BY MOUTH EVERY DAY Yes Qing Watson PA-C   azelastine (ASTELIN) 0.1 % nasal spray 2 sprays by Nasal route 2 times daily Use in each nostril as directed Yes Qing Watson PA-C   montelukast (SINGULAIR) 10 MG tablet Take 1 tablet by mouth nightly Yes Qing Watson PA-C   aspirin 81 MG EC tablet Take 81 mg by mouth nightly Yes Historical Provider, MD   levothyroxine (SYNTHROID) 25 MCG tablet Take 1 tablet by mouth daily Yes Qing Watson PA-C   glipiZIDE (GLUCOTROL XL) 2.5 MG extended release tablet Take 1 tablet by mouth daily Yes Qing Watson PA-C   Handicap Placard MISC Handicap Placard  Patient unable to walk more than 200ft without stopping. Dx: Chronic lower back pain, spinal stenosis, lumbar spondylosis, Fibromyalgia.   Recommend 5 year placard Yes Qing Watson PA-C   fluticasone (FLONASE) 50 MCG/ACT nasal spray 1 spray by Nasal route daily  Patient taking differently: 1 spray by Nasal route as needed  Yes Qing Watson PA-C   budesonide-formoterol (SYMBICORT) 80-4.5 MCG/ACT AERO Inhale 2 puffs into the lungs 2 times daily Yes Qing Watson PA-C   albuterol sulfate HFA (VENTOLIN HFA) 108 (90 Base) MCG/ACT inhaler Inhale 2 puffs into the lungs every 6 hours as needed for Wheezing or Shortness of Breath Yes Andrez Flores MD   meclizine (ANTIVERT) 25 MG tablet Take 1 tablet by mouth 3 times daily as needed Yes Rc Forman MD   zoster recombinant adjuvanted vaccine [facility]) 50 MCG/0.5ML SUSR injection Inject 0.5 mLs into the muscle See Admin Instructions 1 dose now and repeat in 2-6 months  Patient not taking: Reported on 1/13/2021  Qing Watson PA-C          There were no vitals filed for this visit. Estimated body mass index is 51.09 kg/m² as calculated from the following:    Height as of 1/13/21: 5' 8\" (1.727 m). Weight as of 1/13/21: 336 lb (152.4 kg). Physical Exam    ASSESSMENT/PLAN:  Matias Simpson was seen today for diabetes, anxiety and constipation. Diagnoses and all orders for this visit:    Chronic idiopathic constipation  -     linaclotide (LINZESS) 145 MCG capsule; Take 1 capsule by mouth every morning (before breakfast)      Patient advised she can try Linzess for constipation, unsure if covered by her insurance. She would like to try this. She was encouraged to try and find another place to reside. She was advised to continue her medications for her anxiety. Advised to try and increased her green veggies, increase her fiber and water intake and also we need to set up her AWV as well. She will keep me posted how she is doing. An electronic signature was used to authenticate this note.     --Uriel Mancia PA-C on 1/19/2021 at 8:32 PM

## 2021-01-20 NOTE — PATIENT INSTRUCTIONS
Patient Education        Constipation: Care Instructions  Your Care Instructions     Constipation means that you have a hard time passing stools (bowel movements). People pass stools from 3 times a day to once every 3 days. What is normal for you may be different. Constipation may occur with pain in the rectum and cramping. The pain may get worse when you try to pass stools. Sometimes there are small amounts of bright red blood on toilet paper or the surface of stools. This is because of enlarged veins near the rectum (hemorrhoids). A few changes in your diet and lifestyle may help you avoid ongoing constipation. Your doctor may also prescribe medicine to help loosen your stool. Some medicines can cause constipation. These include pain medicines and antidepressants. Tell your doctor about all the medicines you take. Your doctor may want to make a medicine change to ease your symptoms. Follow-up care is a key part of your treatment and safety. Be sure to make and go to all appointments, and call your doctor if you are having problems. It's also a good idea to know your test results and keep a list of the medicines you take. How can you care for yourself at home? · Drink plenty of fluids, enough so that your urine is light yellow or clear like water. If you have kidney, heart, or liver disease and have to limit fluids, talk with your doctor before you increase the amount of fluids you drink. · Include high-fiber foods in your diet each day. These include fruits, vegetables, beans, and whole grains. · Get at least 30 minutes of exercise on most days of the week. Walking is a good choice. You also may want to do other activities, such as running, swimming, cycling, or playing tennis or team sports. · Take a fiber supplement, such as Citrucel or Metamucil, every day. Read and follow all instructions on the label. · Schedule time each day for a bowel movement. A daily routine may help. Take your time having your bowel movement. · Support your feet with a small step stool when you sit on the toilet. This helps flex your hips and places your pelvis in a squatting position. · Your doctor may recommend an over-the-counter laxative to relieve your constipation. Examples are Milk of Magnesia and MiraLax. Read and follow all instructions on the label. Do not use laxatives on a long-term basis. When should you call for help? Call your doctor now or seek immediate medical care if:    · You have new or worse belly pain.     · You have new or worse nausea or vomiting.     · You have blood in your stools. Watch closely for changes in your health, and be sure to contact your doctor if:    · Your constipation is getting worse.     · You do not get better as expected. Where can you learn more? Go to https://QderoPateo CommunicationspeMailsuite.ParentPlus. org and sign in to your AmpIdea account. Enter 21 [phone] in the Meludia box to learn more about \"Constipation: Care Instructions. \"     If you do not have an account, please click on the \"Sign Up Now\" link. Current as of: June 26, 2019               Content Version: 12.6  © 2790-9607 [employer]. Care instructions adapted under license by [facility] ([facility]). If you have questions about a medical condition or this instruction, always ask your healthcare professional. [place] [address] any warranty or liability for your use of this information. Patient Education        Learning About Generalized Anxiety Disorder  What is generalized anxiety disorder? We all worry. It's a normal part of life. But when you have generalized anxiety disorder, you worry about lots of things and have a hard time stopping your worry. This worry or anxiety interferes with your relationships, work, and life. What causes it? The cause is not known. But it may be passed down through families. What are the symptoms? You may feel anxious or worry most days about things like work, relationships, health, or money. You may worry about things that are unlikely to happen. You find it hard to stop or control the worry. Because you worry a lot and try hard to stop worrying, you may feel restless, tired, tense, or cranky. You may also find it hard to think or sleep. And you may have headaches or an upset stomach. How is it diagnosed? Your doctor will ask about your health and how often you worry or feel anxious. He or she may ask about other symptoms, like whether you:  · Feel restless. · Feel tired. · Have a hard time thinking or feel that your mind goes blank. · Feel cranky. · Have tense muscles. · Have sleep problems. A physical exam and tests can help make sure that your symptoms aren't caused by a different condition, such as a thyroid problem. How is it treated? Counseling and medicine can both work to treat anxiety. The two are often used along with lifestyle changes. Cognitive-behavioral therapy (CBT) is a type of counseling that's used to help treat anxiety. In CBT, you learn how to notice and replace thoughts that make you feel worried. It also can help you learn how to relax when you worry. Medicines can help. These medicines are often also used for depression. Selective serotonin reuptake inhibitors (SSRIs) are often tried first. But there are other medicines that your doctor may use. You may need to try a few medicines to find one that works well. Many people feel better by getting regular exercise, eating healthy meals, and getting good sleep. Mindfulnessfocusing on things that happen in the present momentalso can help reduce your anxiety. What can you expect when you have it? Having anxiety can be upsetting. Some people might feel less worried and stressed after a couple of months of treatment. But for other people, it might take longer to feel better. Reaching out to people for help is important. Try not to isolate yourself. Let your family and friends help you. Find someone you can trust and confide in. Talk to that person. When you know what anxiety isand how you can get help for ityou can start to learn new ways of thinking. This can help you cope and work through your anxiety. Follow-up care is a key part of your treatment and safety. Be sure to make and go to all appointments, and call your doctor if you are having problems. It's also a good idea to know your test results and keep a list of the medicines you take. Where can you learn more? Go to https://cheileeneb.E2E Networks. org and sign in to your GestureTek account. Enter G110 in the Blaast box to learn more about \"Learning About Generalized Anxiety Disorder. \"     If you do not have an account, please click on the \"Sign Up Now\" link. Current as of: January 31, 2020               Content Version: 12.6  © 3684-5071 [employer], Incorporated. Care instructions adapted under license by [facility] ([facility]). If you have questions about a medical condition or this instruction, always ask your healthcare professional. [address] any warranty or liability for your use of this information.

## 2021-01-29 DIAGNOSIS — R73.03 BORDERLINE DIABETES: ICD-10-CM

## 2021-02-01 RX ORDER — GLIPIZIDE 2.5 MG/1
2.5 TABLET, EXTENDED RELEASE ORAL DAILY
Qty: 90 TABLET | Refills: 1 | Status: SHIPPED
Start: 2021-02-01 | End: 2021-04-28 | Stop reason: ALTCHOICE

## 2021-02-02 DIAGNOSIS — M79.7 FIBROMYALGIA: ICD-10-CM

## 2021-02-02 RX ORDER — PREGABALIN 150 MG/1
150 CAPSULE ORAL 3 TIMES DAILY
Qty: 90 CAPSULE | Refills: 0 | Status: SHIPPED
Start: 2021-02-02 | End: 2021-02-10 | Stop reason: SDUPTHER

## 2021-02-08 ENCOUNTER — TELEPHONE (OUTPATIENT)
Dept: PAIN MANAGEMENT | Age: 66
End: 2021-02-08

## 2021-02-10 ENCOUNTER — TELEPHONE (OUTPATIENT)
Dept: PAIN MANAGEMENT | Age: 66
End: 2021-02-10

## 2021-02-10 DIAGNOSIS — F32.A DEPRESSION, UNSPECIFIED DEPRESSION TYPE: ICD-10-CM

## 2021-02-10 DIAGNOSIS — Z76.0 MEDICATION REFILL: ICD-10-CM

## 2021-02-10 DIAGNOSIS — M79.7 FIBROMYALGIA: ICD-10-CM

## 2021-02-10 DIAGNOSIS — J30.9 ALLERGIC RHINITIS, UNSPECIFIED SEASONALITY, UNSPECIFIED TRIGGER: ICD-10-CM

## 2021-02-10 DIAGNOSIS — K21.9 GASTROESOPHAGEAL REFLUX DISEASE WITHOUT ESOPHAGITIS: ICD-10-CM

## 2021-02-10 RX ORDER — CETIRIZINE HYDROCHLORIDE 10 MG/1
10 TABLET ORAL DAILY
Qty: 90 TABLET | Refills: 1 | Status: SHIPPED
Start: 2021-02-10 | End: 2021-02-12 | Stop reason: SDUPTHER

## 2021-02-10 RX ORDER — TOPIRAMATE 100 MG/1
TABLET, FILM COATED ORAL
Qty: 180 TABLET | Refills: 1 | Status: SHIPPED | OUTPATIENT
Start: 2021-02-10 | End: 2021-07-29 | Stop reason: SDUPTHER

## 2021-02-10 RX ORDER — PANTOPRAZOLE SODIUM 40 MG/1
40 TABLET, DELAYED RELEASE ORAL 2 TIMES DAILY
Qty: 180 TABLET | Refills: 1 | Status: SHIPPED
Start: 2021-02-10 | End: 2021-07-15 | Stop reason: SDUPTHER

## 2021-02-10 RX ORDER — MONTELUKAST SODIUM 10 MG/1
10 TABLET ORAL NIGHTLY
Qty: 90 TABLET | Refills: 1 | Status: SHIPPED
Start: 2021-02-10 | End: 2021-02-12

## 2021-02-10 RX ORDER — PREGABALIN 150 MG/1
150 CAPSULE ORAL 3 TIMES DAILY
Qty: 270 CAPSULE | Refills: 0 | OUTPATIENT
Start: 2021-02-02 | End: 2021-03-04 | Stop reason: SDUPTHER

## 2021-02-10 RX ORDER — BUSPIRONE HYDROCHLORIDE 15 MG/1
TABLET ORAL
Qty: 270 TABLET | Refills: 1 | Status: SHIPPED | OUTPATIENT
Start: 2021-02-10 | End: 2021-07-29 | Stop reason: SDUPTHER

## 2021-02-11 ENCOUNTER — VIRTUAL VISIT (OUTPATIENT)
Dept: PAIN MANAGEMENT | Age: 66
End: 2021-02-11
Payer: MEDICARE

## 2021-02-11 DIAGNOSIS — M47.816 LUMBAR SPONDYLOSIS: ICD-10-CM

## 2021-02-11 DIAGNOSIS — G89.29 CHRONIC LOW BACK PAIN WITH SCIATICA, SCIATICA LATERALITY UNSPECIFIED, UNSPECIFIED BACK PAIN LATERALITY: ICD-10-CM

## 2021-02-11 DIAGNOSIS — M54.40 CHRONIC LOW BACK PAIN WITH SCIATICA, SCIATICA LATERALITY UNSPECIFIED, UNSPECIFIED BACK PAIN LATERALITY: ICD-10-CM

## 2021-02-11 DIAGNOSIS — M79.18 MYOFASCIAL PAIN SYNDROME: ICD-10-CM

## 2021-02-11 DIAGNOSIS — Z96.652 HISTORY OF TOTAL LEFT KNEE REPLACEMENT: ICD-10-CM

## 2021-02-11 DIAGNOSIS — G89.4 CHRONIC PAIN SYNDROME: Primary | ICD-10-CM

## 2021-02-11 DIAGNOSIS — M79.7 FIBROMYALGIA: ICD-10-CM

## 2021-02-11 DIAGNOSIS — E66.01 MORBID OBESITY WITH BMI OF 45.0-49.9, ADULT (HCC): ICD-10-CM

## 2021-02-11 DIAGNOSIS — M51.26 DISPLACEMENT OF LUMBAR INTERVERTEBRAL DISC: ICD-10-CM

## 2021-02-11 DIAGNOSIS — M48.061 SPINAL STENOSIS OF LUMBAR REGION WITHOUT NEUROGENIC CLAUDICATION: ICD-10-CM

## 2021-02-11 DIAGNOSIS — M51.9 LUMBAR DISC DISORDER: ICD-10-CM

## 2021-02-11 PROCEDURE — 99423 OL DIG E/M SVC 21+ MIN: CPT | Performed by: NURSE PRACTITIONER

## 2021-02-11 NOTE — PROGRESS NOTES
[address] [place] [address], [address] [place] [id number]  [phone]    Virtual telehealth visit       Rajani Beck     Date of Visit:  2/11/2021    CC:  Patient presents for follow up low back pain and left knee pain    Consent:  Telehealth Visit due to Kate 19 pandemic  The patient and/or health care decision maker is aware that he/she may receive a bill for this tele-health service [employer] [facility], depending on his insurance coverage, and has provided verbal consent to proceed: Yes  I have considered the risks of abuse, dependence, addiction and diversion. My patient is aware that they will need a follow-up visit (in-person or virtually) at the appropriate time indicated for continued medications. Further, my patient is aware that when this acute crisis has lifted, they will be expected to return for an in-person visit and all elements of standard local and hospital guidelines in order to continue this medication. Patient Location:Home   Physician Location: Other address in [place]    HPI: Pt here with complaints burning left knee pain with weakness that started 6 weeks ago and pt feels like something may be wrong with implant. Pt notes tender to touch to left lateral and patellar aspect with indentation. Pt has hx of bilateral knee replacement done in 2010. Discussed obtaining MRI done of left knee at this time. Pt saw Dr Sanders Case last month and advised that she must lost 35-40lbs first before considering surgery. Appropriate analgesia with current medications regimen:yes  Change in quality of symptoms: yes improved   Medication side effects:none. Recent diagnostic testing: none  Recent interventional procedures:none     Imaging:    MRI of Lumbar Spine 09/2020:  1. Postsurgical changes status post L3-L5 posterior spinal fusion. 2. Severe multifactorial spinal canal stenosis at L2-L3 with mass effect on    the cauda equina nerve roots.     3. Moderate multifactorial L1-L2 Past Surgical History:   Procedure Laterality Date    CARPAL TUNNEL RELEASE Bilateral     CHOLECYSTECTOMY      COLONOSCOPY  2011    COLONOSCOPY  11/22/2016    Dr. Andi Thakur N/A 12/8/2020    Quintella Bellis performed by Lars Walton MD at [address]    benign reasons    [address] Bilateral 2010    LUMBAR FUSION      L3-5    ROTATOR CUFF REPAIR Left     left    TOTAL KNEE ARTHROPLASTY Bilateral 2010    did both at the same time   [address] [facility] N/A 9/29/2020    EGD BIOPSY performed by Lars Walton MD at [facility]       Prior to Admission medications    Medication Sig Start Date End Date Taking? Authorizing Provider   busPIRone (BUSPAR) 15 MG tablet TAKE 1 TABLET BY MOUTH THREE TIMES A DAY 2/10/21  Yes Tabatha Marrero PA-C   cetirizine (ZYRTEC) 10 MG tablet Take 1 tablet by mouth daily 2/10/21  Yes Tabatha Marrero PA-C   montelukast (SINGULAIR) 10 MG tablet Take 1 tablet by mouth nightly 2/10/21  Yes Tabatha Marrero PA-C   topiramate (TOPAMAX) 100 MG tablet TAKE ONE TABLET BY MOUTH TWICE A DAY 2/10/21  Yes Tabatha Marrero PA-C   pantoprazole (PROTONIX) 40 MG tablet Take 1 tablet by mouth 2 times daily 2/10/21  Yes Tabatha Marrero PA-C   pregabalin (LYRICA) 150 MG capsule Take 1 capsule by mouth 3 times daily for 90 days. TID 2/2/21 5/3/21 Yes GIUSEPPE Cheung - CNP   glipiZIDE (GLUCOTROL XL) 2.5 MG extended release tablet Take 1 tablet by mouth daily 2/1/21  Yes Tabatha Marrero PA-C   linaclotide [facility]) 145 MCG capsule Take 1 capsule by mouth every morning (before breakfast) 1/19/21  Yes Tabatha Marrero PA-C   DULoxetine (CYMBALTA) 30 MG extended release capsule Take 1 capsule by mouth daily 1/11/21  Yes Tabatha Marrero PA-C   clotrimazole-betamethasone (LOTRISONE) 1-0.05 % cream Apply topically 2 times daily.  10/22/20  Yes Kalpana Paez Lisa Doe, DPM   tolterodine (DETROL LA) 2 MG extended release capsule TAKE 1 TABLET BY MOUTH EVERY DAY 10/6/20  Yes Loy Hernandez PA-C   zoster recombinant adjuvanted vaccine [facility]) 50 MCG/0.5ML SUSR injection Inject 0.5 mLs into the muscle See Admin Instructions 1 dose now and repeat in 2-6 months 10/6/20 4/4/21 Yes Loy Hernandez PA-C   aspirin 81 MG EC tablet Take 81 mg by mouth nightly   Yes Historical Provider, MD   Handicap Placard [facility] Handicap Placard  Patient unable to walk more than 200ft without stopping. Dx: Chronic lower back pain, spinal stenosis, lumbar spondylosis, Fibromyalgia. Recommend 5 year raghavendra 8/5/20  Yes Loy Hernandez PA-C   fluticasone [facility]) 50 MCG/ACT nasal spray 1 spray by Nasal route daily  Patient taking differently: 1 spray by Nasal route as needed  4/27/20  Yes Loy Hernandez PA-C   budesonide-formoterol [facility]) 80-4.5 MCG/ACT AERO Inhale 2 puffs into the lungs 2 times daily 4/27/20  Yes Loy Hernandez PA-C   albuterol sulfate HFA (VENTOLIN HFA) 108 (90 Base) MCG/ACT inhaler Inhale 2 puffs into the lungs every 6 hours as needed for Wheezing or Shortness of Breath 12/15/17  Yes Lee Valdes MD   meclizine (ANTIVERT) 25 MG tablet Take 1 tablet by mouth 3 times daily as needed 7/74/80  Yes Eliu Fallon MD   azelastine (ASTELIN) 0.1 % nasal spray 2 sprays by Nasal route 2 times daily Use in each nostril as directed  Patient not taking: Reported on 2/11/2021 10/6/20   Loy Hernandez PA-C   levothyroxine (SYNTHROID) 25 MCG tablet Take 1 tablet by mouth daily  Patient not taking: Reported on 2/11/2021 9/25/20   Loy Hernandez PA-C       Allergies   Allergen Reactions    Carafate [Sucralfate]      Patient unable to tolerate. Unable to recall her reaction.     Glucophage [Metformin Hcl]      Severe abdominal pain, constipation      Mobic [Meloxicam] Other (See Comments)     Abdominal pain    Trazodone And Nefazodone      Unable to breast    Depression Brother        REVIEW OF SYSTEMS:     Phuong Mclean denies fever/chills, chest pain, shortness of breath, new bowel or bladder complaints or suicidal ideations. All other review of systems wasnegative.       PHYSICAL EXAMINATION:      LMP  (LMP Unknown)     General:       General appearance:   elderly, pleasant and well-hydrated.   ,in mild to moderate discomfort and A & O x3  Build:obese     HEENT:     Head:normocephalic and atraumatic  Pupils:regular, round and equal.  Sclera: icterus absent,      Lungs:     Breathing:Breathing Pattern: regular, no distress        Cervical spine:     Range of motion:normal not flexion, extension rotation bilateral and is not painful.     Lumbar spine:     Range of motion:abnormal moderately Lateral bending, flexion, extension rotation bilateral and is mildly painful.        Knee:     Inspection:bilateral knee replaced  ROM Left knee limited by pain and weakness per pt reports     Gait:antalgic      Impression:     Chronic low back with h/o L3-5 fusion/bilateral knees replaced since 2010  Refuses to have anymore injections, had at previous facility [place] and reports so painful during and after, refuses to have again    Seen Dr Adilene Beauchamp and is a surgical candidate but advised  weight loss of >30lbs or consult bariatric surgery     Plan:               Chronic low back and all over body pain and acute left  knee pain    MRI of left knee for acute worsening knee pain >6 weeks  No Refill Lyrica 150 mg TID, filled 2/6/21    No Refill Norco 5/325mg po daily-bid prn #35, does NOT  need monthly fills anymore since taking Robaxin  No Refill Robaxin 500mg po tid prn spasms  Continue with Cymbalta 40 mg QD from PCP                Currently on Zanaflex 4mg QHS gets from PCP  O[facility] report reviewed 02/2021   Patient encouraged to stay active and to lose weight  Against referral to physical therapy  Treatment plan discussed with the patient including medications side effects  We discussed with the patient that combining opioids, benzodiazepines, alcohol, illicit drugs or sleep aids increases the risk of respiratory depression including death. We discussed that these medications may cause drowsiness, sedation or dizziness and have counseled the patient not to drive or operate machinery. We have discussed that these medications will be prescribed only by one provider. We have discussed with the patient about age related risk factors and have thoroughly discussed the importance of taking these medications as prescribed. The patient verbalizes understanding.         Patient advised regarding steps to help prevent the spread of COVID-19   SOURCE - https://liliana-gonzalez.info/. html     1-Stay home except to get medical care  2-Clean your hands often for atleast 20 seconds, avoid touching: Avoid touching your eyes, nose, and mouth with unwashed hands. 3-Seek medical attention: Seek prompt medical attention if your illness is worsening (e.g., difficulty breathing). Call you doctor first.  3-Wear a facemask if you are sick   4-Cover your coughs and sneezes           I affirm this is a Patient Initiated Episode with an established Patient who has not had a related appointment within my department in the past 7 days or scheduled within the next 24 hours.     Total Time: 21:44 min    Cc: Referring physician      Electronically signed by GIUSEPPE Mejia CNP on 02/11/20 at1:22 PM EDT

## 2021-02-11 NOTE — PROGRESS NOTES
Alisson Torres was read the following message We want to confirm that, for purposes of billing, this is a virtual visit with your provider for which we will submit a claim for reimbursement with your insurance company. You will be responsible for any copays, coinsurance amounts or other amounts not covered by your insurance company. If you do not accept this, unfortunately we will not be able to schedule or proceed with a virtual visit with the provider. Do you accept? Shay Reyna responded Yes .

## 2021-02-12 ENCOUNTER — TELEPHONE (OUTPATIENT)
Dept: FAMILY MEDICINE CLINIC | Age: 66
End: 2021-02-12

## 2021-02-12 DIAGNOSIS — J30.9 ALLERGIC RHINITIS, UNSPECIFIED SEASONALITY, UNSPECIFIED TRIGGER: ICD-10-CM

## 2021-02-12 RX ORDER — CETIRIZINE HYDROCHLORIDE 10 MG/1
10 TABLET ORAL 2 TIMES DAILY
Qty: 180 TABLET | Refills: 1 | Status: SHIPPED | OUTPATIENT
Start: 2021-02-12 | End: 2021-07-29 | Stop reason: SDUPTHER

## 2021-02-12 NOTE — TELEPHONE ENCOUNTER
[address] 41 called pt takig Zyrtec 2 times daily and wants Rx sent in for 2 times daily she is not taking singular.  She says this helps better     Last seen 1/19/2021  Next appt 2/17/2021

## 2021-02-15 ENCOUNTER — TELEPHONE (OUTPATIENT)
Dept: PAIN MANAGEMENT | Age: 66
End: 2021-02-15

## 2021-02-15 NOTE — TELEPHONE ENCOUNTER
I spoke to Sacha, she is not going with [facility] pharmacy. She will stay at [address] [place] [address]

## 2021-02-22 DIAGNOSIS — G89.29 CHRONIC PAIN OF LEFT KNEE: Primary | ICD-10-CM

## 2021-02-22 DIAGNOSIS — M25.562 CHRONIC PAIN OF LEFT KNEE: Primary | ICD-10-CM

## 2021-02-22 DIAGNOSIS — Z96.652 HISTORY OF TOTAL LEFT KNEE REPLACEMENT: ICD-10-CM

## 2021-02-23 ENCOUNTER — TELEPHONE (OUTPATIENT)
Dept: PAIN MANAGEMENT | Age: 66
End: 2021-02-23

## 2021-02-23 NOTE — TELEPHONE ENCOUNTER
Please let patient know that I sent in an order to change MRI to CT of knee to have at [facility] yesterday. She should be hearing from them soon to schedule.

## 2021-02-24 ENCOUNTER — OFFICE VISIT (OUTPATIENT)
Dept: FAMILY MEDICINE CLINIC | Age: 66
End: 2021-02-24
Payer: MEDICARE

## 2021-02-24 ENCOUNTER — OFFICE VISIT (OUTPATIENT)
Dept: PODIATRY | Age: 66
End: 2021-02-24
Payer: MEDICARE

## 2021-02-24 VITALS
DIASTOLIC BLOOD PRESSURE: 72 MMHG | WEIGHT: 293 LBS | BODY MASS INDEX: 44.41 KG/M2 | HEIGHT: 68 IN | SYSTOLIC BLOOD PRESSURE: 120 MMHG | OXYGEN SATURATION: 98 % | TEMPERATURE: 97.4 F | HEART RATE: 82 BPM | RESPIRATION RATE: 17 BRPM

## 2021-02-24 VITALS — BODY MASS INDEX: 44.41 KG/M2 | WEIGHT: 293 LBS | HEIGHT: 68 IN

## 2021-02-24 DIAGNOSIS — M79.675 PAIN OF TOE OF LEFT FOOT: ICD-10-CM

## 2021-02-24 DIAGNOSIS — Z00.00 ROUTINE GENERAL MEDICAL EXAMINATION AT A HEALTH CARE FACILITY: Primary | ICD-10-CM

## 2021-02-24 DIAGNOSIS — Z23 NEED FOR SHINGLES VACCINE: ICD-10-CM

## 2021-02-24 DIAGNOSIS — B35.1 ONYCHOMYCOSIS: Primary | ICD-10-CM

## 2021-02-24 DIAGNOSIS — E11.51 TYPE II DIABETES MELLITUS WITH PERIPHERAL CIRCULATORY DISORDER (HCC): ICD-10-CM

## 2021-02-24 DIAGNOSIS — M79.674 PAIN OF TOE OF RIGHT FOOT: ICD-10-CM

## 2021-02-24 DIAGNOSIS — Z71.89 ACP (ADVANCE CARE PLANNING): ICD-10-CM

## 2021-02-24 DIAGNOSIS — M47.816 LUMBAR SPONDYLOSIS: ICD-10-CM

## 2021-02-24 DIAGNOSIS — L85.3 XEROSIS CUTIS: ICD-10-CM

## 2021-02-24 DIAGNOSIS — R26.2 DIFFICULTY WALKING: ICD-10-CM

## 2021-02-24 DIAGNOSIS — I87.2 VENOUS INSUFFICIENCY (CHRONIC) (PERIPHERAL): ICD-10-CM

## 2021-02-24 DIAGNOSIS — M79.7 FIBROMYALGIA: ICD-10-CM

## 2021-02-24 LAB
CREATININE URINE POCT: NORMAL
MICROALBUMIN/CREAT 24H UR: NORMAL MG/G{CREAT}
MICROALBUMIN/CREAT UR-RTO: NORMAL

## 2021-02-24 PROCEDURE — 11721 DEBRIDE NAIL 6 OR MORE: CPT | Performed by: PODIATRIST

## 2021-02-24 PROCEDURE — G0438 PPPS, INITIAL VISIT: HCPCS | Performed by: PHYSICIAN ASSISTANT

## 2021-02-24 PROCEDURE — 82044 UR ALBUMIN SEMIQUANTITATIVE: CPT | Performed by: PHYSICIAN ASSISTANT

## 2021-02-24 RX ORDER — AMMONIUM LACTATE 12 G/100G
LOTION TOPICAL
Qty: 222 ML | Refills: 5 | Status: SHIPPED | OUTPATIENT
Start: 2021-02-24 | End: 2022-03-08

## 2021-02-24 RX ORDER — ZOSTER VACCINE RECOMBINANT, ADJUVANTED 50 MCG/0.5
0.5 KIT INTRAMUSCULAR SEE ADMIN INSTRUCTIONS
Qty: 0.5 ML | Refills: 0 | Status: SHIPPED | OUTPATIENT
Start: 2021-02-24 | End: 2021-07-29 | Stop reason: SDUPTHER

## 2021-02-24 ASSESSMENT — PATIENT HEALTH QUESTIONNAIRE - PHQ9
SUM OF ALL RESPONSES TO PHQ QUESTIONS 1-9: 2
SUM OF ALL RESPONSES TO PHQ9 QUESTIONS 1 & 2: 2
1. LITTLE INTEREST OR PLEASURE IN DOING THINGS: 1

## 2021-02-24 ASSESSMENT — LIFESTYLE VARIABLES: HOW OFTEN DO YOU HAVE A DRINK CONTAINING ALCOHOL: 0

## 2021-02-24 NOTE — PROGRESS NOTES
Patient is here for routine DM foot exam and nail care. No concerns. Patient disliked the cream that was prescribed.      Last ov with Radhika Uriarte PA-C was 2/24/2021      Electronically signed by Dean Ramos LPN on 6/64/8466 at 4:36 PM

## 2021-02-24 NOTE — PATIENT INSTRUCTIONS
Personalized Preventive Plan for [place] - 2/24/2021  Medicare offers a range of preventive health benefits. Some of the tests and screenings are paid in full while other may be subject to a deductible, co-insurance, and/or copay. Some of these benefits include a comprehensive review of your medical history including lifestyle, illnesses that may run in your family, and various assessments and screenings as appropriate. After reviewing your medical record and screening and assessments performed today your provider may have ordered immunizations, labs, imaging, and/or referrals for you. A list of these orders (if applicable) as well as your Preventive Care list are included within your After Visit Summary for your review. Other Preventive Recommendations:    · A preventive eye exam performed by an eye specialist is recommended every 1-2 years to screen for glaucoma; cataracts, macular degeneration, and other eye disorders. · A preventive dental visit is recommended every 6 months. · Try to get at least 150 minutes of exercise per week or 10,000 steps per day on a pedometer . · Order or download the FREE \"Exercise & Physical Activity: Your Everyday Guide\" from [employer]. Call 9-291.492.5798 or search The [employer] online. · You need 4591-2377 mg of calcium and 0853-3318 IU of vitamin D per day. It is possible to meet your calcium requirement with diet alone, but a vitamin D supplement is usually necessary to meet this goal.  · When exposed to the sun, use a sunscreen that protects against both UVA and UVB radiation with an SPF of 30 or greater. Reapply every 2 to 3 hours or after sweating, drying off with a towel, or swimming. · Always wear a seat belt when traveling in a car. Always wear a helmet when riding a bicycle or motorcycle. Learning About Living Perroy  What is a living will? You can change your living will at any time. Some people find that their wishes about end-of-life care change as their health changes. If you make big changes to your living will, complete a new form. If you move to another state, make sure that your living will is legal in the state where you now live. In most cases, doctors will respect your wishes even if you have a form from a different state. You might use a universal form that has been approved by many states. This kind of form can sometimes be filled out and stored online. Your digital copy will then be available wherever you have a connection to the internet. The doctors and nurses who need to treat you can find it right away. Your state may offer an online registry. This is another place where you can store your living will online. It's a good idea to get your living will notarized. This means using a person called a [employer] to watch two people sign, or witness, your living will. What should you know when you create a living will? Here are some questions to ask yourself as you make your living will:  Do you know enough about life support methods that might be used? If not, talk to your doctor so you know what might be done if you can't breathe on your own, your heart stops, or you can't swallow. What things would you still want to be able to do after you receive life-support methods? Would you want to be able to walk? To speak? To eat on your own? To live without the help of machines? Do you want certain Mu-ism practices performed if you become very ill? If you have a choice, where do you want to be cared for? In your home? At a hospital or nursing home? If you have a choice at the end of your life, where would you prefer to die? At home? In a hospital or nursing home? Somewhere else? Would you prefer to be buried or cremated? Do you want your organs to be donated after you die? What should you do with your living will? Make sure that your family members and your health care agent have copies of your living will (also called a declaration). Give your doctor a copy of your living will. Ask him or her to keep it as part of your medical record. If you have more than one doctor, make sure that each one has a copy. Put a copy of your living will where it can be easily found. For example, some people may put a copy on their refrigerator door. If you are using a digital copy, be sure your doctor, family members, and health care agent know how to find and access it. Where can you learn more? Go to https://chpepiceweb.Sound[employer]. org and sign in to your "[employer] account. Enter E415 in the [employer] box to learn more about \"Learning About Living Perroy. \"     If you do not have an account, please click on the \"Sign Up Now\" link. Current as of: December 9, 2019               Content Version: 12.6  © 8064-9556 [employer]. Care instructions adapted under license by [facility] ([facility]). If you have questions about a medical condition or this instruction, always ask your healthcare professional. [address] any warranty or liability for your use of this information. ·        Eating Healthy Foods: Care Instructions  Your Care Instructions     Eating healthy foods can help lower your risk for disease. Healthy food gives you energy and keeps your heart strong, your brain active, your muscles working, and your bones strong. A healthy diet includes a variety of foods from the basic food groups: grains, vegetables, fruits, milk and milk products, and meat and beans. Some people may eat more of their favorite foods from only one food group and, as a result, miss getting the nutrients they need. So, it is important to pay attention not only to what you eat but also to what you are missing from your diet. You can eat a healthy, balanced diet by making a few small changes. Follow-up care is a key part of your treatment and safety. Be sure to make and go to all appointments, and call your doctor if you are having problems. It's also a good idea to know your test results and keep a list of the medicines you take. How can you care for yourself at home? Look at what you eat  Keep a food diary for a week or two and record everything you eat or drink. Track the number of servings you eat from each food group. For a balanced diet every day, eat a variety of:  6 or more ounce-equivalents of grains, such as cereals, breads, crackers, rice, or pasta, every day. An ounce-equivalent is 1 slice of bread, 1 cup of ready-to-eat cereal, or ½ cup of cooked rice, cooked pasta, or cooked cereal.  2½ cups of vegetables, especially:  Dark-green vegetables such as broccoli and spinach. Orange vegetables such as carrots and sweet potatoes. Dry beans (such as reno and kidney beans) and peas (such as lentils). 2 cups of fresh, frozen, or canned fruit. A small apple or 1 banana or orange equals 1 cup.  3 cups of nonfat or low-fat milk, yogurt, or other milk products. 5½ ounces of meat and beans, such as chicken, fish, lean meat, beans, nuts, and seeds. One egg, 1 tablespoon of peanut butter, ½ ounce nuts or seeds, or ¼ cup of cooked beans equals 1 ounce of meat. Learn how to read food labels for serving sizes and ingredients. Fast-food and convenience-food meals often contain few or no fruits or vegetables. Make sure you eat some fruits and vegetables to make the meal more nutritious. Look at your food diary. For each food group, add up what you have eaten and then divide the total by the number of days. This will give you an idea of how much you are eating from each food group. See if you can find some ways to change your diet to make it more healthy.   Start small Do not try to make dramatic changes to your diet all at once. You might feel that you are missing out on your favorite foods and then be more likely to fail. Start slowly, and gradually change your habits. Try some of the following:  Use whole wheat bread instead of white bread. Use nonfat or low-fat milk instead of whole milk. Eat brown rice instead of white rice, and eat whole wheat pasta instead of white-flour pasta. Try low-fat cheeses and low-fat yogurt. Add more fruits and vegetables to meals and have them for snacks. Add lettuce, tomato, cucumber, and onion to sandwiches. Add fruit to yogurt and cereal.  Enjoy food  You can still eat your favorite foods. You just may need to eat less of them. If your favorite foods are high in fat, salt, and sugar, limit how often you eat them, but do not cut them out entirely. Eat a wide variety of foods. Make healthy choices when eating out  The type of restaurant you choose can help you make healthy choices. Even fast-food chains are now offering more low-fat or healthier choices on the menu. Choose smaller portions, or take half of your meal home. When eating out, try:  A veggie pizza with a whole wheat crust or grilled chicken (instead of sausage or pepperoni). Pasta with roasted vegetables, grilled chicken, or marinara sauce instead of cream sauce. A vegetable wrap or grilled chicken wrap. Broiled or poached food instead of fried or breaded items. Make healthy choices easy  Buy packaged, prewashed, ready-to-eat fresh vegetables and fruits, such as baby carrots, salad mixes, and chopped or shredded broccoli and cauliflower. Buy packaged, presliced fruits, such as melon or pineapple. Choose 100% fruit or vegetable juice instead of soda. Limit juice intake to 4 to 6 oz (½ to ¾ cup) a day. Blend low-fat yogurt, fruit juice, and canned or frozen fruit to make a smoothie for breakfast or a snack. Where can you learn more? Go to https://chpepiceweb.healthSmart [employer]. org and sign in to your "[employer]t account. Enter V119 in the [facility] box to learn more about \"Eating Healthy Foods: Care Instructions. \"     If you do not have an account, please click on the \"Sign Up Now\" link. Current as of: August 22, 2019               Content Version: 12.6  © 0316-1887 [employer]. Care instructions adapted under license by [facility] ([facility]). If you have questions about a medical condition or this instruction, always ask your healthcare professional. [place] [address] any warranty or liability for your use of this information. ·   Personalized Preventive Plan for [place] - 2/24/2021  Medicare offers a range of preventive health benefits. Some of the tests and screenings are paid in full while other may be subject to a deductible, co-insurance, and/or copay. Some of these benefits include a comprehensive review of your medical history including lifestyle, illnesses that may run in your family, and various assessments and screenings as appropriate. After reviewing your medical record and screening and assessments performed today your provider may have ordered immunizations, labs, imaging, and/or referrals for you. A list of these orders (if applicable) as well as your Preventive Care list are included within your After Visit Summary for your review. Other Preventive Recommendations:    A preventive eye exam performed by an eye specialist is recommended every 1-2 years to screen for glaucoma; cataracts, macular degeneration, and other eye disorders. A preventive dental visit is recommended every 6 months. Try to get at least 150 minutes of exercise per week or 10,000 steps per day on a pedometer . Order or download the FREE \"Exercise & Physical Activity: Your Everyday Guide\" from [employer]. Call 0-730.454.9718 or search [employer] online. You need 2959-0723 mg of calcium and 6815-9045 IU of vitamin D per day. It is possible to meet your calcium requirement with diet alone, but a vitamin D supplement is usually necessary to meet this goal.  When exposed to the sun, use a sunscreen that protects against both UVA and UVB radiation with an SPF of 30 or greater. Reapply every 2 to 3 hours or after sweating, drying off with a towel, or swimming. Always wear a seat belt when traveling in a car. Always wear a helmet when riding a bicycle or motorcycle.

## 2021-02-24 NOTE — PROGRESS NOTES
21     [facility]    : 1955  Sex: female  Age: 72 y.o. Subjective: The patient is seen today for evaluation regarding diabetic foot evaluation and mycotic nail care. No other complaints noted. Chief Complaint   Patient presents with    Nail Problem       Current Medications:    Current Outpatient Medications:     Handicap Placard MISC, by Does not apply route Patient cannot walk 200 ft without stopping to rest.   Expiration 2026, Disp: 1 each, Rfl: 0    zoster recombinant adjuvanted vaccine (SHINGRIX) 50 MCG/0.5ML SUSR injection, Inject 0.5 mLs into the muscle See Admin Instructions 1 dose now and repeat in 2-6 months, Disp: 0.5 mL, Rfl: 0    ammonium lactate (LAC-HYDRIN) 12 % lotion, Apply topically daily. , Disp: 222 mL, Rfl: 5    cetirizine (ZYRTEC) 10 MG tablet, Take 1 tablet by mouth 2 times daily, Disp: 180 tablet, Rfl: 1    busPIRone (BUSPAR) 15 MG tablet, TAKE 1 TABLET BY MOUTH THREE TIMES A DAY, Disp: 270 tablet, Rfl: 1    topiramate (TOPAMAX) 100 MG tablet, TAKE ONE TABLET BY MOUTH TWICE A DAY, Disp: 180 tablet, Rfl: 1    pantoprazole (PROTONIX) 40 MG tablet, Take 1 tablet by mouth 2 times daily, Disp: 180 tablet, Rfl: 1    pregabalin (LYRICA) 150 MG capsule, Take 1 capsule by mouth 3 times daily for 90 days.  TID, Disp: 270 capsule, Rfl: 0    glipiZIDE (GLUCOTROL XL) 2.5 MG extended release tablet, Take 1 tablet by mouth daily, Disp: 90 tablet, Rfl: 1    DULoxetine (CYMBALTA) 30 MG extended release capsule, Take 1 capsule by mouth daily, Disp: 90 capsule, Rfl: 1    tolterodine (DETROL LA) 2 MG extended release capsule, TAKE 1 TABLET BY MOUTH EVERY DAY, Disp: 90 capsule, Rfl: 1    azelastine (ASTELIN) 0.1 % nasal spray, 2 sprays by Nasal route 2 times daily Use in each nostril as directed, Disp: 3 Bottle, Rfl: 1    aspirin 81 MG EC tablet, Take 81 mg by mouth nightly, Disp: , Rfl:     fluticasone (FLONASE) 50 MCG/ACT nasal spray, 1 spray by Nasal route daily (Patient taking differently: 1 spray by Nasal route as needed ), Disp: 3 Bottle, Rfl: 1    albuterol sulfate HFA (VENTOLIN HFA) 108 (90 Base) MCG/ACT inhaler, Inhale 2 puffs into the lungs every 6 hours as needed for Wheezing or Shortness of Breath, Disp: 1 Inhaler, Rfl: 5    Allergies: Allergies   Allergen Reactions    Carafate [Sucralfate]      Patient unable to tolerate. Unable to recall her reaction.     Glucophage [Metformin Hcl]      Severe abdominal pain, constipation      Mobic [Meloxicam] Other (See Comments)     Abdominal pain    Trazodone And Nefazodone      Unable to tolerate, patient reports made her physically ill with abdominal pain      Ultram [Tramadol]      Abdominal pain    Nickel Itching and Rash       Past Surgical History:   Procedure Laterality Date    CARPAL TUNNEL RELEASE Bilateral     CHOLECYSTECTOMY      COLONOSCOPY  2011    COLONOSCOPY  11/22/2016    Dr. Liliana Vasquez N/A 12/8/2020    Deloris Ok performed by Heidi Parker MD at [address]    benign reasons    [address] Bilateral 2010    LUMBAR FUSION      L3-5    ROTATOR CUFF REPAIR Left     left    TOTAL KNEE ARTHROPLASTY Bilateral 2010    did both at the same time   [facility] N/A 9/29/2020    EGD BIOPSY performed by Heidi Parker MD at [facility]     Past Medical History:   Diagnosis Date    Anxiety     Arthralgia 10/8/2013    Arthritis     Bone avascular necrosis ([facility] [address] 3/23/2015    Breast lesion 12/1/2015    Chronic back pain     Chronic fatigue 10/8/2013    Daytime somnolence 10/8/2013    Depression     Diabetes mellitus (HCC)     Fibromyalgia     Fracture, fibula     right    GERD (gastroesophageal reflux disease)     H/O cardiovascular stress test 01/29/2020    Lexiscan    Headache     History of blood transfusion     History of fractured kneecap     (R) 2 hair line fractures    History of total knee arthroplasty 3/19/2015    Hyperlipidemia     Morbid obesity with BMI of 45.0-49.9, adult ([facility] [address] 10/9/2015    Obesity     Osteoarthritis     Overactive bladder 1/22/2014    Reflux        Vitals:    02/24/21 1401   Weight: (!) 336 lb (152.4 kg)   Height: 5' 8\" (1.727 m)       Exam:  Neurovascular status unchanged. At this time the nail/s 1, 2, 3, 4, 5 right foot and nail/s 1, 2, 3, 4, 5 left foot are noted to be thickened, dystrophic and discolored with subungual debris present. Tenderness noted to palpation. Diminished hair growth is noted to both lower extremities. Edema noted with both varicosities and stasis skin changes present bilaterally. Coolness is noted to the digital regions to palpation. Capillary fill time delayed digital areas bilateral foot. No heel fissuring or macerations of the web spaces. No plantar calluses and/or ulcerative areas are noted. Patient is having difficulty with gait/walking. Plan Per Assessment  Kylie Garrido was seen today for nail problem. Diagnoses and all orders for this visit:    Onychomycosis    Pain of toe of right foot    Pain of toe of left foot    Type II diabetes mellitus with peripheral circulatory disorder (HCC)    Venous insufficiency (chronic) (peripheral)    Xerosis cutis  -     ammonium lactate (LAC-HYDRIN) 12 % lotion; Apply topically daily. Difficulty walking        1. Evaluation and Management  2. Manual and electrical debridement of the mycotic nails was performed for thickness and length to prevent injection and/or ulceration. 3. I recommended antifungal cream to the nails daily. 4. It was discussed in detail with the patient proper caring for the vascular compromised foot. The fact that they have compromised blood flow put the patient at risk for infection/gangrene/amputation. The patient should not walk barefoot. Shoe gear should fit properly and socks should be worn with shoes.   Exercise is very important to prevent worsening of the disease process but before performing an exercise program should check with their family physician first.  If any skin lesions are noted, they are instructed to contact the office immediately. 5. It was discussed in detail with the patient proper hygiene for the diabetic foot. They are to get in the habit of looking at their feet or have someone look at them. If they are unable to do daily, they are to look for any signs of redness, blistering, cracking, swelling, drainage, open lesions, etc.  They are to dry in between the toes after each bath or shower gently. The water should be tested with the elbow to prevent burns. The patient is to refrain from soaking their feet unless instructed by myself to do otherwise. They are to refrain from going barefoot. Shoe gear should be inspected for any foreign objects. Shoes should have a deep wide toe box. With any type of shoe, the feet should be inspected for any signs of pressure, i.e. redness, blistering, or open sores. Further instructional guidelines were dispensed to the patient. 6. We will see the patient back at a later date for continued podiatric management and care. Patient was advised to call the office with any questions or concerns prior to their next appointment if needed. Seen By:    Sundeep Mota DPM    Electronically signed by Sundeep Mota DPM on 2/24/2021 at 2:38 PM      This note was created using voice recognition software. The note was reviewed however may contain grammatical errors.

## 2021-02-24 NOTE — PROGRESS NOTES
Medicare Annual Wellness Visit  Name: Rishi Amaya Date: 2021   MRN: [id number] Sex: Female   Age: 72 y.o. Ethnicity: Non-/Non    : 1955 Race: Jensen Card is here for [facility] (annual wellness)    Recent and remote memory appear intact and appropriate. Screenings for behavioral, psychosocial and functional/safety risks, and cognitive dysfunction are all negative except as indicated below. These results, as well as other patient data from the [address] form, are documented in Flowsheets linked to this Encounter. Allergies   Allergen Reactions    Carafate [Sucralfate]      Patient unable to tolerate. Unable to recall her reaction.  Glucophage [Metformin Hcl]      Severe abdominal pain, constipation      Mobic [Meloxicam] Other (See Comments)     Abdominal pain    Trazodone And Nefazodone      Unable to tolerate, patient reports made her physically ill with abdominal pain      Ultram [Tramadol]      Abdominal pain    Nickel Itching and Rash         Prior to Visit Medications    Medication Sig Taking?  Authorizing Provider   Handicap Placard MISC by Does not apply route Patient cannot walk 200 ft without stopping to rest.    Expiration 2026 Yes Nilton Medina PA-C   zoster recombinant adjuvanted vaccine [facility]) 50 MCG/0.5ML SUSR injection Inject 0.5 mLs into the muscle See Admin Instructions 1 dose now and repeat in 2-6 months Yes Nilton Medina PA-C   cetirizine (ZYRTEC) 10 MG tablet Take 1 tablet by mouth 2 times daily Yes Nilton Medina PA-C   busPIRone (BUSPAR) 15 MG tablet TAKE 1 TABLET BY MOUTH THREE TIMES A DAY Yes Nilton Medina PA-C   topiramate (TOPAMAX) 100 MG tablet TAKE ONE TABLET BY MOUTH TWICE A DAY Yes Nilton Medina PA-C   pantoprazole (PROTONIX) 40 MG tablet Take 1 tablet by mouth 2 times daily Yes Nilton Medina PA-C   pregabalin (LYRICA) 150 MG capsule Take 1 capsule by mouth 3 times daily for 90 days. TID Yes GIUSEPPE Mejia CNP   glipiZIDE (GLUCOTROL XL) 2.5 MG extended release tablet Take 1 tablet by mouth daily Yes Karl Brooks PA-C   DULoxetine (CYMBALTA) 30 MG extended release capsule Take 1 capsule by mouth daily Yes Karl Brooks PA-C   tolterodine (DETROL LA) 2 MG extended release capsule TAKE 1 TABLET BY MOUTH EVERY DAY Yes Karl Brooks PA-C   azelastine (ASTELIN) 0.1 % nasal spray 2 sprays by Nasal route 2 times daily Use in each nostril as directed Yes Karl Brooks PA-C   aspirin 81 MG EC tablet Take 81 mg by mouth nightly Yes Historical Provider, MD   fluticasone (FLONASE) 50 MCG/ACT nasal spray 1 spray by Nasal route daily  Patient taking differently: 1 spray by Nasal route as needed  Yes Karl Brooks PA-C   albuterol sulfate HFA (VENTOLIN HFA) 108 (90 Base) MCG/ACT inhaler Inhale 2 puffs into the lungs every 6 hours as needed for Wheezing or Shortness of Breath Yes Michelle Erickson MD   ammonium lactate (LAC-HYDRIN) 12 % lotion Apply topically daily.   David Camarena DPM         Past Medical History:   Diagnosis Date    Anxiety     Arthralgia 10/8/2013    Arthritis     Bone avascular necrosis ([facility] [address] 3/23/2015    Breast lesion 12/1/2015    Chronic back pain     Chronic fatigue 10/8/2013    Daytime somnolence 10/8/2013    Depression     Diabetes mellitus ([facility] [address]     Fibromyalgia     Fracture, fibula     right    GERD (gastroesophageal reflux disease)     H/O cardiovascular stress test 01/29/2020    Lexiscan    Headache     History of blood transfusion     History of fractured kneecap     (R) 2 hair line fractures    History of total knee arthroplasty 3/19/2015    Hyperlipidemia     Morbid obesity with BMI of 45.0-49.9, adult ([facility] [address] 10/9/2015    Obesity     Osteoarthritis     Overactive bladder 1/22/2014    Reflux        Past Surgical History:   Procedure Laterality Date    CARPAL TUNNEL RELEASE Bilateral     CHOLECYSTECTOMY      COLONOSCOPY  2011    COLONOSCOPY  11/22/2016    Dr. Garrison Machado N/A 12/8/2020    LAPAROSCOPIC HERNIA HIATAL REPAIR WITH MESH performed by Britta Solis MD at [address]    benign reasons     JOINT REPLACEMENT Bilateral 2010    LUMBAR FUSION      L3-5    ROTATOR CUFF REPAIR Left     left    TOTAL KNEE ARTHROPLASTY Bilateral 2010    did both at the same time   [facility] ENDOSCOPY N/A 9/29/2020    EGD BIOPSY performed by Britta Solis MD at [address] [place] [address] History   Problem Relation Age of Onset    High Blood Pressure Mother     Diabetes Mother     Heart Disease Mother     Cancer Mother     Diabetes Father     Heart Disease Father     High Blood Pressure Father     Cancer Other 50        breast    Depression Brother        CareTeam (Including outside providers/suppliers regularly involved in providing care):   Patient Care Team:  Bernadine Chou PA-C as PCP - General (Physician Assistant)  Bernadine Chou PA-C as PCP - [facility] Empaneled Provider  Krysta Lopez MD as Consulting Physician (Hand Surgery)  Amy Denson MD as Surgeon (Trauma Surgery)    Wt Readings from Last 3 Encounters:   02/24/21 (!) 336 lb (152.4 kg)   02/24/21 (!) 336 lb (152.4 kg)   01/13/21 (!) 336 lb (152.4 kg)     Vitals:    02/24/21 1246   BP: 120/72   Site: Left Upper Arm   Position: Sitting   Cuff Size: Large Adult   Pulse: 82   Resp: 17   Temp: 97.4 °F (36.3 °C)   TempSrc: Temporal   SpO2: 98%   Weight: (!) 336 lb (152.4 kg)   Height: 5' 8\" (1.727 m)     Body mass index is 51.09 kg/m². Based upon direct observation of the patient, evaluation of cognition reveals recent and remote memory intact.     General Appearance: alert and oriented to person, place and time, obese and appears flat affect on exam.  Skin: warm and dry, no rash or erythema  Head: normocephalic and atraumatic  Eyes: PERRLA, EOM's intact  ENT: TM's normal.  Neck: supple, no significant adenopathy. Pulmonary/Chest: clear to auscultation bilaterally, no current wheezes or rhonchi. Cardiovascular: normal rate, no obvious ectopy. Abdomen: Obese, soft,  non-tender, BS active  Extremities: trace edema at ankles. Musculoskeletal: ROM intact to UE and LE bilaterally. Gait intact, but limited by patient discomfort. Neurologic: CN 2 through 12 grossly intact. Patient's complete Health Risk Assessment and screening values have been reviewed and are found in Flowsheets. The following problems were reviewed today and where indicated follow up appointments were made and/or referrals ordered. Positive Risk Factor Screenings with Interventions:            General Health and ACP:  General  In general, how would you say your health is?: Good  In the past 7 days, have you experienced any of the following?  New or Increased Pain, New or Increased Fatigue, Loneliness, Social Isolation, Stress or Anger?: (!) New or Increased Pain, Stress  Do you get the social and emotional support that you need?: Yes  Do you have a Living Will?: (!) No  Advance Directives     Power of  Living Will ACP-Advance Directive ACP-Power of     Not on File Filed on 04/23/13 Filed Not on File          Health Habits/Nutrition:  Health Habits/Nutrition  Do you exercise for at least 20 minutes 2-3 times per week?: (!) No  Have you lost any weight without trying in the past 3 months?: No  Do you eat only one meal per day?: No  Have you seen the dentist within the past year?: (!) No  Body mass index: (!) 51.08  Health Habits/Nutrition Interventions:  · Inadequate physical activity:  patient is not ready to increase his/her physical activity level at this time  · Nutritional issues:  patient is not ready to address his/her nutritional/weight issues at this time, Patient agrees to try and eat healthier, but difficult due to her financial 02/24/21   POCT Microalbumin   Result Value Ref Range    Microalb, Ur 10 mg/l     Creatinine Ur POCT 50 mg/dl     Microalbumin Creatinine Ratio <30 mg/g        Recommendations for Preventive Services Due: see orders and patient instructions/AVS.  . Recommended screening schedule for the next 5-10 years is provided to the patient in written form: see Patient Alie Romero was seen today for medicare awv. Diagnoses and all orders for this visit:    Routine general medical examination at a health care facility    Fibromyalgia  -     Handicap Placard MISC; by Does not apply route Patient cannot walk 200 ft without stopping to rest.    Expiration 2-    Lumbar spondylosis  -     Handicap Placard MISC; by Does not apply route Patient cannot walk 200 ft without stopping to rest.    Expiration 2-    Need for shingles vaccine  -     zoster recombinant adjuvanted vaccine [facility]) 50 MCG/0.5ML SUSR injection; Inject 0.5 mLs into the muscle See Admin Instructions 1 dose now and repeat in 2-6 months    Type II diabetes mellitus with peripheral circulatory disorder (HCC)  -     POCT Microalbumin    ACP (advance care planning)             Patient given written information concerning Living will as well as Power of .

## 2021-02-26 DIAGNOSIS — R19.7 DIARRHEA, UNSPECIFIED TYPE: ICD-10-CM

## 2021-02-26 DIAGNOSIS — G47.00 INSOMNIA, UNSPECIFIED TYPE: Primary | ICD-10-CM

## 2021-02-26 RX ORDER — DOXEPIN HYDROCHLORIDE 10 MG/1
10 CAPSULE ORAL NIGHTLY
Qty: 30 CAPSULE | Refills: 2 | Status: SHIPPED | OUTPATIENT
Start: 2021-02-26 | End: 2022-08-15

## 2021-02-26 RX ORDER — DIPHENOXYLATE HYDROCHLORIDE AND ATROPINE SULFATE 2.5; .025 MG/1; MG/1
1 TABLET ORAL 4 TIMES DAILY PRN
Qty: 15 TABLET | Refills: 0 | Status: SHIPPED | OUTPATIENT
Start: 2021-02-26 | End: 2021-03-03

## 2021-02-26 NOTE — PROGRESS NOTES
Patient recently in the office. We had talked about medication for insomnia, but I had forgot to send in medication we discussed. Patient also just spoke with office and she is going to her sons' home this weekend and having some issues with diarrhea and requesting something for this. Will send in a few tablets of Lomotil, but will also message patient about BRAT diet and to only use Lomotil if needed.     Prabhjot Olsen PA-C

## 2021-03-04 ENCOUNTER — VIRTUAL VISIT (OUTPATIENT)
Dept: PAIN MANAGEMENT | Age: 66
End: 2021-03-04
Payer: MEDICARE

## 2021-03-04 DIAGNOSIS — M79.18 MYOFASCIAL PAIN SYNDROME: ICD-10-CM

## 2021-03-04 DIAGNOSIS — Z96.653 HISTORY OF TOTAL BILATERAL KNEE REPLACEMENT: ICD-10-CM

## 2021-03-04 DIAGNOSIS — M79.7 FIBROMYALGIA: ICD-10-CM

## 2021-03-04 DIAGNOSIS — M51.26 DISPLACEMENT OF LUMBAR INTERVERTEBRAL DISC: ICD-10-CM

## 2021-03-04 DIAGNOSIS — M47.816 LUMBAR SPONDYLOSIS: ICD-10-CM

## 2021-03-04 DIAGNOSIS — M51.9 LUMBAR DISC DISORDER: ICD-10-CM

## 2021-03-04 DIAGNOSIS — G89.4 CHRONIC PAIN SYNDROME: ICD-10-CM

## 2021-03-04 DIAGNOSIS — E66.01 MORBID OBESITY WITH BMI OF 45.0-49.9, ADULT (HCC): ICD-10-CM

## 2021-03-04 DIAGNOSIS — M48.061 SPINAL STENOSIS OF LUMBAR REGION WITHOUT NEUROGENIC CLAUDICATION: Primary | ICD-10-CM

## 2021-03-04 PROCEDURE — 99421 OL DIG E/M SVC 5-10 MIN: CPT | Performed by: NURSE PRACTITIONER

## 2021-03-04 RX ORDER — METHOCARBAMOL 500 MG/1
TABLET, FILM COATED ORAL
COMMUNITY
Start: 2021-02-08 | End: 2021-03-23 | Stop reason: SDUPTHER

## 2021-03-04 RX ORDER — DIPHENOXYLATE HYDROCHLORIDE AND ATROPINE SULFATE 2.5; .025 MG/1; MG/1
1 TABLET ORAL 4 TIMES DAILY PRN
COMMUNITY
End: 2021-06-10 | Stop reason: SDUPTHER

## 2021-03-04 RX ORDER — PREGABALIN 150 MG/1
150 CAPSULE ORAL 3 TIMES DAILY
Qty: 270 CAPSULE | Refills: 0 | Status: SHIPPED | OUTPATIENT
Start: 2021-03-08 | End: 2021-09-08 | Stop reason: SDUPTHER

## 2021-03-04 NOTE — PROGRESS NOTES
Mikael Bruner was read the following message We want to confirm that, for purposes of billing, this is a virtual visit with your provider for which we will submit a claim for reimbursement with your insurance company. You will be responsible for any copays, coinsurance amounts or other amounts not covered by your insurance company. If you do not accept this, unfortunately we will not be able to schedule or proceed with a virtual visit with the provider. Do you accept? Tia Haro responded Yes .

## 2021-03-04 NOTE — PROGRESS NOTES
[address] [place] [address], [address] [place] [id number]  [phone]    Virtual telehealth visit       Lindsey Sánchez     Date of Visit:  3/4/2021    CC:  Patient presents for follow up low back pain and left knee pain    Consent:  Telehealth Visit due to Beth Baca 19 pandemic  The patient and/or health care decision maker is aware that he/she may receive a bill for this tele-health service Doxy Me, depending on his insurance coverage, and has provided verbal consent to proceed: Yes  I have considered the risks of abuse, dependence, addiction and diversion. My patient is aware that they will need a follow-up visit (in-person or virtually) at the appropriate time indicated for continued medications. Further, my patient is aware that when this acute crisis has lifted, they will be expected to return for an in-person visit and all elements of standard local and hospital guidelines in order to continue this medication. Patient Location:Home   Physician Location: Other address in [place]    HPI: Pt followed today for left knee and low back pain worsening and main complaint. Pt reports CT of left knee rescheduled for April 7th. Unable to do MRI due to hx of replacement and too much artifact per radiologist.  Pt has hx of bilateral knee replacement done in 2010. Previously saw Dr Maurice Whiteside and advised that she must lost 35-40lbs first before considering low back surgery. Appropriate analgesia with current medications regimen:yes  Change in quality of symptoms: yes improved   Medication side effects:none. Recent diagnostic testing: none  Recent interventional procedures:none     Imaging:    MRI of Lumbar Spine 09/2020:  1. Postsurgical changes status post L3-L5 posterior spinal fusion. 2. Severe multifactorial spinal canal stenosis at L2-L3 with mass effect on    the cauda equina nerve roots.     3. Moderate multifactorial L1-L2 spinal canal stenosis with moderate right    neural foraminal stenosis. 4. Moderate left L5-S1 neural foraminal stenosis.             LUMBAR SPINE XRAY 2020  Intact lumbar vertebral height and alignment. Relative severe    multilevel disc and facet joint narrowing with subchondral sclerosis    and spurring. Previous discectomy with posterior pedicle screw plates    extending from L3 through L5. Symmetric osteoarthritis at the    sacroiliac joints manifesting as joint space narrowing and subchondral    sclerosis.         Lumbar spine CT 2016      1.  Status post lumbar spine fusion from L3-L5 appearing in stable,   satisfactory alignment.       2. Lumbar spondylosis as described above in part due to congenital   shortened pedicles and notable stenosis          Potential Aberrant Drug-Related Behavior:  None    Previous medication: baclofen helps some, tizanidine sleepiness, flexeril no relief,      Urine Drug Screenin2020 Negative for all which is consistent, pt does NOT take every day and does NOT fill exactly 30 days, can go longer than a month for refill   10/2020: consistent for hydrocodone     OARRS report:  2020-2021:Consistent      Past Medical History:   Diagnosis Date    Anxiety     Arthralgia 10/8/2013    Arthritis     Bone avascular necrosis ([address] 3/23/2015    Breast lesion 2015    Chronic back pain     Chronic fatigue 10/8/2013    Daytime somnolence 10/8/2013    Depression     Diabetes mellitus ([address]     Fibromyalgia     Fracture, fibula     right    GERD (gastroesophageal reflux disease)     H/O cardiovascular stress test 2020    Lexiscan    Headache     History of blood transfusion     History of fractured kneecap     (R) 2 hair line fractures    History of total knee arthroplasty 3/19/2015    Hyperlipidemia     Morbid obesity with BMI of 45.0-49.9, adult ([address] 10/9/2015    Obesity     Osteoarthritis     Overactive bladder 2014    Reflux        Past Surgical History:   Procedure Laterality Date    CARPAL TUNNEL RELEASE Bilateral     CHOLECYSTECTOMY      COLONOSCOPY  2011    COLONOSCOPY  11/22/2016    Dr. Sherry Hurtado N/A 12/8/2020    Bernabe Hotter performed by Caryl Ambriz MD at [address]    benign reasons    [address] Bilateral 2010    LUMBAR FUSION      L3-5    ROTATOR CUFF REPAIR Left     left    TOTAL KNEE ARTHROPLASTY Bilateral 2010    did both at the same time   [address] N/A 9/29/2020    EGD BIOPSY performed by Caryl Ambriz MD at [facility]       Prior to Admission medications    Medication Sig Start Date End Date Taking? Authorizing Provider   doxepin (SINEQUAN) 10 MG capsule Take 1 capsule by mouth nightly 2/26/21   Brittany Yepez PA-C   Handicap Placard MISC by Does not apply route Patient cannot walk 200 ft without stopping to rest.    Expiration 2- 2/24/21   Brittany Yepez PA-C   zoster recombinant adjuvanted vaccine [facility]) 50 MCG/0.5ML SUSR injection Inject 0.5 mLs into the muscle See Admin Instructions 1 dose now and repeat in 2-6 months 2/24/21 8/23/21  Brittany Yepez PA-C   ammonium lactate (LAC-HYDRIN) 12 % lotion Apply topically daily. 2/24/21   Minus Pride., DPM   cetirizine (ZYRTEC) 10 MG tablet Take 1 tablet by mouth 2 times daily 2/12/21   Brittany Yepez PA-C   busPIRone (BUSPAR) 15 MG tablet TAKE 1 TABLET BY MOUTH THREE TIMES A DAY 2/10/21   Brittany Yepez PA-C   topiramate (TOPAMAX) 100 MG tablet TAKE ONE TABLET BY MOUTH TWICE A DAY 2/10/21   Brittany Yepez PA-C   pantoprazole (PROTONIX) 40 MG tablet Take 1 tablet by mouth 2 times daily 2/10/21   Brittany Yepez PA-C   pregabalin (LYRICA) 150 MG capsule Take 1 capsule by mouth 3 times daily for 90 days.  TID 2/2/21 5/3/21  GIUSEPPE Alvares - CNP   glipiZIDE (GLUCOTROL XL) 2.5 MG extended release tablet Take 1 tablet by mouth daily 5     Quit date: 2020     Years since quittin.4    Smokeless tobacco: Never Used   Substance and Sexual Activity    Alcohol use: No     Alcohol/week: 0.0 standard drinks    Drug use: No    Sexual activity: Never   Lifestyle    Physical activity     Days per week: Not on file     Minutes per session: Not on file    Stress: Not on file   Relationships    Social connections     Talks on phone: Not on file     Gets together: Not on file     Attends Sabianist service: Not on file     Active member of club or organization: Not on file     Attends meetings of clubs or organizations: Not on file     Relationship status: Not on file    Intimate partner violence     Fear of current or ex partner: Not on file     Emotionally abused: Not on file     Physically abused: Not on file     Forced sexual activity: Not on file   Other Topics Concern    Not on file   Social History Narrative    Not on file       Family History   Problem Relation Age of Onset    High Blood Pressure Mother     Diabetes Mother     Heart Disease Mother     Cancer Mother     Diabetes Father     Heart Disease Father     High Blood Pressure Father     Cancer Other 50        breast    Depression Brother        REVIEW OF SYSTEMS:     Tia Haro denies fever/chills, chest pain, shortness of breath, new bowel or bladder complaints or suicidal ideations. All other review of systems wasnegative.       PHYSICAL EXAMINATION:      LMP  (LMP Unknown)     General:       General appearance:   elderly, pleasant and well-hydrated.   ,in mild to moderate discomfort and A & O x3  Build:obese     HEENT:     Head:normocephalic and atraumatic  Sclera: icterus absent,      Lungs:     Breathing:Breathing Pattern: regular, no distress        Cervical spine:     Range of motion:normal not flexion, extension rotation bilateral and is not painful.     Lumbar spine:     Range of motion:abnormal moderately Lateral bending, flexion, extension rotation bilateral and is mildly painful.        Knee:     Inspection:bilateral knee replaced  ROM Left knee limited by pain and weakness per pt reports       Impression:     Chronic low back with h/o L3-5 fusion/bilateral knees replaced since 2010  Refuses to have anymore injections, [place] at [place] and reports so painful during and after, refuses to have again    Seen Dr Albania De La Cruz and is a surgical candidate but  advised weight loss of >30lbs or consult bariatric  surgery     Plan:               Chronic low back, diffuse body pain and acute left  knee pain that is slowly getting better   Not interested in anymore interventional procedures   CT of left knee rescheduled for April 7th    Refill Lyrica 150 mg TID    No Refill Norco 5/325mg po daily-bid prn #35, does  NOT need monthly fills anymore since taking Robaxin  No Refill Robaxin 500mg po tid prn spasms  Continue with Cymbalta 40 mg QD from PCP                Currently on Zanaflex 4mg QHS gets from PCP  OARRS report reviewed 03/2021   Patient encouraged to stay active and to lose weight  Against referral to physical therapy  Treatment plan discussed with the patient including medications side effects                  We discussed with the patient that combining opioids, benzodiazepines, alcohol, illicit drugs or sleep aids increases the risk of respiratory depression including death. We discussed that these medications may cause drowsiness, sedation or dizziness and have counseled the patient not to drive or operate machinery. We have discussed that these medications will be prescribed only by one provider. We have discussed with the patient about age related risk factors and have thoroughly discussed the importance of taking these medications as prescribed. The patient verbalizes understanding.         Patient advised regarding steps to help prevent the spread of COVID-19   SOURCE - https://liliana-lisa.info/. html 1-Stay home except to get medical care  2-Clean your hands often for atleast 20 seconds, avoid touching: Avoid touching your eyes, nose, and mouth with unwashed hands. 3-Seek medical attention: Seek prompt medical attention if your illness is worsening (e.g., difficulty breathing). Call you doctor first.  3-Wear a facemask if you are sick   4-Cover your coughs and sneezes           I affirm this is a Patient Initiated Episode with an established Patient who has not had a related appointment within my department in the past 7 days or scheduled within the next 24 hours.     Total Time:  7min    Cc: Referring physician      Electronically signed by GIUSEPPE Espinosa CNP on 03/04/20 at1:22 PM EDT

## 2021-03-05 ENCOUNTER — PATIENT MESSAGE (OUTPATIENT)
Dept: FAMILY MEDICINE CLINIC | Age: 66
End: 2021-03-05

## 2021-03-05 RX ORDER — METHOCARBAMOL 500 MG/1
TABLET, FILM COATED ORAL
Qty: 90 TABLET | Refills: 2 | OUTPATIENT
Start: 2021-03-05

## 2021-03-23 RX ORDER — METHOCARBAMOL 500 MG/1
500 TABLET, FILM COATED ORAL 3 TIMES DAILY
Qty: 90 TABLET | Refills: 2 | Status: SHIPPED | OUTPATIENT
Start: 2021-03-23 | End: 2021-04-22

## 2021-03-23 NOTE — TELEPHONE ENCOUNTER
Pt has a NTP appt scheduled on 04/13 but Pt says she will need a med refill for her Cetirizine before appt.  Please advise Pt

## 2021-03-23 NOTE — TELEPHONE ENCOUNTER
I called patient and informed RX was sent 2/12/21 to [facility] for #180 and 1 refill.

## 2021-04-07 ENCOUNTER — HOSPITAL ENCOUNTER (OUTPATIENT)
Dept: CT IMAGING | Age: 66
Discharge: HOME | End: 2021-04-07
Payer: MEDICARE

## 2021-04-07 DIAGNOSIS — G89.29 CHRONIC PAIN OF LEFT KNEE: ICD-10-CM

## 2021-04-07 DIAGNOSIS — Z96.652 HISTORY OF TOTAL LEFT KNEE REPLACEMENT: ICD-10-CM

## 2021-04-07 DIAGNOSIS — M25.562 CHRONIC PAIN OF LEFT KNEE: ICD-10-CM

## 2021-04-07 PROCEDURE — 73700 CT LOWER EXTREMITY W/O DYE: CPT

## 2021-04-08 ENCOUNTER — OFFICE VISIT (OUTPATIENT)
Dept: PAIN MANAGEMENT | Age: 66
End: 2021-04-08
Payer: MEDICARE

## 2021-04-08 VITALS
RESPIRATION RATE: 16 BRPM | HEART RATE: 100 BPM | SYSTOLIC BLOOD PRESSURE: 120 MMHG | DIASTOLIC BLOOD PRESSURE: 56 MMHG | BODY MASS INDEX: 44.41 KG/M2 | HEIGHT: 68 IN | TEMPERATURE: 95.2 F | WEIGHT: 293 LBS

## 2021-04-08 DIAGNOSIS — M48.061 SPINAL STENOSIS OF LUMBAR REGION WITHOUT NEUROGENIC CLAUDICATION: ICD-10-CM

## 2021-04-08 DIAGNOSIS — M51.26 DISPLACEMENT OF LUMBAR INTERVERTEBRAL DISC: Primary | ICD-10-CM

## 2021-04-08 DIAGNOSIS — G89.29 CHRONIC LOW BACK PAIN WITH SCIATICA, SCIATICA LATERALITY UNSPECIFIED, UNSPECIFIED BACK PAIN LATERALITY: ICD-10-CM

## 2021-04-08 DIAGNOSIS — G89.4 CHRONIC PAIN SYNDROME: ICD-10-CM

## 2021-04-08 DIAGNOSIS — M47.816 LUMBAR SPONDYLOSIS: ICD-10-CM

## 2021-04-08 DIAGNOSIS — M79.7 FIBROMYALGIA: ICD-10-CM

## 2021-04-08 DIAGNOSIS — M79.18 MYOFASCIAL PAIN SYNDROME: ICD-10-CM

## 2021-04-08 DIAGNOSIS — M54.40 CHRONIC LOW BACK PAIN WITH SCIATICA, SCIATICA LATERALITY UNSPECIFIED, UNSPECIFIED BACK PAIN LATERALITY: ICD-10-CM

## 2021-04-08 DIAGNOSIS — E66.01 MORBID OBESITY WITH BMI OF 45.0-49.9, ADULT (HCC): Chronic | ICD-10-CM

## 2021-04-08 DIAGNOSIS — M51.9 LUMBAR DISC DISORDER: ICD-10-CM

## 2021-04-08 DIAGNOSIS — Z96.653 HISTORY OF TOTAL BILATERAL KNEE REPLACEMENT: ICD-10-CM

## 2021-04-08 PROCEDURE — 99213 OFFICE O/P EST LOW 20 MIN: CPT | Performed by: NURSE PRACTITIONER

## 2021-04-08 PROCEDURE — 99214 OFFICE O/P EST MOD 30 MIN: CPT | Performed by: NURSE PRACTITIONER

## 2021-04-08 RX ORDER — HYDROCODONE BITARTRATE AND ACETAMINOPHEN 5; 325 MG/1; MG/1
1 TABLET ORAL EVERY 12 HOURS PRN
Qty: 35 TABLET | Refills: 0 | Status: SHIPPED
Start: 2021-04-08 | End: 2021-07-15 | Stop reason: SDUPTHER

## 2021-04-08 NOTE — PROGRESS NOTES
Do you currently have any of the following:    Fever: No  Headache:  Yes  Cough: No  Shortness of breath: No  Exposed to anyone with these symptoms: No                                                                                                                Lowell Edwards presents to the [facility] on 4/8/2021. Willem Thomas is complaining of pain back and knees. The pain is constant. The pain is described as aching. Pain is rated on her best day at a 5, on her worst day at a 10, and on average at a 3 on the VAS scale. She took her last dose of  Methocarbamol, Cymbalta lyrica and norco.today Willem Thomas does not have issues with constipation. Any procedures since your last visit: No, with  % relief. She is not on NSAIDS and  is  on anticoagulation medications to include ASA and is managed by Shasha Arentt PA-C  . Pacemaker or defibrillator: No Physician managing device is . Medication Contract and Consent for Opioid Use Documents Filed     Patient Documents       Type of Document Status Date Received Received By Description     Medication Contract Received 10/14/2020  1:22 PM TOMY THOMAS Mgmt Patient Agreement                   Temp 95.2 °F (35.1 °C) (Infrared)   Resp 16   Ht 5' 8\" (1.727 m)   Wt (!) 330 lb (149.7 kg)   LMP  (LMP Unknown)   BMI 50.18 kg/m²      No LMP recorded (lmp unknown). Patient has had a hysterectomy.

## 2021-04-08 NOTE — PROGRESS NOTES
[address] [facility], [address] [place] [id number]  [phone]    Follow up visit       [facility]     Date of Visit:  4/8/2021    CC:  Patient presents for follow up low back pain and left knee pain      HPI: Pt followed today for low back pain. CT of left knee reviewed with patient today in office and pt reports pain has since resolved over last month. Pt reports 3 weeks ago pt fell at home, NO LOC noted and ambulance called to get pt off floor. Pt here with bruising to left arm with left glute tenderness. Pt notes pain slowly improving. Pt has hx of bilateral knee replacement done in 2010. Previously saw Dr Toña Balderas and advised that she must lost 35-40lbs first before considering low back surgery. Appropriate analgesia with current medications regimen:yes  Change in quality of symptoms: yes improved   Medication side effects:none. Recent diagnostic testing: none  Recent interventional procedures:none     Imaging:    CT of left knee 04/2021:    Impression   1. Small suprapatellar joint effusion. 2. Prior left knee arthroplasty and patellar resurfacing.  No significant   periprosthetic lucency or acute osseous abnormality. 3. Calcifications which are well corticated in the distal quadriceps tendon   measuring up to 1.5 x 0.8 cm.   4. Hardware associated streak artifact limits the exam to some degree. MRI of Lumbar Spine 09/2020:  1. Postsurgical changes status post L3-L5 posterior spinal fusion. 2. Severe multifactorial spinal canal stenosis at L2-L3 with mass effect on    the cauda equina nerve roots. 3. Moderate multifactorial L1-L2 spinal canal stenosis with moderate right    neural foraminal stenosis. 4. Moderate left L5-S1 neural foraminal stenosis.             LUMBAR SPINE XRAY 03/4/2020  Intact lumbar vertebral height and alignment. Relative severe    multilevel disc and facet joint narrowing with subchondral sclerosis    and spurring. Previous discectomy with posterior pedicle screw plates    extending from L3 through L5. Symmetric osteoarthritis at the    sacroiliac joints manifesting as joint space narrowing and subchondral    sclerosis.         Lumbar spine CT 2016      1.  Status post lumbar spine fusion from L3-L5 appearing in stable,   satisfactory alignment.       2. Lumbar spondylosis as described above in part due to congenital   shortened pedicles and notable stenosis          Potential Aberrant Drug-Related Behavior:  None    Previous medication: baclofen helps some, tizanidine sleepiness, flexeril no relief,      Urine Drug Screenin2020 Negative for all which is consistent, pt does NOT take every day and does NOT fill exactly 30 days, can go longer than a month for refill   10/2020: consistent for hydrocodone     OARRS report:  2020-2021:Consistent    Pain agreement: 10/14/2020      Past Medical History:   Diagnosis Date    Anxiety     Arthralgia 10/8/2013    Arthritis     Bone avascular necrosis ([facility] [address] 3/23/2015    Breast lesion 2015    Chronic back pain     Chronic fatigue 10/8/2013    Daytime somnolence 10/8/2013    Depression     Diabetes mellitus ([address]     Fibromyalgia     Fracture, fibula     right    GERD (gastroesophageal reflux disease)     H/O cardiovascular stress test 2020    Lexiscan    Headache     History of blood transfusion     History of fractured kneecap     (R) 2 hair line fractures    History of total knee arthroplasty 3/19/2015    Hyperlipidemia     Morbid obesity with BMI of 45.0-49.9, adult ([address] 10/9/2015    Obesity     Osteoarthritis     Overactive bladder 2014    Reflux        Past Surgical History:   Procedure Laterality Date    CARPAL TUNNEL RELEASE Bilateral     CHOLECYSTECTOMY      COLONOSCOPY      COLONOSCOPY  2016    Dr. Maritza Rose N/A 2020    LAPAROSCOPIC HERNIA HIATAL REPAIR WITH MESH performed by Harriett Lawrence (TOPAMAX) 100 MG tablet TAKE ONE TABLET BY MOUTH TWICE A DAY 2/10/21   Eveline Linn PA-C   pantoprazole (PROTONIX) 40 MG tablet Take 1 tablet by mouth 2 times daily 2/10/21   Eveline Linn PA-C   glipiZIDE (GLUCOTROL XL) 2.5 MG extended release tablet Take 1 tablet by mouth daily 2/1/21   Eveline Linn PA-C   DULoxetine (CYMBALTA) 30 MG extended release capsule Take 1 capsule by mouth daily 1/11/21   Eveline Linn PA-C   aspirin 81 MG EC tablet Take 81 mg by mouth nightly    Historical Provider, MD   fluticasone (FLONASE) 50 MCG/ACT nasal spray 1 spray by Nasal route daily  Patient taking differently: 1 spray by Nasal route as needed  4/27/20   Eveline Linn PA-C   albuterol sulfate HFA (VENTOLIN HFA) 108 (90 Base) MCG/ACT inhaler Inhale 2 puffs into the lungs every 6 hours as needed for Wheezing or Shortness of Breath 12/15/17   Dejah Figueroa MD       Allergies   Allergen Reactions    Carafate [Sucralfate]      Patient unable to tolerate. Unable to recall her reaction.     Glucophage [Metformin Hcl]      Severe abdominal pain, constipation      Mobic [Meloxicam] Other (See Comments)     Abdominal pain    Trazodone And Nefazodone      Unable to tolerate, patient reports made her physically ill with abdominal pain      Ultram [Tramadol]      Abdominal pain    Nickel Itching and Rash       Social History     Socioeconomic History    Marital status:      Spouse name: Not on file    Number of children: Not on file    Years of education: Not on file    Highest education level: Associate degree: academic program   Occupational History    Not on file   Social Needs    Financial resource strain: Not on file    Food insecurity     Worry: Not on file     Inability: Not on file   [employer] needs     Medical: Not on file     Non-medical: Not on file   Tobacco Use    Smoking status: Former Smoker     Packs/day: 1.00     Years: 30.00     Pack years: 30.00     Types: advised weight loss of >30lbs or consult bariatric  surgery     Plan:               Chronic low back, diffuse body pain and acute left  knee pain improved, s/p fall 3 weeks ago but slowly   improving   Not interested in anymore interventional procedures   CT of left knee reviewed with patient in office today   Refill Lyrica 150 mg TID   Refill Norco 5/325mg po daily-bid prn #35, no  longer needs Norco montly since taking robaxin last  fill Dec 2020  No Refill Robaxin 500mg po tid prn spasms  Continue with Cymbalta 40 mg QD from PCP                Currently on Zanaflex 4mg QHS gets from PCP  OARRS report reviewed 04/2021   Patient encouraged to stay active and to lose weight  Against referral to physical therapy  Treatment plan discussed with the patient including medications side effects                  We discussed with the patient that combining opioids, benzodiazepines, alcohol, illicit drugs or sleep aids increases the risk of respiratory depression including death. We discussed that these medications may cause drowsiness, sedation or dizziness and have counseled the patient not to drive or operate machinery. We have discussed that these medications will be prescribed only by one provider. We have discussed with the patient about age related risk factors and have thoroughly discussed the importance of taking these medications as prescribed. The patient verbalizes understanding.         Cc: Referring physician      Electronically signed by GIUSEPPE Cradenas CNP on 04/08/20 at 2:22 PM EDT

## 2021-04-28 ENCOUNTER — OFFICE VISIT (OUTPATIENT)
Dept: FAMILY MEDICINE CLINIC | Age: 66
End: 2021-04-28
Payer: MEDICARE

## 2021-04-28 VITALS
SYSTOLIC BLOOD PRESSURE: 112 MMHG | BODY MASS INDEX: 44.41 KG/M2 | HEIGHT: 68 IN | WEIGHT: 293 LBS | RESPIRATION RATE: 24 BRPM | TEMPERATURE: 97.3 F | DIASTOLIC BLOOD PRESSURE: 66 MMHG | HEART RATE: 68 BPM | OXYGEN SATURATION: 97 %

## 2021-04-28 DIAGNOSIS — R10.13 EPIGASTRIC PAIN: ICD-10-CM

## 2021-04-28 DIAGNOSIS — E66.01 CLASS 3 SEVERE OBESITY DUE TO EXCESS CALORIES WITH SERIOUS COMORBIDITY AND BODY MASS INDEX (BMI) OF 50.0 TO 59.9 IN ADULT (HCC): ICD-10-CM

## 2021-04-28 DIAGNOSIS — E03.9 HYPOTHYROIDISM, UNSPECIFIED TYPE: ICD-10-CM

## 2021-04-28 DIAGNOSIS — R06.00 DYSPNEA, UNSPECIFIED TYPE: ICD-10-CM

## 2021-04-28 DIAGNOSIS — E11.9 DIABETES MELLITUS WITHOUT COMPLICATION (HCC): ICD-10-CM

## 2021-04-28 DIAGNOSIS — G47.30 SLEEP APNEA, UNSPECIFIED TYPE: ICD-10-CM

## 2021-04-28 DIAGNOSIS — Z72.0 TOBACCO USE: ICD-10-CM

## 2021-04-28 DIAGNOSIS — J45.909 UNCOMPLICATED ASTHMA, UNSPECIFIED ASTHMA SEVERITY, UNSPECIFIED WHETHER PERSISTENT: ICD-10-CM

## 2021-04-28 DIAGNOSIS — E11.9 DIABETES MELLITUS WITHOUT COMPLICATION (HCC): Primary | ICD-10-CM

## 2021-04-28 PROBLEM — M51.26 DISPLACEMENT OF LUMBAR INTERVERTEBRAL DISC: Status: RESOLVED | Noted: 2017-03-03 | Resolved: 2021-04-28

## 2021-04-28 PROBLEM — E66.813 CLASS 3 SEVERE OBESITY DUE TO EXCESS CALORIES WITH SERIOUS COMORBIDITY AND BODY MASS INDEX (BMI) OF 50.0 TO 59.9 IN ADULT: Status: ACTIVE | Noted: 2021-04-28

## 2021-04-28 PROBLEM — M79.604 PAIN IN BOTH LOWER EXTREMITIES: Status: RESOLVED | Noted: 2020-02-18 | Resolved: 2021-04-28

## 2021-04-28 PROBLEM — M79.605 PAIN IN BOTH LOWER EXTREMITIES: Status: RESOLVED | Noted: 2020-02-18 | Resolved: 2021-04-28

## 2021-04-28 LAB
ANION GAP SERPL CALCULATED.3IONS-SCNC: 18 MMOL/L (ref 7–16)
BUN BLDV-MCNC: 17 MG/DL (ref 6–23)
CALCIUM SERPL-MCNC: 9.5 MG/DL (ref 8.6–10.2)
CHLORIDE BLD-SCNC: 106 MMOL/L (ref 98–107)
CHOLESTEROL, TOTAL: 250 MG/DL (ref 0–199)
CO2: 19 MMOL/L (ref 22–29)
CREAT SERPL-MCNC: 1.1 MG/DL (ref 0.5–1)
CREATININE URINE: 52 MG/DL (ref 29–226)
GFR AFRICAN AMERICAN: >60
GFR NON-AFRICAN AMERICAN: 50 ML/MIN/1.73
GLUCOSE BLD-MCNC: 89 MG/DL (ref 74–99)
HBA1C MFR BLD: 5.8 %
HDLC SERPL-MCNC: 81 MG/DL
LDL CHOLESTEROL CALCULATED: 136 MG/DL (ref 0–99)
MICROALBUMIN UR-MCNC: <12 MG/L
MICROALBUMIN/CREAT UR-RTO: ABNORMAL (ref 0–30)
POTASSIUM SERPL-SCNC: 4 MMOL/L (ref 3.5–5)
SODIUM BLD-SCNC: 143 MMOL/L (ref 132–146)
TRIGL SERPL-MCNC: 167 MG/DL (ref 0–149)
TSH SERPL DL<=0.05 MIU/L-ACNC: 1.49 UIU/ML (ref 0.27–4.2)
VLDLC SERPL CALC-MCNC: 33 MG/DL

## 2021-04-28 PROCEDURE — 83036 HEMOGLOBIN GLYCOSYLATED A1C: CPT | Performed by: INTERNAL MEDICINE

## 2021-04-28 PROCEDURE — 99214 OFFICE O/P EST MOD 30 MIN: CPT | Performed by: INTERNAL MEDICINE

## 2021-04-28 RX ORDER — LIRAGLUTIDE 6 MG/ML
0.6 INJECTION SUBCUTANEOUS DAILY
Qty: 2 PEN | Refills: 3 | Status: SHIPPED
Start: 2021-04-28 | End: 2021-06-01 | Stop reason: SINTOL

## 2021-04-28 RX ORDER — METHOCARBAMOL 500 MG/1
TABLET, FILM COATED ORAL
COMMUNITY
Start: 2021-04-22 | End: 2021-06-28 | Stop reason: SDUPTHER

## 2021-04-28 NOTE — PROGRESS NOTES
4/28/2021  New Patient Visit  Chief Complaint   Patient presents with   [facility]    Abdominal Pain     very painful: comes in the afternoon and lasts several hours throughout the day       HPI  Epigastric pain for 6 months, lasts hours,  up to 8/10 in intensity, dull, not associated with eating. Carafate not helpful in the past.   Tylenol is no help. Review of Systems   Constitutional: Negative for chills, diaphoresis, fever and unexpected weight change. HENT: Negative for sore throat and trouble swallowing. Respiratory: Negative for cough and wheezing. Cardiovascular: Negative for chest pain. Gastrointestinal: Negative for blood in stool. Neurological: Negative for syncope and facial asymmetry. Past Medical History:   Diagnosis Date    Anxiety     Arthralgia 10/8/2013    Arthritis     Bone avascular necrosis ([facility] [address] 3/23/2015    Breast lesion 12/1/2015    Chronic back pain     Chronic fatigue 10/8/2013    Daytime somnolence 10/8/2013    Depression     Diabetes mellitus (HCC)     Fibromyalgia     Fracture, fibula     right    GERD (gastroesophageal reflux disease)     H/O cardiovascular stress test 01/29/2020    Lexiscan    Headache     History of blood transfusion     History of fractured kneecap     (R) 2 hair line fractures    History of total knee arthroplasty 3/19/2015    Hyperlipidemia     Morbid obesity with BMI of 45.0-49.9, adult (HCC) 10/9/2015    Obesity     Osteoarthritis     Overactive bladder 1/22/2014    Reflux        Social History     Tobacco Use    Smoking status: Heavy Tobacco Smoker     Packs/day: 1.00     Years: 30.00     Pack years: 30.00     Types: Cigarettes    Smokeless tobacco: Never Used   Substance Use Topics    Alcohol use: No     Alcohol/week: 0.0 standard drinks       Past surgical and family history reviewed and updated.     EXAM  /66   Pulse 68   Temp 97.3 °F (36.3 °C) (Temporal)   Resp 24   Ht 5' 8\" (1.727 m)   Wt (!) 336 lb 9.6 oz (152.7 kg)   LMP  (LMP Unknown)   SpO2 97%   BMI 51.18 kg/m²     Physical Exam  Constitutional:       General: She is not in acute distress. HENT:      Mouth/Throat:      Pharynx: Oropharynx is clear. Cardiovascular:      Rate and Rhythm: Normal rate and regular rhythm. Heart sounds: Normal heart sounds. No murmur. No friction rub. No gallop. Pulmonary:      Breath sounds: Normal breath sounds. No stridor. No wheezing, rhonchi or rales. Abdominal:      General: Bowel sounds are normal. There is no distension. Palpations: Abdomen is soft. There is no mass. Tenderness: There is abdominal tenderness. There is no guarding or rebound. Musculoskeletal:      Right lower leg: No edema. Left lower leg: No edema. Neurological:      General: No focal deficit present. Mental Status: She is alert. ASSESSMENT AND PLAN    1. Diabetes mellitus without complication ([facility] [address]  Due for labs   - Lipid Panel; Future  - Microalbumin / Creatinine Urine Ratio; Future  - Basic Metabolic Panel; Future  - Liraglutide (VICTOZA) 18 MG/3ML SOPN SC injection; Inject 0.6 mg into the skin daily  Dispense: 2 pen; Refill: 3    2. Tobacco use  Counseled briefly, address next time     3. Dyspnea, unspecified type     - fluticasone-salmeterol (ADVAIR HFA) 45-21 MCG/ACT inhaler; Inhale 2 puffs into the lungs 2 times daily  Dispense: 1 Inhaler; Refill: 3  - XR CHEST (2 VW); Future    4. Hypothyroidism, unspecified type  Stable- continue present management.    - TSH with Reflex; Future    5. Class 3 severe obesity due to excess calories with serious comorbidity and body mass index (BMI) of 50.0 to 59.9 in adult [facility])  Will address next time     6. Sleep apnea, unspecified type  Stable- continue present management. 8. Uncomplicated asthma, unspecified asthma severity, unspecified whether persistent  Stable- continue present management. -------------------------------------------------------------------------------  I spent 35 minutes with patient with the majority of the time dedicated to education, counseling, and/or care coordination.

## 2021-04-30 ENCOUNTER — TELEPHONE (OUTPATIENT)
Dept: FAMILY MEDICINE CLINIC | Age: 66
End: 2021-04-30

## 2021-04-30 NOTE — TELEPHONE ENCOUNTER
Jes, pharmacist from [facility], called stating Dr. Vivienne Joyner sent RX for Victoza, but no order for needles. Informed that Dr. Vivienne Joyner and MA are not in the ofc today and I asked if a verbal order would be acceptable, so that patient could start administering her medication. Arianna Sesay accepted a verbal order and will provide patient with 1 box of needles. Msg routed, for informational purposes.

## 2021-05-02 ENCOUNTER — PATIENT MESSAGE (OUTPATIENT)
Dept: FAMILY MEDICINE CLINIC | Age: 66
End: 2021-05-02

## 2021-05-02 DIAGNOSIS — R06.02 SHORTNESS OF BREATH ON EXERTION: ICD-10-CM

## 2021-05-03 RX ORDER — ALBUTEROL SULFATE 90 UG/1
2 AEROSOL, METERED RESPIRATORY (INHALATION) EVERY 6 HOURS PRN
Qty: 1 INHALER | Refills: 5 | Status: SHIPPED | OUTPATIENT
Start: 2021-05-03 | End: 2022-03-08 | Stop reason: SDUPTHER

## 2021-05-03 NOTE — TELEPHONE ENCOUNTER
From: Zak Palacio  To: Nemo Eaton MD  Sent: 5/2/2021 12:26 PM EDT  Subject: Prescription Question    Dr. Erica Cantor,     Could you please send in a prescription for the albuterol inhaler? The Advair inhaler worked well but, to many side effects. Bad leg cramps in the night, gave me cold like symptoms. I smoke, don't even cough. So I quit using the Advair inhaler. I want to thank you for giving me the opportunity to   try it. I'll stick with the Albuterol inhaler.      Thank you,   Shailesh Yang

## 2021-05-07 PROBLEM — E78.5 HYPERLIPIDEMIA: Status: ACTIVE | Noted: 2021-05-07

## 2021-05-07 RX ORDER — PEN NEEDLE, DIABETIC 32GX 5/32"
NEEDLE, DISPOSABLE MISCELLANEOUS
COMMUNITY
Start: 2021-04-30 | End: 2021-05-10

## 2021-05-07 ASSESSMENT — ENCOUNTER SYMPTOMS
COUGH: 0
TROUBLE SWALLOWING: 0
BLOOD IN STOOL: 0
SORE THROAT: 0
WHEEZING: 0

## 2021-06-01 DIAGNOSIS — R11.0 NAUSEA: Primary | ICD-10-CM

## 2021-06-01 RX ORDER — ONDANSETRON 4 MG/1
4 TABLET, ORALLY DISINTEGRATING ORAL 3 TIMES DAILY PRN
Qty: 21 TABLET | Refills: 0 | Status: SHIPPED | OUTPATIENT
Start: 2021-06-01 | End: 2022-03-08

## 2021-06-09 ENCOUNTER — TELEPHONE (OUTPATIENT)
Dept: ORTHOPEDIC SURGERY | Age: 66
End: 2021-06-09

## 2021-06-09 DIAGNOSIS — M79.642 BILATERAL HAND PAIN: Primary | ICD-10-CM

## 2021-06-09 DIAGNOSIS — M79.641 BILATERAL HAND PAIN: Primary | ICD-10-CM

## 2021-06-10 ENCOUNTER — OFFICE VISIT (OUTPATIENT)
Dept: ORTHOPEDIC SURGERY | Age: 66
End: 2021-06-10
Payer: COMMERCIAL

## 2021-06-10 VITALS — RESPIRATION RATE: 20 BRPM | WEIGHT: 293 LBS | HEIGHT: 68 IN | BODY MASS INDEX: 44.41 KG/M2

## 2021-06-10 DIAGNOSIS — R19.7 DIARRHEA, UNSPECIFIED TYPE: Primary | ICD-10-CM

## 2021-06-10 DIAGNOSIS — M25.731 BOSS, CARPAL, RIGHT: Primary | ICD-10-CM

## 2021-06-10 PROCEDURE — 20600 DRAIN/INJ JOINT/BURSA W/O US: CPT | Performed by: ORTHOPAEDIC SURGERY

## 2021-06-10 PROCEDURE — 99203 OFFICE O/P NEW LOW 30 MIN: CPT | Performed by: ORTHOPAEDIC SURGERY

## 2021-06-10 RX ORDER — BETAMETHASONE SODIUM PHOSPHATE AND BETAMETHASONE ACETATE 3; 3 MG/ML; MG/ML
6 INJECTION, SUSPENSION INTRA-ARTICULAR; INTRALESIONAL; INTRAMUSCULAR; SOFT TISSUE ONCE
Status: COMPLETED | OUTPATIENT
Start: 2021-06-10 | End: 2021-06-10

## 2021-06-10 RX ORDER — DIPHENOXYLATE HYDROCHLORIDE AND ATROPINE SULFATE 2.5; .025 MG/1; MG/1
1 TABLET ORAL 4 TIMES DAILY PRN
Qty: 15 TABLET | Refills: 0 | Status: SHIPPED | OUTPATIENT
Start: 2021-06-10 | End: 2021-07-20

## 2021-06-10 RX ADMIN — BETAMETHASONE SODIUM PHOSPHATE AND BETAMETHASONE ACETATE 6 MG: 3; 3 INJECTION, SUSPENSION INTRA-ARTICULAR; INTRALESIONAL; INTRAMUSCULAR; SOFT TISSUE at 11:45

## 2021-06-10 NOTE — PROGRESS NOTES
Department of Orthopedic Surgery  History and Physical      CHIEF COMPLAINT: Bilateral hand pain    HISTORY OF PRESENT ILLNESS:                The patient is a 77 y.o. female who presents with bilateral hand pain. Known to this practice with previously seen in 2015, she underwent left wrist CMC arthroplasty, de Quervain's release, carpal tunnel release, volar oblique ligament reconstruction. Today her major complaint is right-sided dorsal wrist pain. Has been going on for a couple months states she had a fall in March but does not think it related. She is having some numbness, tingling, paresthesias to the right upper extremity medial 3 digits. She states that the lump on the back of her wrist this is causing her pain, pain worse with resisted extension of the wrist.  Some tenderness about the medial elbow. Patient also having some discomfort on the left side overlying previous scar for de Quervain's as well as distal to previous [facility] arthroplasty. States this is manageable. Patient with complaint of some numbness and tingling into the digits of the left hand as well, also manageable. Patient right-hand-dominant. Patient currently not working.   No other orthopedic complaints at this time    Past Medical History:        Diagnosis Date    Anxiety     Arthralgia 10/8/2013    Arthritis     Bone avascular necrosis ([address] 3/23/2015    Breast lesion 12/1/2015    Chronic back pain     Chronic fatigue 10/8/2013    Daytime somnolence 10/8/2013    Depression     Diabetes mellitus ([address]     Fibromyalgia     Fracture, fibula     right    GERD (gastroesophageal reflux disease)     H/O cardiovascular stress test 01/29/2020    Lexiscan    Headache     History of blood transfusion     History of fractured kneecap     (R) 2 hair line fractures    History of total knee arthroplasty 3/19/2015    Hyperlipidemia     Morbid obesity with BMI of 45.0-49.9, adult ([address] 10/9/2015    Obesity     Osteoarthritis  Overactive bladder 1/22/2014    Reflux      Past Surgical History:        Procedure Laterality Date    CARPAL TUNNEL RELEASE Bilateral     CHOLECYSTECTOMY      COLONOSCOPY  2011    COLONOSCOPY  11/22/2016    Dr. Rm Dykes N/A 12/8/2020    [place] performed by Kwadwo Perez MD at [address]    benign reasons    [address] Bilateral 2010    LUMBAR FUSION      L3-5    ROTATOR CUFF REPAIR Left     left    TOTAL KNEE ARTHROPLASTY Bilateral 2010    did both at the same time   [id number] [facility] N/A 9/29/2020    EGD BIOPSY performed by Kwadwo Perez MD at [facility] ENDOSCOPY     Current Medications:   No current facility-administered medications for this visit. Allergies:  Carafate [sucralfate], Glucophage [metformin hcl], Mobic [meloxicam], Trazodone and nefazodone, Ultram [tramadol], and Nickel    Social History:   TOBACCO:   reports that she has been smoking cigarettes. She has a 30.00 pack-year smoking history. She has never used smokeless tobacco.  ETOH:   reports no history of alcohol use. DRUGS:   reports no history of drug use.   ACTIVITIES OF DAILY LIVING:    OCCUPATION:    Family History:       Problem Relation Age of Onset    High Blood Pressure Mother     Diabetes Mother     Heart Disease Mother     Cancer Mother     Diabetes Father     Heart Disease Father     High Blood Pressure Father     Cancer Other 50        breast    Depression Brother        REVIEW OF SYSTEMS:  CONSTITUTIONAL:  negative  EYES:  negative  HEENT:  negative  RESPIRATORY:  negative  CARDIOVASCULAR:  negative  GASTROINTESTINAL:  negative  HEMATOLOGIC/LYMPHATIC:  negative  ALLERGIC/IMMUNOLOGIC:  negative  ENDOCRINE:  negative  MUSCULOSKELETAL:  positive for  pain  NEUROLOGICAL:  positive for numbness and tingling    PHYSICAL EXAM:    VITALS:  Resp 20   Ht 5' 8\" (1.727 m)   Wt (!) 336 lb (152.4 kg)   LMP  (LMP Unknown)   BMI 51.09 kg/m²   CONSTITUTIONAL:  awake, alert, cooperative, no apparent distress, and appears stated age  EYES:  Lids and lashes normal, pupils equal, round and reactive to light, extra ocular muscles intact, sclera clear, conjunctiva normal  ENT:  Normocephalic, without obvious abnormality, atraumatic, sinuses nontender on palpation, external ears without lesions, oral pharynx with moist mucus membranes, tonsils without erythema or exudates, gums normal and good dentition. NECK:  Supple, symmetrical, trachea midline, no adenopathy, thyroid symmetric, not enlarged and no tenderness, skin normal  LUNGS:  CTA  CARDIOVASCULAR:  2+ radial pulses, extremities warm and well perfused  ABDOMEN:  NTTP  CHEST:  Atraumatic   GENITAL/URINARY:  deferred  NEUROLOGIC:  Awake, alert, oriented to name, place and time. Cranial nerves II-XII are grossly intact. Motor is 5 out of 5 bilaterally. Sensory is intact.  gait is normal.  MUSCULOSKELETAL:    Right upper extremity  Non-tender about the shoulder and elbow with good ROM. - tinels of the cubital tunnel, - tinels of the carpal tunnel, - durkans, - finkelsteins, - CMC grind, - tenderness over the A1 pulleys with no active triggering. Full flexion and extension of the fingers. - wartenbergs and cross finger testing, APB strength 5/5 with no atrophy. Median, ulnar, radial n intact to light touch. Brisk capillary refill. Gross motor 5/5. Pain with palpation overlying the third and fourth extensor compartment, likely carpometacarpal boss    Left upper extremity  Non-tender about the shoulder and elbow with good ROM, previous surgical incisions well-healed. + tinels of the cubital tunnel, - tinels of the carpal tunnel, + durkans, - finkelsteins, - CMC grind, - tenderness over the A1 pulleys with no active triggering. Full flexion and extension of the fingers.   45 degree hyperextension of the MCP joint with pain- wartenbergs and cross finger testing, APB strength 5/5 with no atrophy. Median, ulnar, radial n intact to light touch. Brisk capillary refill. Gross motor 5/5. DATA:    CBC:   Lab Results   Component Value Date    WBC 4.8 12/07/2020    RBC 4.43 12/07/2020    HGB 12.8 12/07/2020    HCT 40.2 12/07/2020    MCV 90.7 12/07/2020    MCH 28.9 12/07/2020    MCHC 31.8 12/07/2020    RDW 13.7 12/07/2020     12/07/2020    MPV 12.1 12/07/2020     PT/INR:    Lab Results   Component Value Date    PROTIME 10.2 07/26/2016    PROTIME 10.2 02/16/2011    INR 0.9 07/26/2016       Radiology Review: X-rays bilateral hand 3 views AP, oblique, lateral demonstrate no acute fracture dislocation, carpal bones in appropriate alignment, previous trapeziectomy  X-ray impression: Status post [facility] arthroplasty    IMPRESSION:  · Left first MCP hyperextension status post [facility] arthroplasty  · Right carpometacarpal boss with extensor tenosynovitis  · Status post left carpal tunnel release, left CMC arthroplasty, left de Quervain's release, left volar oblique ligament reconstruction  · Arthritis  · Chronic back pain  · Chronic fatigue    PLAN:  Treatment diagnosis at the patient. At this time major complaint is the right dorsal wrist pain. Patient states that the numbness and tingling is manageable does not bother that much. Patient states that the left thumb pain is manageable and does not bother her much. She would like to proceed with corticosteroid injection to the dorsal wrist right side. Did offer injection to the MCP joint of the left thumb and she denied at this time. Return in 6 weeks to see how the injection works. Procedure Note dorsal wrist injection    The right dorsal wrist area of maximal tenderness was identified as the injection site. The risk and benefits of a cortisone injection were explained and the patient consented to the injection.  Under sterile conditions, the digit was injected with a mixture of 1 mL of 1% Lidocaine and 1 mL of 6 mg/mL Betamethasone without complication. A sterile bandage was applied. I have seen and evaluated the patient and agree with the above assessment and plan on today's visit. I have performed the key components of the history and physical examination with significant findings of symptomatic right index carpometacarpal boss. Treatment option explained discussed. Patient is consented to a cortisone injection which was tolerated. In addition patient was having pain of the MCP joint of the thumb status post LR TI. She does have a hyperextension deformity of 45 degrees. Discussed this finding with her. All questions were answered. . I concur with the findings and plan as documented.     Austin Zuñiga MD  6/10/2021

## 2021-06-11 ENCOUNTER — IMMUNIZATION (OUTPATIENT)
Dept: PRIMARY CARE CLINIC | Age: 66
End: 2021-06-11
Payer: MEDICARE

## 2021-06-11 PROCEDURE — 91301 COVID-19, MODERNA VACCINE 100MCG/0.5ML DOSE: CPT | Performed by: PHYSICIAN ASSISTANT

## 2021-06-11 PROCEDURE — 0011A COVID-19, MODERNA VACCINE 100MCG/0.5ML DOSE: CPT | Performed by: PHYSICIAN ASSISTANT

## 2021-06-28 RX ORDER — METHOCARBAMOL 500 MG/1
500 TABLET, FILM COATED ORAL 3 TIMES DAILY
Qty: 90 TABLET | Refills: 2 | Status: SHIPPED
Start: 2021-06-28 | End: 2021-07-15

## 2021-07-08 ENCOUNTER — IMMUNIZATION (OUTPATIENT)
Dept: PRIMARY CARE CLINIC | Age: 66
End: 2021-07-08
Payer: MEDICARE

## 2021-07-08 PROCEDURE — 0012A COVID-19, MODERNA VACCINE 100MCG/0.5ML DOSE: CPT | Performed by: NURSE PRACTITIONER

## 2021-07-08 PROCEDURE — 91301 COVID-19, MODERNA VACCINE 100MCG/0.5ML DOSE: CPT | Performed by: NURSE PRACTITIONER

## 2021-07-15 ENCOUNTER — OFFICE VISIT (OUTPATIENT)
Dept: PAIN MANAGEMENT | Age: 66
End: 2021-07-15
Payer: MEDICARE

## 2021-07-15 VITALS
DIASTOLIC BLOOD PRESSURE: 72 MMHG | OXYGEN SATURATION: 98 % | TEMPERATURE: 97.1 F | HEART RATE: 101 BPM | BODY MASS INDEX: 44.41 KG/M2 | SYSTOLIC BLOOD PRESSURE: 118 MMHG | WEIGHT: 293 LBS | HEIGHT: 68 IN | RESPIRATION RATE: 16 BRPM

## 2021-07-15 DIAGNOSIS — M47.816 LUMBAR SPONDYLOSIS: ICD-10-CM

## 2021-07-15 DIAGNOSIS — M79.18 MYOFASCIAL PAIN SYNDROME: ICD-10-CM

## 2021-07-15 DIAGNOSIS — M79.7 FIBROMYALGIA: ICD-10-CM

## 2021-07-15 DIAGNOSIS — G89.29 CHRONIC LOW BACK PAIN WITH SCIATICA, SCIATICA LATERALITY UNSPECIFIED, UNSPECIFIED BACK PAIN LATERALITY: ICD-10-CM

## 2021-07-15 DIAGNOSIS — M51.9 LUMBAR DISC DISORDER: ICD-10-CM

## 2021-07-15 DIAGNOSIS — M48.061 SPINAL STENOSIS OF LUMBAR REGION WITHOUT NEUROGENIC CLAUDICATION: ICD-10-CM

## 2021-07-15 DIAGNOSIS — M54.40 CHRONIC LOW BACK PAIN WITH SCIATICA, SCIATICA LATERALITY UNSPECIFIED, UNSPECIFIED BACK PAIN LATERALITY: ICD-10-CM

## 2021-07-15 DIAGNOSIS — G89.4 CHRONIC PAIN SYNDROME: Primary | ICD-10-CM

## 2021-07-15 PROCEDURE — G8399 PT W/DXA RESULTS DOCUMENT: HCPCS | Performed by: NURSE PRACTITIONER

## 2021-07-15 PROCEDURE — 4004F PT TOBACCO SCREEN RCVD TLK: CPT | Performed by: NURSE PRACTITIONER

## 2021-07-15 PROCEDURE — 4040F PNEUMOC VAC/ADMIN/RCVD: CPT | Performed by: NURSE PRACTITIONER

## 2021-07-15 PROCEDURE — 99213 OFFICE O/P EST LOW 20 MIN: CPT | Performed by: NURSE PRACTITIONER

## 2021-07-15 PROCEDURE — 3017F COLORECTAL CA SCREEN DOC REV: CPT | Performed by: NURSE PRACTITIONER

## 2021-07-15 PROCEDURE — G8417 CALC BMI ABV UP PARAM F/U: HCPCS | Performed by: NURSE PRACTITIONER

## 2021-07-15 PROCEDURE — G8427 DOCREV CUR MEDS BY ELIG CLIN: HCPCS | Performed by: NURSE PRACTITIONER

## 2021-07-15 PROCEDURE — 1090F PRES/ABSN URINE INCON ASSESS: CPT | Performed by: NURSE PRACTITIONER

## 2021-07-15 PROCEDURE — 1123F ACP DISCUSS/DSCN MKR DOCD: CPT | Performed by: NURSE PRACTITIONER

## 2021-07-15 RX ORDER — HYDROCODONE BITARTRATE AND ACETAMINOPHEN 5; 325 MG/1; MG/1
1 TABLET ORAL EVERY 12 HOURS PRN
Qty: 45 TABLET | Refills: 0 | Status: SHIPPED
Start: 2021-07-15 | End: 2021-08-17 | Stop reason: SDUPTHER

## 2021-07-15 RX ORDER — METHOCARBAMOL 750 MG/1
750 TABLET, FILM COATED ORAL 3 TIMES DAILY
Qty: 90 TABLET | Refills: 2 | Status: SHIPPED
Start: 2021-07-15 | End: 2021-08-17 | Stop reason: SDUPTHER

## 2021-07-15 NOTE — PROGRESS NOTES
[address] [place] [address], [address] [place] [id number]  [phone]    Follow up visit       Gil Alcantara     Date of Visit:  7/15/2021    CC:  Patient presents for follow up low back pain and left knee pain      HPI: Pt followed today for low back pain and severe headache today. Pt notes low back pain has been worsening over last month since fall. Pt notes robaxin helps but not like it use to. Pt has hx of bilateral knee replacement done in 2010. Previously saw Dr Oniel Canela and advised that she must lost 35-40lbs first before considering low back surgery. Appropriate analgesia with current medications regimen:somewhat  Change in quality of symptoms: worsening low back   Medication side effects:none. Recent diagnostic testing: none  Recent interventional procedures:none     Imaging:    CT of left knee 04/2021:    Impression   1. Small suprapatellar joint effusion. 2. Prior left knee arthroplasty and patellar resurfacing.  No significant   periprosthetic lucency or acute osseous abnormality. 3. Calcifications which are well corticated in the distal quadriceps tendon   measuring up to 1.5 x 0.8 cm.   4. Hardware associated streak artifact limits the exam to some degree. MRI of Lumbar Spine 09/2020:  1. Postsurgical changes status post L3-L5 posterior spinal fusion. 2. Severe multifactorial spinal canal stenosis at L2-L3 with mass effect on    the cauda equina nerve roots. 3. Moderate multifactorial L1-L2 spinal canal stenosis with moderate right    neural foraminal stenosis. 4. Moderate left L5-S1 neural foraminal stenosis.             LUMBAR SPINE XRAY 03/4/2020  Intact lumbar vertebral height and alignment. Relative severe    multilevel disc and facet joint narrowing with subchondral sclerosis    and spurring. Previous discectomy with posterior pedicle screw plates    extending from L3 through L5.  Symmetric osteoarthritis at the    sacroiliac joints manifesting as joint space narrowing and subchondral    sclerosis.         Lumbar spine CT 2016      1.  Status post lumbar spine fusion from L3-L5 appearing in stable,   satisfactory alignment.       2. Lumbar spondylosis as described above in part due to congenital   shortened pedicles and notable stenosis          Potential Aberrant Drug-Related Behavior:  None    Previous medication: baclofen helps some, tizanidine sleepiness, flexeril no relief,      Urine Drug Screenin2020 Negative for all which is consistent, pt does NOT take every day and does NOT fill exactly 30 days, can go longer than a month for refill   10/2020: consistent for hydrocodone     OARRS report:  2020-2021:Consistent    Pain agreement: 10/14/2020      Past Medical History:   Diagnosis Date    Anxiety     Arthralgia 10/8/2013    Arthritis     Bone avascular necrosis ([address] 3/23/2015    Breast lesion 2015    Chronic back pain     Chronic fatigue 10/8/2013    Daytime somnolence 10/8/2013    Depression     Diabetes mellitus ([address]     Fibromyalgia     Fracture, fibula     right    GERD (gastroesophageal reflux disease)     H/O cardiovascular stress test 2020    Lexiscan    Headache     History of blood transfusion     History of fractured kneecap     (R) 2 hair line fractures    History of total knee arthroplasty 3/19/2015    Hyperlipidemia     Morbid obesity with BMI of 45.0-49.9, adult ([address] 10/9/2015    Obesity     Osteoarthritis     Overactive bladder 2014    Reflux        Past Surgical History:   Procedure Laterality Date    CARPAL TUNNEL RELEASE Bilateral     CHOLECYSTECTOMY      COLONOSCOPY      COLONOSCOPY  2016    Dr. Kusum Guajardo N/A 2020    LAPAROSCOPIC HERNIA HIATAL REPAIR WITH MESH performed by Jewell Gaspar MD at [address]    benign reasons     JOINT REPLACEMENT Bilateral 2010    LUMBAR FUSION      L3-5    ROTATOR CUFF REPAIR Left     left    TOTAL KNEE ARTHROPLASTY Bilateral 2010    did both at the same time   [address] 9/29/2020    EGD BIOPSY performed by Vasu San MD at [facility]       Prior to Admission medications    Medication Sig Start Date End Date Taking? Authorizing Provider   methocarbamol (ROBAXIN) 500 MG tablet Take 1 tablet by mouth 3 times daily 6/28/21 7/28/21 Yes GIUSEPPE Muniz - CNP   diphenoxylate-atropine (LOMOTIL) 2.5-0.025 MG per tablet Take 1 tablet by mouth 4 times daily as needed for Diarrhea for up to 40 days. 6/10/21 7/20/21 Yes Brooke Horn MD   ondansetron (ZOFRAN-ODT) 4 MG disintegrating tablet Take 1 tablet by mouth 3 times daily as needed for Nausea or Vomiting 6/1/21  Yes Brooke Horn MD   UNIFINE PENTIPS 32G X 4 MM MISC USE AS DIRECTED WITH VICTOZA 5/10/21  Yes Brooke Horn MD   albuterol sulfate HFA (VENTOLIN HFA) 108 (90 Base) MCG/ACT inhaler Inhale 2 puffs into the lungs every 6 hours as needed for Wheezing or Shortness of Breath 5/3/21  Yes Brooke Horn MD   fluticasone-salmeterol ([facility]) 11-03 MCG/ACT inhaler Inhale 2 puffs into the lungs 2 times daily 4/28/21  Yes Brooke Horn MD   tolterodine (DETROL LA) 2 MG extended release capsule TAKE 1 TABLET BY MOUTH EVERY DAY 3/5/21  Yes Yayo Beck PA-C   doxepin (SINEQUAN) 10 MG capsule Take 1 capsule by mouth nightly 2/26/21  Yes Yayo Beck PA-C   ammonium lactate (LAC-HYDRIN) 12 % lotion Apply topically daily.  2/24/21  Yes Lisa Caldwell DPM   cetirizine (ZYRTEC) 10 MG tablet Take 1 tablet by mouth 2 times daily 2/12/21  Yes Yayo Beck PA-C   busPIRone (BUSPAR) 15 MG tablet TAKE 1 TABLET BY MOUTH THREE TIMES A DAY 2/10/21  Yes Yayo Beck PA-C   topiramate (TOPAMAX) 100 MG tablet TAKE ONE TABLET BY MOUTH TWICE A DAY 2/10/21  Yes Yayo Beck PA-C   pantoprazole (PROTONIX) 40 MG tablet Take 1 tablet by mouth 2 times daily 2/10/21  Yes Tamanna Zavala PA-C   DULoxetine (CYMBALTA) 30 MG extended release capsule Take 1 capsule by mouth daily 1/11/21  Yes Tamanna Zavala PA-C   fluticasone [facility]) 50 MCG/ACT nasal spray 1 spray by Nasal route daily  Patient taking differently: 1 spray by Nasal route as needed  4/27/20  Yes Tamanna Zavala PA-C   pregabalin (LYRICA) 150 MG capsule Take 1 capsule by mouth 3 times daily for 90 days. TID 3/8/21 6/6/21  Shauna Baeza APRN - CNP   zoster recombinant adjuvanted vaccine [facility]) 50 MCG/0.5ML SUSR injection Inject 0.5 mLs into the muscle See Admin Instructions 1 dose now and repeat in 2-6 months  Patient not taking: Reported on 7/15/2021 2/24/21 8/23/21  Tamanna Zavala PA-C       Allergies   Allergen Reactions    Carafate [Sucralfate]      Patient unable to tolerate. Unable to recall her reaction.     Glucophage [Metformin Hcl]      Severe abdominal pain, constipation      Mobic [Meloxicam] Other (See Comments)     Abdominal pain    Trazodone And Nefazodone      Unable to tolerate, patient reports made her physically ill with abdominal pain      Ultram [Tramadol]      Abdominal pain    Nickel Itching and Rash       Social History     Socioeconomic History    Marital status:      Spouse name: Not on file    Number of children: Not on file    Years of education: Not on file    Highest education level: Associate degree: academic program   Occupational History    Not on file   Tobacco Use    Smoking status: Heavy Tobacco Smoker     Packs/day: 1.00     Years: 30.00     Pack years: 30.00     Types: Cigarettes    Smokeless tobacco: Never Used   Vaping Use    Vaping Use: Never used   Substance and Sexual Activity    Alcohol use: No     Alcohol/week: 0.0 standard drinks    Drug use: No    Sexual activity: Not Currently   Other Topics Concern    Not on file   Social History Narrative    Not on file     Social Determinants of Health     Financial Resource Strain:     Difficulty of Paying Living Expenses:    Food Insecurity:     Worried About Running Out of Food in the Last Year:     [address] in the Last Year:    Transportation Needs:     Lack of Transportation (Medical):  Lack of Transportation (Non-Medical):    Physical Activity:     Days of Exercise per Week:     Minutes of Exercise per Session:    Stress:     Feeling of Stress :    Social Connections:     Frequency of Communication with Friends and Family:     Frequency of Social Gatherings with Friends and Family:     Attends [employer] Services:     Active Member of Clubs or Organizations:     Attends Club or Organization Meetings:     Marital Status:    Intimate Partner Violence:     Fear of Current or Ex-Partner:     Emotionally Abused:     Physically Abused:     Sexually Abused:        Family History   Problem Relation Age of Onset    High Blood Pressure Mother     Diabetes Mother     Heart Disease Mother     Cancer Mother     Diabetes Father     Heart Disease Father     High Blood Pressure Father     Cancer Other 50        breast    Depression Brother        REVIEW OF SYSTEMS:     Debbi Bowsernguyen denies fever/chills, chest pain, shortness of breath, new bowel or bladder complaints or suicidal ideations. All other review of systems wasnegative. PHYSICAL EXAMINATION:      /72   Pulse 101   Temp 97.1 °F (36.2 °C) (Infrared)   Resp 16   Ht 5' 8\" (1.727 m)   Wt (!) 336 lb (152.4 kg)   LMP  (LMP Unknown)   SpO2 98%   BMI 51.09 kg/m²     General:       General appearance:   elderly, pleasant and well-hydrated.    ,in moderate to severe discomfort and A & O x3  Build:obese     HEENT:     Head:normocephalic and atraumatic  Pupils:regular, round and equal.  Sclera: icterus absent,      Lungs:     Breathing:Breathing Pattern: regular, no distress     Abdomen:     Shape:non-distended and normal  Tenderness:none     Cervical spine:     Inspection:normal  Palpation:tenderness paravertebral muscles, trigger points paravertebral muscles, facet loading, left, right, negative, tenderness and non-tender paraspinals. Range of motion:normal not flexion, extension rotation bilateral and is not painful.     Lumbar spine:     Spine inspection:surgical incision scar   CVA tenderness:No   Palpation:tenderness paravertebral muscles, facet loading, left and right, positive and tenderness. Range of motion:abnormal moderately Lateral bending, flexion, extension rotation bilateral and is moderately painful.     Musculoskeletal:     Trigger points in Paraveteral:present bilaterally  Burton's:negative right, negative left   SI joint tenderness:positive right, positive left              SULTANA test:negative right, negative left  Piriformis tenderness:negative right, negative left  Trochanteric bursa tenderness:positve right, positive left  SLR:negative right, negative left, sitting      Extremities:     Tremors:None bilaterally upper and lower  Range of motion:Generally normal shoulders, pain with internal rotation of hips negative.   Intact:Yes  Edema: +1 pitting edema bilateral LE     Knee:     Inspection:bilateral knee replaced     Neurological:     Sensory:normal to light touch bilateral upper and lower extremities  Motor:              Right Bicep5/5           Left Bicep5/5              Right Triceps5/5       Left Triceps5/5          Right Deltoid5/5     Left Deltoid5/5                  Right Quadriceps 5/5          Left Quadriceps4/5           Right Gastrocnemius5/5    Left Gastrocnemius5/5  Right Ant Tibialis5/5  Left Ant Tibialis5/5     Gait:antalgic     Dermatology:     Skin:no unusual rashes and no skin lesions  Diffuse bruising to left arm due to fall         Impression:     Chronic low back with h/o L3-5 fusion/bilateral knees replaced since 2010  Refuses to have anymore injections, [place] and reports so painful during and after, refuses to have again    Seen Dr Deandra Abernathy and is a surgical candidate but  advised weight loss of >30lbs or consult bariatric  Surgery    Plan:  Chronic low back, diffuse body pain and acute left knee pain   Not interested in anymore interventional procedures  No Refill Lyrica 150 mg TID x 90 day, refill due in Sept 2021  Refill Norco 5/325mg po daily-bid prn #45  Refill and increase Robaxin 750mg po tid prn spasms  Continue with Cymbalta 40 mg QD from PCP   Currently on Zanaflex 4mg QHS gets from PCP  OARRS report reviewed  Patient encouraged to stay active and to lose weight  Against referral to physical therapy  Treatment plan discussed with the patient including medications side effects                 We discussed with the patient that combining opioids, benzodiazepines, alcohol, illicit drugs or sleep aids increases the risk of respiratory depression including death. We discussed that these medications may cause drowsiness, sedation or dizziness and have counseled the patient not to drive or operate machinery. We have discussed that these medications will be prescribed only by one provider. We have discussed with the patient about age related risk factors and have thoroughly discussed the importance of taking these medications as prescribed. The patient verbalizes understanding.         Cc: Referring physician      Electronically signed by GIUSEPPE Wall CNP on 07/15/20 at 2:22 PM EDT

## 2021-07-22 ENCOUNTER — OFFICE VISIT (OUTPATIENT)
Dept: ORTHOPEDIC SURGERY | Age: 66
End: 2021-07-22
Payer: MEDICARE

## 2021-07-22 VITALS — RESPIRATION RATE: 24 BRPM | WEIGHT: 293 LBS | HEIGHT: 68 IN | BODY MASS INDEX: 44.41 KG/M2

## 2021-07-22 DIAGNOSIS — M18.11 ARTHRITIS OF CARPOMETACARPAL (CMC) JOINT OF RIGHT THUMB: Primary | ICD-10-CM

## 2021-07-22 DIAGNOSIS — M19.042 DEGENERATIVE ARTHRITIS OF METACARPOPHALANGEAL JOINT OF LEFT THUMB: ICD-10-CM

## 2021-07-22 DIAGNOSIS — M65.4 DE QUERVAIN'S TENOSYNOVITIS, RIGHT: ICD-10-CM

## 2021-07-22 PROCEDURE — G8427 DOCREV CUR MEDS BY ELIG CLIN: HCPCS | Performed by: ORTHOPAEDIC SURGERY

## 2021-07-22 PROCEDURE — G8417 CALC BMI ABV UP PARAM F/U: HCPCS | Performed by: ORTHOPAEDIC SURGERY

## 2021-07-22 PROCEDURE — 20600 DRAIN/INJ JOINT/BURSA W/O US: CPT | Performed by: ORTHOPAEDIC SURGERY

## 2021-07-22 PROCEDURE — G8399 PT W/DXA RESULTS DOCUMENT: HCPCS | Performed by: ORTHOPAEDIC SURGERY

## 2021-07-22 PROCEDURE — 1123F ACP DISCUSS/DSCN MKR DOCD: CPT | Performed by: ORTHOPAEDIC SURGERY

## 2021-07-22 PROCEDURE — 4004F PT TOBACCO SCREEN RCVD TLK: CPT | Performed by: ORTHOPAEDIC SURGERY

## 2021-07-22 PROCEDURE — 20550 NJX 1 TENDON SHEATH/LIGAMENT: CPT | Performed by: ORTHOPAEDIC SURGERY

## 2021-07-22 PROCEDURE — 4040F PNEUMOC VAC/ADMIN/RCVD: CPT | Performed by: ORTHOPAEDIC SURGERY

## 2021-07-22 PROCEDURE — 3017F COLORECTAL CA SCREEN DOC REV: CPT | Performed by: ORTHOPAEDIC SURGERY

## 2021-07-22 PROCEDURE — 1090F PRES/ABSN URINE INCON ASSESS: CPT | Performed by: ORTHOPAEDIC SURGERY

## 2021-07-22 RX ORDER — BETAMETHASONE SODIUM PHOSPHATE AND BETAMETHASONE ACETATE 3; 3 MG/ML; MG/ML
15 INJECTION, SUSPENSION INTRA-ARTICULAR; INTRALESIONAL; INTRAMUSCULAR; SOFT TISSUE ONCE
Status: COMPLETED | OUTPATIENT
Start: 2021-07-22 | End: 2021-07-22

## 2021-07-22 RX ADMIN — BETAMETHASONE SODIUM PHOSPHATE AND BETAMETHASONE ACETATE 15 MG: 3; 3 INJECTION, SUSPENSION INTRA-ARTICULAR; INTRALESIONAL; INTRAMUSCULAR; SOFT TISSUE at 14:00

## 2021-07-22 NOTE — PATIENT INSTRUCTIONS
Patient Education        Learning About Arthritis at the [facility] of the Thumb  What is it? Arthritis at the base of the thumb joint is wear and tear on the cartilage. Cartilage is a firm, thick, slippery tissue. It covers and protects the ends of bones where they meet to form a joint. When you have arthritis, there are changes in the cartilage that cause it to break down. The bones rub together and cause joint damage and pain. What causes it? Experts don't know what causes arthritis at the base of the thumb. But aging, a lot of use, an injury, or family history may play a part. What are the symptoms? Symptoms of arthritis at the base of the thumb include aching in your joint. Or the pain may feel burning or sharp. You may feel clicking, creaking, or catching in the joint. It may get stiff. You may have more pain and less strength when you pinch or  things. Symptoms may come and go, stay the same, or get worse over time. How is it diagnosed? Your doctor can often diagnose arthritis by asking you questions about your joint pain and other symptoms and examining you. You may also have X-rays and blood tests. Blood tests can help make sure another disease isn't causing your symptoms. How is it treated? Arthritis at the base of your thumb may be treated with rest, pain relievers, steroid medicines, using a brace or splint, andin some casessurgery. To help relieve pain in the joint, rest your sore hand. Switch hands for some activities. You can try heat and cold therapy, such as hot compresses, paraffin wax, cold packs, or ice massage. Your doctor may give you a splint to wear during some activities or when pain flares up. You can often manage mild or moderate arthritis pain with over-the-counter pain relievers. These include medicines that reduce swelling, such as ibuprofen or naproxen. You can also use acetaminophen. Sometimes these medicines are in creams that you can rub on your thumb and hand.  Your doctor may also prescribe other medicine for your pain. For some people, steroid shots may be an option. If none of the treatments work, your doctor may discuss surgery with you. Follow-up care is a key part of your treatment and safety. Be sure to make and go to all appointments, and call your doctor if you are having problems. It's also a good idea to know your test results and keep a list of the medicines you take. Where can you learn more? Go to https://Isentio.[address]. org and sign in to your [employer] account. Enter T110 in the ebindle box to learn more about \"Learning About Arthritis at the [facility] of the Thumb. \"     If you do not have an account, please click on the \"Sign Up Now\" link. Current as of: August 5, 2020               Content Version: 12.9  © 2006-2021 [employer]. Care instructions adapted under license by [facility] ([facility]). If you have questions about a medical condition or this instruction, always ask your healthcare professional. [place] [address] any warranty or liability for your use of this information. Patient Education        Raquel First Tenosynovitis: Care Instructions  Your Care Instructions  Raquel First (say \"Marck\") tenosynovitis is a problem that makes the bottom of your thumb and the side of your wrist hurt. When you have de Quervain's, the ropey fiber (tendon) that helps move your thumb away from your fingers becomes swollen. You may have pain when you move your wrist or pick things up. You may hear a creaking sound when you move your wrist or thumb. Symptoms often get better in a few weeks with home care. Your doctor may want you to start some gentle stretching exercises once your symptoms are gone. Sometimes treatment with an injection or surgery is needed. Follow-up care is a key part of your treatment and safety.  Be sure to make and go to all appointments, and call your doctor if you are having problems. It's also a good idea to know your test results and keep a list of the medicines you take. How can you care for yourself at home? · Until your symptoms are better, stop the activities that caused the pain. · Avoid moving the hand and wrist that hurt. · Follow your doctor's directions for wearing a splint to keep your thumb and wrist from moving. · Try ice or heat. ? Put ice or a cold pack on your thumb and wrist for 10 to 20 minutes at a time. Put a thin cloth between the ice and your skin. ? You can use heat for 20 to 30 minutes, 2 or 3 times a day. Try using a heating pad, hot shower, or hot pack. · Ask your doctor if you can take an over-the-counter pain medicine, such as acetaminophen (Tylenol), ibuprofen (Advil, Motrin), or naproxen (Aleve). Be safe with medicines. Read and follow all instructions on the label. When should you call for help? Watch closely for changes in your health, and be sure to contact your doctor if:    · You have new or worse pain.     · You have new or worse numbness or tingling in your hand or fingers.     · Your hand feels weaker.     · You do not get better as expected. Where can you learn more? Go to https://Context Matters.Healthcare Interactive. org and sign in to your [employer] account. Enter I956 in the [facility] box to learn more about \"De Quervain's Tenosynovitis: Care Instructions. \"     If you do not have an account, please click on the \"Sign Up Now\" link. Current as of: November 16, 2020               Content Version: 12.9  © 0716-0279 Workday. Care instructions adapted under license by [facility] ([facility]). If you have questions about a medical condition or this instruction, always ask your healthcare professional. [address]  any warranty or liability for your use of this information. Patient Education        Janey  Disease: Exercises  Introduction  Here are some examples of exercises for you to try. The exercises may be suggested for a condition or for rehabilitation. Start each exercise slowly. Ease off the exercises if you start to have pain. You will be told when to start these exercises and which ones will work best for you. How to do the exercises  Thumb lifts   1. Place your hand on a flat surface, with your palm up. 2. Lift your thumb away from your palm to make a \"C\" shape. 3. Hold for about 6 seconds. 4. Repeat 8 to 12 times. Passive thumb MP flexion   1. Hold your hand in front of you, and turn your hand so your little finger faces down and your thumb faces up. (Your hand should be in the position used for shaking someone's hand.) You may also rest your hand on a flat surface. 2. Use the fingers on your other hand to bend your thumb down at the point where your thumb connects to your palm. 3. Hold for at least 15 to 30 seconds. 4. Repeat 2 to 4 times. Finkelstein stretch   1. Hold your arms out in front of you. (Your hand should be in the position used for shaking someone's hand.)  2. Bend your thumb toward your palm. 3. Use your other hand to gently stretch your thumb and wrist downward until you feel the stretch on the thumb side of your wrist.  4. Hold for at least 15 to 30 seconds. 5. Repeat 2 to 4 times. Resisted ulnar deviation   For this exercise, you will need elastic exercise material, such as surgical tubing or Thera-Band. 1. Sit leaning forward with your legs slightly spread and your elbow on your thigh. 2. Grasp one end of the band with your palm down, and step on the other end with the foot opposite the hand holding the band. 3. Slowly bend your wrist sideways and away from your knee. 4. Repeat 8 to 12 times. Follow-up care is a key part of your treatment and safety. Be sure to make and go to all appointments, and call your doctor if you are having problems. It's also a good idea to know your test results and keep a list of the medicines you take.   Where can you learn more? Go to https://chpepiceweb.healthprotected-networks.com. org and sign in to your [employer] account. Enter G047 in the [facility] box to learn more about \"De Quervain's Disease: Exercises. \"     If you do not have an account, please click on the \"Sign Up Now\" link. Current as of: November 16, 2020               Content Version: 12.9  © 5170-2685 Healthwise, [employer]. Care instructions adapted under license by [facility] [address] If you have questions about a medical condition or this instruction, always ask your healthcare professional. [place] [address] any warranty or liability for your use of this information.

## 2021-07-22 NOTE — PROGRESS NOTES
Department of Orthopedic Surgery  History and Physical      CHIEF COMPLAINT: Bilateral hand pain    HISTORY OF PRESENT ILLNESS:                The patient is a 77 y.o. female who presents with bilateral hand pain. Known to this practice with previously seen in 2015, she underwent left wrist CMC arthroplasty, de Quervain's release, carpal tunnel release, volar oblique ligament reconstruction. Today her major complaint is right-sided dorsal wrist pain. Has been going on for a couple months states she had a fall in March but does not think it related. She is having some numbness, tingling, paresthesias to the right upper extremity medial 3 digits. She states that the lump on the back of her wrist this is causing her pain, pain worse with resisted extension of the wrist.  Some tenderness about the medial elbow. Patient also having some discomfort on the left side overlying previous scar for de Quervain's as well as distal to previous Aia 16 arthroplasty. States this is manageable. Patient with complaint of some numbness and tingling into the digits of the left hand as well, also manageable. Patient right-hand-dominant. Patient currently not working. No other orthopedic complaints at this time. 6 weeks ago the patient had a cortisone injection to the dorsal aspect of her right wrist.  Patient states this did not help her pain much. She still reports pain to the radial aspect of her right wrist and her right thumb. She also continues to have pain to her left thumb.     Past Medical History:        Diagnosis Date    Anxiety     Arthralgia 10/8/2013    Arthritis     Bone avascular necrosis ([facility] [address] 3/23/2015    Breast lesion 12/1/2015    Chronic back pain     Chronic fatigue 10/8/2013    Daytime somnolence 10/8/2013    Depression     Diabetes mellitus (HCC)     Fibromyalgia     Fracture, fibula     right    GERD (gastroesophageal reflux disease)     H/O cardiovascular stress test 01/29/2020 Lexiscan    Headache     History of blood transfusion     History of fractured kneecap     (R) 2 hair line fractures    History of total knee arthroplasty 3/19/2015    Hyperlipidemia     Morbid obesity with BMI of 45.0-49.9, adult ([address] 10/9/2015    Obesity     Osteoarthritis     Overactive bladder 1/22/2014    Reflux      Past Surgical History:        Procedure Laterality Date    CARPAL TUNNEL RELEASE Bilateral     CHOLECYSTECTOMY      COLONOSCOPY  2011    COLONOSCOPY  11/22/2016    Dr. Joshua Prasad N/A 12/8/2020    Haim Beckwith performed by Jensen Thao MD at [address]    benign reasons    [address] Bilateral 2010    LUMBAR FUSION      L3-5    ROTATOR CUFF REPAIR Left     left    TOTAL KNEE ARTHROPLASTY Bilateral 2010    did both at the same time   [facility] N/A 9/29/2020    EGD BIOPSY performed by Jensen Thao MD at [facility] ENDOSCOPY     Current Medications:   No current facility-administered medications for this visit. Allergies:  Carafate [sucralfate], Glucophage [metformin hcl], Mobic [meloxicam], Trazodone and nefazodone, Ultram [tramadol], and Nickel    Social History:   TOBACCO:   reports that she has been smoking cigarettes. She has a 30.00 pack-year smoking history. She has never used smokeless tobacco.  ETOH:   reports no history of alcohol use. DRUGS:   reports no history of drug use.   ACTIVITIES OF DAILY LIVING:    OCCUPATION:    Family History:       Problem Relation Age of Onset    High Blood Pressure Mother     Diabetes Mother     Heart Disease Mother     Cancer Mother     Diabetes Father     Heart Disease Father     High Blood Pressure Father     Cancer Other 50        breast    Depression Brother        REVIEW OF SYSTEMS:  CONSTITUTIONAL:  negative  EYES:  negative  HEENT:  negative  RESPIRATORY:  negative  CARDIOVASCULAR: + tinels of the cubital tunnel, - tinels of the carpal tunnel, + durkans, - finkelsteins, - CMC grind, - tenderness over the A1 pulleys with no active triggering. Full flexion and extension of the fingers. 45 degree hyperextension of the MCP joint with pain. - wartenbergs and cross finger testing, APB strength 5/5 with no atrophy. Median, ulnar, radial n intact to light touch. Brisk capillary refill. Gross motor 5/5. DATA:    CBC:   Lab Results   Component Value Date    WBC 4.8 12/07/2020    RBC 4.43 12/07/2020    HGB 12.8 12/07/2020    HCT 40.2 12/07/2020    MCV 90.7 12/07/2020    MCH 28.9 12/07/2020    MCHC 31.8 12/07/2020    RDW 13.7 12/07/2020     12/07/2020    MPV 12.1 12/07/2020     PT/INR:    Lab Results   Component Value Date    PROTIME 10.2 07/26/2016    PROTIME 10.2 02/16/2011    INR 0.9 07/26/2016         IMPRESSION:  · Left first MCP hyperextension status post [facility] arthroplasty  · Right carpometacarpal boss with extensor tenosynovitis  · Right DeQuervains tenosynovitis  · Right thumb CMC joint arthritis   · Status post left carpal tunnel release, left CMC arthroplasty, left de Quervain's release, left volar oblique ligament reconstruction  · Arthritis  · Chronic back pain  · Chronic fatigue    PLAN:  Discussed findings with patient. Patient did not receive much relief with the injection to the dorsal aspect of the wrist.  Recommended a cortisone injection to the right thumb CMC joint and right de Quervain's. Offered the patient a cortisone injection to the left thumb MCP joint also. Patient consented to all 3 injections. These were tolerated well. Follow-up in 6 weeks. If she is doing well, she may cancel. Procedure Note Wrist Thumb CMC Cortisone Injection    The right wrist thumb CMC joint was identified as the injection site. The risk and benefits of a cortisone injection were explained and the patient consented to the injection.  Under sterile conditions, the wrist was injected with a mixture of 1 mL of 1% Lidocaine and 1 mL of 6 mg/mL Betamethasone without complication. A sterile bandage was applied    Procedure Note DeQuerveins Injection    The right first dorsal wrist compartment was identified as the injection site. The risk and benefits of a cortisone injection were explained and the patient consented to the injection. Under sterile conditions, the wrist was injected with a mixture of 1 mL of 1% Lidocaine and 1 mL of 6 mg/mL Betamethasone without complication. A sterile bandage was applied. Procedure Note Finger Joint Injection    The left Thumb MCP joint dorsal aspect was identified as the injection site. The risk and benefits of a cortisone injection were explained and the patient consented to the injection. Under sterile conditions, the joint was injected with a mixture of 0.5 mL of 1% Lidocaine and 0.5 mL of 6 mg/mL Betamethasone without complication. A sterile bandage was applied. I have seen and evaluated the patient and agree with the above assessment and plan on today's visit. I have performed the key components of the history and physical examination with significant findings of right thumb arthritis and de Quervain's tenosynovitis and left thumb MCP joint arthritis and instability. Treatment option explained and discussed. Discussed surgical options. After discussion she would like to continue with conservative care. Injections provided. I concur with the findings and plan as documented.     Jona Burch MD  7/22/2021

## 2021-07-27 ENCOUNTER — VIRTUAL VISIT (OUTPATIENT)
Dept: FAMILY MEDICINE CLINIC | Age: 66
End: 2021-07-27
Payer: MEDICARE

## 2021-07-27 DIAGNOSIS — R06.02 SHORTNESS OF BREATH ON EXERTION: ICD-10-CM

## 2021-07-27 DIAGNOSIS — E66.01 CLASS 3 SEVERE OBESITY DUE TO EXCESS CALORIES WITH SERIOUS COMORBIDITY AND BODY MASS INDEX (BMI) OF 50.0 TO 59.9 IN ADULT (HCC): ICD-10-CM

## 2021-07-27 DIAGNOSIS — R19.7 DIARRHEA, UNSPECIFIED TYPE: Primary | ICD-10-CM

## 2021-07-27 DIAGNOSIS — D68.9 COAGULATION DEFECT (HCC): ICD-10-CM

## 2021-07-27 DIAGNOSIS — E11.9 DIABETES MELLITUS WITHOUT COMPLICATION (HCC): ICD-10-CM

## 2021-07-27 PROCEDURE — G8399 PT W/DXA RESULTS DOCUMENT: HCPCS | Performed by: INTERNAL MEDICINE

## 2021-07-27 PROCEDURE — 4040F PNEUMOC VAC/ADMIN/RCVD: CPT | Performed by: INTERNAL MEDICINE

## 2021-07-27 PROCEDURE — 1090F PRES/ABSN URINE INCON ASSESS: CPT | Performed by: INTERNAL MEDICINE

## 2021-07-27 PROCEDURE — 99213 OFFICE O/P EST LOW 20 MIN: CPT | Performed by: INTERNAL MEDICINE

## 2021-07-27 PROCEDURE — 1123F ACP DISCUSS/DSCN MKR DOCD: CPT | Performed by: INTERNAL MEDICINE

## 2021-07-27 PROCEDURE — G8417 CALC BMI ABV UP PARAM F/U: HCPCS | Performed by: INTERNAL MEDICINE

## 2021-07-27 PROCEDURE — 3044F HG A1C LEVEL LT 7.0%: CPT | Performed by: INTERNAL MEDICINE

## 2021-07-27 PROCEDURE — 4004F PT TOBACCO SCREEN RCVD TLK: CPT | Performed by: INTERNAL MEDICINE

## 2021-07-27 PROCEDURE — 2022F DILAT RTA XM EVC RTNOPTHY: CPT | Performed by: INTERNAL MEDICINE

## 2021-07-27 PROCEDURE — 3017F COLORECTAL CA SCREEN DOC REV: CPT | Performed by: INTERNAL MEDICINE

## 2021-07-27 PROCEDURE — G8427 DOCREV CUR MEDS BY ELIG CLIN: HCPCS | Performed by: INTERNAL MEDICINE

## 2021-07-27 SDOH — ECONOMIC STABILITY: FOOD INSECURITY: WITHIN THE PAST 12 MONTHS, YOU WORRIED THAT YOUR FOOD WOULD RUN OUT BEFORE YOU GOT MONEY TO BUY MORE.: NEVER TRUE

## 2021-07-27 SDOH — ECONOMIC STABILITY: FOOD INSECURITY: WITHIN THE PAST 12 MONTHS, THE FOOD YOU BOUGHT JUST DIDN'T LAST AND YOU DIDN'T HAVE MONEY TO GET MORE.: NEVER TRUE

## 2021-07-27 ASSESSMENT — SOCIAL DETERMINANTS OF HEALTH (SDOH): HOW HARD IS IT FOR YOU TO PAY FOR THE VERY BASICS LIKE FOOD, HOUSING, MEDICAL CARE, AND HEATING?: HARD

## 2021-07-27 NOTE — PROGRESS NOTES
2021    TELEHEALTH EVALUATION -- Audio/Visual (During [place] [address] public health emergency)    HPI:    Duane Fick (:  1955) has requested an audio/video evaluation for the following concern(s):    Diarrhea    Patient complains of recurrence of frequent stools, abd discomfort. Denies vomiting, rectal bleeding, weight loss. Review of Systems  Denies fever, chills, chest pain     PHYSICAL EXAMINATION:  Vital Signs: (As obtained by patient/caregiver or practitioner observation)    Blood pressure-    Constitutional: [x] No apparent distress      [] Abnormal-   Mental status:  [] Alert and awake      Pulmonary/Chest: [x] No visualized signs of respiratory distress      ASSESSMENT/PLAN:   1. Diarrhea, unspecified type  Will assess for celiac disease and advised trial of dairy free diet for 1-2 weeks. - GLIADIN ANTIBODIES, SERUM; Future  - Amb External Referral To Gastroenterology    2. Diabetes mellitus without complication ([facility] [address]  Due for labs  - Basic Metabolic Panel; Future  - Lipid Panel; Future  - Hemoglobin A1C; Future    3. Class 3 severe obesity due to excess calories with serious comorbidity and body mass index (BMI) of 50.0 to 59.9 in adult [facility])  Counseled briefly    4. Shortness of breath on exertion  Stable- continue present management. - ADVAIR -93 MCG/ACT inhaler; Inhale 2 puffs into the lungs 2 times daily  Dispense: 1 Inhaler; Refill: 3    5. Coagulation defect (HCC)  platlets 11K, repeating test  - CBC Auto Differential; Future      Duane Fick is a 77 y.o. female being evaluated by a Virtual Visit (video visit) encounter to address concerns as mentioned above. A caregiver was present when appropriate. Due to this being a TeleHealth encounter (During [facility] public health emergency), evaluation of the following organ systems was limited: Vitals/Constitutional/EENT/Resp/CV/GI//MS/Neuro/Skin/Heme-Lymph-Imm.   Pursuant to the emergency declaration under the [address] and the [employer] Act, [facility] waiver authority and the Coronavirus Preparedness and [employer] Act, this Virtual Visit was conducted with patient's (and/or legal guardian's) consent, to reduce the patient's risk of exposure to COVID-19 and provide necessary medical care. The patient (and/or legal guardian) has also been advised to contact this office for worsening conditions or problems, and seek emergency medical treatment and/or call 911 if deemed necessary. Patient identification was verified at the start of the visit: YES    Total time spent on this encounter: Not billed by time    Services were provided through a video synchronous discussion virtually to substitute for in-person clinic visit. Patient and provider were located at their individual homes. --Alexx Le MD on 7/27/2021 at 2:58 PM    An electronic signature was used to authenticate this note.

## 2021-08-06 ENCOUNTER — PATIENT MESSAGE (OUTPATIENT)
Dept: PAIN MANAGEMENT | Age: 66
End: 2021-08-06

## 2021-08-06 NOTE — TELEPHONE ENCOUNTER
From: Omega Sanches  To: Roland SHAYY TraylorN - CNP  Sent: 8/6/2021 11:55 AM EDT  Subject: Visit Follow-Up Question    Regarding leg cramps! They are starting to come back. I have an appointment with you   Monday August 16th. When they do I take a 1/2 of the Methocarbamol. It does seem to help. Will talk more about it when I see you   about this at my appointment. If you want me to do anything different, let me know.      Thank you,   Ismael Longoria

## 2021-08-09 RX ORDER — FLUTICASONE PROPIONATE AND SALMETEROL XINAFOATE 115; 21 UG/1; UG/1
2 AEROSOL, METERED RESPIRATORY (INHALATION) 2 TIMES DAILY
Qty: 1 INHALER | Refills: 3
Start: 2021-08-09 | End: 2021-11-09 | Stop reason: SDUPTHER

## 2021-08-16 NOTE — PROGRESS NOTES
[address] [address]  [phone]    Follow up visit       Joel Rolon     Date of Visit:  8/17/2021    CC:  Patient presents for follow up low back pain and left knee pain      HPI: Pt followed today for low back pain with no acute changes. Low back pain slowly improving since fall. Chronic bilateral knee pain and has hx of bilateral knee replacement done in 2010  Appropriate analgesia with current medications regimen:somewhat  Change in quality of symptoms: worsening low back   Medication side effects:none. Recent diagnostic testing: none  Recent interventional procedures:none     Imaging:    CT of left knee 04/2021:    Impression   1. Small suprapatellar joint effusion. 2. Prior left knee arthroplasty and patellar resurfacing.  No significant   periprosthetic lucency or acute osseous abnormality. 3. Calcifications which are well corticated in the distal quadriceps tendon   measuring up to 1.5 x 0.8 cm.   4. Hardware associated streak artifact limits the exam to some degree. MRI of Lumbar Spine 09/2020:  1. Postsurgical changes status post L3-L5 posterior spinal fusion. 2. Severe multifactorial spinal canal stenosis at L2-L3 with mass effect on    the cauda equina nerve roots. 3. Moderate multifactorial L1-L2 spinal canal stenosis with moderate right    neural foraminal stenosis. 4. Moderate left L5-S1 neural foraminal stenosis.             LUMBAR SPINE XRAY 03/4/2020  Intact lumbar vertebral height and alignment. Relative severe    multilevel disc and facet joint narrowing with subchondral sclerosis    and spurring. Previous discectomy with posterior pedicle screw plates    extending from L3 through L5. Symmetric osteoarthritis at the    sacroiliac joints manifesting as joint space narrowing and subchondral    sclerosis.         Lumbar spine CT 07/2016      1.  Status post lumbar spine fusion from L3-L5 appearing in stable,   satisfactory alignment.       2. Lumbar spondylosis as described above in part due to congenital   shortened pedicles and notable stenosis          Potential Aberrant Drug-Related Behavior:  None    Previous medication: baclofen helps some, tizanidine sleepiness, flexeril no relief,      Urine Drug Screenin2020 Negative for all which is consistent, pt does NOT take every day and does NOT fill exactly 30 days, can go longer than a month for refill   10/2020: consistent for hydrocodone     OARRS report:  2020-2021:Consistent    Pain agreement: 10/14/2020      Past Medical History:   Diagnosis Date    Anxiety     Arthralgia 10/8/2013    Arthritis     Bone avascular necrosis ([facility] [address] 3/23/2015    Breast lesion 2015    Chronic back pain     Chronic fatigue 10/8/2013    Daytime somnolence 10/8/2013    Depression     Diabetes mellitus ([facility] [address]     Fibromyalgia     Fracture, fibula     right    GERD (gastroesophageal reflux disease)     H/O cardiovascular stress test 2020    Lexiscan    Headache     History of blood transfusion     History of fractured kneecap     (R) 2 hair line fractures    History of total knee arthroplasty 3/19/2015    Hyperlipidemia     Morbid obesity with BMI of 45.0-49.9, adult ([facility] [address] 10/9/2015    Obesity     Osteoarthritis     Overactive bladder 2014    Reflux        Past Surgical History:   Procedure Laterality Date    CARPAL TUNNEL RELEASE Bilateral     CHOLECYSTECTOMY      COLONOSCOPY      COLONOSCOPY  2016    Dr. Jose Husain N/A 2020    LAPAROSCOPIC Tg Fines performed by Ramakrishna Castillo MD at [address]    benign reasons     JOINT REPLACEMENT Bilateral 2010    LUMBAR FUSION      L3-5    ROTATOR CUFF REPAIR Left     left    TOTAL KNEE ARTHROPLASTY Bilateral 2010    did both at the same time    TUBAL LIGATION      UPPER GASTROINTESTINAL ENDOSCOPY N/A 9/29/2020    EGD BIOPSY performed by Jaleesa Scott MD at [facility]       Prior to Admission medications    Medication Sig Start Date End Date Taking? Authorizing Provider   [facility] 115-21 MCG/ACT inhaler Inhale 2 puffs into the lungs 2 times daily 8/9/21   Amrit Olsen MD   busPIRone (BUSPAR) 15 MG tablet TAKE 1 TABLET BY MOUTH THREE TIMES A DAY 7/30/21   Amrit Olsen MD   topiramate (TOPAMAX) 100 MG tablet Take 1 tablet by mouth 2 times daily 7/30/21   Amrit Olsen MD   cetirizine (ZYRTEC) 10 MG tablet Take 1 tablet by mouth 2 times daily 7/30/21   Amrit Olsen MD   zoster recombinant adjuvanted vaccine [facility]) 50 MCG/0.5ML SUSR injection Inject 0.5 mLs into the muscle See Admin Instructions 7/30/21 1/26/22  Amrit Olsen MD   tolterodine (DETROL LA) 2 MG extended release capsule Once daily 7/30/21   Amrit Olsen MD   pantoprazole (PROTONIX) 40 MG tablet Take 1 tablet by mouth 2 times daily 7/16/21   Amrit Olsen MD   ondansetron (ZOFRAN-ODT) 4 MG disintegrating tablet Take 1 tablet by mouth 3 times daily as needed for Nausea or Vomiting 6/1/21   Amrit Olsen MD   albuterol sulfate HFA (VENTOLIN HFA) 108 (90 Base) MCG/ACT inhaler Inhale 2 puffs into the lungs every 6 hours as needed for Wheezing or Shortness of Breath 5/3/21   Amrit Olsen MD   pregabalin (LYRICA) 150 MG capsule Take 1 capsule by mouth 3 times daily for 90 days. TID 3/8/21 7/27/21  Nellie Moscoso APRN - CNP   doxepin (SINEQUAN) 10 MG capsule Take 1 capsule by mouth nightly 2/26/21   Gisella Brasher PA-C   ammonium lactate (LAC-HYDRIN) 12 % lotion Apply topically daily.  2/24/21   Charline Akhtar, ANILA   DULoxetine (CYMBALTA) 30 MG extended release capsule Take 1 capsule by mouth daily 1/11/21   Gisella Brasher PA-C   fluticasone [facility]) 50 MCG/ACT nasal spray 1 spray by Nasal route daily  Patient taking differently: 1 spray by Nasal route as needed  4/27/20   Galina Aden PA-C       Allergies   Allergen Reactions    Carafate [Sucralfate]      Patient unable to tolerate. Unable to recall her reaction.  Glucophage [Metformin Hcl]      Severe abdominal pain, constipation      Mobic [Meloxicam] Other (See Comments)     Abdominal pain    Trazodone And Nefazodone      Unable to tolerate, patient reports made her physically ill with abdominal pain      Ultram [Tramadol]      Abdominal pain    Nickel Itching and Rash       Social History     Socioeconomic History    Marital status:      Spouse name: Not on file    Number of children: Not on file    Years of education: Not on file    Highest education level: Associate degree: academic program   Occupational History    Not on file   Tobacco Use    Smoking status: Heavy Tobacco Smoker     Packs/day: 1.00     Years: 30.00     Pack years: 30.00     Types: Cigarettes    Smokeless tobacco: Never Used   Vaping Use    Vaping Use: Never used   Substance and Sexual Activity    Alcohol use: No     Alcohol/week: 0.0 standard drinks    Drug use: No    Sexual activity: Not Currently   Other Topics Concern    Not on file   Social History Narrative    Not on file     Social Determinants of Health     Financial Resource Strain: High Risk    Difficulty of Paying Living Expenses: Hard   Food Insecurity: No Food Insecurity    Worried About Running Out of Food in the Last Year: Never true    Ascencion of Food in the Last Year: Never true   Transportation Needs:     Lack of Transportation (Medical):      Lack of Transportation (Non-Medical):    Physical Activity:     Days of Exercise per Week:     Minutes of Exercise per Session:    Stress:     Feeling of Stress :    Social Connections:     Frequency of Communication with Friends and Family:     Frequency of Social Gatherings with Friends and Family:     Attends [employer] Services:     Active Member of Clubs or Organizations:     Attends negative left  Trochanteric bursa tenderness:positve right, positive left  SLR:negative right, negative left, sitting      Extremities:     Tremors:None bilaterally upper and lower  Range of motion:Generally normal shoulders, pain with internal rotation of hips negative.   Intact:Yes  Edema: +1 pitting edema bilateral LE     Knee:     Inspection:bilateral knee replaced     Neurological:     Sensory:normal to light touch bilateral upper and lower extremities  Motor:              Right Bicep5/5           Left Bicep5/5              Right Triceps5/5       Left Triceps5/5          Right Deltoid5/5     Left Deltoid5/5                  Right Quadriceps 5/5          Left Quadriceps4/5           Right Gastrocnemius5/5    Left Gastrocnemius5/5  Right Ant Tibialis5/5  Left Ant Tibialis5/5     Gait:antalgic     Dermatology:     Skin:no unusual rashes and no skin lesions  Diffuse bruising to left arm due to fall     Exam unchanged         Impression:     Chronic low back with h/o L3-5 fusion/bilateral knees replaced since 2010  Refuses to have anymore injections, [place] and reports so painful during and after, refuses to have again    Seen Dr Shawna Macario and is a surgical candidate but advised weight loss of  >30lbs or consult bariatric Surgery    Plan:  Chronic low back, diffuse body pain and acute left knee pain   Not interested in anymore interventional procedures  Buccal swab in August compliance, last done 10/2021  No Refill Lyrica 150 mg TID x 90 day, refill due in Sept 2021  Refill Norco 5/325mg po daily-bid prn #45, usually lasts 2-3 months   Trial increase Robaxin 750mg po tid prn spasms, 2 refills on file   Continue with Cymbalta 40 mg QD from PCP   Currently on Zanaflex 4mg QHS gets from PCP  OARRS report reviewed  Patient encouraged to stay active and to lose weight  Against referral to physical therapy  Treatment plan discussed with the patient including medications side effects We discussed with the patient that combining opioids, benzodiazepines, alcohol, illicit drugs or sleep aids increases the risk of respiratory depression including death. We discussed that these medications may cause drowsiness, sedation or dizziness and have counseled the patient not to drive or operate machinery. We have discussed that these medications will be prescribed only by one provider. We have discussed with the patient about age related risk factors and have thoroughly discussed the importance of taking these medications as prescribed. The patient verbalizes understanding.         Cc: Referring physician      Electronically signed by GIUSEPPE Mathew CNP on 08/16/21 at 2:22 PM EDT

## 2021-08-17 ENCOUNTER — OFFICE VISIT (OUTPATIENT)
Dept: PAIN MANAGEMENT | Age: 66
End: 2021-08-17
Payer: MEDICARE

## 2021-08-17 ENCOUNTER — OFFICE VISIT (OUTPATIENT)
Dept: PODIATRY | Age: 66
End: 2021-08-17
Payer: MEDICARE

## 2021-08-17 VITALS — BODY MASS INDEX: 44.41 KG/M2 | WEIGHT: 293 LBS | HEIGHT: 68 IN

## 2021-08-17 VITALS
WEIGHT: 293 LBS | BODY MASS INDEX: 44.41 KG/M2 | RESPIRATION RATE: 16 BRPM | SYSTOLIC BLOOD PRESSURE: 122 MMHG | HEART RATE: 89 BPM | OXYGEN SATURATION: 97 % | DIASTOLIC BLOOD PRESSURE: 78 MMHG | TEMPERATURE: 97.8 F | HEIGHT: 68 IN

## 2021-08-17 DIAGNOSIS — I87.2 VENOUS INSUFFICIENCY (CHRONIC) (PERIPHERAL): ICD-10-CM

## 2021-08-17 DIAGNOSIS — M79.675 PAIN OF TOE OF LEFT FOOT: ICD-10-CM

## 2021-08-17 DIAGNOSIS — M79.674 PAIN OF TOE OF RIGHT FOOT: ICD-10-CM

## 2021-08-17 DIAGNOSIS — M79.7 FIBROMYALGIA: ICD-10-CM

## 2021-08-17 DIAGNOSIS — G89.29 CHRONIC LOW BACK PAIN WITH SCIATICA, SCIATICA LATERALITY UNSPECIFIED, UNSPECIFIED BACK PAIN LATERALITY: ICD-10-CM

## 2021-08-17 DIAGNOSIS — M48.061 SPINAL STENOSIS OF LUMBAR REGION WITHOUT NEUROGENIC CLAUDICATION: ICD-10-CM

## 2021-08-17 DIAGNOSIS — E11.51 TYPE II DIABETES MELLITUS WITH PERIPHERAL CIRCULATORY DISORDER (HCC): ICD-10-CM

## 2021-08-17 DIAGNOSIS — M54.40 CHRONIC LOW BACK PAIN WITH SCIATICA, SCIATICA LATERALITY UNSPECIFIED, UNSPECIFIED BACK PAIN LATERALITY: ICD-10-CM

## 2021-08-17 DIAGNOSIS — B35.1 ONYCHOMYCOSIS: Primary | ICD-10-CM

## 2021-08-17 DIAGNOSIS — R26.2 DIFFICULTY WALKING: ICD-10-CM

## 2021-08-17 DIAGNOSIS — M51.9 LUMBAR DISC DISORDER: ICD-10-CM

## 2021-08-17 DIAGNOSIS — M47.816 LUMBAR SPONDYLOSIS: ICD-10-CM

## 2021-08-17 DIAGNOSIS — F11.99 OPIOID USE, UNSPECIFIED WITH UNSPECIFIED OPIOID-INDUCED DISORDER (HCC): Primary | ICD-10-CM

## 2021-08-17 PROCEDURE — G8399 PT W/DXA RESULTS DOCUMENT: HCPCS | Performed by: NURSE PRACTITIONER

## 2021-08-17 PROCEDURE — 99213 OFFICE O/P EST LOW 20 MIN: CPT | Performed by: NURSE PRACTITIONER

## 2021-08-17 PROCEDURE — G8428 CUR MEDS NOT DOCUMENT: HCPCS | Performed by: NURSE PRACTITIONER

## 2021-08-17 PROCEDURE — 3017F COLORECTAL CA SCREEN DOC REV: CPT | Performed by: NURSE PRACTITIONER

## 2021-08-17 PROCEDURE — G8417 CALC BMI ABV UP PARAM F/U: HCPCS | Performed by: NURSE PRACTITIONER

## 2021-08-17 PROCEDURE — 11721 DEBRIDE NAIL 6 OR MORE: CPT | Performed by: PODIATRIST

## 2021-08-17 PROCEDURE — 1090F PRES/ABSN URINE INCON ASSESS: CPT | Performed by: NURSE PRACTITIONER

## 2021-08-17 PROCEDURE — 4004F PT TOBACCO SCREEN RCVD TLK: CPT | Performed by: NURSE PRACTITIONER

## 2021-08-17 PROCEDURE — 4040F PNEUMOC VAC/ADMIN/RCVD: CPT | Performed by: NURSE PRACTITIONER

## 2021-08-17 PROCEDURE — 1123F ACP DISCUSS/DSCN MKR DOCD: CPT | Performed by: NURSE PRACTITIONER

## 2021-08-17 RX ORDER — METHOCARBAMOL 750 MG/1
750 TABLET, FILM COATED ORAL 3 TIMES DAILY
Qty: 90 TABLET | Refills: 2 | Status: SHIPPED
Start: 2021-08-17 | End: 2021-08-25 | Stop reason: SDUPTHER

## 2021-08-17 RX ORDER — HYDROCODONE BITARTRATE AND ACETAMINOPHEN 5; 325 MG/1; MG/1
1 TABLET ORAL EVERY 12 HOURS PRN
Qty: 45 TABLET | Refills: 0 | Status: SHIPPED
Start: 2021-08-17 | End: 2021-09-08

## 2021-08-17 NOTE — PROGRESS NOTES
21     Daisy Stratton    : 1955  Sex: female  Age: 77 y.o. Subjective: The patient is seen today for evaluation regarding diabetic foot evaluation and mycotic nail care. No other complaints noted. Chief Complaint   Patient presents with    Diabetes     diabetic foot exam        Current Medications:    Current Outpatient Medications:     HYDROcodone-acetaminophen (NORCO) 5-325 MG per tablet, Take 1 tablet by mouth every 12 hours as needed for Pain for up to 30 days. Take lowest dose possible to manage pain, Disp: 45 tablet, Rfl: 0    methocarbamol (ROBAXIN) 750 MG tablet, Take 1 tablet by mouth 3 times daily, Disp: 90 tablet, Rfl: 2    ADVAIR -21 MCG/ACT inhaler, Inhale 2 puffs into the lungs 2 times daily, Disp: 1 Inhaler, Rfl: 3    busPIRone (BUSPAR) 15 MG tablet, TAKE 1 TABLET BY MOUTH THREE TIMES A DAY, Disp: 270 tablet, Rfl: 3    topiramate (TOPAMAX) 100 MG tablet, Take 1 tablet by mouth 2 times daily, Disp: 180 tablet, Rfl: 3    cetirizine (ZYRTEC) 10 MG tablet, Take 1 tablet by mouth 2 times daily, Disp: 180 tablet, Rfl: 0    zoster recombinant adjuvanted vaccine (SHINGRIX) 50 MCG/0.5ML SUSR injection, Inject 0.5 mLs into the muscle See Admin Instructions, Disp: 0.5 mL, Rfl: 0    tolterodine (DETROL LA) 2 MG extended release capsule, Once daily, Disp: 90 capsule, Rfl: 3    pantoprazole (PROTONIX) 40 MG tablet, Take 1 tablet by mouth 2 times daily, Disp: 180 tablet, Rfl: 0    ondansetron (ZOFRAN-ODT) 4 MG disintegrating tablet, Take 1 tablet by mouth 3 times daily as needed for Nausea or Vomiting, Disp: 21 tablet, Rfl: 0    albuterol sulfate HFA (VENTOLIN HFA) 108 (90 Base) MCG/ACT inhaler, Inhale 2 puffs into the lungs every 6 hours as needed for Wheezing or Shortness of Breath, Disp: 1 Inhaler, Rfl: 5    doxepin (SINEQUAN) 10 MG capsule, Take 1 capsule by mouth nightly, Disp: 30 capsule, Rfl: 2    ammonium lactate (LAC-HYDRIN) 12 % lotion, Apply topically daily. , Disp: 222 mL, Rfl: 5    DULoxetine (CYMBALTA) 30 MG extended release capsule, Take 1 capsule by mouth daily, Disp: 90 capsule, Rfl: 1    fluticasone (FLONASE) 50 MCG/ACT nasal spray, 1 spray by Nasal route daily (Patient taking differently: 1 spray by Nasal route as needed ), Disp: 3 Bottle, Rfl: 1    pregabalin (LYRICA) 150 MG capsule, Take 1 capsule by mouth 3 times daily for 90 days. TID, Disp: 270 capsule, Rfl: 0    Allergies: Allergies   Allergen Reactions    Carafate [Sucralfate]      Patient unable to tolerate. Unable to recall her reaction.     Glucophage [Metformin Hcl]      Severe abdominal pain, constipation      Mobic [Meloxicam] Other (See Comments)     Abdominal pain    Trazodone And Nefazodone      Unable to tolerate, patient reports made her physically ill with abdominal pain      Ultram [Tramadol]      Abdominal pain    Nickel Itching and Rash       Past Surgical History:   Procedure Laterality Date    CARPAL TUNNEL RELEASE Bilateral     CHOLECYSTECTOMY      COLONOSCOPY  2011    COLONOSCOPY  11/22/2016    Dr. Samra Serrano N/A 12/8/2020    Nya Rede performed by Merlin Cha MD at [address]    benign reasons    [address] Bilateral 2010    LUMBAR FUSION      L3-5    ROTATOR CUFF REPAIR Left     left    TOTAL KNEE ARTHROPLASTY Bilateral 2010    did both at the same time   [facility] N/A 9/29/2020    EGD BIOPSY performed by Merlin Cha MD at [facility]     Past Medical History:   Diagnosis Date    Anxiety     Arthralgia 10/8/2013    Arthritis     Bone avascular necrosis ([facility] [address] 3/23/2015    Breast lesion 12/1/2015    Chronic back pain     Chronic fatigue 10/8/2013    Daytime somnolence 10/8/2013    Depression     Diabetes mellitus (HCC)     Fibromyalgia     Fracture, fibula     right    GERD (gastroesophageal reflux disease)     H/O cardiovascular stress test 01/29/2020    Lexiscan    Headache     History of blood transfusion     History of fractured kneecap     (R) 2 hair line fractures    History of total knee arthroplasty 3/19/2015    Hyperlipidemia     Morbid obesity with BMI of 45.0-49.9, adult ([facility] [address] 10/9/2015    Obesity     Osteoarthritis     Overactive bladder 1/22/2014    Reflux        Vitals:    08/17/21 1331   Weight: (!) 336 lb (152.4 kg)   Height: 5' 8\" (1.727 m)       Exam:  Neurovascular status unchanged. At this time the nail/s 1, 2, 3, 4, 5 right foot and nail/s 1, 2, 3, 4, 5 left foot are noted to be thickened, dystrophic and discolored with subungual debris present. Tenderness noted to palpation. Minimal hair growth is noted to both lower extremities. Edema noted with both varicosities and stasis skin changes present bilaterally. Coolness is noted to the digital regions to palpation. Capillary fill time delayed digital areas bilateral foot. No heel fissuring or macerations of the web spaces. No plantar calluses and/or ulcerative areas are noted. Patient is having difficulty with gait/walking. Plan Per Assessment  Earnestine Chinchilla was seen today for diabetes. Diagnoses and all orders for this visit:    Onychomycosis    Pain of toe of right foot    Pain of toe of left foot    Type II diabetes mellitus with peripheral circulatory disorder (HCC)    Venous insufficiency (chronic) (peripheral)    Difficulty walking        1. Evaluation and Management  2. Manual and electrical debridement of the mycotic nails was performed for thickness and length to prevent injection and/or ulceration. 3. I recommended antifungal cream to the nails daily. 4. It was discussed in detail with the patient proper hygiene for the diabetic foot. They are to get in the habit of looking at their feet or have someone look at them.    If they are unable to do daily, they are to look for any signs of redness, blistering, cracking, swelling, drainage, open lesions, etc.  They are to dry in between the toes after each bath or shower gently. The water should be tested with the elbow to prevent burns. The patient is to refrain from soaking their feet unless instructed by myself to do otherwise. They are to refrain from going barefoot. Shoe gear should be inspected for any foreign objects. Shoes should have a deep wide toe box. With any type of shoe, the feet should be inspected for any signs of pressure, i.e. redness, blistering, or open sores. Further instructional guidelines were dispensed to the patient. 5. We will see the patient back at a later date for continued podiatric management and care. Patient was advised to call the office with any questions or concerns prior to their next appointment if needed. Seen By:    Lisa Gonzalez DPM    Electronically signed by Lisa Gonzalez DPM on 8/17/2021 at 1:48 PM      This note was created using voice recognition software. The note was reviewed however may contain grammatical errors.

## 2021-08-17 NOTE — PROGRESS NOTES
Patient is in today for diabetic foot care. Patient is having trouble with her toenails.  pcp is Brook Koch MD  Last ov 7/27/21

## 2021-08-25 RX ORDER — METHOCARBAMOL 750 MG/1
750 TABLET, FILM COATED ORAL 3 TIMES DAILY
Qty: 90 TABLET | Refills: 2 | Status: SHIPPED
Start: 2021-08-25 | End: 2021-09-08

## 2021-08-31 DIAGNOSIS — M79.7 FIBROMYALGIA: ICD-10-CM

## 2021-08-31 DIAGNOSIS — F32.A DEPRESSION, UNSPECIFIED DEPRESSION TYPE: ICD-10-CM

## 2021-08-31 RX ORDER — DULOXETIN HYDROCHLORIDE 30 MG/1
30 CAPSULE, DELAYED RELEASE ORAL DAILY
Qty: 90 CAPSULE | Refills: 1 | Status: SHIPPED
Start: 2021-08-31 | End: 2022-03-04

## 2021-08-31 NOTE — TELEPHONE ENCOUNTER
Last Appointment   7/27/2021  Next Appointment  Visit date not found    Tried calling pt to schedule f/u, unable to reach by phone. Sent pt [employer].

## 2021-09-08 ENCOUNTER — OFFICE VISIT (OUTPATIENT)
Dept: PAIN MANAGEMENT | Age: 66
End: 2021-09-08
Payer: MEDICARE

## 2021-09-08 VITALS
OXYGEN SATURATION: 96 % | HEART RATE: 78 BPM | HEIGHT: 68 IN | WEIGHT: 293 LBS | TEMPERATURE: 96.9 F | DIASTOLIC BLOOD PRESSURE: 82 MMHG | SYSTOLIC BLOOD PRESSURE: 128 MMHG | BODY MASS INDEX: 44.41 KG/M2 | RESPIRATION RATE: 16 BRPM

## 2021-09-08 DIAGNOSIS — M48.061 SPINAL STENOSIS OF LUMBAR REGION WITHOUT NEUROGENIC CLAUDICATION: ICD-10-CM

## 2021-09-08 DIAGNOSIS — G89.4 CHRONIC PAIN SYNDROME: Primary | ICD-10-CM

## 2021-09-08 DIAGNOSIS — M79.7 FIBROMYALGIA: ICD-10-CM

## 2021-09-08 DIAGNOSIS — M51.9 LUMBAR DISC DISORDER: ICD-10-CM

## 2021-09-08 DIAGNOSIS — G89.29 CHRONIC LOW BACK PAIN WITH SCIATICA, SCIATICA LATERALITY UNSPECIFIED, UNSPECIFIED BACK PAIN LATERALITY: ICD-10-CM

## 2021-09-08 DIAGNOSIS — M47.816 LUMBAR SPONDYLOSIS: ICD-10-CM

## 2021-09-08 DIAGNOSIS — M54.40 CHRONIC LOW BACK PAIN WITH SCIATICA, SCIATICA LATERALITY UNSPECIFIED, UNSPECIFIED BACK PAIN LATERALITY: ICD-10-CM

## 2021-09-08 PROCEDURE — G8427 DOCREV CUR MEDS BY ELIG CLIN: HCPCS | Performed by: NURSE PRACTITIONER

## 2021-09-08 PROCEDURE — 99213 OFFICE O/P EST LOW 20 MIN: CPT | Performed by: NURSE PRACTITIONER

## 2021-09-08 PROCEDURE — 4040F PNEUMOC VAC/ADMIN/RCVD: CPT | Performed by: NURSE PRACTITIONER

## 2021-09-08 PROCEDURE — G8417 CALC BMI ABV UP PARAM F/U: HCPCS | Performed by: NURSE PRACTITIONER

## 2021-09-08 PROCEDURE — 3017F COLORECTAL CA SCREEN DOC REV: CPT | Performed by: NURSE PRACTITIONER

## 2021-09-08 PROCEDURE — 1123F ACP DISCUSS/DSCN MKR DOCD: CPT | Performed by: NURSE PRACTITIONER

## 2021-09-08 PROCEDURE — 1090F PRES/ABSN URINE INCON ASSESS: CPT | Performed by: NURSE PRACTITIONER

## 2021-09-08 PROCEDURE — G8399 PT W/DXA RESULTS DOCUMENT: HCPCS | Performed by: NURSE PRACTITIONER

## 2021-09-08 PROCEDURE — 4004F PT TOBACCO SCREEN RCVD TLK: CPT | Performed by: NURSE PRACTITIONER

## 2021-09-08 RX ORDER — PREGABALIN 150 MG/1
150 CAPSULE ORAL 3 TIMES DAILY
Qty: 270 CAPSULE | Refills: 0 | Status: SHIPPED
Start: 2021-09-08 | End: 2021-12-06 | Stop reason: SDUPTHER

## 2021-09-08 RX ORDER — METHOCARBAMOL 500 MG/1
TABLET, FILM COATED ORAL
COMMUNITY
Start: 2021-08-02 | End: 2021-09-08

## 2021-09-08 RX ORDER — HYDROCODONE BITARTRATE AND ACETAMINOPHEN 5; 325 MG/1; MG/1
1 TABLET ORAL EVERY 12 HOURS PRN
Qty: 45 TABLET | Refills: 0 | Status: CANCELLED | OUTPATIENT
Start: 2021-09-16 | End: 2021-10-16

## 2021-09-08 RX ORDER — CHLORZOXAZONE 500 MG/1
500 TABLET ORAL 3 TIMES DAILY PRN
Qty: 45 TABLET | Refills: 0 | Status: SHIPPED
Start: 2021-09-08 | End: 2021-09-20 | Stop reason: SDUPTHER

## 2021-09-08 RX ORDER — OXYCODONE HYDROCHLORIDE AND ACETAMINOPHEN 5; 325 MG/1; MG/1
0.5 TABLET ORAL EVERY 8 HOURS PRN
Qty: 23 TABLET | Refills: 0 | Status: SHIPPED
Start: 2021-09-08 | End: 2021-09-20 | Stop reason: SDUPTHER

## 2021-09-08 NOTE — PROGRESS NOTES
[address] [facility], [address] [place] [id number]  [phone]    Follow up visit       Daisy Stratton     Date of Visit:  9/8/2021    CC:  Patient presents for follow up low back pain and left knee pain      HPI: Pt followed today for low back pain severe, debilitating and worsening over last month,. Pt reports Norco and robaxin are no longer helping and feels like she is building tolerance to norco. Chronic bilateral knee pain and has hx of bilateral knee replacement done in 2010  Appropriate analgesia with current medications regimen:no   Change in quality of symptoms: worsening low back   Medication side effects:none. Recent diagnostic testing: none  Recent interventional procedures:none     Imaging:    CT of left knee 04/2021:    Impression   1. Small suprapatellar joint effusion. 2. Prior left knee arthroplasty and patellar resurfacing.  No significant   periprosthetic lucency or acute osseous abnormality. 3. Calcifications which are well corticated in the distal quadriceps tendon   measuring up to 1.5 x 0.8 cm.   4. Hardware associated streak artifact limits the exam to some degree. MRI of Lumbar Spine 09/2020:  1. Postsurgical changes status post L3-L5 posterior spinal fusion. 2. Severe multifactorial spinal canal stenosis at L2-L3 with mass effect on    the cauda equina nerve roots. 3. Moderate multifactorial L1-L2 spinal canal stenosis with moderate right    neural foraminal stenosis. 4. Moderate left L5-S1 neural foraminal stenosis.             LUMBAR SPINE XRAY 03/4/2020  Intact lumbar vertebral height and alignment. Relative severe    multilevel disc and facet joint narrowing with subchondral sclerosis    and spurring. Previous discectomy with posterior pedicle screw plates    extending from L3 through L5. Symmetric osteoarthritis at the    sacroiliac joints manifesting as joint space narrowing and subchondral    sclerosis.  Lumbar spine CT 2016      1.  Status post lumbar spine fusion from L3-L5 appearing in stable,   satisfactory alignment.       2. Lumbar spondylosis as described above in part due to congenital   shortened pedicles and notable stenosis          Potential Aberrant Drug-Related Behavior:  None    Previous medication: baclofen helps some, tizanidine sleepiness, flexeril no relief,      Urine Drug Screenin2020 Negative for all which is consistent, pt does NOT take every day and does NOT fill exactly 30 days, can go longer than a month for refill   10/2020: consistent for hydrocodone     OARRS report:  2020-2021:Consistent    Pain agreement: 10/14/2020      Past Medical History:   Diagnosis Date    Anxiety     Arthralgia 10/8/2013    Arthritis     Bone avascular necrosis ([address] 3/23/2015    Breast lesion 2015    Chronic back pain     Chronic fatigue 10/8/2013    Daytime somnolence 10/8/2013    Depression     Diabetes mellitus ([address]     Fibromyalgia     Fracture, fibula     right    GERD (gastroesophageal reflux disease)     H/O cardiovascular stress test 2020    Lexiscan    Headache     History of blood transfusion     History of fractured kneecap     (R) 2 hair line fractures    History of total knee arthroplasty 3/19/2015    Hyperlipidemia     Morbid obesity with BMI of 45.0-49.9, adult ([address] 10/9/2015    Obesity     Osteoarthritis     Overactive bladder 2014    Reflux        Past Surgical History:   Procedure Laterality Date    CARPAL TUNNEL RELEASE Bilateral     CHOLECYSTECTOMY      COLONOSCOPY      COLONOSCOPY  2016    Dr. Dale Duran N/A 2020    LAPAROSCOPIC HERNIA HIATAL REPAIR WITH MESH performed by Ulysses Silva, MD at [address]    benign reasons     JOINT REPLACEMENT Bilateral 2010    LUMBAR FUSION      L3-5    ROTATOR CUFF REPAIR Left     left    TOTAL KNEE ARTHROPLASTY Bilateral  did both at the same time   [facility] 9/29/2020    EGD BIOPSY performed by Vasu San MD at [facility]       Prior to Admission medications    Medication Sig Start Date End Date Taking? Authorizing Provider   DULoxetine (CYMBALTA) 30 MG extended release capsule Take 1 capsule by mouth daily 8/31/21   Brooke Horn MD   methocarbamol (ROBAXIN) 750 MG tablet Take 1 tablet by mouth 3 times daily 8/25/21 9/24/21  GIUSEPPE Muniz CNP   HYDROcodone-acetaminophen (NORCO) 5-325 MG per tablet Take 1 tablet by mouth every 12 hours as needed for Pain for up to 30 days.  Take lowest dose possible to manage pain 8/17/21 9/16/21  GIUSEPPE Muniz CNP   ADVAIR -21 MCG/ACT inhaler Inhale 2 puffs into the lungs 2 times daily 8/9/21   Brooke Horn MD   busPIRone (BUSPAR) 15 MG tablet TAKE 1 TABLET BY MOUTH THREE TIMES A DAY 7/30/21   Brooke Horn MD   topiramate (TOPAMAX) 100 MG tablet Take 1 tablet by mouth 2 times daily 7/30/21   Brooke Horn MD   cetirizine (ZYRTEC) 10 MG tablet Take 1 tablet by mouth 2 times daily 7/30/21   Brooke Horn MD   zoster recombinant adjuvanted vaccine [facility]) 50 MCG/0.5ML SUSR injection Inject 0.5 mLs into the muscle See Admin Instructions 7/30/21 1/26/22  Brooke Horn MD   tolterodine (DETROL LA) 2 MG extended release capsule Once daily 7/30/21   Brooke Horn MD   pantoprazole (PROTONIX) 40 MG tablet Take 1 tablet by mouth 2 times daily 7/16/21   Brooke Horn MD   ondansetron (ZOFRAN-ODT) 4 MG disintegrating tablet Take 1 tablet by mouth 3 times daily as needed for Nausea or Vomiting 6/1/21   Brooke Horn MD   albuterol sulfate HFA (VENTOLIN HFA) 108 (90 Base) MCG/ACT inhaler Inhale 2 puffs into the lungs every 6 hours as needed for Wheezing or Shortness of Breath 5/3/21   Brooke Horn MD   pregabalin (LYRICA) 150 MG capsule Take 1 capsule by mouth 3 times daily for 90 days. TID 3/8/21 7/27/21  GIUSEPPE Dueñas - CNP   doxepin (SINEQUAN) 10 MG capsule Take 1 capsule by mouth nightly 2/26/21   Verenice Pool PA-C   ammonium lactate (LAC-HYDRIN) 12 % lotion Apply topically daily. 2/24/21   Meaghan Shore., DPM   fluticasone [facility]) 50 MCG/ACT nasal spray 1 spray by Nasal route daily  Patient taking differently: 1 spray by Nasal route as needed  4/27/20   Verenice Pool PA-C       Allergies   Allergen Reactions    Carafate [Sucralfate]      Patient unable to tolerate. Unable to recall her reaction.     Glucophage [Metformin Hcl]      Severe abdominal pain, constipation      Mobic [Meloxicam] Other (See Comments)     Abdominal pain    Trazodone And Nefazodone      Unable to tolerate, patient reports made her physically ill with abdominal pain      Ultram [Tramadol]      Abdominal pain    Nickel Itching and Rash       Social History     Socioeconomic History    Marital status:      Spouse name: Not on file    Number of children: Not on file    Years of education: Not on file    Highest education level: Associate degree: academic program   Occupational History    Not on file   Tobacco Use    Smoking status: Heavy Tobacco Smoker     Packs/day: 1.00     Years: 30.00     Pack years: 30.00     Types: Cigarettes    Smokeless tobacco: Never Used   Vaping Use    Vaping Use: Never used   Substance and Sexual Activity    Alcohol use: No     Alcohol/week: 0.0 standard drinks    Drug use: No    Sexual activity: Not Currently   Other Topics Concern    Not on file   Social History Narrative    Not on file     Social Determinants of Health     Financial Resource Strain: High Risk    Difficulty of Paying Living Expenses: Hard   Food Insecurity: No Food Insecurity    Worried About Running Out of Food in the Last Year: Never true    Ascencion of Food in the Last Year: Never true   Transportation Needs:     Lack of Transportation (Medical):  Lack of Transportation (Non-Medical):    Physical Activity:     Days of Exercise per Week:     Minutes of Exercise per Session:    Stress:     Feeling of Stress :    Social Connections:     Frequency of Communication with Friends and Family:     Frequency of Social Gatherings with Friends and Family:     Attends Orthodox Services:     Active Member of Clubs or Organizations:     Attends Club or Organization Meetings:     Marital Status:    Intimate Partner Violence:     Fear of Current or Ex-Partner:     Emotionally Abused:     Physically Abused:     Sexually Abused:        Family History   Problem Relation Age of Onset    High Blood Pressure Mother     Diabetes Mother     Heart Disease Mother     Cancer Mother     Diabetes Father     Heart Disease Father     High Blood Pressure Father     Cancer Other 50        breast    Depression Brother        REVIEW OF SYSTEMS:     Cynda January denies fever/chills, chest pain, shortness of breath, new bowel or bladder complaints or suicidal ideations. All other review of systems wasnegative. PHYSICAL EXAMINATION:      LMP  (LMP Unknown)     General:       General appearance:   elderly, pleasant and well-hydrated. ,in ]severe discomfort and A & O x3  Build:obese     HEENT:     Head:normocephalic and atraumatic  Pupils:regular, round and equal.  Sclera: icterus absent,      Lungs:     Breathing:Breathing Pattern: regular, no distress     Abdomen:     Shape:non-distended and normal  Tenderness:none     Cervical spine:     Inspection:normal  Palpation:tenderness paravertebral muscles, trigger points paravertebral muscles, facet loading, left, right, negative, tenderness and non-tender paraspinals.   Range of motion:normal not flexion, extension rotation bilateral and is not painful.     Lumbar spine:     Spine inspection:surgical incision scar   CVA tenderness:No   Palpation:tenderness paravertebral muscles, facet loading, left and right, positive and tenderness. Range of motion:abnormal severely Lateral bending, flexion, extension rotation bilateral and is severely painful.     Musculoskeletal:     Trigger points in Paraveteral:present bilaterally  Burton's:negative right, negative left   SI joint tenderness:positive right, positive left              SULTANA test:negative right, negative left  Piriformis tenderness:negative right, negative left  Trochanteric bursa tenderness:positve right, positive left  SLR:negative right, negative left, sitting      Extremities:     Tremors:None bilaterally upper and lower  Range of motion:Generally normal shoulders, pain with internal rotation of hips negative.   Intact:Yes  Edema: +1 pitting edema bilateral LE     Knee:     Inspection:bilateral knee replaced     Neurological:     Sensory:normal to light touch bilateral upper and lower extremities  Motor:              Right Bicep5/5           Left Bicep5/5              Right Triceps5/5       Left Triceps5/5          Right Deltoid5/5     Left Deltoid5/5                  Right Quadriceps 5/5          Left Quadriceps4/5           Right Gastrocnemius5/5    Left Gastrocnemius5/5  Right Ant Tibialis5/5  Left Ant Tibialis5/5     Gait:antalgic     Dermatology:     Skin:no unusual rashes and no skin lesions  Diffuse bruising to left arm due to fall, healing            Impression:     Chronic low back with h/o L3-5 fusion/bilateral knees replaced since 2010  Refuses to have anymore injections, [place] and reports so painful during and after, refuses to have again    Seen Dr Donna Stacy and is a surgical candidate but advised weight loss of  >30lbs or consult bariatric Surgery    Plan:  Chronic low back, diffuse body pain and acute left knee pain worsening  Not interested in anymore interventional procedures  Buccal swab in August compliance, last done 10/2021  Refill Lyrica 150 mg TID x 90 day, refill due in Sept 2021  DC Norco due to tolerance  Trial percocet 1/2 tablet po tid x 15 days   DC Robaxin   Trial Parfon Forte 500mg po tid prn spasms x 15 days  Continue with Cymbalta 40 mg QD from PCP   No longer taking Tizanidine at night by PCP  OARRS report reviewed  Patient encouraged to stay active and to lose weight  Against referral to physical therapy  Treatment plan discussed with the patient including medications side effects                 We discussed with the patient that combining opioids, benzodiazepines, alcohol, illicit drugs or sleep aids increases the risk of respiratory depression including death. We discussed that these medications may cause drowsiness, sedation or dizziness and have counseled the patient not to drive or operate machinery. We have discussed that these medications will be prescribed only by one provider. We have discussed with the patient about age related risk factors and have thoroughly discussed the importance of taking these medications as prescribed. The patient verbalizes understanding.         Cc: Referring physician      Electronically signed by GIUSEPPE Luu CNP on 09/08/21 at 2:22 PM EDT

## 2021-09-08 NOTE — PROGRESS NOTES
Do you currently have any of the following:    Fever: No  Headache:  No  Cough: No  Shortness of breath: No  Exposed to anyone with these symptoms: No                                                                                                                Marguerite Jarrell presents to the [facility] on [facility]8/2021. Suki Theodore is complaining of pain in lower back and left leg. . The pain is constant. The pain is described as aching, throbbing, shooting, stabbing, sharp and cramping/spasm down her leg to the ankle. . Pain is rated on her best day at a 7, on her worst day at a 10, and on average at a 9 on the VAS scale. She took her last dose of Norco, Lyrica and methacarbamol, duloxetine. The [address] is not working for her. Kadie Wright does not have issues with constipation. Any procedures since your last visit: No,    She is  on NSAIDS and  is not on anticoagulation medications to include none and is managed by NA. Pacemaker or defibrillator: No Physician managing device is NA. Medication Contract and Consent for Opioid Use Documents Filed     Patient Documents       Type of Document Status Date Received Received By Description     Medication Contract Received 10/14/2020  1:22 PM TOMY THOMAS Mgmt Patient Agreement                   /82   Pulse 78   Temp 96.9 °F (36.1 °C) (Infrared)   Resp 16   Ht 5' 8\" (1.727 m)   Wt (!) 336 lb (152.4 kg)   LMP  (LMP Unknown)   SpO2 96%   BMI 51.09 kg/m²      No LMP recorded (lmp unknown). Patient has had a hysterectomy.

## 2021-09-20 DIAGNOSIS — M51.9 LUMBAR DISC DISORDER: ICD-10-CM

## 2021-09-20 DIAGNOSIS — M79.7 FIBROMYALGIA: ICD-10-CM

## 2021-09-20 DIAGNOSIS — M54.40 CHRONIC LOW BACK PAIN WITH SCIATICA, SCIATICA LATERALITY UNSPECIFIED, UNSPECIFIED BACK PAIN LATERALITY: ICD-10-CM

## 2021-09-20 DIAGNOSIS — M47.816 LUMBAR SPONDYLOSIS: ICD-10-CM

## 2021-09-20 DIAGNOSIS — G89.4 CHRONIC PAIN SYNDROME: ICD-10-CM

## 2021-09-20 DIAGNOSIS — G89.29 CHRONIC LOW BACK PAIN WITH SCIATICA, SCIATICA LATERALITY UNSPECIFIED, UNSPECIFIED BACK PAIN LATERALITY: ICD-10-CM

## 2021-09-20 DIAGNOSIS — M48.061 SPINAL STENOSIS OF LUMBAR REGION WITHOUT NEUROGENIC CLAUDICATION: ICD-10-CM

## 2021-09-20 RX ORDER — OXYCODONE HYDROCHLORIDE AND ACETAMINOPHEN 5; 325 MG/1; MG/1
0.5 TABLET ORAL EVERY 8 HOURS PRN
Qty: 23 TABLET | Refills: 0 | Status: SHIPPED
Start: 2021-09-22 | End: 2021-10-06 | Stop reason: SDUPTHER

## 2021-09-20 RX ORDER — CHLORZOXAZONE 500 MG/1
500 TABLET ORAL 3 TIMES DAILY PRN
Qty: 45 TABLET | Refills: 0 | Status: SHIPPED
Start: 2021-09-22 | End: 2021-10-20 | Stop reason: SDUPTHER

## 2021-10-06 DIAGNOSIS — G89.4 CHRONIC PAIN SYNDROME: ICD-10-CM

## 2021-10-06 DIAGNOSIS — M51.9 LUMBAR DISC DISORDER: ICD-10-CM

## 2021-10-06 DIAGNOSIS — M54.40 CHRONIC LOW BACK PAIN WITH SCIATICA, SCIATICA LATERALITY UNSPECIFIED, UNSPECIFIED BACK PAIN LATERALITY: ICD-10-CM

## 2021-10-06 DIAGNOSIS — M79.7 FIBROMYALGIA: ICD-10-CM

## 2021-10-06 DIAGNOSIS — G89.29 CHRONIC LOW BACK PAIN WITH SCIATICA, SCIATICA LATERALITY UNSPECIFIED, UNSPECIFIED BACK PAIN LATERALITY: ICD-10-CM

## 2021-10-06 DIAGNOSIS — M48.061 SPINAL STENOSIS OF LUMBAR REGION WITHOUT NEUROGENIC CLAUDICATION: ICD-10-CM

## 2021-10-06 DIAGNOSIS — M47.816 LUMBAR SPONDYLOSIS: ICD-10-CM

## 2021-10-06 RX ORDER — OXYCODONE HYDROCHLORIDE AND ACETAMINOPHEN 5; 325 MG/1; MG/1
0.5 TABLET ORAL EVERY 8 HOURS PRN
Qty: 23 TABLET | Refills: 0 | Status: SHIPPED
Start: 2021-10-06 | End: 2021-10-20

## 2021-10-18 NOTE — PROGRESS NOTES
[facility]  [address] [facility], [address] [place] [address] Alberto  [phone]    Follow up Note      Tiesha Segal     Date of Visit:  10/20/2021    CC:  Patient presents for follow up   Chief Complaint   Patient presents with    Follow-up    Back Pain     lower radiates down left leg to ankle       HPI:    Pain is unchanged. Lower back pain and left leg pain continue. Appropriate analgesia with current medications regimen: no.    Change in quality of symptoms:no. Medication side effects: percocet caused constipation  Recent diagnostic testing:none. Recent interventional procedures:none. She has not been on anticoagulation medications to include none. The patient  has not been on herbal supplements. The patient is diabetic. Imaging:    CT of left knee 04/2021:     Impression   1. Small suprapatellar joint effusion. 2. Prior left knee arthroplasty and patellar resurfacing.  No significant   periprosthetic lucency or acute osseous abnormality. 3. Calcifications which are well corticated in the distal quadriceps tendon   measuring up to 1.5 x 0.8 cm.   4. Hardware associated streak artifact limits the exam to some degree.            MRI of Lumbar Spine 09/2020:  1. Postsurgical changes status post L3-L5 posterior spinal fusion. 2. Severe multifactorial spinal canal stenosis at L2-L3 with mass effect on    the cauda equina nerve roots. 3. Moderate multifactorial L1-L2 spinal canal stenosis with moderate right    neural foraminal stenosis. 4. Moderate left L5-S1 neural foraminal stenosis.              LUMBAR SPINE XRAY 03/4/2020  Intact lumbar vertebral height and alignment. Relative severe    multilevel disc and facet joint narrowing with subchondral sclerosis    and spurring. Previous discectomy with posterior pedicle screw plates    extending from L3 through L5.  Symmetric osteoarthritis at the    sacroiliac joints manifesting as joint space narrowing and subchondral    sclerosis.        Lumbar spine CT 2016      1.  Status post lumbar spine fusion from L3-L5 appearing in stable,   satisfactory alignment.       2. Lumbar spondylosis as described above in part due to congenital   shortened pedicles and notable stenosis          Potential Aberrant Drug-Related Behavior:  None     Previous medication: baclofen helps some, tizanidine sleepiness, flexeril no relief,      Urine Drug Screenin2020 Negative for all which is consistent, pt does NOT take every day and does NOT fill exactly 30 days, can go longer than a month for refill   10/2020: consistent for hydrocodone  2021:  consistent     OARRS report:  2020-10/2021:Consistent     Pain agreement: 10/14/2020 - new one to be filled out today.         Past Medical History:   Diagnosis Date    Anxiety     Arthralgia 10/8/2013    Arthritis     Bone avascular necrosis ([facility] [address] 3/23/2015    Breast lesion 2015    Chronic back pain     Chronic fatigue 10/8/2013    Daytime somnolence 10/8/2013    Depression     Diabetes mellitus ([address]     Fibromyalgia     Fracture, fibula     right    GERD (gastroesophageal reflux disease)     H/O cardiovascular stress test 2020    Lexiscan    Headache     History of blood transfusion     History of fractured kneecap     (R) 2 hair line fractures    History of total knee arthroplasty 3/19/2015    Hyperlipidemia     Morbid obesity with BMI of 45.0-49.9, adult ([address] 10/9/2015    Obesity     Osteoarthritis     Overactive bladder 2014    Reflux        Past Surgical History:   Procedure Laterality Date    CARPAL TUNNEL RELEASE Bilateral     CHOLECYSTECTOMY      COLONOSCOPY      COLONOSCOPY  2016    Dr. Daniel Griffin N/A 2020    LAPAROSCOPIC Linell Sages performed by Ciera Orozco MD at [address]    benign reasons     JOINT REPLACEMENT Bilateral    [facility] L3-5    ROTATOR CUFF REPAIR Left     left    TOTAL KNEE ARTHROPLASTY Bilateral 2010    did both at the same time   [facility] N/A 9/29/2020    EGD BIOPSY performed by Jennifer Saunders MD at [place] [id number]       Prior to Admission medications    Medication Sig Start Date End Date Taking? Authorizing Provider   pantoprazole (PROTONIX) 40 MG tablet Take 1 tablet by mouth 2 times daily 10/20/21  Yes Madelin Stoddard MD   HYDROcodone-acetaminophen (NORCO) 5-325 MG per tablet Take 1 tablet by mouth every 12 hours as needed for Pain for up to 30 days. Take lowest dose possible to manage pain 10/20/21 11/19/21 Yes DEANDRE Webb   chlorzoxazone (PARAFON FORTE) 500 MG tablet Take 1 tablet by mouth 3 times daily as needed for Muscle spasms 10/20/21 11/4/21 Yes DEANDRE Webb   pregabalin (LYRICA) 150 MG capsule Take 1 capsule by mouth 3 times daily for 90 days.  TID 9/8/21 12/7/21 Yes GIUSEP[facility] [place] - CNP   DULoxetine (CYMBALTA) 30 MG extended release capsule Take 1 capsule by mouth daily 8/31/21  Yes Madelin Stoddard MD   [facility] 115-21 MCG/ACT inhaler Inhale 2 puffs into the lungs 2 times daily 8/9/21  Yes Madelin Stoddard MD   busPIRone (BUSPAR) 15 MG tablet TAKE 1 TABLET BY MOUTH THREE TIMES A DAY 7/30/21  Yes Madelin Stoddard MD   topiramate (TOPAMAX) 100 MG tablet Take 1 tablet by mouth 2 times daily 7/30/21  Yes Madelin Stoddard MD   cetirizine (ZYRTEC) 10 MG tablet Take 1 tablet by mouth 2 times daily 7/30/21  Yes Madelin Stoddard MD   tolterodine (DETROL LA) 2 MG extended release capsule Once daily 7/30/21  Yes Madelin Stoddard MD   ondansetron (ZOFRAN-ODT) 4 MG disintegrating tablet Take 1 tablet by mouth 3 times daily as needed for Nausea or Vomiting 6/1/21  Yes Madelin Stoddard MD   albuterol sulfate HFA (VENTOLIN HFA) 108 (90 Base) MCG/ACT inhaler Inhale 2 puffs into the lungs every 6 hours as needed for Wheezing or Shortness of Breath 5/3/21  Yes Brayan Ruth MD   doxepin [facility]) 10 MG capsule Take 1 capsule by mouth nightly 2/26/21  Yes Erika Copeland PA-C   fluticasone [facility]) 50 MCG/ACT nasal spray 1 spray by Nasal route daily  Patient taking differently: 1 spray by Nasal route as needed  4/27/20  Yes Erika Copeland PA-C   zoster recombinant adjuvanted vaccine [facility]) 50 MCG/0.5ML SUSR injection Inject 0.5 mLs into the muscle See Admin Instructions  Patient not taking: Reported on 9/8/2021 7/30/21 1/26/22  Brayan Ruth MD   ammonium lactate (LAC-HYDRIN) 12 % lotion Apply topically daily. Patient not taking: Reported on 10/20/2021 2/24/21   Dakotah Ou., DPM       Allergies   Allergen Reactions    Carafate [Sucralfate]      Patient unable to tolerate. Unable to recall her reaction.     Glucophage [Metformin Hcl]      Severe abdominal pain, constipation      Mobic [Meloxicam] Other (See Comments)     Abdominal pain    Trazodone And Nefazodone      Unable to tolerate, patient reports made her physically ill with abdominal pain      Ultram [Tramadol]      Abdominal pain    Nickel Itching and Rash       Social History     Socioeconomic History    Marital status:      Spouse name: Not on file    Number of children: Not on file    Years of education: Not on file    Highest education level: Associate degree: academic program   Occupational History    Not on file   Tobacco Use    Smoking status: Heavy Tobacco Smoker     Packs/day: 1.00     Years: 30.00     Pack years: 30.00     Types: Cigarettes    Smokeless tobacco: Never Used   Vaping Use    Vaping Use: Never used   Substance and Sexual Activity    Alcohol use: No     Alcohol/week: 0.0 standard drinks    Drug use: No    Sexual activity: Not Currently   Other Topics Concern    Not on file   Social History Narrative    Not on file     Social Determinants of Health     Financial Resource Strain: High Risk    Difficulty of Paying Living Expenses: Hard   Food Insecurity: No Food Insecurity    Worried About Running Out of Food in the Last Year: Never true    Ran Out of Food in the Last Year: Never true   Transportation Needs:     Lack of Transportation (Medical):  Lack of Transportation (Non-Medical):    Physical Activity:     Days of Exercise per Week:     Minutes of Exercise per Session:    Stress:     Feeling of Stress :    Social Connections:     Frequency of Communication with Friends and Family:     Frequency of Social Gatherings with Friends and Family:     Attends [employer] Services:     Active Member of Clubs or Organizations:     Attends Club or Organization Meetings:     Marital Status:    Intimate Partner Violence:     Fear of Current or Ex-Partner:     Emotionally Abused:     Physically Abused:     Sexually Abused:        Family History   Problem Relation Age of Onset    High Blood Pressure Mother     Diabetes Mother     Heart Disease Mother     Cancer Mother     Diabetes Father     Heart Disease Father     High Blood Pressure Father     Cancer Other 50        breast    Depression Brother        REVIEW OF SYSTEMS:     Nena Rivas denies fever/chills, chest pain, shortness of breath, new bowel or bladder complaints. All other review of systems was negative. PHYSICAL EXAMINATION:      /70   Pulse 68   Temp 97.1 °F (36.2 °C)   Resp 20   Ht 5' 8\" (1.727 m)   Wt (!) 336 lb (152.4 kg)   LMP  (LMP Unknown)   SpO2 97%   BMI 51.09 kg/m²     General:      General appearance:   pleasant and well-hydrated. , in no discomfort and A & O x3  Build: Morbidly obese    HEENT:    Head:normocephalic and atraumatic  Sclera: icterus absent,    Lungs:    Breathing:Normal expansion. No wheezing.     Abdomen:    Shape:non-distended and normal      Lumbar spine:    Range of motion:abnormal moderately Lateral bending, flexion, extension rotation bilateral and is painful. Extremities:    Tremors:None bilaterally upper and lower  Range of motion:Generally normal shoulders, pain with internal rotation of hips not done. Intact:Yes  Edema:Normal    Neurological:    [address]    Dermatology:    Skin:no unusual rashes, no skin lesions, no palpable subcutaneous nodules and good skin turgor    Impression:    Chronic low back with h/o L3-5 fusion/bilateral knees replaced since 2010  Refuses to have anymore injections, had at previous facility [place] state and reports so painful during and after, refuses to have again               Seen Dr Froy Bauer and is a surgical candidate but advised weight loss of     >30lbs or consult bariatric Surgery     Plan:  Chronic low back, diffuse body pain and acute left knee pain worsening  Not interested in anymore interventional procedures  Buccal swab in August compliance, last done 10/2021  Refill Lyrica 150 mg TID x 90 day, refill due in Sept 2021  Restart norco 5/325 #45  Discontinue percocet 1/2 tablet po tid - caused constipation  Continue Parfon Forte 500mg po tid prn spasms - helpful  Continue with Cymbalta 40 mg QD from PCP   No longer taking Tizanidine at night by PCP  OARRS report reviewed  Patient encouraged to stay active and to lose weight  Against referral to physical therapy  Treatment plan discussed with the patient including medications side effects                             Controlled Substance Monitoring:    Acute and Chronic Pain Monitoring:   RX Monitoring 10/20/2021   Attestation -   Periodic Controlled Substance Monitoring Possible medication side effects, risk of tolerance/dependence & alternative treatments discussed. ;No signs of potential drug abuse or diversion identified. ;Assessed functional status. We discussed with the patient that combining opioids, benzodiazepines, alcohol, illicit drugs or sleep aids increases the risk of respiratory depression including death.  We discussed that these medications may cause drowsiness, sedation or dizziness and have counseled the patient not to drive or operate machinery. We have discussed that these medications will be prescribed only by one provider. We have discussed with the patient about age related risk factors and have thoroughly discussed the importance of taking these medications as prescribed. The patient verbalizes understanding.     ccreferring physic

## 2021-10-19 DIAGNOSIS — K21.9 GASTROESOPHAGEAL REFLUX DISEASE WITHOUT ESOPHAGITIS: ICD-10-CM

## 2021-10-20 ENCOUNTER — OFFICE VISIT (OUTPATIENT)
Dept: PAIN MANAGEMENT | Age: 66
End: 2021-10-20
Payer: MEDICARE

## 2021-10-20 VITALS
TEMPERATURE: 97.1 F | DIASTOLIC BLOOD PRESSURE: 70 MMHG | BODY MASS INDEX: 44.41 KG/M2 | WEIGHT: 293 LBS | HEIGHT: 68 IN | SYSTOLIC BLOOD PRESSURE: 132 MMHG | OXYGEN SATURATION: 97 % | RESPIRATION RATE: 20 BRPM | HEART RATE: 68 BPM

## 2021-10-20 DIAGNOSIS — G89.29 CHRONIC LOW BACK PAIN WITH SCIATICA, SCIATICA LATERALITY UNSPECIFIED, UNSPECIFIED BACK PAIN LATERALITY: Primary | ICD-10-CM

## 2021-10-20 DIAGNOSIS — F11.99 OPIOID USE, UNSPECIFIED WITH UNSPECIFIED OPIOID-INDUCED DISORDER (HCC): ICD-10-CM

## 2021-10-20 DIAGNOSIS — M79.7 FIBROMYALGIA: ICD-10-CM

## 2021-10-20 DIAGNOSIS — M47.816 LUMBAR SPONDYLOSIS: ICD-10-CM

## 2021-10-20 DIAGNOSIS — G89.4 CHRONIC PAIN SYNDROME: ICD-10-CM

## 2021-10-20 DIAGNOSIS — M51.26 DISPLACEMENT OF LUMBAR INTERVERTEBRAL DISC: ICD-10-CM

## 2021-10-20 DIAGNOSIS — M54.40 CHRONIC LOW BACK PAIN WITH SCIATICA, SCIATICA LATERALITY UNSPECIFIED, UNSPECIFIED BACK PAIN LATERALITY: Primary | ICD-10-CM

## 2021-10-20 DIAGNOSIS — M51.9 LUMBAR DISC DISORDER: ICD-10-CM

## 2021-10-20 DIAGNOSIS — M47.816 LUMBAR FACET ARTHROPATHY: ICD-10-CM

## 2021-10-20 DIAGNOSIS — M48.061 SPINAL STENOSIS OF LUMBAR REGION WITHOUT NEUROGENIC CLAUDICATION: ICD-10-CM

## 2021-10-20 DIAGNOSIS — M79.18 MYOFASCIAL PAIN SYNDROME: ICD-10-CM

## 2021-10-20 PROCEDURE — G8427 DOCREV CUR MEDS BY ELIG CLIN: HCPCS | Performed by: PHYSICIAN ASSISTANT

## 2021-10-20 PROCEDURE — G8484 FLU IMMUNIZE NO ADMIN: HCPCS | Performed by: PHYSICIAN ASSISTANT

## 2021-10-20 PROCEDURE — 1123F ACP DISCUSS/DSCN MKR DOCD: CPT | Performed by: PHYSICIAN ASSISTANT

## 2021-10-20 PROCEDURE — 1090F PRES/ABSN URINE INCON ASSESS: CPT | Performed by: PHYSICIAN ASSISTANT

## 2021-10-20 PROCEDURE — G8399 PT W/DXA RESULTS DOCUMENT: HCPCS | Performed by: PHYSICIAN ASSISTANT

## 2021-10-20 PROCEDURE — 99213 OFFICE O/P EST LOW 20 MIN: CPT | Performed by: PHYSICIAN ASSISTANT

## 2021-10-20 PROCEDURE — 4004F PT TOBACCO SCREEN RCVD TLK: CPT | Performed by: PHYSICIAN ASSISTANT

## 2021-10-20 PROCEDURE — G8417 CALC BMI ABV UP PARAM F/U: HCPCS | Performed by: PHYSICIAN ASSISTANT

## 2021-10-20 PROCEDURE — 4040F PNEUMOC VAC/ADMIN/RCVD: CPT | Performed by: PHYSICIAN ASSISTANT

## 2021-10-20 PROCEDURE — 3017F COLORECTAL CA SCREEN DOC REV: CPT | Performed by: PHYSICIAN ASSISTANT

## 2021-10-20 RX ORDER — PANTOPRAZOLE SODIUM 40 MG/1
40 TABLET, DELAYED RELEASE ORAL 2 TIMES DAILY
Qty: 180 TABLET | Refills: 0 | Status: SHIPPED
Start: 2021-10-20 | End: 2022-01-07 | Stop reason: SDUPTHER

## 2021-10-20 RX ORDER — CHLORZOXAZONE 500 MG/1
500 TABLET ORAL 3 TIMES DAILY PRN
Qty: 45 TABLET | Refills: 0 | Status: SHIPPED
Start: 2021-10-20 | End: 2021-11-23 | Stop reason: SDUPTHER

## 2021-10-20 RX ORDER — HYDROCODONE BITARTRATE AND ACETAMINOPHEN 5; 325 MG/1; MG/1
1 TABLET ORAL EVERY 12 HOURS PRN
Qty: 45 TABLET | Refills: 0 | Status: SHIPPED
Start: 2021-10-20 | End: 2021-11-22 | Stop reason: SDUPTHER

## 2021-10-20 RX ORDER — HYDROCODONE BITARTRATE AND ACETAMINOPHEN 5; 325 MG/1; MG/1
1 TABLET ORAL EVERY 12 HOURS PRN
COMMUNITY
End: 2021-10-20

## 2021-10-20 NOTE — PROGRESS NOTES
Do you currently have any of the following:    Fever: No  Headache:  No  Cough: No  Shortness of breath: Yes  Exposed to anyone with these symptoms: No                                                                                                                Ahsan Greenwood presents to the [facility] on 10/20/2021. Harriet Wong is complaining of pain in her lower back which radiates down the left leg to ankle. The pain is constant. The pain is described as aggravating. Pain is rated on her best day at a 5, on her worst day at a 10, and on average at a 7 on the VAS scale. She took her last dose of Percocet 2 weeks ago., norco 1/2 this am, lyrica this am, Harriet Wong does have issues with constipation. Any procedures since your last visit: No,     She is not on NSAIDS and  is not on anticoagulation medications to include none     Pacemaker or defibrillator: No     Medication Contract and Consent for Opioid Use Documents Filed     Patient Documents       Type of Document Status Date Received Received By Description     Medication Contract Received 10/14/2020  1:22 PM TOMY THOMAS Pain Mgmt Patient Agreement                   /70   Pulse 68   Temp 97.1 °F (36.2 °C)   Resp 20   Ht 5' 8\" (1.727 m)   Wt (!) 336 lb (152.4 kg)   LMP  (LMP Unknown)   SpO2 97%   BMI 51.09 kg/m²      No LMP recorded (lmp unknown). Patient has had a hysterectomy.

## 2021-10-29 DIAGNOSIS — J30.9 ALLERGIC RHINITIS, UNSPECIFIED SEASONALITY, UNSPECIFIED TRIGGER: ICD-10-CM

## 2021-11-02 RX ORDER — CETIRIZINE HYDROCHLORIDE 10 MG/1
10 TABLET ORAL 2 TIMES DAILY
Qty: 180 TABLET | Refills: 0 | Status: SHIPPED
Start: 2021-11-02 | End: 2022-02-06 | Stop reason: SDUPTHER

## 2021-11-08 DIAGNOSIS — D68.9 COAGULATION DEFECT (HCC): ICD-10-CM

## 2021-11-08 DIAGNOSIS — E11.9 DIABETES MELLITUS WITHOUT COMPLICATION (HCC): ICD-10-CM

## 2021-11-08 DIAGNOSIS — R19.7 DIARRHEA, UNSPECIFIED TYPE: ICD-10-CM

## 2021-11-08 LAB
ANION GAP SERPL CALCULATED.3IONS-SCNC: 15 MMOL/L (ref 7–16)
BASOPHILS ABSOLUTE: 0.09 E9/L (ref 0–0.2)
BASOPHILS RELATIVE PERCENT: 1.7 % (ref 0–2)
BUN BLDV-MCNC: 11 MG/DL (ref 6–23)
CALCIUM SERPL-MCNC: 9.2 MG/DL (ref 8.6–10.2)
CHLORIDE BLD-SCNC: 108 MMOL/L (ref 98–107)
CHOLESTEROL, TOTAL: 227 MG/DL (ref 0–199)
CO2: 19 MMOL/L (ref 22–29)
CREAT SERPL-MCNC: 1.2 MG/DL (ref 0.5–1)
EOSINOPHILS ABSOLUTE: 0.21 E9/L (ref 0.05–0.5)
EOSINOPHILS RELATIVE PERCENT: 4 % (ref 0–6)
GFR AFRICAN AMERICAN: 54
GFR NON-AFRICAN AMERICAN: 45 ML/MIN/1.73
GLUCOSE BLD-MCNC: 99 MG/DL (ref 74–99)
HBA1C MFR BLD: 5.5 % (ref 4–5.6)
HCT VFR BLD CALC: 41.4 % (ref 34–48)
HDLC SERPL-MCNC: 57 MG/DL
HEMOGLOBIN: 13 G/DL (ref 11.5–15.5)
IMMATURE GRANULOCYTES #: 0.02 E9/L
IMMATURE GRANULOCYTES %: 0.4 % (ref 0–5)
LDL CHOLESTEROL CALCULATED: 132 MG/DL (ref 0–99)
LYMPHOCYTES ABSOLUTE: 1.73 E9/L (ref 1.5–4)
LYMPHOCYTES RELATIVE PERCENT: 32.9 % (ref 20–42)
MCH RBC QN AUTO: 29.3 PG (ref 26–35)
MCHC RBC AUTO-ENTMCNC: 31.4 % (ref 32–34.5)
MCV RBC AUTO: 93.5 FL (ref 80–99.9)
MONOCYTES ABSOLUTE: 0.5 E9/L (ref 0.1–0.95)
MONOCYTES RELATIVE PERCENT: 9.5 % (ref 2–12)
NEUTROPHILS ABSOLUTE: 2.71 E9/L (ref 1.8–7.3)
NEUTROPHILS RELATIVE PERCENT: 51.5 % (ref 43–80)
PDW BLD-RTO: 14 FL (ref 11.5–15)
PLATELET # BLD: 105 E9/L (ref 130–450)
PMV BLD AUTO: 13.9 FL (ref 7–12)
POTASSIUM SERPL-SCNC: 4.1 MMOL/L (ref 3.5–5)
RBC # BLD: 4.43 E12/L (ref 3.5–5.5)
SODIUM BLD-SCNC: 142 MMOL/L (ref 132–146)
TRIGL SERPL-MCNC: 192 MG/DL (ref 0–149)
VLDLC SERPL CALC-MCNC: 38 MG/DL
WBC # BLD: 5.3 E9/L (ref 4.5–11.5)

## 2021-11-09 ENCOUNTER — OFFICE VISIT (OUTPATIENT)
Dept: FAMILY MEDICINE CLINIC | Age: 66
End: 2021-11-09
Payer: MEDICARE

## 2021-11-09 VITALS
OXYGEN SATURATION: 100 % | HEIGHT: 68 IN | SYSTOLIC BLOOD PRESSURE: 126 MMHG | BODY MASS INDEX: 44.41 KG/M2 | RESPIRATION RATE: 16 BRPM | TEMPERATURE: 96.8 F | WEIGHT: 293 LBS | HEART RATE: 70 BPM | DIASTOLIC BLOOD PRESSURE: 70 MMHG

## 2021-11-09 DIAGNOSIS — Z23 FLU VACCINE NEED: ICD-10-CM

## 2021-11-09 DIAGNOSIS — N18.31 STAGE 3A CHRONIC KIDNEY DISEASE (HCC): ICD-10-CM

## 2021-11-09 DIAGNOSIS — R26.89 BALANCE DISORDER: ICD-10-CM

## 2021-11-09 DIAGNOSIS — J30.9 ALLERGIC RHINITIS, UNSPECIFIED SEASONALITY, UNSPECIFIED TRIGGER: ICD-10-CM

## 2021-11-09 DIAGNOSIS — R06.02 SHORTNESS OF BREATH ON EXERTION: Primary | ICD-10-CM

## 2021-11-09 DIAGNOSIS — E66.01 CLASS 3 SEVERE OBESITY DUE TO EXCESS CALORIES WITH SERIOUS COMORBIDITY AND BODY MASS INDEX (BMI) OF 50.0 TO 59.9 IN ADULT (HCC): ICD-10-CM

## 2021-11-09 DIAGNOSIS — R60.0 BILATERAL EDEMA OF LOWER EXTREMITY: ICD-10-CM

## 2021-11-09 PROCEDURE — 1123F ACP DISCUSS/DSCN MKR DOCD: CPT | Performed by: INTERNAL MEDICINE

## 2021-11-09 PROCEDURE — 99214 OFFICE O/P EST MOD 30 MIN: CPT | Performed by: INTERNAL MEDICINE

## 2021-11-09 PROCEDURE — 3017F COLORECTAL CA SCREEN DOC REV: CPT | Performed by: INTERNAL MEDICINE

## 2021-11-09 PROCEDURE — 90694 VACC AIIV4 NO PRSRV 0.5ML IM: CPT | Performed by: INTERNAL MEDICINE

## 2021-11-09 PROCEDURE — G8484 FLU IMMUNIZE NO ADMIN: HCPCS | Performed by: INTERNAL MEDICINE

## 2021-11-09 PROCEDURE — G8417 CALC BMI ABV UP PARAM F/U: HCPCS | Performed by: INTERNAL MEDICINE

## 2021-11-09 PROCEDURE — 1090F PRES/ABSN URINE INCON ASSESS: CPT | Performed by: INTERNAL MEDICINE

## 2021-11-09 PROCEDURE — G8427 DOCREV CUR MEDS BY ELIG CLIN: HCPCS | Performed by: INTERNAL MEDICINE

## 2021-11-09 PROCEDURE — 4004F PT TOBACCO SCREEN RCVD TLK: CPT | Performed by: INTERNAL MEDICINE

## 2021-11-09 PROCEDURE — G8399 PT W/DXA RESULTS DOCUMENT: HCPCS | Performed by: INTERNAL MEDICINE

## 2021-11-09 PROCEDURE — G0008 ADMIN INFLUENZA VIRUS VAC: HCPCS | Performed by: INTERNAL MEDICINE

## 2021-11-09 PROCEDURE — 4040F PNEUMOC VAC/ADMIN/RCVD: CPT | Performed by: INTERNAL MEDICINE

## 2021-11-09 RX ORDER — FLUTICASONE PROPIONATE AND SALMETEROL XINAFOATE 115; 21 UG/1; UG/1
2 AEROSOL, METERED RESPIRATORY (INHALATION) 2 TIMES DAILY
Qty: 3 EACH | Refills: 3 | Status: SHIPPED
Start: 2021-11-09 | End: 2021-12-06

## 2021-11-09 RX ORDER — HYDROCHLOROTHIAZIDE 25 MG/1
25 TABLET ORAL EVERY MORNING
Qty: 90 TABLET | Refills: 0 | Status: ON HOLD
Start: 2021-11-09 | End: 2022-06-20 | Stop reason: HOSPADM

## 2021-11-09 RX ORDER — FLUTICASONE PROPIONATE 50 MCG
1 SPRAY, SUSPENSION (ML) NASAL DAILY
Qty: 3 EACH | Refills: 3 | Status: SHIPPED
Start: 2021-11-09 | End: 2022-08-16 | Stop reason: SDUPTHER

## 2021-11-09 NOTE — PROGRESS NOTES
11/9/2021  Visit type: Established patient    Reason for Visit: Diabetes (f/u), Discuss Labs, Health Maintenance (flu vaccine given: Low dose CT lung scan, diabetic foot exam, diabetic eye exam, shingles vaccine), and Leg Swelling (bilateral: red and blistery)      ASSESSMENT AND PLAN        1. Shortness of breath on exertion  This could be her asthma or heart failure. Known LVH. Begin diuretic  - ADVAIR -21 MCG/ACT inhaler; Inhale 2 puffs into the lungs 2 times daily  Dispense: 3 each; Refill: 3  - Echocardiogram complete; Future  - BRAIN NATRIURETIC PEPTIDE; Future    2. Bilateral edema of lower extremity     - hydroCHLOROthiazide (HYDRODIURIL) 25 MG tablet; Take 1 tablet by mouth every morning  Dispense: 90 tablet; Refill: 0    3. Allergic rhinitis, unspecified seasonality, unspecified trigger     - fluticasone (FLONASE) 50 MCG/ACT nasal spray; 1 spray by Nasal route daily  Dispense: 3 each; Refill: 3    4. Class 3 severe obesity due to excess calories with serious comorbidity and body mass index (BMI) of 50.0 to 59.9 in adult [facility])  Discussed options and she is possibly interested in bariatric surgery   - Dayana Bartlett MD, Bariatrics, [facility]    5. Balance disorder  nonspecific  - Vitamin B12; Future    6. Stage 3a chronic kidney disease ([facility] [address]  We discussed this stable condition in some detail. Unclear cause as she has no proteinuria and CT abd in recent years should no abnormality of   Kidneys. She is to avoid NSAIDS and any other potential nephrotoxin. We discussed that PPIs may possibly be associated with CKD. - Urinalysis; Future    7.  Flu vaccine need     - INFLUENZA, QUADV, ADJUVANTED, 65 YRS =, IM, PF, PREFILL SYR, 0.5ML (FLUAD)     ----------------------------------------------------------------------    SUBJECTIVE    Chief Complaint   Patient presents with    Diabetes     f/u   [facility] Maintenance     flu vaccine given: Low dose CT lung scan, diabetic foot exam, diabetic eye exam, shingles vaccine    Leg Swelling     bilateral: red and blistery     HPI   Patient has been having mild PEREZ and more leg edema. Her balance is poor. Has some bumps on skin of calves. Review of Systems   Denies fever, chills, chest pain, bleeding, weight loss,     OBJECTIVE    /70 (Site: Left Upper Arm, Position: Sitting, Cuff Size: Large Adult)   Pulse 70   Temp 96.8 °F (36 °C) (Temporal)   Resp 16   Ht 5' 8\" (1.727 m)   Wt (!) 337 lb 14.4 oz (153.3 kg)   LMP  (LMP Unknown)   SpO2 100%   BMI 51.38 kg/m²   Physical Exam  Constitutional:       General: She is not in acute distress. HENT:      Mouth/Throat:      Pharynx: Oropharynx is clear. Cardiovascular:      Rate and Rhythm: Normal rate and regular rhythm. Heart sounds: Normal heart sounds. No murmur heard. No friction rub. No gallop. Pulmonary:      Breath sounds: Normal breath sounds. No stridor. No wheezing, rhonchi or rales. Musculoskeletal:      Right lower leg: Edema present. Left lower leg: Edema present. Skin:     Comments: Some erythema and dry skin of calves. Neurological:      General: No focal deficit present.       Mental Status: She is alert.         ---------------------------------------------------------------------------

## 2021-11-10 PROBLEM — N18.31 STAGE 3A CHRONIC KIDNEY DISEASE (HCC): Status: ACTIVE | Noted: 2021-11-10

## 2021-11-11 LAB
GLIADIN ANTIBODIES IGA: 0.8 U/ML
GLIADIN ANTIBODIES IGG: <0.4 U/ML

## 2021-11-22 ENCOUNTER — TELEPHONE (OUTPATIENT)
Dept: PAIN MANAGEMENT | Age: 66
End: 2021-11-22

## 2021-11-22 DIAGNOSIS — M54.40 CHRONIC LOW BACK PAIN WITH SCIATICA, SCIATICA LATERALITY UNSPECIFIED, UNSPECIFIED BACK PAIN LATERALITY: ICD-10-CM

## 2021-11-22 DIAGNOSIS — M51.9 LUMBAR DISC DISORDER: ICD-10-CM

## 2021-11-22 DIAGNOSIS — M47.816 LUMBAR SPONDYLOSIS: ICD-10-CM

## 2021-11-22 DIAGNOSIS — G89.29 CHRONIC LOW BACK PAIN WITH SCIATICA, SCIATICA LATERALITY UNSPECIFIED, UNSPECIFIED BACK PAIN LATERALITY: ICD-10-CM

## 2021-11-22 DIAGNOSIS — M79.7 FIBROMYALGIA: ICD-10-CM

## 2021-11-22 DIAGNOSIS — M48.061 SPINAL STENOSIS OF LUMBAR REGION WITHOUT NEUROGENIC CLAUDICATION: ICD-10-CM

## 2021-11-22 RX ORDER — HYDROCODONE BITARTRATE AND ACETAMINOPHEN 5; 325 MG/1; MG/1
1 TABLET ORAL EVERY 12 HOURS PRN
Qty: 45 TABLET | Refills: 0 | Status: SHIPPED
Start: 2021-11-22 | End: 2021-12-06 | Stop reason: SDUPTHER

## 2021-11-23 RX ORDER — CHLORZOXAZONE 500 MG/1
500 TABLET ORAL 3 TIMES DAILY PRN
Qty: 45 TABLET | Refills: 0 | Status: SHIPPED
Start: 2021-11-23 | End: 2021-12-06 | Stop reason: SDUPTHER

## 2021-12-06 ENCOUNTER — OFFICE VISIT (OUTPATIENT)
Dept: PAIN MANAGEMENT | Age: 66
End: 2021-12-06
Payer: MEDICARE

## 2021-12-06 VITALS
HEIGHT: 68 IN | OXYGEN SATURATION: 98 % | SYSTOLIC BLOOD PRESSURE: 122 MMHG | RESPIRATION RATE: 16 BRPM | WEIGHT: 293 LBS | HEART RATE: 68 BPM | BODY MASS INDEX: 44.41 KG/M2 | TEMPERATURE: 95.9 F | DIASTOLIC BLOOD PRESSURE: 80 MMHG

## 2021-12-06 DIAGNOSIS — M79.7 FIBROMYALGIA: ICD-10-CM

## 2021-12-06 DIAGNOSIS — M48.061 SPINAL STENOSIS OF LUMBAR REGION WITHOUT NEUROGENIC CLAUDICATION: ICD-10-CM

## 2021-12-06 DIAGNOSIS — G89.29 CHRONIC LOW BACK PAIN WITH SCIATICA, SCIATICA LATERALITY UNSPECIFIED, UNSPECIFIED BACK PAIN LATERALITY: ICD-10-CM

## 2021-12-06 DIAGNOSIS — M51.9 LUMBAR DISC DISORDER: ICD-10-CM

## 2021-12-06 DIAGNOSIS — M54.40 CHRONIC LOW BACK PAIN WITH SCIATICA, SCIATICA LATERALITY UNSPECIFIED, UNSPECIFIED BACK PAIN LATERALITY: ICD-10-CM

## 2021-12-06 DIAGNOSIS — M47.816 LUMBAR SPONDYLOSIS: ICD-10-CM

## 2021-12-06 PROCEDURE — 99213 OFFICE O/P EST LOW 20 MIN: CPT | Performed by: NURSE PRACTITIONER

## 2021-12-06 PROCEDURE — 3017F COLORECTAL CA SCREEN DOC REV: CPT | Performed by: NURSE PRACTITIONER

## 2021-12-06 PROCEDURE — 1123F ACP DISCUSS/DSCN MKR DOCD: CPT | Performed by: NURSE PRACTITIONER

## 2021-12-06 PROCEDURE — G8417 CALC BMI ABV UP PARAM F/U: HCPCS | Performed by: NURSE PRACTITIONER

## 2021-12-06 PROCEDURE — G8484 FLU IMMUNIZE NO ADMIN: HCPCS | Performed by: NURSE PRACTITIONER

## 2021-12-06 PROCEDURE — G8427 DOCREV CUR MEDS BY ELIG CLIN: HCPCS | Performed by: NURSE PRACTITIONER

## 2021-12-06 PROCEDURE — 1090F PRES/ABSN URINE INCON ASSESS: CPT | Performed by: NURSE PRACTITIONER

## 2021-12-06 PROCEDURE — 4040F PNEUMOC VAC/ADMIN/RCVD: CPT | Performed by: NURSE PRACTITIONER

## 2021-12-06 PROCEDURE — G8399 PT W/DXA RESULTS DOCUMENT: HCPCS | Performed by: NURSE PRACTITIONER

## 2021-12-06 PROCEDURE — 4004F PT TOBACCO SCREEN RCVD TLK: CPT | Performed by: NURSE PRACTITIONER

## 2021-12-06 RX ORDER — HYDROCODONE BITARTRATE AND ACETAMINOPHEN 5; 325 MG/1; MG/1
1 TABLET ORAL EVERY 12 HOURS PRN
Qty: 45 TABLET | Refills: 0 | Status: SHIPPED
Start: 2021-12-21 | End: 2022-01-19 | Stop reason: SDUPTHER

## 2021-12-06 RX ORDER — PREGABALIN 150 MG/1
150 CAPSULE ORAL 3 TIMES DAILY
Qty: 270 CAPSULE | Refills: 0 | Status: SHIPPED
Start: 2021-12-06 | End: 2022-03-17 | Stop reason: SDUPTHER

## 2021-12-06 RX ORDER — CHLORZOXAZONE 500 MG/1
500 TABLET ORAL 3 TIMES DAILY PRN
Qty: 45 TABLET | Refills: 2 | Status: SHIPPED
Start: 2021-12-06 | End: 2022-01-19 | Stop reason: SDUPTHER

## 2021-12-06 RX ORDER — FLUTICASONE PROPIONATE AND SALMETEROL XINAFOATE 45; 21 UG/1; UG/1
2 AEROSOL, METERED RESPIRATORY (INHALATION) 2 TIMES DAILY
COMMUNITY
Start: 2021-09-09

## 2021-12-06 NOTE — PROGRESS NOTES
Do you currently have any of the following:    Fever: No  Headache:  No  Cough: No  Shortness of breath: No  Exposed to anyone with these symptoms: No                                                                                                                Franko Ma presents to the [facility] on 12/6/2021. Sahara Bhatia is complaining of pain in her lower back. . The pain is constant. The pain is described as aching, throbbing, shooting, stabbing and sharp. Pain is rated on her best day at a 9, on her worst day at a 10, and on average at a 9 on the VAS scale. She took her last dose of Norco and Lyrica . Sahara Bhatia does not have issues with constipation. Any procedures since your last visit: No,    She is not on NSAIDS and  is not on anticoagulation medications to include none and is managed by NA. Pacemaker or defibrillator: No Physician managing device is NA. Medication Contract and Consent for Opioid Use Documents Filed     Patient Documents     Type of Document Status Date Received Received By Description    Medication Contract Received 10/14/2020  1:22 PM TOMY THOMAS Mgmt Patient Agreement                   /80   Pulse 68   Temp 95.9 °F (35.5 °C) (Infrared)   Resp 16   Ht 5' 8\" (1.727 m)   Wt (!) 337 lb (152.9 kg)   LMP  (LMP Unknown)   SpO2 98%   BMI 51.24 kg/m²      No LMP recorded (lmp unknown). Patient has had a hysterectomy.

## 2021-12-06 NOTE — PROGRESS NOTES
[facility]  [address] [facility], [address] [place] [address]  [phone]    Follow up Note      Irma Blackburn     Date of Visit:  12/6/2021    CC:  Patient presents for follow up   Chief Complaint   Patient presents with    Lower Back Pain       HPI:    Pain is unchanged. Lower back pain and left leg pain continue. Appropriate analgesia with current medications regimen: no.    Change in quality of symptoms:no. Medication side effects: percocet caused constipation  Recent diagnostic testing:none. Recent interventional procedures:none. She has not been on anticoagulation medications to include none. The patient  has not been on herbal supplements. The patient is diabetic. Imaging:    CT of left knee 04/2021:     Impression   1. Small suprapatellar joint effusion. 2. Prior left knee arthroplasty and patellar resurfacing.  No significant   periprosthetic lucency or acute osseous abnormality. 3. Calcifications which are well corticated in the distal quadriceps tendon   measuring up to 1.5 x 0.8 cm.   4. Hardware associated streak artifact limits the exam to some degree.            MRI of Lumbar Spine 09/2020:  1. Postsurgical changes status post L3-L5 posterior spinal fusion. 2. Severe multifactorial spinal canal stenosis at L2-L3 with mass effect on    the cauda equina nerve roots. 3. Moderate multifactorial L1-L2 spinal canal stenosis with moderate right    neural foraminal stenosis. 4. Moderate left L5-S1 neural foraminal stenosis.              LUMBAR SPINE XRAY 03/4/2020  Intact lumbar vertebral height and alignment. Relative severe    multilevel disc and facet joint narrowing with subchondral sclerosis    and spurring. Previous discectomy with posterior pedicle screw plates    extending from L3 through L5. Symmetric osteoarthritis at the    sacroiliac joints manifesting as joint space narrowing and subchondral    sclerosis.            Lumbar spine CT 2016      1.  Status post lumbar spine fusion from L3-L5 appearing in stable,   satisfactory alignment.       2. Lumbar spondylosis as described above in part due to congenital   shortened pedicles and notable stenosis          Potential Aberrant Drug-Related Behavior:  None     Previous medication: baclofen helps some, tizanidine sleepiness, flexeril no relief, percocet constipation     Urine Drug Screenin2020 Negative for all which is consistent, pt does NOT take every day and does NOT fill exactly 30 days, can go longer than a month for refill   10/2020: consistent for hydrocodone  2021:  consistent     OARRS report:  2020-2021:Consistent     Pain agreement: 10/14/2020 - new one to be filled out today.         Past Medical History:   Diagnosis Date    Anxiety     Arthralgia 10/8/2013    Arthritis     Bone avascular necrosis ([address] 3/23/2015    Breast lesion 2015    Chronic back pain     Chronic fatigue 10/8/2013    Daytime somnolence 10/8/2013    Depression     Diabetes mellitus ([address]     Fibromyalgia     Fracture, fibula     right    GERD (gastroesophageal reflux disease)     H/O cardiovascular stress test 2020    Lexiscan    Headache     History of blood transfusion     History of fractured kneecap     (R) 2 hair line fractures    History of total knee arthroplasty 3/19/2015    Hyperlipidemia     Morbid obesity with BMI of 45.0-49.9, adult ([address] 10/9/2015    Obesity     Osteoarthritis     Overactive bladder 2014    Reflux        Past Surgical History:   Procedure Laterality Date    CARPAL TUNNEL RELEASE Bilateral     CHOLECYSTECTOMY      COLONOSCOPY      COLONOSCOPY  2016    Dr. Annmarie Fernández N/A 2020    LAPAROSCOPIC HERNIA HIATAL REPAIR WITH MESH performed by Alberto Smith MD at [address]    benign reasons     JOINT REPLACEMENT Bilateral 2010    LUMBAR FUSION      L3-5    ROTATOR CUFF REPAIR Left     left    TOTAL KNEE ARTHROPLASTY Bilateral 2010    did both at the same time   [facility] 9/29/2020    EGD BIOPSY performed by Kael Holm MD at [facility]       Prior to Admission medications    Medication Sig Start Date End Date Taking? Authorizing Provider   [facility] 45-21 MCG/ACT inhaler  9/9/21  Yes Historical Provider, MD   chlorzoxazone (PARAFON FORTE) 500 MG tablet Take 1 tablet by mouth 3 times daily as needed for Muscle spasms 11/23/21 12/8/21 Yes DEANDRE Cruz   HYDROcodone-acetaminophen (NORCO) 5-325 MG per tablet Take 1 tablet by mouth every 12 hours as needed for Pain for up to 30 days. Take lowest dose possible to manage pain 11/22/21 12/22/21 Yes GIUSEPPE Choudhary CNP   fluticasone (FLONASE) 50 MCG/ACT nasal spray 1 spray by Nasal route daily 11/9/21  Yes Jamshid Thomas MD   hydroCHLOROthiazide (HYDRODIURIL) 25 MG tablet Take 1 tablet by mouth every morning 11/9/21  Yes Jamshid Thomas MD   topiramate (TOPAMAX) 100 MG tablet Take 1 tablet by mouth 2 times daily 11/2/21  Yes Jamshid Thomas MD   cetirizine (ZYRTEC) 10 MG tablet Take 1 tablet by mouth 2 times daily 11/2/21  Yes Jamshid Thomas MD   pantoprazole (PROTONIX) 40 MG tablet Take 1 tablet by mouth 2 times daily 10/20/21  Yes Jamshid Thomas MD   pregabalin (LYRICA) 150 MG capsule Take 1 capsule by mouth 3 times daily for 90 days.  TID 9/8/21 12/7/21 Yes GIUSEPPE Choudhary CNP   DULoxetine (CYMBALTA) 30 MG extended release capsule Take 1 capsule by mouth daily 8/31/21  Yes Jamshid Thomas MD   busPIRone (BUSPAR) 15 MG tablet TAKE 1 TABLET BY MOUTH THREE TIMES A DAY 7/30/21  Yes Jamshid Thomas MD   zoster recombinant adjuvanted vaccine [facility]) 50 MCG/0.5ML SUSR injection Inject 0.5 mLs into the muscle See Admin Instructions 7/30/21 1/26/22 Yes Jamshid Thomas MD   tolterodine (DETROL LA) 2 MG extended release capsule Once daily 7/30/21  Yes Christy Zamora MD   ondansetron (ZOFRAN-ODT) 4 MG disintegrating tablet Take 1 tablet by mouth 3 times daily as needed for Nausea or Vomiting 6/1/21  Yes Christy Zamora MD   albuterol sulfate HFA (VENTOLIN HFA) 108 (90 Base) MCG/ACT inhaler Inhale 2 puffs into the lungs every 6 hours as needed for Wheezing or Shortness of Breath 5/3/21  Yes Christy Zamora MD   doxepin [facility]) 10 MG capsule Take 1 capsule by mouth nightly 2/26/21  Yes Eliceo Camara PA-C   ammonium lactate (LAC-HYDRIN) 12 % lotion Apply topically daily. 2/24/21  Yes Marisol Gonsaelz DPM       Allergies   Allergen Reactions    Carafate [Sucralfate]      Patient unable to tolerate. Unable to recall her reaction.     Glucophage [Metformin Hcl]      Severe abdominal pain, constipation      Mobic [Meloxicam] Other (See Comments)     Abdominal pain    Trazodone And Nefazodone      Unable to tolerate, patient reports made her physically ill with abdominal pain      Ultram [Tramadol]      Abdominal pain    Nickel Itching and Rash       Social History     Socioeconomic History    Marital status:      Spouse name: Not on file    Number of children: Not on file    Years of education: Not on file    Highest education level: Associate degree: academic program   Occupational History    Not on file   Tobacco Use    Smoking status: Heavy Tobacco Smoker     Packs/day: 1.00     Years: 30.00     Pack years: 30.00     Types: Cigarettes    Smokeless tobacco: Never Used   Vaping Use    Vaping Use: Never used   Substance and Sexual Activity    Alcohol use: No     Alcohol/week: 0.0 standard drinks    Drug use: No    Sexual activity: Not Currently   Other Topics Concern    Not on file   Social History Narrative    Not on file     Social Determinants of Health     Financial Resource Strain: High Risk    Difficulty of Paying Living Expenses: Hard   Food Insecurity: No Food Insecurity    Worried About Running Out of Food in the Last Year: Never true    Ascencion of Food in the Last Year: Never true   Transportation Needs:     Lack of Transportation (Medical): Not on file    Lack of Transportation (Non-Medical): Not on file   Physical Activity:     Days of Exercise per Week: Not on file    Minutes of Exercise per Session: Not on file   Stress:     Feeling of Stress : Not on file   Social Connections:     Frequency of Communication with Friends and Family: Not on file    Frequency of Social Gatherings with Friends and Family: Not on file    Attends [employer] Services: Not on file    Active Member of [address] [place] [address] [employer] or Organizations: Not on file    Attends Club or Organization Meetings: Not on file    Marital Status: Not on file   Intimate Partner Violence:     Fear of Current or Ex-Partner: Not on file    Emotionally Abused: Not on file    Physically Abused: Not on file    Sexually Abused: Not on file   Housing Stability:     Unable to Pay for Housing in the Last Year: Not on file    Number of [place] in the Last Year: Not on file    Unstable Housing in the Last Year: Not on file       Family History   Problem Relation Age of Onset    High Blood Pressure Mother     Diabetes Mother     Heart Disease Mother     Cancer Mother     Diabetes Father     Heart Disease Father     High Blood Pressure Father     Cancer Other 50        breast    Depression Brother        REVIEW OF SYSTEMS:     Pedro Tejeda denies fever/chills, chest pain, shortness of breath, new bowel or bladder complaints. All other review of systems was negative. PHYSICAL EXAMINATION:      /80   Pulse 68   Temp 95.9 °F (35.5 °C) (Infrared)   Resp 16   Ht 5' 8\" (1.727 m)   Wt (!) 337 lb (152.9 kg)   LMP  (LMP Unknown)   SpO2 98%   BMI 51.24 kg/m²     General:      General appearance:   pleasant and well-hydrated. , in moderate discomfort and A & O x3  Build: Morbidly obese    HEENT:    Head:normocephalic and atraumatic  Sclera: icterus absent,    Lungs:    Breathing:Normal expansion. No wheezing. Abdomen:    Shape:non-distended and normal      Lumbar spine:    Range of motion:abnormal moderately Lateral bending, flexion, extension rotation bilateral and is painful. Extremities:    Tremors:None bilaterally upper and lower  Range of motion:Generally normal shoulders, pain with internal rotation of hips not done. Intact:Yes  Edema:Normal    Neurological:    [address]    Dermatology:    Skin:no unusual rashes, no skin lesions, no palpable subcutaneous nodules and good skin turgor    Impression:    Chronic low back with h/o L3-5 fusion/bilateral knees replaced since 2010  Refuses to have anymore injections, had at previous facility [place] of [place] and reports so painful during and after, refuses to have again               Seen Dr Kendall Hernandez and is a surgical candidate but advised  weight loss of   >30lbs or consult bariatric Surgery     Plan:  Chronic low back, diffuse body pain and acute left knee pain worsening  Not interested in anymore interventional procedures  Refill Lyrica 150 mg TID x 90 day,   Refill norco 5/325 #45  Refill Parfon Forte 500mg po tid prn spasms - helpful  Continue with Cymbalta 40 mg QD from PCP   No longer taking Tizanidine at night by PCP  OARRS report reviewed  Patient encouraged to stay active and to lose weight  Against referral to physical therapy  Treatment plan discussed with the patient including medications side effects                             Controlled Substance Monitoring:    Acute and Chronic Pain Monitoring: We discussed with the patient that combining opioids, benzodiazepines, alcohol, illicit drugs or sleep aids increases the risk of respiratory depression including death. We discussed that these medications may cause drowsiness, sedation or dizziness and have counseled the patient not to drive or operate machinery.  We have discussed that these medications will be prescribed only by one provider. We have discussed with the patient about age related risk factors and have thoroughly discussed the importance of taking these medications as prescribed. The patient verbalizes understanding.     ccreferring physic

## 2022-01-07 DIAGNOSIS — K21.9 GASTROESOPHAGEAL REFLUX DISEASE WITHOUT ESOPHAGITIS: ICD-10-CM

## 2022-01-07 RX ORDER — PANTOPRAZOLE SODIUM 40 MG/1
40 TABLET, DELAYED RELEASE ORAL 2 TIMES DAILY
Qty: 180 TABLET | Refills: 0 | Status: SHIPPED
Start: 2022-01-07 | End: 2022-04-18 | Stop reason: SDUPTHER

## 2022-01-19 ENCOUNTER — VIRTUAL VISIT (OUTPATIENT)
Dept: PAIN MANAGEMENT | Age: 67
End: 2022-01-19
Payer: MEDICARE

## 2022-01-19 DIAGNOSIS — G89.29 CHRONIC LOW BACK PAIN WITH SCIATICA, SCIATICA LATERALITY UNSPECIFIED, UNSPECIFIED BACK PAIN LATERALITY: ICD-10-CM

## 2022-01-19 DIAGNOSIS — M79.7 FIBROMYALGIA: ICD-10-CM

## 2022-01-19 DIAGNOSIS — M51.9 LUMBAR DISC DISORDER: ICD-10-CM

## 2022-01-19 DIAGNOSIS — M48.061 SPINAL STENOSIS OF LUMBAR REGION WITHOUT NEUROGENIC CLAUDICATION: ICD-10-CM

## 2022-01-19 DIAGNOSIS — M54.40 CHRONIC LOW BACK PAIN WITH SCIATICA, SCIATICA LATERALITY UNSPECIFIED, UNSPECIFIED BACK PAIN LATERALITY: ICD-10-CM

## 2022-01-19 DIAGNOSIS — M47.816 LUMBAR SPONDYLOSIS: ICD-10-CM

## 2022-01-19 PROCEDURE — 99421 OL DIG E/M SVC 5-10 MIN: CPT | Performed by: NURSE PRACTITIONER

## 2022-01-19 RX ORDER — HYDROCODONE BITARTRATE AND ACETAMINOPHEN 5; 325 MG/1; MG/1
1 TABLET ORAL EVERY 12 HOURS PRN
Qty: 45 TABLET | Refills: 0 | Status: SHIPPED
Start: 2022-01-20 | End: 2022-02-17 | Stop reason: SDUPTHER

## 2022-01-19 RX ORDER — CHLORZOXAZONE 500 MG/1
500 TABLET ORAL 3 TIMES DAILY PRN
Qty: 45 TABLET | Refills: 2 | Status: SHIPPED
Start: 2022-01-19 | End: 2022-04-20 | Stop reason: SDUPTHER

## 2022-01-19 NOTE — PROGRESS NOTES
[facility], [address] [place] [address] Alberto  [phone]    Tele health follow up Note      Kiko Lopez     Date of Visit:  1/19/2022    CC:  Tele health follow up   Chief Complaint   Patient presents with    Follow-up     level 6    Back Pain       Consent:  Telehealth Visit due to [place] 19 pandemic  The patient and/or health care decision maker is aware that he/she may receive a bill for this tele-health service [employer], depending on his insurance coverage, and has provided verbal consent to proceed: Yes  I have considered the risks of abuse, dependence, addiction and diversion. My patient is aware that they will need a follow-up visit (in-person or virtually) at the appropriate time indicated for continued medications. Further, my patient is aware that when this acute crisis has lifted, they will be expected to return for an in-person visit and all elements of standard local and hospital guidelines in order to continue this medication. Patient Location:Home   Physician Location: Other address in [address] [place] [address] Dr ESCALERA:    Pt seen today as virtual visit. Pt continues to have bilateral knee pain but L>R. Appropriate analgesia with current medications regimen: no.    Change in quality of symptoms:no. Medication side effects: percocet caused constipation  Recent diagnostic testing:none. Recent interventional procedures:none.     She has not been on anticoagulation medications to include none. The patient  has not been on herbal supplements. The patient is diabetic. Imaging:    CT of left knee 04/2021:     Impression   1. Small suprapatellar joint effusion. 2. Prior left knee arthroplasty and patellar resurfacing.  No significant   periprosthetic lucency or acute osseous abnormality.    3. Calcifications which are well corticated in the distal quadriceps tendon   measuring up to 1.5 x 0.8 cm.   4. Hardware associated streak artifact limits the exam to some degree.            MRI of Lumbar Spine 2020:  1. Postsurgical changes status post L3-L5 posterior spinal fusion. 2. Severe multifactorial spinal canal stenosis at L2-L3 with mass effect on    the cauda equina nerve roots. 3. Moderate multifactorial L1-L2 spinal canal stenosis with moderate right    neural foraminal stenosis. 4. Moderate left L5-S1 neural foraminal stenosis.              LUMBAR SPINE XRAY 2020  Intact lumbar vertebral height and alignment. Relative severe    multilevel disc and facet joint narrowing with subchondral sclerosis    and spurring. Previous discectomy with posterior pedicle screw plates    extending from L3 through L5. Symmetric osteoarthritis at the    sacroiliac joints manifesting as joint space narrowing and subchondral    sclerosis.        Lumbar spine CT 2016      1. Status post lumbar spine fusion from L3-L5 appearing in stable,   satisfactory alignment.       2. Lumbar spondylosis as described above in part due to congenital   shortened pedicles and notable stenosis          Potential Aberrant Drug-Related Behavior:  None     Previous medication: baclofen helps some, tizanidine sleepiness, flexeril no relief, percocet constipation     Urine Drug Screenin2020 Negative for all which is consistent, pt does NOT take every day and does NOT fill exactly 30 days, can go longer than a month for refill   10/2020: consistent for hydrocodone  2021:  consistent     OARRS report:  2020-2022:Consistent     Pain agreement:  -  Renewal date:2022            Past Medical History: Reviewed    Past Surgical History: Reviewed     Home Medications: Reviewed    Allergies: Reviewed     Social History: Reviewed     REVIEW OF SYSTEMS:     [place] Louis denies fever/chills, chest pain, shortness of breath, new bowel or bladder complaints. All other review of systems was negative.     PHYSICAL EXAMINATION:      General:       General appearance:   pleasant and well-hydrated. , in moderate discomfort and A & O x3  Build: Morbidly obese     HEENT:     Head:normocephalic and atraumatic  Sclera: icterus absent,     Lungs:     Breathing:Normal expansion. No wheezing.     Abdomen:     Shape:non-distended and normal            Impression:     Chronic low back with h/o L3-5 fusion/bilateral knees replaced since 2010  Refuses to have anymore injections, had at previous facility [place] of state and reports so painful during and after, refuses to have again               Seen Dr Geeta Rodgers and is a surgical candidate but advised      weight loss of   >30lbs or consult bariatric Surgery     Plan:  Chronic low back, diffuse body pain and bilateral knee pain L>R   Not interested in anymore interventional procedures  Continue Lyrica 150 mg TID x 90 day, due in march 2022  Refill norco 5/325 #45  Refill Parfon Forte 500mg po tid prn spasms - helpful  Continue with Cymbalta 40 mg QD from PCP   OARRS report reviewed  Patient encouraged to stay active and to lose weight  Against referral to physical therapy  Treatment plan discussed with the patient including medications side effects                             Controlled Substance Monitoring:     Acute and Chronic Pain Monitoring:             We discussed with the patient that combining opioids, benzodiazepines, alcohol, illicit drugs or sleep aids increases the risk of respiratory depression including death. We discussed that these medications may cause drowsiness, sedation or dizziness and have counseled the patient not to drive or operate machinery. We have discussed that these medications will be prescribed only by one provider. We have discussed with the patient about age related risk factors and have thoroughly discussed the importance of taking these medications as prescribed.  The patient verbalizes understanding.     ccreferring physic          Patient advised regarding steps to help prevent the spread of COVID-19   SOURCE - https://liliana-lisa.info/. html     1-Stay home except to get medical care  2-Clean your hands often for atleast 20 seconds, avoid touching: Avoid touching your eyes, nose, and mouth with unwashed hands. 3-Seek medical attention: Seek prompt medical attention if your illness is worsening (e.g., difficulty breathing). Call you doctor first.  3-Wear a facemask if you are sick   4-Cover your coughs and sneezes           I affirm this is a Patient Initiated Episode with an established Patient who has not had a related appointment within my department in the past 7 days or scheduled within the next 24 hours.     Total Time: 5 min  Cc: Referring physician

## 2022-01-19 NOTE — PROGRESS NOTES
Collette Greenland was read the following message We want to confirm that, for purposes of billing, this is a virtual visit with your provider for which we will submit a claim for reimbursement with your insurance company. You will be responsible for any copays, coinsurance amounts or other amounts not covered by your insurance company. If you do not accept this, unfortunately we will not be able to schedule or proceed with a virtual visit with the provider. Do you accept? Etelvina Russell responded Yes .

## 2022-02-17 ENCOUNTER — OFFICE VISIT (OUTPATIENT)
Dept: PODIATRY | Age: 67
End: 2022-02-17
Payer: MEDICARE

## 2022-02-17 ENCOUNTER — OFFICE VISIT (OUTPATIENT)
Dept: PAIN MANAGEMENT | Age: 67
End: 2022-02-17
Payer: MEDICARE

## 2022-02-17 VITALS — WEIGHT: 293 LBS | BODY MASS INDEX: 44.41 KG/M2 | HEIGHT: 68 IN

## 2022-02-17 VITALS
RESPIRATION RATE: 16 BRPM | SYSTOLIC BLOOD PRESSURE: 132 MMHG | DIASTOLIC BLOOD PRESSURE: 78 MMHG | HEART RATE: 73 BPM | TEMPERATURE: 96.2 F | OXYGEN SATURATION: 93 %

## 2022-02-17 DIAGNOSIS — M51.9 LUMBAR DISC DISORDER: ICD-10-CM

## 2022-02-17 DIAGNOSIS — G89.29 CHRONIC LOW BACK PAIN WITH SCIATICA, SCIATICA LATERALITY UNSPECIFIED, UNSPECIFIED BACK PAIN LATERALITY: ICD-10-CM

## 2022-02-17 DIAGNOSIS — M47.816 LUMBAR SPONDYLOSIS: ICD-10-CM

## 2022-02-17 DIAGNOSIS — I87.2 VENOUS INSUFFICIENCY (CHRONIC) (PERIPHERAL): ICD-10-CM

## 2022-02-17 DIAGNOSIS — M79.674 PAIN OF TOE OF RIGHT FOOT: ICD-10-CM

## 2022-02-17 DIAGNOSIS — B35.1 ONYCHOMYCOSIS: Primary | ICD-10-CM

## 2022-02-17 DIAGNOSIS — R26.2 DIFFICULTY WALKING: ICD-10-CM

## 2022-02-17 DIAGNOSIS — G89.4 CHRONIC PAIN SYNDROME: ICD-10-CM

## 2022-02-17 DIAGNOSIS — M79.675 PAIN OF TOE OF LEFT FOOT: ICD-10-CM

## 2022-02-17 DIAGNOSIS — M54.40 CHRONIC LOW BACK PAIN WITH SCIATICA, SCIATICA LATERALITY UNSPECIFIED, UNSPECIFIED BACK PAIN LATERALITY: ICD-10-CM

## 2022-02-17 DIAGNOSIS — N18.31 STAGE 3A CHRONIC KIDNEY DISEASE (HCC): ICD-10-CM

## 2022-02-17 DIAGNOSIS — I73.9 PVD (PERIPHERAL VASCULAR DISEASE) (HCC): ICD-10-CM

## 2022-02-17 DIAGNOSIS — M48.061 SPINAL STENOSIS OF LUMBAR REGION WITHOUT NEUROGENIC CLAUDICATION: ICD-10-CM

## 2022-02-17 DIAGNOSIS — E11.51 TYPE II DIABETES MELLITUS WITH PERIPHERAL CIRCULATORY DISORDER (HCC): ICD-10-CM

## 2022-02-17 DIAGNOSIS — R06.02 SHORTNESS OF BREATH ON EXERTION: ICD-10-CM

## 2022-02-17 DIAGNOSIS — R26.89 BALANCE DISORDER: ICD-10-CM

## 2022-02-17 DIAGNOSIS — M79.7 FIBROMYALGIA: ICD-10-CM

## 2022-02-17 LAB
BILIRUBIN URINE: NEGATIVE
BLOOD, URINE: NEGATIVE
CLARITY: CLEAR
COLOR: YELLOW
GLUCOSE URINE: NEGATIVE MG/DL
KETONES, URINE: NEGATIVE MG/DL
LEUKOCYTE ESTERASE, URINE: NEGATIVE
NITRITE, URINE: NEGATIVE
PH UA: 5.5 (ref 5–9)
PRO-BNP: 147 PG/ML (ref 0–125)
PROTEIN UA: NEGATIVE MG/DL
SPECIFIC GRAVITY UA: 1.01 (ref 1–1.03)
UROBILINOGEN, URINE: 0.2 E.U./DL
VITAMIN B-12: 380 PG/ML (ref 211–946)

## 2022-02-17 PROCEDURE — G8427 DOCREV CUR MEDS BY ELIG CLIN: HCPCS | Performed by: NURSE PRACTITIONER

## 2022-02-17 PROCEDURE — 4040F PNEUMOC VAC/ADMIN/RCVD: CPT | Performed by: NURSE PRACTITIONER

## 2022-02-17 PROCEDURE — 1090F PRES/ABSN URINE INCON ASSESS: CPT | Performed by: NURSE PRACTITIONER

## 2022-02-17 PROCEDURE — 99213 OFFICE O/P EST LOW 20 MIN: CPT | Performed by: NURSE PRACTITIONER

## 2022-02-17 PROCEDURE — 4004F PT TOBACCO SCREEN RCVD TLK: CPT | Performed by: NURSE PRACTITIONER

## 2022-02-17 PROCEDURE — G8417 CALC BMI ABV UP PARAM F/U: HCPCS | Performed by: NURSE PRACTITIONER

## 2022-02-17 PROCEDURE — 99213 OFFICE O/P EST LOW 20 MIN: CPT

## 2022-02-17 PROCEDURE — 99999 PR OFFICE/OUTPT VISIT,PROCEDURE ONLY: CPT | Performed by: PODIATRIST

## 2022-02-17 PROCEDURE — G8484 FLU IMMUNIZE NO ADMIN: HCPCS | Performed by: NURSE PRACTITIONER

## 2022-02-17 PROCEDURE — 11721 DEBRIDE NAIL 6 OR MORE: CPT | Performed by: PODIATRIST

## 2022-02-17 PROCEDURE — 3017F COLORECTAL CA SCREEN DOC REV: CPT | Performed by: NURSE PRACTITIONER

## 2022-02-17 PROCEDURE — G8399 PT W/DXA RESULTS DOCUMENT: HCPCS | Performed by: NURSE PRACTITIONER

## 2022-02-17 PROCEDURE — 1123F ACP DISCUSS/DSCN MKR DOCD: CPT | Performed by: NURSE PRACTITIONER

## 2022-02-17 RX ORDER — ACETAMINOPHEN, ASPIRIN AND CAFFEINE 250; 250; 65 MG/1; MG/1; MG/1
1 TABLET, FILM COATED ORAL EVERY 6 HOURS PRN
Status: ON HOLD | COMMUNITY
End: 2022-06-20 | Stop reason: HOSPADM

## 2022-02-17 RX ORDER — TRIAMCINOLONE ACETONIDE 1 MG/G
CREAM TOPICAL
Qty: 90 G | Refills: 2 | Status: SHIPPED
Start: 2022-02-17 | End: 2022-08-15

## 2022-02-17 RX ORDER — CHLORZOXAZONE 500 MG/1
TABLET ORAL
COMMUNITY
Start: 2022-02-14 | End: 2022-04-20 | Stop reason: SDUPTHER

## 2022-02-17 RX ORDER — HYDROCODONE BITARTRATE AND ACETAMINOPHEN 5; 325 MG/1; MG/1
1 TABLET ORAL EVERY 12 HOURS PRN
Qty: 45 TABLET | Refills: 0 | Status: SHIPPED
Start: 2022-02-19 | End: 2022-03-17 | Stop reason: SDUPTHER

## 2022-02-17 NOTE — PROGRESS NOTES
22     Ericka Carrillo    : 1955  Sex: female  Age: 77 y.o. Subjective: The patient is seen today for evaluation regarding diabetic foot evaluation and mycotic nail care. No other complaints noted. Chief Complaint   Patient presents with    Diabetes    Nail Problem       Current Medications:    Current Outpatient Medications:     chlorzoxazone (PARAFON FORTE) 500 MG tablet, , Disp: , Rfl:     aspirin-acetaminophen-caffeine (EXCEDRIN MIGRAINE) 250-250-65 MG per tablet, Take 1 tablet by mouth every 6 hours as needed for Headaches, Disp: , Rfl:     [START ON 2022] HYDROcodone-acetaminophen (NORCO) 5-325 MG per tablet, Take 1 tablet by mouth every 12 hours as needed for Pain for up to 30 days. Take lowest dose possible to manage pain, Disp: 45 tablet, Rfl: 0    triamcinolone (KENALOG) 0.1 % cream, Apply topically 2 times daily. , Disp: 90 g, Rfl: 2    cetirizine (ZYRTEC) 10 MG tablet, Take 1 tablet by mouth 2 times daily, Disp: 180 tablet, Rfl: 1    pantoprazole (PROTONIX) 40 MG tablet, Take 1 tablet by mouth 2 times daily, Disp: 180 tablet, Rfl: 0    ADVAIR HFA 45-21 MCG/ACT inhaler, , Disp: , Rfl:     pregabalin (LYRICA) 150 MG capsule, Take 1 capsule by mouth 3 times daily for 90 days.  TID, Disp: 270 capsule, Rfl: 0    fluticasone (FLONASE) 50 MCG/ACT nasal spray, 1 spray by Nasal route daily, Disp: 3 each, Rfl: 3    hydroCHLOROthiazide (HYDRODIURIL) 25 MG tablet, Take 1 tablet by mouth every morning, Disp: 90 tablet, Rfl: 0    topiramate (TOPAMAX) 100 MG tablet, Take 1 tablet by mouth 2 times daily, Disp: 180 tablet, Rfl: 3    DULoxetine (CYMBALTA) 30 MG extended release capsule, Take 1 capsule by mouth daily, Disp: 90 capsule, Rfl: 1    busPIRone (BUSPAR) 15 MG tablet, TAKE 1 TABLET BY MOUTH THREE TIMES A DAY, Disp: 270 tablet, Rfl: 3    tolterodine (DETROL LA) 2 MG extended release capsule, Once daily, Disp: 90 capsule, Rfl: 3    ondansetron (ZOFRAN-ODT) 4 MG disintegrating tablet, Take 1 tablet by mouth 3 times daily as needed for Nausea or Vomiting, Disp: 21 tablet, Rfl: 0    albuterol sulfate HFA (VENTOLIN HFA) 108 (90 Base) MCG/ACT inhaler, Inhale 2 puffs into the lungs every 6 hours as needed for Wheezing or Shortness of Breath, Disp: 1 Inhaler, Rfl: 5    doxepin (SINEQUAN) 10 MG capsule, Take 1 capsule by mouth nightly, Disp: 30 capsule, Rfl: 2    ammonium lactate (LAC-HYDRIN) 12 % lotion, Apply topically daily. , Disp: 222 mL, Rfl: 5    Allergies: Allergies   Allergen Reactions    Carafate [Sucralfate]      Patient unable to tolerate. Unable to recall her reaction.     Glucophage [Metformin Hcl]      Severe abdominal pain, constipation      Mobic [Meloxicam] Other (See Comments)     Abdominal pain    Trazodone And Nefazodone      Unable to tolerate, patient reports made her physically ill with abdominal pain      Ultram [Tramadol]      Abdominal pain    Nickel Itching and Rash       Past Surgical History:   Procedure Laterality Date    CARPAL TUNNEL RELEASE Bilateral     CHOLECYSTECTOMY      COLONOSCOPY  2011    COLONOSCOPY  11/22/2016    Dr. Sonja Cano N/A 12/8/2020    Azalee Nyssa performed by Cassie Art MD at [address]    benign reasons    [address] Bilateral 2010    LUMBAR FUSION      L3-5    ROTATOR CUFF REPAIR Left     left    TOTAL KNEE ARTHROPLASTY Bilateral 2010    did both at the same time   [facility] N/A 9/29/2020    EGD BIOPSY performed by Cassie Art MD at [facility]     Past Medical History:   Diagnosis Date    Anxiety     Arthralgia 10/8/2013    Arthritis     Bone avascular necrosis ([facility] [address] 3/23/2015    Breast lesion 12/1/2015    Chronic back pain     Chronic fatigue 10/8/2013    Daytime somnolence 10/8/2013    Depression     Diabetes mellitus (HCC)     Fibromyalgia     Fracture, fibula     right  GERD (gastroesophageal reflux disease)     H/O cardiovascular stress test 01/29/2020    Lexiscan    Headache     History of blood transfusion     History of fractured kneecap     (R) 2 hair line fractures    History of total knee arthroplasty 3/19/2015    Hyperlipidemia     Morbid obesity with BMI of 45.0-49.9, adult ([facility] 10/9/2015    Obesity     Osteoarthritis     Overactive bladder 1/22/2014    Reflux        Vitals:    02/17/22 1353   Weight: (!) 337 lb (152.9 kg)   Height: 5' 8\" (1.727 m)       Exam:  Pedal pulses diminished to palpation bilateral foot. Capillary refill time delayed digital regions bilateral foot. At this time the nail/s 1, 2, 3, 4, 5 right foot and nail/s 1, 2, 3, 4, 5 left foot are noted to be thickened, dystrophic and discolored with subungual debris present. Tenderness noted to palpation. Minimal hair growth is noted to both lower extremities. Edema noted with both varicosities and stasis skin changes and dermatitis issues bilateral lower extremity. Coolness is noted to the digital regions to palpation. Capillary fill time delayed digital areas bilateral foot. No heel fissuring or macerations of the web spaces. No plantar calluses and/or ulcerative areas are noted. Patient is having difficulty with gait/walking. Plan Per Assessment  Vani Smiley was seen today for diabetes and nail problem. Diagnoses and all orders for this visit:    Onychomycosis    Pain of toe of right foot    Pain of toe of left foot    PVD (peripheral vascular disease) ([facility]  -      DIABETES FOOT EXAM    Type II diabetes mellitus with peripheral circulatory disorder (HCC)  -      DIABETES FOOT EXAM  -     triamcinolone (KENALOG) 0.1 % cream; Apply topically 2 times daily. Venous insufficiency (chronic) (peripheral)  -      DIABETES FOOT EXAM  -     triamcinolone (KENALOG) 0.1 % cream; Apply topically 2 times daily. Difficulty walking  -      DIABETES FOOT EXAM        1. Evaluation and Management  2. Manual and electrical debridement of the mycotic nails was performed for thickness and length to prevent injection and/or ulceration. 3. I recommended antifungal cream to the nails daily. Additional diabetic foot care techniques were discussed with patient in detail today. 4. It was discussed in detail with the patient proper caring for the vascular compromised foot. The fact that they have compromised blood flow put the patient at risk for infection/gangrene/amputation. The patient should not walk barefoot. Shoe gear should fit properly and socks should be worn with shoes. Exercise is very important to prevent worsening of the disease process but before performing an exercise program should check with their family physician first.  If any skin lesions are noted, they are instructed to contact the office immediately. 5. We will see the patient back at a later date for continued podiatric management and care. Patient was advised to call the office with any questions or concerns prior to their next appointment if needed. Seen By:    Melly Castro DPM    Electronically signed by Melly Castro DPM on 2/17/2022 at 2:11 PM      This note was created using voice recognition software. The note was reviewed however may contain grammatical errors.

## 2022-02-17 NOTE — PROGRESS NOTES
Patient in today for nail care. Patient does not have any complaints of pain at this time.  Patient's PCP is Hildegard Shone, MD date of last ov  12/16/2021       Solomon Velazquez LPN '

## 2022-02-17 NOTE — PROGRESS NOTES
Do you currently have any of the following:    Fever: No  Headache:  No  Cough: No  Shortness of breath: No  Exposed to anyone with these symptoms: No                                                                                                                Chuckie Santiago presents to the [facility] on 2/17/2022. Bryn Diez is complaining of pain in her lower back down left leg occasionally. The pain is persistent. The pain is described as aching. Pain is rated on her best day at a 4, on her worst day at a 10, and on average at a 4 on the VAS scale. She took her last dose of Norco today. Bryn Diez does have issues with constipation. Any procedures since your last visit: No.    She is not on NSAIDS and  is  on anticoagulation medications to include ASA and is managed by herself in Excedrin. Pacemaker or defibrillator: No Physician managing device is n/a. Medication Contract and Consent for Opioid Use Documents Filed     Patient Documents     Type of Document Status Date Received Received By Description    Medication Contract Received 10/14/2020  1:22 PM TOMY THOMAS [facility] Patient Agreement    Medication Contract Received 12/6/2021  1:54 PM Harsh Wesley Medication Contract                   /78   Pulse 73   Temp 96.2 °F (35.7 °C) (Infrared)   Resp 16   LMP  (LMP Unknown)   SpO2 93%      No LMP recorded (lmp unknown). Patient has had a hysterectomy.

## 2022-02-17 NOTE — PROGRESS NOTES
[facility]  [address] [facility], [address] [place] [address] Alberto  [phone]    Follow up Note      aWnda Faulkner     Date of Visit:  2/17/2022    CC:  Tele health follow up   Chief Complaint   Patient presents with    Follow-up    Lower Back Pain     HPI:    Pt seen today as virtual visit. Pt continues to have bilateral knee pain but L>R. No acute changes noted     Appropriate analgesia with current medications regimen: no.    Change in quality of symptoms:no. Medication side effects: percocet caused constipation  Recent diagnostic testing:none. Recent interventional procedures:none.     She has not been on anticoagulation medications to include none. The patient  has not been on herbal supplements. The patient is diabetic. Imaging:    CT of left knee 04/2021:     Impression   1. Small suprapatellar joint effusion. 2. Prior left knee arthroplasty and patellar resurfacing.  No significant   periprosthetic lucency or acute osseous abnormality. 3. Calcifications which are well corticated in the distal quadriceps tendon   measuring up to 1.5 x 0.8 cm.   4. Hardware associated streak artifact limits the exam to some degree.            MRI of Lumbar Spine 09/2020:  1. Postsurgical changes status post L3-L5 posterior spinal fusion. 2. Severe multifactorial spinal canal stenosis at L2-L3 with mass effect on    the cauda equina nerve roots. 3. Moderate multifactorial L1-L2 spinal canal stenosis with moderate right    neural foraminal stenosis. 4. Moderate left L5-S1 neural foraminal stenosis.              LUMBAR SPINE XRAY 03/4/2020  Intact lumbar vertebral height and alignment. Relative severe    multilevel disc and facet joint narrowing with subchondral sclerosis    and spurring. Previous discectomy with posterior pedicle screw plates    extending from L3 through L5.  Symmetric osteoarthritis at the    sacroiliac joints manifesting as joint space narrowing and subchondral    sclerosis.        Lumbar spine CT 2016      1. Status post lumbar spine fusion from L3-L5 appearing in stable,   satisfactory alignment.       2. Lumbar spondylosis as described above in part due to congenital   shortened pedicles and notable stenosis          Potential Aberrant Drug-Related Behavior:  None     Previous medication: baclofen helps some, tizanidine sleepiness, flexeril no relief, percocet constipation     Urine Drug Screenin2020 Negative for all which is consistent, pt does NOT take every day and does NOT fill exactly 30 days, can go longer than a month for refill   10/2020: consistent for hydrocodone  2021:  consistent     OARRS report:  2020-2022:Consistent     Pain agreement:  -  Renewal date:2022            Past Medical History: Reviewed    Past Surgical History: Reviewed     Home Medications: Reviewed    Allergies: Reviewed     Social History: Reviewed     REVIEW OF SYSTEMS:     Martina Manning denies fever/chills, chest pain, shortness of breath, new bowel or bladder complaints. All other review of systems was negative. PHYSICAL EXAMINATION:      General:       General appearance:   pleasant and well-hydrated. , in no discomfort and A & O x3  Build: Morbidly obese     HEENT:     Head:normocephalic and atraumatic  Sclera: icterus absent,     Lungs:     Breathing:Normal expansion. No wheezing.     Abdomen:     Shape:non-distended and normal        Lumbar spine:     Range of motion:abnormal moderately Lateral bending, flexion, extension rotation bilateral and is painful.     Extremities:     Tremors:None bilaterally upper and lower  Range of motion:Generally normal shoulders, pain with internal rotation of hips not done.   Intact:Yes  Edema:Normal     Neurological:     Gait:antalgic     Dermatology:     Skin:no unusual rashes, no skin lesions, no palpable subcutaneous nodules and good skin turgor    Impression:     Chronic low back with h/o L3-5 fusion/bilateral knees replaced since 2010  Refuses to have anymore injections, had at previous facility [place] of state and reports so painful during and after, refuses to have again               Seen Dr Carlton Mccracken and is a surgical candidate but advised      weight loss of   >30lbs or consult bariatric Surgery     Plan:  Chronic low back, diffuse body pain and bilateral knee pain L>R   Not interested in anymore interventional procedures  Continue Lyrica 150 mg TID x 90 day, due in march 2022  Refill norco 5/325mg #45  Continue Parfon Forte 500mg po tid prn spasms - helpful  Continue with Cymbalta 40 mg QD from PCP   OARRS report reviewed  Patient encouraged to stay active and to lose weight  Against referral to physical therapy  Treatment plan discussed with the patient including medications side effects                                     We discussed with the patient that combining opioids, benzodiazepines, alcohol, illicit drugs or sleep aids increases the risk of respiratory depression including death. We discussed that these medications may cause drowsiness, sedation or dizziness and have counseled the patient not to drive or operate machinery. We have discussed that these medications will be prescribed only by one provider. We have discussed with the patient about age related risk factors and have thoroughly discussed the importance of taking these medications as prescribed. The patient verbalizes understanding.     ccreferring physic         Cc:  Referring physician

## 2022-03-03 ENCOUNTER — HOSPITAL ENCOUNTER (OUTPATIENT)
Dept: NON INVASIVE DIAGNOSTICS | Age: 67
Discharge: HOME | End: 2022-03-03
Payer: MEDICARE

## 2022-03-03 DIAGNOSIS — F32.A DEPRESSION, UNSPECIFIED DEPRESSION TYPE: ICD-10-CM

## 2022-03-03 DIAGNOSIS — M79.7 FIBROMYALGIA: ICD-10-CM

## 2022-03-03 LAB
LV EF: 63 %
LVEF MODALITY: NORMAL

## 2022-03-03 PROCEDURE — C8929 TTE W OR WO FOL WCON,DOPPLER: HCPCS

## 2022-03-03 PROCEDURE — 93306 TTE W/DOPPLER COMPLETE: CPT

## 2022-03-03 PROCEDURE — 6360000004 HC RX CONTRAST MEDICATION: Performed by: INTERNAL MEDICINE

## 2022-03-03 RX ORDER — DULOXETIN HYDROCHLORIDE 30 MG/1
30 CAPSULE, DELAYED RELEASE ORAL DAILY
Qty: 90 CAPSULE | Refills: 1 | OUTPATIENT
Start: 2022-03-03

## 2022-03-03 RX ADMIN — PERFLUTREN 1.5 ML: 6.52 INJECTION, SUSPENSION INTRAVENOUS at 12:04

## 2022-03-04 RX ORDER — DULOXETIN HYDROCHLORIDE 30 MG/1
30 CAPSULE, DELAYED RELEASE ORAL DAILY
Qty: 90 CAPSULE | Refills: 1 | Status: SHIPPED
Start: 2022-03-04 | End: 2022-08-16 | Stop reason: SDUPTHER

## 2022-03-08 ENCOUNTER — OFFICE VISIT (OUTPATIENT)
Dept: FAMILY MEDICINE CLINIC | Age: 67
End: 2022-03-08
Payer: MEDICARE

## 2022-03-08 VITALS
OXYGEN SATURATION: 96 % | HEIGHT: 68 IN | BODY MASS INDEX: 44.41 KG/M2 | TEMPERATURE: 97.7 F | RESPIRATION RATE: 16 BRPM | SYSTOLIC BLOOD PRESSURE: 108 MMHG | WEIGHT: 293 LBS | HEART RATE: 67 BPM | DIASTOLIC BLOOD PRESSURE: 61 MMHG

## 2022-03-08 DIAGNOSIS — R10.9 FLANK PAIN: Primary | ICD-10-CM

## 2022-03-08 DIAGNOSIS — I51.7 LVH (LEFT VENTRICULAR HYPERTROPHY): ICD-10-CM

## 2022-03-08 DIAGNOSIS — R73.03 PREDIABETES: ICD-10-CM

## 2022-03-08 DIAGNOSIS — D15.1 MYXOMA OF HEART: ICD-10-CM

## 2022-03-08 DIAGNOSIS — R06.02 SHORTNESS OF BREATH ON EXERTION: ICD-10-CM

## 2022-03-08 DIAGNOSIS — E78.5 HYPERLIPIDEMIA, UNSPECIFIED HYPERLIPIDEMIA TYPE: ICD-10-CM

## 2022-03-08 DIAGNOSIS — F32.A DEPRESSION, UNSPECIFIED DEPRESSION TYPE: ICD-10-CM

## 2022-03-08 DIAGNOSIS — J45.909 UNCOMPLICATED ASTHMA, UNSPECIFIED ASTHMA SEVERITY, UNSPECIFIED WHETHER PERSISTENT: ICD-10-CM

## 2022-03-08 DIAGNOSIS — E03.9 HYPOTHYROIDISM, UNSPECIFIED TYPE: ICD-10-CM

## 2022-03-08 DIAGNOSIS — N18.31 STAGE 3A CHRONIC KIDNEY DISEASE (HCC): ICD-10-CM

## 2022-03-08 LAB
BILIRUBIN, POC: NEGATIVE
BLOOD URINE, POC: NEGATIVE
CLARITY, POC: CLEAR
COLOR, POC: YELLOW
GLUCOSE URINE, POC: NORMAL
HBA1C MFR BLD: 5.8 %
KETONES, POC: NEGATIVE
LEUKOCYTE EST, POC: NEGATIVE
NITRITE, POC: NEGATIVE
PH, POC: 5
PROTEIN, POC: NEGATIVE
SPECIFIC GRAVITY, POC: 1.02
UROBILINOGEN, POC: 1

## 2022-03-08 PROCEDURE — 99214 OFFICE O/P EST MOD 30 MIN: CPT | Performed by: INTERNAL MEDICINE

## 2022-03-08 PROCEDURE — 83036 HEMOGLOBIN GLYCOSYLATED A1C: CPT | Performed by: INTERNAL MEDICINE

## 2022-03-08 PROCEDURE — 1090F PRES/ABSN URINE INCON ASSESS: CPT | Performed by: INTERNAL MEDICINE

## 2022-03-08 PROCEDURE — 3017F COLORECTAL CA SCREEN DOC REV: CPT | Performed by: INTERNAL MEDICINE

## 2022-03-08 PROCEDURE — 1123F ACP DISCUSS/DSCN MKR DOCD: CPT | Performed by: INTERNAL MEDICINE

## 2022-03-08 PROCEDURE — G8484 FLU IMMUNIZE NO ADMIN: HCPCS | Performed by: INTERNAL MEDICINE

## 2022-03-08 PROCEDURE — 81002 URINALYSIS NONAUTO W/O SCOPE: CPT | Performed by: INTERNAL MEDICINE

## 2022-03-08 PROCEDURE — 4004F PT TOBACCO SCREEN RCVD TLK: CPT | Performed by: INTERNAL MEDICINE

## 2022-03-08 PROCEDURE — 4040F PNEUMOC VAC/ADMIN/RCVD: CPT | Performed by: INTERNAL MEDICINE

## 2022-03-08 PROCEDURE — G8399 PT W/DXA RESULTS DOCUMENT: HCPCS | Performed by: INTERNAL MEDICINE

## 2022-03-08 PROCEDURE — G8417 CALC BMI ABV UP PARAM F/U: HCPCS | Performed by: INTERNAL MEDICINE

## 2022-03-08 PROCEDURE — G8427 DOCREV CUR MEDS BY ELIG CLIN: HCPCS | Performed by: INTERNAL MEDICINE

## 2022-03-08 RX ORDER — ALBUTEROL SULFATE 90 UG/1
2 AEROSOL, METERED RESPIRATORY (INHALATION) EVERY 6 HOURS PRN
Qty: 1 EACH | Refills: 3 | Status: SHIPPED
Start: 2022-03-08 | End: 2022-08-16 | Stop reason: SDUPTHER

## 2022-03-08 ASSESSMENT — PATIENT HEALTH QUESTIONNAIRE - PHQ9
6. FEELING BAD ABOUT YOURSELF - OR THAT YOU ARE A FAILURE OR HAVE LET YOURSELF OR YOUR FAMILY DOWN: 1
SUM OF ALL RESPONSES TO PHQ QUESTIONS 1-9: 12
4. FEELING TIRED OR HAVING LITTLE ENERGY: 2
SUM OF ALL RESPONSES TO PHQ QUESTIONS 1-9: 12
8. MOVING OR SPEAKING SO SLOWLY THAT OTHER PEOPLE COULD HAVE NOTICED. OR THE OPPOSITE, BEING SO FIGETY OR RESTLESS THAT YOU HAVE BEEN MOVING AROUND A LOT MORE THAN USUAL: 3
7. TROUBLE CONCENTRATING ON THINGS, SUCH AS READING THE NEWSPAPER OR WATCHING TELEVISION: 3
3. TROUBLE FALLING OR STAYING ASLEEP: 1
10. IF YOU CHECKED OFF ANY PROBLEMS, HOW DIFFICULT HAVE THESE PROBLEMS MADE IT FOR YOU TO DO YOUR WORK, TAKE CARE OF THINGS AT HOME, OR GET ALONG WITH OTHER PEOPLE: 1
SUM OF ALL RESPONSES TO PHQ QUESTIONS 1-9: 12
5. POOR APPETITE OR OVEREATING: 1
SUM OF ALL RESPONSES TO PHQ QUESTIONS 1-9: 12
2. FEELING DOWN, DEPRESSED OR HOPELESS: 1
9. THOUGHTS THAT YOU WOULD BE BETTER OFF DEAD, OR OF HURTING YOURSELF: 0

## 2022-03-08 ASSESSMENT — COLUMBIA-SUICIDE SEVERITY RATING SCALE - C-SSRS
6. HAVE YOU EVER DONE ANYTHING, STARTED TO DO ANYTHING, OR PREPARED TO DO ANYTHING TO END YOUR LIFE?: NO
2. HAVE YOU ACTUALLY HAD ANY THOUGHTS OF KILLING YOURSELF?: NO
1. WITHIN THE PAST MONTH, HAVE YOU WISHED YOU WERE DEAD OR WISHED YOU COULD GO TO SLEEP AND NOT WAKE UP?: YES

## 2022-03-08 NOTE — PROGRESS NOTES
3/8/2022  Visit type: Established patient    Reason for Visit: Diabetes (f/u), Depression (f/u), Results (discuss echo that was done Thursday), and Back Pain (started friday night: kidney and down lower back and under rib cage: seems to urinate a lot)      ASSESSMENT AND PLAN     1. Flank pain  R sided, mild/moderate, negative urinalysis. Suspect muscular pain and expect resolution with time. She will inform me if persists. 2. Asthma  stable  -     albuterol sulfate HFA (VENTOLIN HFA) 108 (90 Base) MCG/ACT inhaler; Inhale 2 puffs into the lungs every 6 hours as needed for Wheezing or Shortness of Breath, Disp-1 each, R-3Normal  3. prediabetes  A1C stable     5. Rule out atrial myxoma of heart  As suggested by TTE  -     ECHO Transesophageal; Future  6. Depression, unspecified depression type  Assessment & Plan: Will resume Cymbalta. Denies suicidal ideation. Orders:  -     Raven Fisher MD, Psychiatry, [facility]  7. LVH (left ventricular hypertrophy)  Assessment & Plan:  Possible HFpEF, difficult to say if her PEREZ is related to asthma. Await ULISES findings  8. Hypothyroidism, unspecified type  Assessment & Plan:   Stable continue present management   9. Stage 3a chronic kidney disease [facility])  Assessment & Plan:   stable    Discussed that I will be ending my practice after 6/1/22 and provided names of PCPs that are accepting new patients. Patient is to select new PCP and schedule a visit.      ----------------------------------------------------------------------    SUBJECTIVE    Chief Complaint   Patient presents with    Diabetes     f/u    Depression     f/u    Results     discuss echo that was done Thursday    Back Pain     started friday night: kidney and down lower back and under rib cage: seems to urinate a lot     HPI   Follow up diabetes     She complains of new R flank pain starting 4 days ago with increased urination.   Denies fever, chills,   Nausea, vomiting, dysuria, hematuria or passing of stone. Echo revealed diastolic dysfunction and possible atrial myxoma  [facility]       Review of Systems   Denies exertional chest pain     OBJECTIVE    /61 (Site: Left Upper Arm, Position: Sitting, Cuff Size: Large Adult)   Pulse 67   Temp 97.7 °F (36.5 °C) (Temporal)   Resp 16   Ht 5' 8\" (1.727 m)   Wt (!) 328 lb 12.8 oz (149.1 kg)   LMP  (LMP Unknown)   SpO2 96%   BMI 49.99 kg/m²   Physical Exam  Constitutional:       General: She is not in acute distress. HENT:      Mouth/Throat:      Pharynx: Oropharynx is clear. Cardiovascular:      Rate and Rhythm: Normal rate and regular rhythm. Heart sounds: Normal heart sounds. No murmur heard. No friction rub. No gallop. Pulmonary:      Breath sounds: Normal breath sounds. No stridor. No wheezing, rhonchi or rales. Abdominal:      General: Bowel sounds are normal. There is no distension. Palpations: Abdomen is soft. There is no mass. Tenderness: There is abdominal tenderness (minimal R sided). There is no guarding or rebound. Musculoskeletal:      Right lower leg: No edema. Left lower leg: No edema. Neurological:      General: No focal deficit present.       Mental Status: She is alert.         ---------------------------------------------------------------------------

## 2022-03-08 NOTE — PATIENT INSTRUCTIONS
As we discussed, I will be ending my practice after 6/1/22. I suggest that you choose one of these providers and make follow up appointment. I wish you well.   Marco Antonio Bar, MD Estephania Marie Liddie Kirk, MD

## 2022-03-14 DIAGNOSIS — M79.7 FIBROMYALGIA: ICD-10-CM

## 2022-03-14 RX ORDER — PREGABALIN 150 MG/1
CAPSULE ORAL
Qty: 270 CAPSULE | Refills: 0 | OUTPATIENT
Start: 2022-03-14

## 2022-03-17 ENCOUNTER — TELEMEDICINE (OUTPATIENT)
Dept: PAIN MANAGEMENT | Age: 67
End: 2022-03-17
Payer: MEDICARE

## 2022-03-17 DIAGNOSIS — M51.9 LUMBAR DISC DISORDER: ICD-10-CM

## 2022-03-17 DIAGNOSIS — G89.29 CHRONIC LOW BACK PAIN WITH SCIATICA, SCIATICA LATERALITY UNSPECIFIED, UNSPECIFIED BACK PAIN LATERALITY: ICD-10-CM

## 2022-03-17 DIAGNOSIS — G89.4 CHRONIC PAIN SYNDROME: Primary | ICD-10-CM

## 2022-03-17 DIAGNOSIS — M54.40 CHRONIC LOW BACK PAIN WITH SCIATICA, SCIATICA LATERALITY UNSPECIFIED, UNSPECIFIED BACK PAIN LATERALITY: ICD-10-CM

## 2022-03-17 DIAGNOSIS — M48.061 SPINAL STENOSIS OF LUMBAR REGION WITHOUT NEUROGENIC CLAUDICATION: ICD-10-CM

## 2022-03-17 DIAGNOSIS — M79.7 FIBROMYALGIA: ICD-10-CM

## 2022-03-17 DIAGNOSIS — M47.816 LUMBAR SPONDYLOSIS: ICD-10-CM

## 2022-03-17 PROCEDURE — 99421 OL DIG E/M SVC 5-10 MIN: CPT | Performed by: NURSE PRACTITIONER

## 2022-03-17 RX ORDER — PREGABALIN 150 MG/1
150 CAPSULE ORAL 3 TIMES DAILY
Qty: 270 CAPSULE | Refills: 0 | Status: SHIPPED
Start: 2022-03-17 | End: 2022-06-06 | Stop reason: SDUPTHER

## 2022-03-17 RX ORDER — HYDROCODONE BITARTRATE AND ACETAMINOPHEN 5; 325 MG/1; MG/1
1 TABLET ORAL EVERY 12 HOURS PRN
Qty: 45 TABLET | Refills: 0 | Status: SHIPPED
Start: 2022-03-17 | End: 2022-04-20 | Stop reason: SDUPTHER

## 2022-03-17 NOTE — PROGRESS NOTES
Ghassan Nash was read the following message We want to confirm that, for purposes of billing, this is a virtual visit with your provider for which we will submit a claim for reimbursement with your insurance company. You will be responsible for any copays, coinsurance amounts or other amounts not covered by your insurance company. If you do not accept this, unfortunately we will not be able to schedule or proceed with a virtual visit with the provider. Do you accept? Julianna Beauchamp responded Yes .

## 2022-03-17 NOTE — PROGRESS NOTES
status post L3-L5 posterior spinal fusion. 2. Severe multifactorial spinal canal stenosis at L2-L3 with mass effect on    the cauda equina nerve roots. 3. Moderate multifactorial L1-L2 spinal canal stenosis with moderate right    neural foraminal stenosis. 4. Moderate left L5-S1 neural foraminal stenosis.              LUMBAR SPINE XRAY 2020  Intact lumbar vertebral height and alignment. Relative severe    multilevel disc and facet joint narrowing with subchondral sclerosis    and spurring. Previous discectomy with posterior pedicle screw plates    extending from L3 through L5. Symmetric osteoarthritis at the    sacroiliac joints manifesting as joint space narrowing and subchondral    sclerosis.        Lumbar spine CT 2016      1. Status post lumbar spine fusion from L3-L5 appearing in stable,   satisfactory alignment.       2. Lumbar spondylosis as described above in part due to congenital   shortened pedicles and notable stenosis          Potential Aberrant Drug-Related Behavior:  None     Previous medication: baclofen helps some, tizanidine sleepiness, flexeril no relief, percocet constipation     Urine Drug Screenin2020 Negative for all which is consistent, pt does NOT take every day and does NOT fill exactly 30 days, can go longer than a month for refill   10/2020: consistent for hydrocodone  2021:  consistent     OARRS report:  2020-3/2022:Consistent     Pain agreement:  -  Renewal date:2022            Past Medical History: Reviewed    Past Surgical History: Reviewed     Home Medications: Reviewed    Allergies: Reviewed     Social History: Reviewed     REVIEW OF SYSTEMS:     Marianna Salas denies fever/chills, chest pain, shortness of breath, new bowel or bladder complaints. All other review of systems was negative. PHYSICAL EXAMINATION:      General:       General appearance:   pleasant and well-hydrated. , in no discomfort and A & O x3  Build: Morbidly obese     HEENT:     Head:normocephalic and atraumatic  Sclera: icterus absent,     Lungs:     Breathing:Normal expansion. No wheezing.     Abdomen:     Shape:non-distended and normal         Impression:     Chronic low back with h/o L3-5 fusion/bilateral knees replaced since 2010  Refuses to have anymore injections, had at previous facility [place] of state and reports so painful during and after, refuses to have again               Seen Dr Sukh Son and is a surgical candidate but advised      weight loss of   >30lbs or consult bariatric Surgery     Plan:  Chronic low back, diffuse body pain and bilateral knee pain L>R   Not interested in anymore interventional procedures  Continue Lyrica 150 mg TID x 90 day, due in march 2022  Refill norco 5/325mg #45  Continue Parfon Forte 500mg po tid prn spasms - helpful  Continue with Cymbalta 40 mg QD from PCP   OARRS report reviewed  Patient encouraged to stay active and to lose weight  Against referral to physical therapy  Treatment plan discussed with the patient including medications side effects                           We discussed with the patient that combining opioids, benzodiazepines, alcohol, illicit drugs or sleep aids increases the risk of respiratory depression including death. We discussed that these medications may cause drowsiness, sedation or dizziness and have counseled the patient not to drive or operate machinery. We have discussed that these medications will be prescribed only by one provider. We have discussed with the patient about age related risk factors and have thoroughly discussed the importance of taking these medications as prescribed. The patient verbalizes understanding. Mohan Salas, was evaluated through a synchronous (real-time) audio-video encounter. The patient (or guardian if applicable) is aware that this is a billable service, which includes applicable co-pays.  This Virtual Visit was conducted with patient's (and/or legal guardian's) consent. The visit was conducted pursuant to the emergency declaration under the [facility] [address] and the [employer] Act. Patient identification was verified, and a caregiver was present when appropriate. The patient was located in a state where the provider was licensed to provide care. Patient advised regarding steps to help prevent the spread of COVID-19   SOURCE - https://liliana-lisa.info/. html      1-Stay home except to get medical care  2-Clean your hands often for atleast 20 seconds, avoid touching: Avoid touching your eyes, nose, and mouth with unwashed hands. 3-Seek medical attention: Seek prompt medical attention if your illness is worsening (e.g., difficulty breathing). Call you doctor first.  3-Wear a facemask if you are sick   4-Cover your coughs and sneezes            I affirm this is a Patient Initiated Episode with an established Patient who has not had a related appointment within my department in the past 7 days or scheduled within the next 24 hours.     Total Time: 7 min     ccreferring physic         Cc:  Referring physician

## 2022-04-18 DIAGNOSIS — K21.9 GASTROESOPHAGEAL REFLUX DISEASE WITHOUT ESOPHAGITIS: ICD-10-CM

## 2022-04-18 RX ORDER — PANTOPRAZOLE SODIUM 40 MG/1
40 TABLET, DELAYED RELEASE ORAL 2 TIMES DAILY
Qty: 180 TABLET | Refills: 0 | Status: SHIPPED
Start: 2022-04-18 | End: 2022-08-16 | Stop reason: SDUPTHER

## 2022-04-20 ENCOUNTER — TELEPHONE (OUTPATIENT)
Dept: CARDIOLOGY CLINIC | Age: 67
End: 2022-04-20

## 2022-04-20 ENCOUNTER — OFFICE VISIT (OUTPATIENT)
Dept: PAIN MANAGEMENT | Age: 67
End: 2022-04-20
Payer: MEDICARE

## 2022-04-20 VITALS
WEIGHT: 293 LBS | DIASTOLIC BLOOD PRESSURE: 88 MMHG | TEMPERATURE: 96.9 F | BODY MASS INDEX: 44.41 KG/M2 | RESPIRATION RATE: 16 BRPM | HEART RATE: 97 BPM | HEIGHT: 68 IN | SYSTOLIC BLOOD PRESSURE: 130 MMHG | OXYGEN SATURATION: 98 %

## 2022-04-20 DIAGNOSIS — M54.40 CHRONIC LOW BACK PAIN WITH SCIATICA, SCIATICA LATERALITY UNSPECIFIED, UNSPECIFIED BACK PAIN LATERALITY: ICD-10-CM

## 2022-04-20 DIAGNOSIS — G89.29 CHRONIC LOW BACK PAIN WITH SCIATICA, SCIATICA LATERALITY UNSPECIFIED, UNSPECIFIED BACK PAIN LATERALITY: ICD-10-CM

## 2022-04-20 DIAGNOSIS — M47.816 LUMBAR SPONDYLOSIS: ICD-10-CM

## 2022-04-20 DIAGNOSIS — M79.7 FIBROMYALGIA: ICD-10-CM

## 2022-04-20 DIAGNOSIS — D15.1 MYXOMA OF HEART: Primary | ICD-10-CM

## 2022-04-20 DIAGNOSIS — M48.061 SPINAL STENOSIS OF LUMBAR REGION WITHOUT NEUROGENIC CLAUDICATION: ICD-10-CM

## 2022-04-20 DIAGNOSIS — M51.9 LUMBAR DISC DISORDER: ICD-10-CM

## 2022-04-20 PROBLEM — N18.30 CHRONIC RENAL DISEASE, STAGE III (HCC): Status: ACTIVE | Noted: 2022-04-20

## 2022-04-20 PROCEDURE — 1123F ACP DISCUSS/DSCN MKR DOCD: CPT | Performed by: NURSE PRACTITIONER

## 2022-04-20 PROCEDURE — G8417 CALC BMI ABV UP PARAM F/U: HCPCS | Performed by: NURSE PRACTITIONER

## 2022-04-20 PROCEDURE — 1090F PRES/ABSN URINE INCON ASSESS: CPT | Performed by: NURSE PRACTITIONER

## 2022-04-20 PROCEDURE — 4004F PT TOBACCO SCREEN RCVD TLK: CPT | Performed by: NURSE PRACTITIONER

## 2022-04-20 PROCEDURE — G8399 PT W/DXA RESULTS DOCUMENT: HCPCS | Performed by: NURSE PRACTITIONER

## 2022-04-20 PROCEDURE — G8428 CUR MEDS NOT DOCUMENT: HCPCS | Performed by: NURSE PRACTITIONER

## 2022-04-20 PROCEDURE — 99213 OFFICE O/P EST LOW 20 MIN: CPT | Performed by: NURSE PRACTITIONER

## 2022-04-20 PROCEDURE — 3017F COLORECTAL CA SCREEN DOC REV: CPT | Performed by: NURSE PRACTITIONER

## 2022-04-20 PROCEDURE — 4040F PNEUMOC VAC/ADMIN/RCVD: CPT | Performed by: NURSE PRACTITIONER

## 2022-04-20 RX ORDER — CHLORZOXAZONE 500 MG/1
500 TABLET ORAL 3 TIMES DAILY PRN
Qty: 45 TABLET | Refills: 2 | Status: SHIPPED | OUTPATIENT
Start: 2022-04-20 | End: 2022-09-09 | Stop reason: SDUPTHER

## 2022-04-20 RX ORDER — HYDROCODONE BITARTRATE AND ACETAMINOPHEN 5; 325 MG/1; MG/1
1 TABLET ORAL EVERY 8 HOURS PRN
Qty: 90 TABLET | Refills: 0 | Status: SHIPPED
Start: 2022-04-20 | End: 2022-06-06 | Stop reason: SDUPTHER

## 2022-04-20 NOTE — LETTER
[facility]  [address] [place] [address]  Phone: [phone]  Fax: [phone]    GIUSEPPE Tipton CNP        April 20, 2022     Patient: Duane Fick   YOB: 1955   Date of Visit: 4/20/2022       To Whom It May Concern: It is my medical opinion that Lazarus Carolin is unable to perform jury duty at this time. If you have any questions or concerns, please don't hesitate to call.     Sincerely,        GIUSEPPE Tipton CNP

## 2022-04-20 NOTE — PROGRESS NOTES
Do you currently have any of the following:    Fever: No  Headache:  Yes  Cough: No  Shortness of breath: No  Exposed to anyone with these symptoms: No                                                                                                                Brittney Nowak presents to the [facility] on 4/20/2022. Sudha Michele is complaining of pain lower back. The pain is intermittent. The pain is described as aching, stabbing and spasms. Pain is rated on her best day at a 5, on her worst day at a 8, and on average at a 6 on the VAS scale. She took her last dose of Norco, Lyrica and cymbalta . Sudha Michele does have issues with constipation. Any procedures since your last visit: No,      She is not on NSAIDS and  is not on anticoagulation medications to include none      Pacemaker or defibrillator: No Physician      Medication Contract and Consent for Opioid Use Documents Filed     Patient Documents     Type of Document Status Date Received Received By Description    Medication Contract Received 10/14/2020  1:22 PM TOMY THOMAS Mgmt Patient Agreement    Medication Contract Received 12/6/2021  1:54 PM Garry Santamaria Medication Contract                   /88   Pulse 97   Temp 96.9 °F (36.1 °C) (Infrared)   Resp 16   Ht 5' 8\" (1.727 m)   Wt (!) 336 lb (152.4 kg)   LMP  (LMP Unknown)   SpO2 98%   BMI 51.09 kg/m²      No LMP recorded (lmp unknown). Patient has had a hysterectomy.

## 2022-04-20 NOTE — TELEPHONE ENCOUNTER
Dr. Arelis Mcgill has a ULISES scheduled with you tomorrow at [address] [facility],[address]. They are calling for orders.

## 2022-04-21 NOTE — TELEPHONE ENCOUNTER
Could you please ask the indication and reason of the test and we will use that indication to order the ambulatory test they are requesting. It looks like they are looking for left atrial myxoma and please currently order ambulatory ULISES for evaluation of left atrial mass/myxoma.

## 2022-04-21 NOTE — TELEPHONE ENCOUNTER
Spoke with Dorina Hamman at Dr. Kt Ramos office regarding this patient. She will print out existing order and discuss with Dr. Feli Gregg. Contacted Pat and explained the entire situation to her. Dr. Denzel Holter was not refusing to place orders, we were just attempting to find out correct process for an OP procedure on a patient never seen by Cardiology.

## 2022-05-06 NOTE — PROGRESS NOTES
[address] [place] [address]        Date: 5/6/2022    Date of surgery: 5/9/22   Arrival Time: Hospital will call you between 5pm and 7pm with your final arrival time for surgery    1. Nothing by mouth (NPO) as instructed._________after midnight___________________________________________________________    2. Take the following medications with a small sip of water on the morning of Surgery: use inhalers and bring, lyrica, topamax, cymbalto, protonix, zyrec, use flonase. If needed busparone and pain pill. 3. Diabetics may take evening dose of insulin but none after midnight. If you feel symptomatic or low blood sugar morning of surgery drink 1-2 ounces of apple juice only. 4. Aspirin, Ibuprofen, Advil, Naproxen, Vitamin E and other Anti-inflammatory products should be stopped  before surgery  as directed by your physician. Take Tylenol only unless instructed otherwise by your surgeon. 5. Check with your Doctor regarding stopping Plavix, Coumadin, Lovenox, Eliquis, Effient, or other blood thinners. 6. Do not smoke,use illicit drugs and do not drink any alcoholic beverages 24 hours prior to surgery. 7. You may brush your teeth the morning of surgery. DO NOT SWALLOW WATER    8. You MUST make arrangements for a responsible adult to take you home after your surgery. You will not be allowed to leave alone or drive yourself home. It is strongly suggested someone stay with you the first 24 hrs. Your surgery will be cancelled if you do not have a ride home. 9. PEDIATRIC PATIENTS ONLY:  A parent/legal guardian must accompany a child scheduled for surgery and plan to stay at the hospital until the child is discharged. Please do not bring other children with you.     10. Please wear simple, loose fitting clothing to the hospital.  Do not bring valuables (money, credit cards, checkbooks, etc.) Do not wear any makeup (including no eye makeup) or nail polish on your fingers or toes. 11. DO NOT wear any jewelry or piercings on day of surgery. All body piercing jewelry must be removed. 12. Shower the night before surgery with __x_Antibacterial soap /PATI WIPES________    13. TOTAL JOINT REPLACEMENT/HYSTERECTOMY PATIENTS ONLY---Remember to bring Blood Bank bracelet to the hospital on the day of surgery. 14. If you have a Living Will and Durable Power of  for Healthcare, please bring in a copy. 15. If appropriate bring crutches, inspirex, WALKER, CANE etc... 12. Notify your Surgeon if you develop any illness between now and surgery time, cough, cold, fever, sore throat, nausea, vomiting, etc.  Please notify your surgeon if you experience dizziness, shortness of breath or blurred vision between now & the time of your surgery. 17. If you have ___dentures, they will be removed before going to the OR; we will provide you a container. If you wear ___contact lenses or ___glasses, they will be removed; please bring a case for them. 18. To provide excellent care visitors will be limited to 2 in the room at any given time. 19. Please bring picture ID and insurance card. 20. During flu season no children under the age of 15 are permitted in the hospital for the safety of all patients. 21. Other please check in at the information desk. Please call AMBULATORY CARE if you have any further questions.    [address] [facility]     [address]

## 2022-05-07 ENCOUNTER — ANESTHESIA EVENT (OUTPATIENT)
Dept: ENDOSCOPY | Age: 67
End: 2022-05-07
Payer: MEDICARE

## 2022-05-07 ASSESSMENT — ENCOUNTER SYMPTOMS: SHORTNESS OF BREATH: 1

## 2022-05-07 NOTE — ANESTHESIA PRE PROCEDURE
Department of Anesthesiology  Preprocedure Note       Name:  Narciso Fernandez   Age:  79 y.o.  :  1955                                          MRN:  [id number]         Date:  2022      Surgeon: Shavon Serrano):  John Vasquez MD    Procedure: Procedure(s):  TRANSESOPHAGEAL ECHOCARDIOGRAM    Medications prior to admission:   Prior to Admission medications    Medication Sig Start Date End Date Taking? Authorizing Provider   HYDROcodone-acetaminophen (NORCO) 5-325 MG per tablet Take 1 tablet by mouth every 8 hours as needed for Pain for up to 30 days. Take lowest dose possible to manage pain 22  Leopold Gemma, APRN - CNP   pseudoephedrine (SUDAFED 24 HR) 240 MG TB24 extended release tablet Take 240 mg by mouth daily    Historical Provider, MD   pantoprazole (PROTONIX) 40 MG tablet Take 1 tablet by mouth 2 times daily 22   Chichi Espinoza MD   pregabalin (LYRICA) 150 MG capsule Take 1 capsule by mouth 3 times daily for 90 days. TID 3/17/22 6/15/22  Leopold Gemma, APRN - CNP   albuterol sulfate HFA (VENTOLIN HFA) 108 (90 Base) MCG/ACT inhaler Inhale 2 puffs into the lungs every 6 hours as needed for Wheezing or Shortness of Breath 3/8/22   Chichi Espinoza MD   DULoxetine (CYMBALTA) 30 MG extended release capsule TAKE 1 CAPSULE BY MOUTH DAILY 3/4/22   Chichi Espinoza MD   aspirin-acetaminophen-caffeine (EXCEDRIN MIGRAINE) 971-298-57 MG per tablet Take 1 tablet by mouth every 6 hours as needed for Headaches    Historical Provider, MD   triamcinolone (KENALOG) 0.1 % cream Apply topically 2 times daily.   Patient not taking: Reported on 2022   [facility] Nikolai Capone., DPALICIA   cetirizine (ZYRTEC) 10 MG tablet Take 1 tablet by mouth 2 times daily 22   Chichi Espinoza MD   ADVAIR [facility] 99-69 MCG/ACT inhaler Inhale 2 puffs into the lungs 2 times daily  21   Historical Provider, MD   fluticasone (FLONASE) 50 MCG/ACT nasal spray 1 spray by Nasal route daily  Patient taking differently: 1 spray by Nasal route daily as needed  11/9/21   Julio César Zarate MD   hydroCHLOROthiazide (HYDRODIURIL) 25 MG tablet Take 1 tablet by mouth every morning  Patient taking differently: Take 25 mg by mouth daily as needed  11/9/21   Julio César Zarate MD   topiramate (TOPAMAX) 100 MG tablet Take 1 tablet by mouth 2 times daily 11/2/21   Julio César Zarate MD   busPIRone (BUSPAR) 15 MG tablet TAKE 1 TABLET BY MOUTH THREE TIMES A DAY 7/30/21   Julio César Zarate MD   tolterodine (DETROL LA) 2 MG extended release capsule Once daily 7/30/21   Julio César Zarate MD   doxepin (SINEQUAN) 10 MG capsule Take 1 capsule by mouth nightly  Patient taking differently: Take 10 mg by mouth nightly as needed  2/26/21   Stephanie Miller PA-C       Current medications:    Current Outpatient Medications   Medication Sig Dispense Refill    HYDROcodone-acetaminophen (NORCO) 5-325 MG per tablet Take 1 tablet by mouth every 8 hours as needed for Pain for up to 30 days. Take lowest dose possible to manage pain 90 tablet 0    pseudoephedrine (SUDAFED 24 HR) 240 MG TB24 extended release tablet Take 240 mg by mouth daily      pantoprazole (PROTONIX) 40 MG tablet Take 1 tablet by mouth 2 times daily 180 tablet 0    pregabalin (LYRICA) 150 MG capsule Take 1 capsule by mouth 3 times daily for 90 days.  capsule 0    albuterol sulfate HFA (VENTOLIN HFA) 108 (90 Base) MCG/ACT inhaler Inhale 2 puffs into the lungs every 6 hours as needed for Wheezing or Shortness of Breath 1 each 3    DULoxetine (CYMBALTA) 30 MG extended release capsule TAKE 1 CAPSULE BY MOUTH DAILY 90 capsule 1    aspirin-acetaminophen-caffeine (EXCEDRIN MIGRAINE) 250-250-65 MG per tablet Take 1 tablet by mouth every 6 hours as needed for Headaches      triamcinolone (KENALOG) 0.1 % cream Apply topically 2 times daily.  (Patient not taking: Reported on 4/20/2022) 90 g 2    cetirizine (ZYRTEC) 10 MG tablet Take 1 tablet by mouth 2 times daily 180 tablet 1    ADVAIR HFA 45-21 MCG/ACT inhaler Inhale 2 puffs into the lungs 2 times daily       fluticasone (FLONASE) 50 MCG/ACT nasal spray 1 spray by Nasal route daily (Patient taking differently: 1 spray by Nasal route daily as needed ) 3 each 3    hydroCHLOROthiazide (HYDRODIURIL) 25 MG tablet Take 1 tablet by mouth every morning (Patient taking differently: Take 25 mg by mouth daily as needed ) 90 tablet 0    topiramate (TOPAMAX) 100 MG tablet Take 1 tablet by mouth 2 times daily 180 tablet 3    busPIRone (BUSPAR) 15 MG tablet TAKE 1 TABLET BY MOUTH THREE TIMES A  tablet 3    tolterodine (DETROL LA) 2 MG extended release capsule Once daily 90 capsule 3    doxepin (SINEQUAN) 10 MG capsule Take 1 capsule by mouth nightly (Patient taking differently: Take 10 mg by mouth nightly as needed ) 30 capsule 2     No current facility-administered medications for this visit. Allergies: Allergies   Allergen Reactions    Carafate [Sucralfate]      Patient unable to tolerate. Unable to recall her reaction.  Glucophage [Metformin Hcl]      Severe abdominal pain, constipation      Mobic [Meloxicam] Other (See Comments)     Abdominal pain    Trazodone And Nefazodone      Unable to tolerate, patient reports made her physically ill with abdominal pain      Ultram [Tramadol]      Abdominal pain    Nickel Itching and Rash       Problem List:    Patient Active Problem List   Diagnosis Code    Chronic back pain M54.9, G89.29    Fibromyalgia M79.7    Overactive bladder N32.81    Depression F32. A    History of total knee arthroplasty Z96.659    Nodule of left lung R91.1    History of colon polyps Z86.010    Spinal stenosis M48.00    Tobacco use Z72.0    Chronic pain syndrome G89.4    Myofascial pain syndrome M79.18    Lumbar disc disorder M51.9    Lumbar spondylosis M47.816    Spinal stenosis of lumbar region without neurogenic claudication M48.061    Epigastric pain R10.13  Onychomycosis B35.1    Type II diabetes mellitus with peripheral circulatory disorder (HCC) E11.51    Venous insufficiency (chronic) (peripheral) I87.2    Tinea pedis of both feet B35.3    Hiatal hernia K44.9    Class 3 severe obesity due to excess calories with serious comorbidity and body mass index (BMI) of 50.0 to 59.9 in adult ([facility] E66.01, Z68.43    Diabetes mellitus without complication (HCC) A04.0    Hypothyroidism E03.9    Sleep apnea Z32.89    Uncomplicated asthma A93.204    Hyperlipidemia E78.5    Opioid use, unspecified with unspecified opioid-induced disorder F11.99    Stage 3a chronic kidney disease (HCC) N18.31    LVH (left ventricular hypertrophy) I51.7    Chronic renal disease, stage III ([facility] [039032] N18.30       Past Medical History:        Diagnosis Date    Anxiety     Arthralgia 10/8/2013    Arthritis     Bone avascular necrosis (HCC) 3/23/2015    Breast lesion 12/1/2015    Chronic back pain     Chronic fatigue 10/8/2013    Daytime somnolence 10/8/2013    Depression     Fibromyalgia     Fracture, fibula     right    GERD (gastroesophageal reflux disease)     H/O cardiovascular stress test 01/29/2020    Lexiscan    Headache     History of blood transfusion     History of fractured kneecap     (R) 2 hair line fractures    History of total knee arthroplasty 3/19/2015    Hyperlipidemia     Morbid obesity with BMI of 45.0-49.9, adult ([facility] 10/9/2015    Obesity     Osteoarthritis     Overactive bladder 1/22/2014    PONV (postoperative nausea and vomiting)     hx 40 yrs ago    Prolonged emergence from general anesthesia     Reflux        Past Surgical History:        Procedure Laterality Date    CARPAL TUNNEL RELEASE Bilateral     CHOLECYSTECTOMY      COLONOSCOPY  2011    COLONOSCOPY  11/22/2016    Dr. Yaa Vargas N/A 12/8/2020    LAPAROSCOPIC Bhargavi Ramal performed by Irlanda Connor MD at [address] [place] [address] 1991    benign reasons     JOINT REPLACEMENT Bilateral 2010    LUMBAR FUSION      L3-5    ROTATOR CUFF REPAIR Left     left    TOTAL KNEE ARTHROPLASTY Bilateral 2010    did both at the same time    TUBAL LIGATION      UPPER GASTROINTESTINAL ENDOSCOPY N/A 9/29/2020    EGD BIOPSY performed by Keagan Collins MD at [address] [place] [address] History:    Social History     Tobacco Use    Smoking status: Current Every Day Smoker     Packs/day: 0.25     Years: 30.00     Pack years: 7.50     Types: Cigarettes    Smokeless tobacco: Never Used   Substance Use Topics    Alcohol use: No     Alcohol/week: 0.0 standard drinks                                Ready to quit: Not Answered  Counseling given: Not Answered      Vital Signs (Current): There were no vitals filed for this visit.                                            BP Readings from Last 3 Encounters:   04/20/22 130/88   03/08/22 108/61   02/17/22 132/78       NPO Status:  > 8 hours NPO                                                                               BMI:   Wt Readings from Last 3 Encounters:   04/20/22 (!) 336 lb (152.4 kg)   03/08/22 (!) 328 lb 12.8 oz (149.1 kg)   02/17/22 (!) 337 lb (152.9 kg)     There is no height or weight on file to calculate BMI.    CBC:   Lab Results   Component Value Date    WBC 5.3 11/08/2021    RBC 4.43 11/08/2021    HGB 13.0 11/08/2021    HCT 41.4 11/08/2021    MCV 93.5 11/08/2021    RDW 14.0 11/08/2021     11/08/2021       CMP:   Lab Results   Component Value Date     11/08/2021    K 4.1 11/08/2021    K 4.0 12/07/2020     11/08/2021    CO2 19 11/08/2021    BUN 11 11/08/2021    CREATININE 1.2 11/08/2021    GFRAA 54 11/08/2021    LABGLOM 45 11/08/2021    GLUCOSE 99 11/08/2021    GLUCOSE 93 02/16/2011    PROT 7.0 09/23/2020    CALCIUM 9.2 11/08/2021    BILITOT 0.3 09/23/2020    ALKPHOS 91 09/23/2020    AST 15 09/23/2020    ALT 13 09/23/2020       POC Tests: No results for input(s): POCGLU, POCNA, POCK, POCCL, POCBUN, POCHEMO, POCHCT in the last 72 hours. Coags:   Lab Results   Component Value Date    PROTIME 10.2 07/26/2016    PROTIME 10.2 02/16/2011    INR 0.9 07/26/2016    APTT 30.7 02/16/2011       HCG (If Applicable): No results found for: PREGTESTUR, PREGSERUM, HCG, HCGQUANT     ABGs: No results found for: PHART, PO2ART, BXQ8VZS, FTV7TJB, BEART, N5GTUDBF     Type & Screen (If Applicable):  No results found for: LABABO, LABRH    Drug/Infectious Status (If Applicable):  No results found for: HIV, HEPCAB    COVID-19 Screening (If Applicable):   Lab Results   Component Value Date    COVID19 Not Detected 12/02/2020     Sinus bradycardia with PAC  Otherwise normal ECG  No previous ECGs available  Confirmed by David Sports ([id number]) on 12/7/2020 1:28:27 PM    LEXISCAN STRESS TEST     INDICATIONS:  The patient is a very polite and pleasant 15-year-old  female who follows with Dr. Tiana Tyler. She has been having intermittent  periods of shortness of breath. Her cardiac risk factors include  hyperlipidemia and a strong family history of cardiovascular disease.     PROCEDURE:  The patient was brought to the cardiac stress lab and  connected to continuous cardiac monitoring. Resting EKG indicated  normal sinus rhythm. She was administered Lexiscan over 10-15 seconds  followed by injection of Cardiolite. She was placed in recovery at 1  minute. Throughout the examination, she had no unusual symptomatology. There was no active chest pain or shortness of breath. There was no  ectopy or dysrhythmia identified. There were no ST or T-wave  abnormalities identified. She had physiologic response to both blood  pressure and heart rate. She tolerated the procedure well. There was  no evidence of Lexiscan-induced ischemia. She will now be transferred  to the Imaging Lab for final stress pictures.      Conclusions      Summary   Left ventricular size is grossly normal.   Mild left ventricular concentric hypertrophy noted. Ejection fraction is visually estimated at 50%. No evidence of left ventricular mass or thrombus noted. No regional wall motion abnormalities seen. Normal sized left atrium. Interatrial septum appears intact. No evidence of thrombus within left atrium. Borderline dilated right ventricle. No evidence of a thrombus in the right ventricle. Mildly enlarged right atrium size. Physiologic and/or trace mitral regurgitation is present. No evidence of mitral valve stenosis. Aortic valve opens well. The aortic valve is trileaflet. No hemodynamically significant aortic stenosis is present. Physiologic and/or trace aortic regurgitation is noted. Physiologic and/or trace tricuspid regurgitation. Regular rhythm. Anesthesia Evaluation  Patient summary reviewed   history of anesthetic complications (does not happen after every anesthesia occurence): PONV. Airway: Mallampati: III  TM distance: >3 FB   Neck ROM: full  Mouth opening: > = 3 FB Dental:      Comment: Multiple missing teeth. Patient denies loose. Pulmonary:   (+) shortness of breath (Mild exertional dyspnea which doesn't limit ADL's): no interval change,  sleep apnea: on noncompliant,  decreased breath sounds,  asthma: current smoker          Patient smoked on day of surgery. ROS comment: Tobacco abuse w/o dx. Of COPD.   Symptoms managed with Ventolin and Symbicort   Cardiovascular:    (+) valvular problems/murmurs (mild TR):, PEREZ: after ambulating 1 flight of stairs, hyperlipidemia      ECG reviewed  Rhythm: regular  Rate: normal  Echocardiogram reviewed         Beta Blocker:  Not on Beta Blocker      ROS comment: possible left atrial mass on transthoracic echocardiogram      Neuro/Psych:   (+) neuromuscular disease:, headaches: migraine headaches, psychiatric history: stable with treatmentdepression/anxiety              ROS comment: Vertigo GI/Hepatic/Renal:   (+) hiatal hernia, GERD: well controlled, morbid obesity (SMO with a BMI of 51.1 kg/m2 and excessive daytime somnolence )         ROS comment: Overactive bladder. Endo/Other:    (+) Diabetes (A1C 5.7%)Type II DM, no interval change, , hypothyroidism::., .          Pt had no PAT visit        ROS comment: OA, DJD, spinal stenosis with fibromyalgia and chronic pain. Chronic fatigue syndrome. Abdominal:   (+) obese ( Super morbidly obese),           Vascular:   + PVD, aortic or cerebral, . Other Findings:             Anesthesia Plan      MAC     ASA 4     (PONV prophylaxis)  Induction: intravenous. MIPS: Prophylactic antiemetics administered. Anesthetic plan and risks discussed with patient. Plan discussed with CRNA.                 Jean-Paul Simpson MD   5-9-22

## 2022-05-09 ENCOUNTER — ANESTHESIA (OUTPATIENT)
Dept: ENDOSCOPY | Age: 67
End: 2022-05-09
Payer: MEDICARE

## 2022-05-09 ENCOUNTER — HOSPITAL ENCOUNTER (OUTPATIENT)
Age: 67
Setting detail: OUTPATIENT SURGERY
Discharge: HOME | End: 2022-05-09
Attending: INTERNAL MEDICINE | Admitting: INTERNAL MEDICINE
Payer: MEDICARE

## 2022-05-09 VITALS
TEMPERATURE: 96.3 F | HEIGHT: 68 IN | SYSTOLIC BLOOD PRESSURE: 109 MMHG | WEIGHT: 293 LBS | HEART RATE: 78 BPM | BODY MASS INDEX: 44.41 KG/M2 | RESPIRATION RATE: 16 BRPM | DIASTOLIC BLOOD PRESSURE: 61 MMHG | OXYGEN SATURATION: 98 %

## 2022-05-09 VITALS
DIASTOLIC BLOOD PRESSURE: 68 MMHG | RESPIRATION RATE: 21 BRPM | OXYGEN SATURATION: 99 % | SYSTOLIC BLOOD PRESSURE: 116 MMHG

## 2022-05-09 DIAGNOSIS — D15.1 MYXOMA OF HEART: ICD-10-CM

## 2022-05-09 PROCEDURE — 3700000001 HC ADD 15 MINUTES (ANESTHESIA): Performed by: INTERNAL MEDICINE

## 2022-05-09 PROCEDURE — 93312 ECHO TRANSESOPHAGEAL: CPT | Performed by: INTERNAL MEDICINE

## 2022-05-09 PROCEDURE — 93312 ECHO TRANSESOPHAGEAL: CPT

## 2022-05-09 PROCEDURE — 3700000000 HC ANESTHESIA ATTENDED CARE: Performed by: INTERNAL MEDICINE

## 2022-05-09 PROCEDURE — 2709999900 HC NON-CHARGEABLE SUPPLY: Performed by: INTERNAL MEDICINE

## 2022-05-09 PROCEDURE — 7100000010 HC PHASE II RECOVERY - FIRST 15 MIN: Performed by: INTERNAL MEDICINE

## 2022-05-09 PROCEDURE — 2500000003 HC RX 250 WO HCPCS

## 2022-05-09 PROCEDURE — 7100000011 HC PHASE II RECOVERY - ADDTL 15 MIN: Performed by: INTERNAL MEDICINE

## 2022-05-09 PROCEDURE — 93325 DOPPLER ECHO COLOR FLOW MAPG: CPT

## 2022-05-09 PROCEDURE — 93325 DOPPLER ECHO COLOR FLOW MAPG: CPT | Performed by: INTERNAL MEDICINE

## 2022-05-09 PROCEDURE — 2580000003 HC RX 258: Performed by: ANESTHESIOLOGY

## 2022-05-09 PROCEDURE — 93320 DOPPLER ECHO COMPLETE: CPT

## 2022-05-09 PROCEDURE — 6360000002 HC RX W HCPCS

## 2022-05-09 RX ORDER — LIDOCAINE HYDROCHLORIDE 20 MG/ML
INJECTION, SOLUTION INFILTRATION; PERINEURAL PRN
Status: DISCONTINUED | OUTPATIENT
Start: 2022-05-09 | End: 2022-05-09 | Stop reason: SDUPTHER

## 2022-05-09 RX ORDER — SODIUM CHLORIDE, SODIUM LACTATE, POTASSIUM CHLORIDE, CALCIUM CHLORIDE 600; 310; 30; 20 MG/100ML; MG/100ML; MG/100ML; MG/100ML
INJECTION, SOLUTION INTRAVENOUS CONTINUOUS
Status: DISCONTINUED | OUTPATIENT
Start: 2022-05-09 | End: 2022-05-09 | Stop reason: HOSPADM

## 2022-05-09 RX ORDER — PROPOFOL 10 MG/ML
INJECTION, EMULSION INTRAVENOUS PRN
Status: DISCONTINUED | OUTPATIENT
Start: 2022-05-09 | End: 2022-05-09 | Stop reason: SDUPTHER

## 2022-05-09 RX ADMIN — PROPOFOL 180 MG: 10 INJECTION, EMULSION INTRAVENOUS at 14:38

## 2022-05-09 RX ADMIN — SODIUM CHLORIDE, POTASSIUM CHLORIDE, SODIUM LACTATE AND CALCIUM CHLORIDE: 600; 310; 30; 20 INJECTION, SOLUTION INTRAVENOUS at 13:56

## 2022-05-09 RX ADMIN — LIDOCAINE HYDROCHLORIDE 60 MG: 20 INJECTION, SOLUTION INFILTRATION; PERINEURAL at 14:38

## 2022-05-09 ASSESSMENT — ENCOUNTER SYMPTOMS: DYSPNEA ACTIVITY LEVEL: AFTER AMBULATING 1 FLIGHT OF STAIRS

## 2022-05-09 ASSESSMENT — PAIN - FUNCTIONAL ASSESSMENT: PAIN_FUNCTIONAL_ASSESSMENT: 0-10

## 2022-05-09 ASSESSMENT — PAIN DESCRIPTION - DESCRIPTORS: DESCRIPTORS: ACHING;DISCOMFORT;SPASM

## 2022-05-09 ASSESSMENT — LIFESTYLE VARIABLES: SMOKING_STATUS: 1

## 2022-05-09 NOTE — PROCEDURES
Procedure: Transesophageal echocardiogram    Indication: Left atrial mass    Anesthesia: MAC      ULISES procedure:    Written informed consent was obtained, the ULISES was performed under stable fasting condition. The patient was set up for monitoring of surface EKG and pulse oximetry continuously. Blood pressure was monitored and with automatic cuff measurements. Supplemental oxygen was administered. The procedure was performed under anesthesia by anesthesiology. After ensuring adequate anesthesia, ULISES probe was intubated without difficulty. Estimated total blood loss: 0    Complications: None    Patient was monitored until awake and vitals remained stable.       Findings:     Midsize echogenic density attached to the interatrial septum, suggestive of atrial myxoma      Thank you

## 2022-05-09 NOTE — PROGRESS NOTES
Endo notified that patient still has her contact in. It is disposable and she would like to leave it in until she speaks with anesthesia. Per Andie Ennis will have her take it out.

## 2022-05-09 NOTE — H&P
Department of Medicine  Section of Cardiology  Attending Procedure History and Physical        Pre-Procedural Diagnosis: Left atrial mass    Indications: Left atrial mass on transthoracic echocardiogram    Procedure Planned: Transesophageal echocardiogram    History obtained from patient    History of Present Illness: The patient is a 79 y.o. female who presents for the above procedure.   Was found to have possible left atrial mass on transthoracic echocardiogram is here for transesophageal echocardiogram for further evaluation    Past Medical History:        Diagnosis Date    Anxiety     Arthralgia 10/8/2013    Arthritis     Bone avascular necrosis ([facility] [address] 3/23/2015    Breast lesion 12/1/2015    Chronic back pain     Chronic fatigue 10/8/2013    Daytime somnolence 10/8/2013    Depression     Fibromyalgia     Fracture, fibula     right    GERD (gastroesophageal reflux disease)     H/O cardiovascular stress test 01/29/2020    Lexiscan    Headache     History of blood transfusion     History of fractured kneecap     (R) 2 hair line fractures    History of total knee arthroplasty 3/19/2015    Hyperlipidemia     Morbid obesity with BMI of 45.0-49.9, adult ([facility] [address] 10/9/2015    Obesity     Osteoarthritis     Overactive bladder 1/22/2014    PONV (postoperative nausea and vomiting)     hx 40 yrs ago    Prolonged emergence from general anesthesia     Reflux        Past Surgical History:        Procedure Laterality Date    CARPAL TUNNEL RELEASE Bilateral     CHOLECYSTECTOMY      COLONOSCOPY  2011    COLONOSCOPY  11/22/2016    Dr. Harvel Olszewski N/A 12/8/2020    Manual Ales performed by Hernan Beck MD at [address]    benign reasons     JOINT REPLACEMENT Bilateral 2010    LUMBAR FUSION      L3-5    ROTATOR CUFF REPAIR Left     left    TOTAL KNEE ARTHROPLASTY Bilateral 2010    did both at the same time    TUBAL LIGATION      UPPER GASTROINTESTINAL ENDOSCOPY N/A 9/29/2020    EGD BIOPSY performed by Laurie Daley MD at [facility] ENDOSCOPY     Medications: Allergies:  Carafate [sucralfate], Glucophage [metformin hcl], Mobic [meloxicam], Trazodone and nefazodone, Ultram [tramadol], and Nickel    History of allergic reaction to anesthesia:  no      REVIEW OF SYSTEMS  CONSTITUTIONAL:  negative for  fevers, chills  HEENT:  negative for earaches, nasal congestion and epistaxis  RESPIRATORY:  negative for  dry cough, cough with sputum,wheezing and hemoptysis  GASTROINTESTINAL:  negative for nausea, vomiting  MUSCULOSKELETAL:  negative for  myalgias, arthralgias  NEUROLOGICAL:  negative for visual disturbance, dysphagia    PHYSICAL EXAM    BP (!) 154/70   Pulse 89   Temp 97.3 °F (36.3 °C) (Tympanic)   Resp 20   Ht 5' 8\" (1.727 m)   Wt (!) 336 lb (152.4 kg)   LMP  (LMP Unknown)   SpO2 99%   BMI 51.09 kg/m²     CONSTITUTIONAL:  awake, alert, cooperative, no apparent distress, and appears stated age  HEAD:  normocepalic, without obvious abnormality, atraumatic  NECK:  Supple, symmetrical, trachea midline, no adenopathy, thyroid symmetric, not enlarged and no tenderness, skin normal  LUNGS:  No increased work of breathing, good air exchange, clear to auscultation bilaterally, no crackles or wheezing  CARDIOVASCULAR:  Normal apical impulse, regular rate and rhythm, normal S1 and S2, no S3 or S4, no edema, no JVD, no carotid bruit. ABDOMEN:  Soft, nontender, no masses, no hepatomegaly, no splenomegaly, BS+  MUSCULOSKELETAL:  No clubbing no cyanosis. there is no redness, warmth, or swelling of the joints  NEUROLOGIC:  Alert, awake,oriented x3  SKIN:  no bruising or bleeding, normal skin color, texture, turgor and no redness, warmth, or swelling    DATA    CBC:    Lab Results   Component Value Date    WBC 5.3 11/08/2021    RBC 4.43 11/08/2021    HGB 13.0 11/08/2021    HCT 41.4 11/08/2021    MCV 93.5 11/08/2021    RDW 14.0 11/08/2021     11/08/2021     Platelet:    Lab Results   Component Value Date     11/08/2021     BUN/Cr:    Lab Results   Component Value Date    BUN 11 11/08/2021     Potassium:    Lab Results   Component Value Date    K 4.1 11/08/2021    K 4.0 12/07/2020     PT/INR:  No results found for: PTINR  PTT:    Lab Results   Component Value Date    APTT 30.7 02/16/2011         ASSESSMENT AND PLAN    1. Patient is a 79 y.o. female with above specified procedure planned transesophageal echocardiogram under [facility]    Expected Sedation/Anesthesia Type:  deep sedation    2. ASA ([address] Anesthesiology) Anesthesia Status:  LMAC    3. Procedure options, risks and benefits reviewed with Patient. Patient expresses understanding    4. Patient has not been on anticoagulation medications    5. Consent has been signed:   Yes

## 2022-05-09 NOTE — ANESTHESIA POSTPROCEDURE EVALUATION
Department of Anesthesiology  Postprocedure Note    Patient: Austen Guerra  MRN: [id number]  YOB: 1955  Date of evaluation: 5/9/2022  Time:  3:28 PM     Procedure Summary     Date: 05/09/22 Room / Location: [address] [place] [address] / [facility]    Anesthesia Start: 5574 Anesthesia Stop: 2022    Procedure: TRANSESOPHAGEAL ECHOCARDIOGRAM (N/A ) Diagnosis: (MYXOMA OF HEAT)    Surgeons: Marquis Chiquis MD Responsible Provider: Joe Reynolds MD    Anesthesia Type: MAC ASA Status: 4          Anesthesia Type: No value filed. Christine Phase I: Christine Score: 10    Christine Phase II:      Last vitals: Reviewed and per EMR flowsheets.        Anesthesia Post Evaluation    Patient location during evaluation: bedside  Patient participation: complete - patient participated  Level of consciousness: awake  Pain score: 0  Airway patency: patent  Nausea & Vomiting: no nausea and no vomiting  Complications: no  Cardiovascular status: hemodynamically stable  Respiratory status: acceptable  Hydration status: euvolemic

## 2022-05-10 ENCOUNTER — TELEPHONE (OUTPATIENT)
Dept: FAMILY MEDICINE CLINIC | Age: 67
End: 2022-05-10

## 2022-05-10 DIAGNOSIS — I35.1 AORTIC VALVE INSUFFICIENCY, ETIOLOGY OF CARDIAC VALVE DISEASE UNSPECIFIED: ICD-10-CM

## 2022-05-10 DIAGNOSIS — E11.51 TYPE II DIABETES MELLITUS WITH PERIPHERAL CIRCULATORY DISORDER (HCC): ICD-10-CM

## 2022-05-10 DIAGNOSIS — D21.9 MYXOMA: ICD-10-CM

## 2022-05-10 DIAGNOSIS — N18.30 STAGE 3 CHRONIC KIDNEY DISEASE, UNSPECIFIED WHETHER STAGE 3A OR 3B CKD (HCC): Primary | ICD-10-CM

## 2022-05-10 DIAGNOSIS — I51.89 LEFT ATRIAL MASS: ICD-10-CM

## 2022-05-10 DIAGNOSIS — R93.1 ABNORMAL FINDINGS ON DIAGNOSTIC IMAGING OF HEART AND CORONARY CIRCULATION: ICD-10-CM

## 2022-05-10 DIAGNOSIS — Z01.810 PREOP CARDIOVASCULAR EXAM: ICD-10-CM

## 2022-05-10 DIAGNOSIS — D21.9 MYXOMA: Primary | ICD-10-CM

## 2022-05-10 PROBLEM — M48.00 SPINAL STENOSIS: Status: RESOLVED | Noted: 2017-03-03 | Resolved: 2022-05-10

## 2022-05-11 NOTE — TELEPHONE ENCOUNTER
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 She is already scheduled for cath and is expecting a call from Dr OWENS Siss[facility] office to schedule consultation.

## 2022-05-20 ENCOUNTER — TELEPHONE (OUTPATIENT)
Dept: CARDIAC CATH/INVASIVE PROCEDURES | Age: 67
End: 2022-05-20

## 2022-05-20 DIAGNOSIS — E11.51 TYPE II DIABETES MELLITUS WITH PERIPHERAL CIRCULATORY DISORDER (HCC): ICD-10-CM

## 2022-05-20 DIAGNOSIS — Z01.810 PREOP CARDIOVASCULAR EXAM: ICD-10-CM

## 2022-05-20 DIAGNOSIS — N18.30 STAGE 3 CHRONIC KIDNEY DISEASE, UNSPECIFIED WHETHER STAGE 3A OR 3B CKD (HCC): ICD-10-CM

## 2022-05-20 LAB
ANION GAP SERPL CALCULATED.3IONS-SCNC: 15 MMOL/L (ref 7–16)
APTT: 32.6 SEC (ref 24.5–35.1)
BACTERIA: ABNORMAL /HPF
BILIRUBIN URINE: NEGATIVE
BLOOD, URINE: NEGATIVE
BUN BLDV-MCNC: 12 MG/DL (ref 6–23)
CALCIUM SERPL-MCNC: 9.2 MG/DL (ref 8.6–10.2)
CHLORIDE BLD-SCNC: 109 MMOL/L (ref 98–107)
CLARITY: CLEAR
CO2: 18 MMOL/L (ref 22–29)
COLOR: YELLOW
CREAT SERPL-MCNC: 1 MG/DL (ref 0.5–1)
EPITHELIAL CELLS, UA: ABNORMAL /HPF
GFR AFRICAN AMERICAN: >60
GFR NON-AFRICAN AMERICAN: 55 ML/MIN/1.73
GLUCOSE BLD-MCNC: 107 MG/DL (ref 74–99)
GLUCOSE URINE: NEGATIVE MG/DL
HCT VFR BLD CALC: 42.8 % (ref 34–48)
HEMOGLOBIN: 13.3 G/DL (ref 11.5–15.5)
INR BLD: 0.9
KETONES, URINE: NEGATIVE MG/DL
LEUKOCYTE ESTERASE, URINE: ABNORMAL
MCH RBC QN AUTO: 28.8 PG (ref 26–35)
MCHC RBC AUTO-ENTMCNC: 31.1 % (ref 32–34.5)
MCV RBC AUTO: 92.6 FL (ref 80–99.9)
NITRITE, URINE: NEGATIVE
PDW BLD-RTO: 14.1 FL (ref 11.5–15)
PH UA: 6 (ref 5–9)
PLATELET # BLD: 123 E9/L (ref 130–450)
PMV BLD AUTO: 12.9 FL (ref 7–12)
POTASSIUM SERPL-SCNC: 4.1 MMOL/L (ref 3.5–5)
PROTEIN UA: NEGATIVE MG/DL
PROTHROMBIN TIME: 10.1 SEC (ref 9.3–12.4)
RBC # BLD: 4.62 E12/L (ref 3.5–5.5)
RBC UA: ABNORMAL /HPF (ref 0–2)
SODIUM BLD-SCNC: 142 MMOL/L (ref 132–146)
SPECIFIC GRAVITY UA: <=1.005 (ref 1–1.03)
UROBILINOGEN, URINE: 0.2 E.U./DL
WBC # BLD: 5.8 E9/L (ref 4.5–11.5)
WBC UA: ABNORMAL /HPF (ref 0–5)

## 2022-05-20 PROCEDURE — 81003 URINALYSIS AUTO W/O SCOPE: CPT | Performed by: INTERNAL MEDICINE

## 2022-05-20 NOTE — TELEPHONE ENCOUNTER
Reminded patient of scheduled procedure on 5/23/22. Instructions given and COVID questionnaire completed.

## 2022-05-21 LAB
ABO/RH: NORMAL
ANTIBODY SCREEN: NORMAL

## 2022-05-23 ENCOUNTER — HOSPITAL ENCOUNTER (OUTPATIENT)
Dept: CARDIAC CATH/INVASIVE PROCEDURES | Age: 67
Discharge: HOME | End: 2022-05-23
Attending: INTERNAL MEDICINE | Admitting: INTERNAL MEDICINE
Payer: MEDICARE

## 2022-05-23 VITALS
WEIGHT: 293 LBS | BODY MASS INDEX: 44.41 KG/M2 | DIASTOLIC BLOOD PRESSURE: 68 MMHG | OXYGEN SATURATION: 100 % | TEMPERATURE: 96.4 F | HEIGHT: 68 IN | SYSTOLIC BLOOD PRESSURE: 116 MMHG | HEART RATE: 49 BPM | RESPIRATION RATE: 18 BRPM

## 2022-05-23 DIAGNOSIS — Z98.890 S/P CARDIAC CATH: ICD-10-CM

## 2022-05-23 LAB
ABO/RH: NORMAL
ANTIBODY SCREEN: NORMAL

## 2022-05-23 PROCEDURE — 6360000002 HC RX W HCPCS

## 2022-05-23 PROCEDURE — C1894 INTRO/SHEATH, NON-LASER: HCPCS

## 2022-05-23 PROCEDURE — 2709999900 HC NON-CHARGEABLE SUPPLY

## 2022-05-23 PROCEDURE — C1887 CATHETER, GUIDING: HCPCS

## 2022-05-23 PROCEDURE — 93454 CORONARY ARTERY ANGIO S&I: CPT

## 2022-05-23 PROCEDURE — 93454 CORONARY ARTERY ANGIO S&I: CPT | Performed by: INTERNAL MEDICINE

## 2022-05-23 PROCEDURE — 6370000000 HC RX 637 (ALT 250 FOR IP): Performed by: INTERNAL MEDICINE

## 2022-05-23 PROCEDURE — 2580000003 HC RX 258: Performed by: INTERNAL MEDICINE

## 2022-05-23 PROCEDURE — 86900 BLOOD TYPING SEROLOGIC ABO: CPT

## 2022-05-23 PROCEDURE — 86901 BLOOD TYPING SEROLOGIC RH(D): CPT

## 2022-05-23 PROCEDURE — 86850 RBC ANTIBODY SCREEN: CPT

## 2022-05-23 PROCEDURE — 36415 COLL VENOUS BLD VENIPUNCTURE: CPT

## 2022-05-23 PROCEDURE — C1769 GUIDE WIRE: HCPCS

## 2022-05-23 PROCEDURE — 2500000003 HC RX 250 WO HCPCS

## 2022-05-23 RX ORDER — SODIUM CHLORIDE 9 MG/ML
INJECTION, SOLUTION INTRAVENOUS PRN
Status: DISCONTINUED | OUTPATIENT
Start: 2022-05-23 | End: 2022-05-23 | Stop reason: HOSPADM

## 2022-05-23 RX ORDER — ACETAMINOPHEN 325 MG/1
650 TABLET ORAL EVERY 4 HOURS PRN
Status: DISCONTINUED | OUTPATIENT
Start: 2022-05-23 | End: 2022-05-23 | Stop reason: HOSPADM

## 2022-05-23 RX ORDER — SODIUM CHLORIDE 9 MG/ML
INJECTION, SOLUTION INTRAVENOUS CONTINUOUS
Status: DISCONTINUED | OUTPATIENT
Start: 2022-05-23 | End: 2022-05-23 | Stop reason: HOSPADM

## 2022-05-23 RX ORDER — SODIUM CHLORIDE 0.9 % (FLUSH) 0.9 %
5-40 SYRINGE (ML) INJECTION EVERY 12 HOURS SCHEDULED
Status: DISCONTINUED | OUTPATIENT
Start: 2022-05-23 | End: 2022-05-23 | Stop reason: HOSPADM

## 2022-05-23 RX ORDER — SODIUM CHLORIDE 0.9 % (FLUSH) 0.9 %
5-40 SYRINGE (ML) INJECTION PRN
Status: DISCONTINUED | OUTPATIENT
Start: 2022-05-23 | End: 2022-05-23 | Stop reason: HOSPADM

## 2022-05-23 RX ADMIN — SODIUM CHLORIDE: 9 INJECTION, SOLUTION INTRAVENOUS at 09:08

## 2022-05-23 RX ADMIN — ASPIRIN 325 MG: 325 TABLET, COATED ORAL at 09:11

## 2022-05-23 NOTE — PROGRESS NOTES
1415  Patient arrived from cath lab. VS stable. Right femoral wrist band intact with good cap refill and pulses palpable.     Anders Campos RN

## 2022-05-23 NOTE — PROCEDURES
[address]                  [address] [facility],  [facility]                            CARDIAC CATHETERIZATION    PATIENT NAME: Garry Dominique                     :        1955  MED REC NO:   [id number]                            ROOM:       6314  ACCOUNT NO:   [de-identified]                           ADMIT DATE: 2022  PROVIDER:     Esthela Earl MD    DATE OF PROCEDURE:  2022    PROCEDURES:  1. Coronary angiography. 2.  Conscious sedation using Versed and fentanyl. Indication #70, score of 7. INDICATION FOR PROCEDURE:  The patient is a 60-year-old female who was  found to have right atrial mass, suspicious for myxoma; was referred to  CT Surgery for possible surgical excision of that mass. The patient was  brought today to evaluate her coronary anatomy prior to any cardiac  surgery. DESCRIPTION OF PROCEDURE:  After the appropriate informed consent, the  right wrist area was prepped and draped in the usual sterile fashion. A  timeout was completed. The right wrist area was locally anesthetized  with 2 mL of 2% lidocaine. A 21-gauge needle was used to access the  right radial artery. A 6-Khmer introducer sheath was used to cannulate  the right radial artery. We tried JL3.5 to engage the left coronary  artery successfully. We were able to get nonselective left coronary  angiography using TIG catheter. We used 6-Khmer JR4 catheter for right  coronary angiography in multiple projections. ANGIOGRAPHIC FINDINGS:  The left main coronary artery is a large vessel,  and in fact it has anterior takeoff. The left main coronary artery has  no CAD, bifurcates into left anterior descending artery and left  circumflex artery. The left anterior descending artery extends down to the apex. It is a  long vessel that tapers off in the mid segment. It gives off a large  diagonal branch. The LAD and its branches have no CAD.     The left circumflex artery is a large vessel, gives off a large OM  branch. The left circumflex artery and its branches have no CAD. The right coronary artery arises normally from the right sinus of  Valsalva. It has dominant circulation without any CAD. At the end of the procedure, the catheter and the wire were pulled out  from the body, the sheath was pulled out from the body and a Vasc Band  was applied to the right wrist area with effective hemostasis. COMPLICATIONS:  None. ESTIMATED TOTAL BLOOD LOSS:  15 to 20 mL. MEDICATIONS USED DURING THE PROCEDURE:  We used intraarterial verapamil  to prevent vasospasm. We used intraarterial heparin for  anticoagulation. CONSCIOUS SEDATION:  We used Versed and fentanyl for conscious sedation. First dose was given at 13:19, procedure ended at 13:42. There were 23  minutes of direct face-to-face supervision during conscious sedation  administration. TOTAL FLUOROSCOPY TIME:  5.1 minutes. TOTAL CONTRAST USED:  75 mL of Isovue. IMPRESSION:  Angiographically normal coronary arteries. RECOMMENDATIONS:  CT Surgery evaluation for right atrial mass.         Stephie Lowery MD    D: 05/23/2022 14:07:12       T: 05/23/2022 14:33:06     [email or web address]  Job#: [id number]     Doc#: [id number]    CC:  ANILA Tolbert MD

## 2022-05-23 NOTE — PROGRESS NOTES
Extended Stay Recovery Discharge Documentation    Final procedure site check completed, stable for discharge- Yes  Ambulated without issue post recovery completion, gait steady. Peripheral IV sites removed (see IV Flowsheet) and site asymptomatic- Yes  Patient dressed self without assistance. Discharge instructions reviewed with Patient and verbalized understanding.    Patient discharged Home with friend at 6:30 PM  Monitor cleaned and placed back in nurses' station- Yes

## 2022-05-26 ASSESSMENT — ENCOUNTER SYMPTOMS
ABDOMINAL PAIN: 0
COUGH: 0
CONSTIPATION: 0
SHORTNESS OF BREATH: 1

## 2022-05-26 NOTE — PROGRESS NOTES
Subjective:      Patient ID: Sarah Durand is a 79 y.o. female. CC: PEREZ, LA mass    This is a 80 yo F referred for LA mass. She underwent TTE for evaluation of PEREZ. This showed moderate AI, normal LVEF, and a 1.2 x 1.9 cm atrial septal mass, suspicious for myxoma. She underwent ULISES for confirmation and this showed the mass along with the AI. Cath was done in anticipation for surgery and showed no significant CAD. HPI    Review of Systems   Constitutional: Negative for chills and fever. Respiratory: Positive for shortness of breath. Negative for cough. Cardiovascular: Negative for chest pain and palpitations. Gastrointestinal: Negative for abdominal pain and constipation. Neurological: Negative for syncope and light-headedness. Objective:   Physical Exam  Constitutional:       General: She is not in acute distress. Cardiovascular:      Rate and Rhythm: Normal rate. Heart sounds: No murmur heard. Pulmonary:      Effort: Pulmonary effort is normal.      Breath sounds: Normal breath sounds. Abdominal:      General: Abdomen is flat. Tenderness: There is no guarding. Skin:     General: Skin is warm and dry. Neurological:      General: No focal deficit present. Mental Status: She is alert and oriented to person, place, and time. Psychiatric:         Mood and Affect: Mood normal.         Behavior: Behavior normal.         Assessment:      LA myxoma, AI      Plan:      The AI is quite eccentric and I think likely needs fixed. She is BMI 51 so her only option is a sternotomy. PAT on 6/10 with OR 6/15 at 7:30am for sternotomy, LA myxoma resection, probably AVR. All risks, benefits, alternatives and potential complications explained thoroughly including, but not limited to, bleeding, infection, lung injury, kidney injury, stroke, heart attack, prolonged ventilation, wound complication, need for re-operation, and death, and the patient agrees to proceed.   She wishes for a tissue valve.

## 2022-05-27 ENCOUNTER — TELEPHONE (OUTPATIENT)
Dept: FAMILY MEDICINE CLINIC | Age: 67
End: 2022-05-27

## 2022-05-27 NOTE — TELEPHONE ENCOUNTER
----- Message from Shailesh Briscoe sent at 5/27/2022  8:56 AM EDT -----  Subject: Appointment Request    Reason for Call: Routine Existing Condition Follow Up    QUESTIONS  Type of Appointment? Established Patient  Reason for appointment request? Available appointments did not meet   patient need  Additional Information for Provider? Patient needs a follow up appointment   after having her heart cath put in. Patient would like to have either a   virtual appointment today 5/27, or have a later appointment on 5/31 with   Dr. Tiffanie Zhang before he leaves the office. ---------------------------------------------------------------------------  --------------  Stef Burtons INFO  What is the best way for the office to contact you? OK to leave message on   voicemail  Preferred Call Back Phone Number? [phone]  ---------------------------------------------------------------------------  --------------  SCRIPT ANSWERS  Relationship to Patient? Self  Is this follow up request related to routine Diabetes Management? No  Have you been diagnosed with, awaiting test results for, or told that you   are suspected of having COVID-19 (Coronavirus)? (If patient has tested   negative or was tested as a requirement for work, school, or travel and   not based on symptoms, answer no)? No  Within the past 10 days have you developed any of the following symptoms   (answer no if symptoms have been present longer than 10 days or began   more than 10 days ago)? Fever or Chills, Cough, Shortness of breath or   difficulty breathing, Loss of taste or smell, Sore throat, Nasal   congestion, Sneezing or runny nose, Fatigue or generalized body aches   (answer no if pain is specific to a body part e.g. back pain), Diarrhea,   Headache? No  Have you had close contact with someone with COVID-19 in the last 7 days? No  (Service Expert - click yes below to proceed with [employer] As [employer]ual   Scheduling)?  Yes

## 2022-05-27 NOTE — TELEPHONE ENCOUNTER
Called pt and offered 2 appts on 5.31.22 and pt declined because she's scheduled with Dr Norman Linn. Offered to schedule with another provider and pt declined. Advised pt if she changed her mind to call back.

## 2022-05-31 ENCOUNTER — INITIAL CONSULT (OUTPATIENT)
Dept: CARDIOTHORACIC SURGERY | Age: 67
End: 2022-05-31
Payer: MEDICARE

## 2022-05-31 ENCOUNTER — PREP FOR PROCEDURE (OUTPATIENT)
Dept: CARDIOTHORACIC SURGERY | Age: 67
End: 2022-05-31

## 2022-05-31 VITALS
HEART RATE: 85 BPM | WEIGHT: 293 LBS | HEIGHT: 68 IN | DIASTOLIC BLOOD PRESSURE: 77 MMHG | BODY MASS INDEX: 44.41 KG/M2 | SYSTOLIC BLOOD PRESSURE: 139 MMHG

## 2022-05-31 DIAGNOSIS — D15.1 MYXOMA OF HEART: ICD-10-CM

## 2022-05-31 DIAGNOSIS — I73.9 PVD (PERIPHERAL VASCULAR DISEASE) (HCC): ICD-10-CM

## 2022-05-31 DIAGNOSIS — I87.2 VENOUS INSUFFICIENCY: ICD-10-CM

## 2022-05-31 DIAGNOSIS — I51.89 LEFT ATRIAL MASS: Primary | ICD-10-CM

## 2022-05-31 DIAGNOSIS — I35.1 AORTIC VALVE INSUFFICIENCY, ETIOLOGY OF CARDIAC VALVE DISEASE UNSPECIFIED: ICD-10-CM

## 2022-05-31 DIAGNOSIS — E08.21 DIABETES MELLITUS DUE TO UNDERLYING CONDITION WITH DIABETIC NEPHROPATHY, UNSPECIFIED WHETHER LONG TERM INSULIN USE (HCC): ICD-10-CM

## 2022-05-31 DIAGNOSIS — Z01.818 PRE-OP EVALUATION: Primary | ICD-10-CM

## 2022-05-31 PROCEDURE — 3017F COLORECTAL CA SCREEN DOC REV: CPT | Performed by: THORACIC SURGERY (CARDIOTHORACIC VASCULAR SURGERY)

## 2022-05-31 PROCEDURE — G8399 PT W/DXA RESULTS DOCUMENT: HCPCS | Performed by: THORACIC SURGERY (CARDIOTHORACIC VASCULAR SURGERY)

## 2022-05-31 PROCEDURE — 1123F ACP DISCUSS/DSCN MKR DOCD: CPT | Performed by: THORACIC SURGERY (CARDIOTHORACIC VASCULAR SURGERY)

## 2022-05-31 PROCEDURE — 4004F PT TOBACCO SCREEN RCVD TLK: CPT | Performed by: THORACIC SURGERY (CARDIOTHORACIC VASCULAR SURGERY)

## 2022-05-31 PROCEDURE — 99204 OFFICE O/P NEW MOD 45 MIN: CPT | Performed by: THORACIC SURGERY (CARDIOTHORACIC VASCULAR SURGERY)

## 2022-05-31 PROCEDURE — 1090F PRES/ABSN URINE INCON ASSESS: CPT | Performed by: THORACIC SURGERY (CARDIOTHORACIC VASCULAR SURGERY)

## 2022-05-31 PROCEDURE — G8417 CALC BMI ABV UP PARAM F/U: HCPCS | Performed by: THORACIC SURGERY (CARDIOTHORACIC VASCULAR SURGERY)

## 2022-05-31 PROCEDURE — G8427 DOCREV CUR MEDS BY ELIG CLIN: HCPCS | Performed by: THORACIC SURGERY (CARDIOTHORACIC VASCULAR SURGERY)

## 2022-05-31 RX ORDER — SODIUM CHLORIDE 0.9 % (FLUSH) 0.9 %
5-40 SYRINGE (ML) INJECTION EVERY 12 HOURS SCHEDULED
Status: CANCELLED | OUTPATIENT
Start: 2022-05-31

## 2022-05-31 RX ORDER — SODIUM CHLORIDE 9 MG/ML
INJECTION, SOLUTION INTRAVENOUS CONTINUOUS
Status: CANCELLED | OUTPATIENT
Start: 2022-05-31

## 2022-05-31 RX ORDER — SODIUM CHLORIDE 9 MG/ML
INJECTION, SOLUTION INTRAVENOUS PRN
Status: CANCELLED | OUTPATIENT
Start: 2022-05-31

## 2022-05-31 RX ORDER — CHLORHEXIDINE GLUCONATE 4 G/100ML
SOLUTION TOPICAL ONCE
Status: CANCELLED | OUTPATIENT
Start: 2022-05-31 | End: 2022-05-31

## 2022-05-31 RX ORDER — SODIUM CHLORIDE 0.9 % (FLUSH) 0.9 %
5-40 SYRINGE (ML) INJECTION PRN
Status: CANCELLED | OUTPATIENT
Start: 2022-05-31

## 2022-05-31 RX ORDER — CHLORHEXIDINE GLUCONATE 0.12 MG/ML
15 RINSE ORAL ONCE
Status: CANCELLED | OUTPATIENT
Start: 2022-05-31 | End: 2022-05-31

## 2022-05-31 NOTE — H&P
CC: PEREZ, LA mass    HPI:   This is a 78 yo F referred for LA mass. She underwent TTE for evaluation of PEREZ. This showed moderate AI, normal LVEF, and a 1.2 x 1.9 cm atrial septal mass, suspicious for myxoma. She underwent ULISES for confirmation and this showed the mass along with the AI. Cath was done in anticipation for surgery and showed no significant CAD.       Past Medical History:   Diagnosis Date    Anxiety     Arthralgia 10/8/2013    Arthritis     Bone avascular necrosis ([facility] 3/23/2015    Breast lesion 12/1/2015    Chronic back pain     Chronic fatigue 10/8/2013    Daytime somnolence 10/8/2013    Depression     Fibromyalgia     Fracture, fibula     right    GERD (gastroesophageal reflux disease)     H/O cardiovascular stress test 01/29/2020    Lexiscan    Headache     History of blood transfusion     History of fractured kneecap     (R) 2 hair line fractures    History of total knee arthroplasty 3/19/2015    Hyperlipidemia     Hypertension     Morbid obesity with BMI of 45.0-49.9, adult ([facility] [address] 10/9/2015    Obesity     Osteoarthritis     Overactive bladder 1/22/2014    PONV (postoperative nausea and vomiting)     hx 40 yrs ago    Prolonged emergence from general anesthesia     Reflux     Thyroid disease      Past Surgical History:   Procedure Laterality Date    CARDIAC CATHETERIZATION  05/23/2022    Dr Lukasz Gonzalez  2011    COLONOSCOPY  11/22/2016    Dr. Darlyn Sánchez N/A 12/8/2020    Tete Segundo performed by Jean-Claude Hendricks MD at [address]    benign reasons     JOINT REPLACEMENT Bilateral 2010    LUMBAR FUSION      L3-5    ROTATOR CUFF REPAIR Left     left    TOTAL KNEE ARTHROPLASTY Bilateral 2010    did both at the same time    TRANSESOPHAGEAL ECHOCARDIOGRAM N/A 5/9/2022    TRANSESOPHAGEAL ECHOCARDIOGRAM performed by Vernon Fox MD at [facility] UPPER GASTROINTESTINAL ENDOSCOPY N/A 9/29/2020    EGD BIOPSY performed by Kael Holm MD at [facility] History     Socioeconomic History    Marital status:      Spouse name: Not on file    Number of children: Not on file    Years of education: Not on file    Highest education level: Associate degree: academic program   Occupational History    Not on file   Tobacco Use    Smoking status: Current Every Day Smoker     Packs/day: 0.25     Years: 30.00     Pack years: 7.50     Types: Cigarettes    Smokeless tobacco: Never Used   Vaping Use    Vaping Use: Never used   Substance and Sexual Activity    Alcohol use: No     Alcohol/week: 0.0 standard drinks    Drug use: No    Sexual activity: Not Currently   Other Topics Concern    Not on file   Social History Narrative    Not on file     Social Determinants of Health     Financial Resource Strain: High Risk    Difficulty of Paying Living Expenses: Hard   Food Insecurity: No Food Insecurity    Worried About Running Out of Food in the Last Year: Never true    [employer] of Food in the Last Year: Never true   Transportation Needs:     Lack of Transportation (Medical): Not on file    Lack of Transportation (Non-Medical):  Not on file   Physical Activity:     Days of Exercise per Week: Not on file    Minutes of Exercise per Session: Not on file   Stress:     Feeling of Stress : Not on file   Social Connections:     Frequency of Communication with Friends and Family: Not on file    Frequency of Social Gatherings with Friends and Family: Not on file    Attends Church Services: Not on file    Active Member of Clubs or Organizations: Not on file    Attends Club or Organization Meetings: Not on file    Marital Status: Not on file   Intimate Partner Violence:     Fear of Current or Ex-Partner: Not on file    Emotionally Abused: Not on file    Physically Abused: Not on file   [employer] Sexually Abused: Not on file   Housing Stability:     Unable to Pay for Housing in the Last Year: Not on file    Number of Places Lived in the Last Year: Not on file    Unstable Housing in the Last Year: Not on file     Family History   Problem Relation Age of Onset    High Blood Pressure Mother     Diabetes Mother     Heart Disease Mother     Cancer Mother     Diabetes Father     Heart Disease Father     High Blood Pressure Father     Cancer Other 50        breast    Depression Brother      Allergies   Allergen Reactions    Carafate [Sucralfate]      Patient unable to tolerate. Unable to recall her reaction.  Glucophage [Metformin Hcl]      Severe abdominal pain, constipation      Mobic [Meloxicam] Other (See Comments)     Abdominal pain    Trazodone And Nefazodone      Unable to tolerate, patient reports made her physically ill with abdominal pain      Ultram [Tramadol]      Abdominal pain    Nickel Itching and Rash     No current facility-administered medications for this encounter. Current Outpatient Medications:     pseudoephedrine (SUDAFED 24 HR) 240 MG TB24 extended release tablet, Take 240 mg by mouth daily, Disp: , Rfl:     pantoprazole (PROTONIX) 40 MG tablet, Take 1 tablet by mouth 2 times daily, Disp: 180 tablet, Rfl: 0    pregabalin (LYRICA) 150 MG capsule, Take 1 capsule by mouth 3 times daily for 90 days. TID, Disp: 270 capsule, Rfl: 0    albuterol sulfate HFA (VENTOLIN HFA) 108 (90 Base) MCG/ACT inhaler, Inhale 2 puffs into the lungs every 6 hours as needed for Wheezing or Shortness of Breath, Disp: 1 each, Rfl: 3    DULoxetine (CYMBALTA) 30 MG extended release capsule, TAKE 1 CAPSULE BY MOUTH DAILY, Disp: 90 capsule, Rfl: 1    aspirin-acetaminophen-caffeine (EXCEDRIN MIGRAINE) 250-250-65 MG per tablet, Take 1 tablet by mouth every 6 hours as needed for Headaches, Disp: , Rfl:     triamcinolone (KENALOG) 0.1 % cream, Apply topically 2 times daily. , Disp: 90 g, Rfl: 2   cetirizine (ZYRTEC) 10 MG tablet, Take 1 tablet by mouth 2 times daily, Disp: 180 tablet, Rfl: 1    ADVAIR HFA 45-21 MCG/ACT inhaler, Inhale 2 puffs into the lungs 2 times daily , Disp: , Rfl:     fluticasone (FLONASE) 50 MCG/ACT nasal spray, 1 spray by Nasal route daily (Patient taking differently: 1 spray by Nasal route daily as needed ), Disp: 3 each, Rfl: 3    hydroCHLOROthiazide (HYDRODIURIL) 25 MG tablet, Take 1 tablet by mouth every morning (Patient taking differently: Take 25 mg by mouth daily as needed ), Disp: 90 tablet, Rfl: 0    topiramate (TOPAMAX) 100 MG tablet, Take 1 tablet by mouth 2 times daily, Disp: 180 tablet, Rfl: 3    busPIRone (BUSPAR) 15 MG tablet, TAKE 1 TABLET BY MOUTH THREE TIMES A DAY, Disp: 270 tablet, Rfl: 3    tolterodine (DETROL LA) 2 MG extended release capsule, Once daily, Disp: 90 capsule, Rfl: 3    doxepin (SINEQUAN) 10 MG capsule, Take 1 capsule by mouth nightly (Patient taking differently: Take 10 mg by mouth nightly as needed ), Disp: 30 capsule, Rfl: 2    Review of Systems:  Constitutional: Denies fevers, chills, or weight loss. HEENT: Denies visual changes or hearing loss. Heart: As per HPI. Lungs: Denies shortness of breath, cough, or wheezing. Gastrointestinal: Denies nausea, vomiting, constipation, or diarrhea. Genitourinary: dysuria or hematuria. Psychiatric: Patient denies anxiety or depression. Neurologic: Patient denies weakness of the extremities, dizziness, or headaches. All other ROS checked and found to be negative. Objective: There were no vitals filed for this visit. General Appearance: Appears stated age. Communicates well, no acute distress. HEENT: Head is normocephalic, atraumatic. EOMs intact, PERRL. Trachea midline. Lungs: Normal respiratory rate and normal effort. Not in respiratory distress. Breath sounds clear to auscultation. No wheezes.    Heart: normal rate and regular rhythm, normal heart sounds   Chest: Symmetric chest wall expansion. Extremities: Normal range of motion. Neurological: Patient is alert and oriented to person, place and time. Patient has normal reflexes. Skin: Warm and dry. Abdomen: Abdomen is soft and non-distended. Bowel sounds are normal. There is no abdominal tenderness tenderness. There is no guarding. There is no mass. Pulses: Distal pulses are intact. Skin: Warm and dry without lesions. ASSESSMENT:  Patient Active Problem List   Diagnosis    Chronic back pain    Fibromyalgia    Overactive bladder    Depression    History of total knee arthroplasty    Nodule of left lung    History of colon polyps    Tobacco use    Chronic pain syndrome    Myofascial pain syndrome    Lumbar disc disorder    Lumbar spondylosis    Spinal stenosis of lumbar region without neurogenic claudication    Epigastric pain    Onychomycosis    Type II diabetes mellitus with peripheral circulatory disorder (HCC)    Venous insufficiency (chronic) (peripheral)    Tinea pedis of both feet    Hiatal hernia    Class 3 severe obesity due to excess calories with serious comorbidity and body mass index (BMI) of 50.0 to 59.9 in adult ([address]    Diabetes mellitus without complication (HCC)    Hypothyroidism    Sleep apnea    Uncomplicated asthma    Hyperlipidemia    Opioid use, unspecified with unspecified opioid-induced disorder    Stage 3a chronic kidney disease ([address]    LVH (left ventricular hypertrophy)    Chronic renal disease, stage III ([address] [727815]    Left atrial mass    Aortic regurgitation    S/P cardiac cath     LA myxoma, AI      PLAN:  The AI is quite eccentric and I think likely needs fixed. She is BMI 51 so her only option is a sternotomy. PAT on 6/10 with OR 6/15 at 7:30am for sternotomy, LA myxoma resection, probably AVR.   All risks, benefits, alternatives and potential complications explained thoroughly including, but not limited to, bleeding, infection, lung injury, kidney injury, stroke, heart attack, prolonged ventilation, wound complication, need for re-operation, and death, and the patient agrees to proceed. She wishes for a tissue valve.

## 2022-06-05 NOTE — H&P
Department of Medicine  Section of Cardiology  Attending Procedure History and Physical        Pre-Procedural Diagnosis: Left atrial mass    Indications: Left atrial mass     Procedure Planned: Cardiac catheterization to evaluate coronary anatomy prior to cardiac surgery to remove left atrial mass    History obtained from patient    History of Present Illness: The patient is a 79 y.o. female who presents for the above procedure.   Was found to have possible left atrial mass is here for cardiac catheterization to evaluate her coronary anatomy prior to cardiac surgery to remove left atrial mass    Past Medical History:        Diagnosis Date    Anxiety     Arthralgia 10/8/2013    Arthritis     Bone avascular necrosis (HCC) 3/23/2015    Breast lesion 12/1/2015    Chronic back pain     Chronic fatigue 10/8/2013    Daytime somnolence 10/8/2013    Depression     Fibromyalgia     Fracture, fibula     right    GERD (gastroesophageal reflux disease)     H/O cardiovascular stress test 01/29/2020    Lexiscan    Headache     History of blood transfusion     History of fractured kneecap     (R) 2 hair line fractures    History of total knee arthroplasty 3/19/2015    Hyperlipidemia     Hypertension     Morbid obesity with BMI of 45.0-49.9, adult ([address] 10/9/2015    Obesity     Osteoarthritis     Overactive bladder 1/22/2014    PONV (postoperative nausea and vomiting)     hx 40 yrs ago    Prolonged emergence from general anesthesia     Reflux     Thyroid disease        Past Surgical History:        Procedure Laterality Date    CARDIAC CATHETERIZATION  05/23/2022    Dr Josselin Gan  2011    COLONOSCOPY  11/22/2016    Dr. Preethi Sullivan N/A 12/8/2020    Wanda Georges performed by Laurie Daley MD at [address]    benign reasons    [address] Bilateral 2010    LUMBAR FUSION      L3-5    ROTATOR CUFF REPAIR Left     left    TOTAL KNEE ARTHROPLASTY Bilateral 2010    did both at the same time    TRANSESOPHAGEAL ECHOCARDIOGRAM N/A 5/9/2022    TRANSESOPHAGEAL ECHOCARDIOGRAM performed by Selin Villatoro MD at [address] [place] [address] ENDOSCOPY N/A 9/29/2020    EGD BIOPSY performed by Kirstie Hanna MD at [facility] ENDOSCOPY     Medications: Allergies:  Carafate [sucralfate], Glucophage [metformin hcl], Mobic [meloxicam], Trazodone and nefazodone, Ultram [tramadol], and Nickel    History of allergic reaction to anesthesia:  no      REVIEW OF SYSTEMS  CONSTITUTIONAL:  negative for  fevers, chills  HEENT:  negative for earaches, nasal congestion and epistaxis  RESPIRATORY:  negative for  dry cough, cough with sputum,wheezing and hemoptysis  GASTROINTESTINAL:  negative for nausea, vomiting  MUSCULOSKELETAL:  negative for  myalgias, arthralgias  NEUROLOGICAL:  negative for visual disturbance, dysphagia    PHYSICAL EXAM    /68   Pulse (!) 49   Temp 96.4 °F (35.8 °C)   Resp 18   Ht 5' 8\" (1.727 m)   Wt (!) 336 lb (152.4 kg)   LMP  (LMP Unknown)   SpO2 100%   BMI 51.09 kg/m²     CONSTITUTIONAL:  awake, alert, cooperative, no apparent distress, and appears stated age  HEAD:  normocepalic, without obvious abnormality, atraumatic  NECK:  Supple, symmetrical, trachea midline, no adenopathy, thyroid symmetric, not enlarged and no tenderness, skin normal  LUNGS:  No increased work of breathing, good air exchange, clear to auscultation bilaterally, no crackles or wheezing  CARDIOVASCULAR:  Normal apical impulse, regular rate and rhythm, normal S1 and S2, no S3 or S4, no edema, no JVD, no carotid bruit. ABDOMEN:  Soft, nontender, no masses, no hepatomegaly, no splenomegaly, BS+  MUSCULOSKELETAL:  No clubbing no cyanosis. there is no redness, warmth, or swelling of the joints  NEUROLOGIC:  Alert, awake,oriented x3  SKIN:  no bruising or bleeding, normal skin color, texture, turgor and no redness, warmth, or swelling    DATA    CBC:    Lab Results   Component Value Date    WBC 5.8 05/20/2022    RBC 4.62 05/20/2022    HGB 13.3 05/20/2022    HCT 42.8 05/20/2022    MCV 92.6 05/20/2022    RDW 14.1 05/20/2022     05/20/2022     Platelet:    Lab Results   Component Value Date     05/20/2022     BUN/Cr:    Lab Results   Component Value Date    BUN 12 05/20/2022     Potassium:    Lab Results   Component Value Date    K 4.1 05/20/2022    K 4.0 12/07/2020     PT/INR:  No results found for: PTINR  PTT:    Lab Results   Component Value Date    APTT 32.6 05/20/2022         ASSESSMENT AND PLAN    1. Patient is a 79 y.o. female with above specified procedure planned cardiac catheterization under conscious sedation. Expected Sedation/Anesthesia Type: Conscious sedation    2. ASA ([address] Anesthesiology) Anesthesia Status: 2    3. Procedure options, risks and benefits reviewed with Patient. Patient expresses understanding    4. Patient has not been on anticoagulation medications    5. Consent has been signed:   Yes

## 2022-06-06 ENCOUNTER — TELEPHONE (OUTPATIENT)
Dept: CARDIOTHORACIC SURGERY | Age: 67
End: 2022-06-06

## 2022-06-06 ENCOUNTER — OFFICE VISIT (OUTPATIENT)
Dept: PAIN MANAGEMENT | Age: 67
End: 2022-06-06
Payer: MEDICARE

## 2022-06-06 VITALS
WEIGHT: 293 LBS | RESPIRATION RATE: 16 BRPM | SYSTOLIC BLOOD PRESSURE: 118 MMHG | DIASTOLIC BLOOD PRESSURE: 78 MMHG | TEMPERATURE: 97.5 F | HEIGHT: 68 IN | BODY MASS INDEX: 44.41 KG/M2 | HEART RATE: 78 BPM | OXYGEN SATURATION: 98 %

## 2022-06-06 DIAGNOSIS — M51.9 LUMBAR DISC DISORDER: ICD-10-CM

## 2022-06-06 DIAGNOSIS — G89.4 CHRONIC PAIN SYNDROME: ICD-10-CM

## 2022-06-06 DIAGNOSIS — G89.29 CHRONIC LOW BACK PAIN WITH SCIATICA, SCIATICA LATERALITY UNSPECIFIED, UNSPECIFIED BACK PAIN LATERALITY: Primary | ICD-10-CM

## 2022-06-06 DIAGNOSIS — M79.7 FIBROMYALGIA: ICD-10-CM

## 2022-06-06 DIAGNOSIS — M54.40 CHRONIC LOW BACK PAIN WITH SCIATICA, SCIATICA LATERALITY UNSPECIFIED, UNSPECIFIED BACK PAIN LATERALITY: Primary | ICD-10-CM

## 2022-06-06 DIAGNOSIS — M79.18 MYOFASCIAL PAIN SYNDROME: ICD-10-CM

## 2022-06-06 DIAGNOSIS — R09.89 BRUIT: Primary | ICD-10-CM

## 2022-06-06 DIAGNOSIS — M48.061 SPINAL STENOSIS OF LUMBAR REGION WITHOUT NEUROGENIC CLAUDICATION: ICD-10-CM

## 2022-06-06 DIAGNOSIS — F11.99 OPIOID USE, UNSPECIFIED WITH UNSPECIFIED OPIOID-INDUCED DISORDER (HCC): ICD-10-CM

## 2022-06-06 DIAGNOSIS — M47.816 LUMBAR FACET ARTHROPATHY: ICD-10-CM

## 2022-06-06 DIAGNOSIS — M47.816 LUMBAR SPONDYLOSIS: ICD-10-CM

## 2022-06-06 DIAGNOSIS — M51.26 DISPLACEMENT OF LUMBAR INTERVERTEBRAL DISC: ICD-10-CM

## 2022-06-06 PROCEDURE — 3017F COLORECTAL CA SCREEN DOC REV: CPT | Performed by: PHYSICIAN ASSISTANT

## 2022-06-06 PROCEDURE — 99213 OFFICE O/P EST LOW 20 MIN: CPT

## 2022-06-06 PROCEDURE — G8427 DOCREV CUR MEDS BY ELIG CLIN: HCPCS | Performed by: PHYSICIAN ASSISTANT

## 2022-06-06 PROCEDURE — 99213 OFFICE O/P EST LOW 20 MIN: CPT | Performed by: PHYSICIAN ASSISTANT

## 2022-06-06 PROCEDURE — G8417 CALC BMI ABV UP PARAM F/U: HCPCS | Performed by: PHYSICIAN ASSISTANT

## 2022-06-06 PROCEDURE — 1090F PRES/ABSN URINE INCON ASSESS: CPT | Performed by: PHYSICIAN ASSISTANT

## 2022-06-06 PROCEDURE — 1123F ACP DISCUSS/DSCN MKR DOCD: CPT | Performed by: PHYSICIAN ASSISTANT

## 2022-06-06 PROCEDURE — 4004F PT TOBACCO SCREEN RCVD TLK: CPT | Performed by: PHYSICIAN ASSISTANT

## 2022-06-06 PROCEDURE — G8399 PT W/DXA RESULTS DOCUMENT: HCPCS | Performed by: PHYSICIAN ASSISTANT

## 2022-06-06 RX ORDER — CHLORZOXAZONE 500 MG/1
TABLET ORAL
Status: ON HOLD | COMMUNITY
Start: 2022-06-01 | End: 2022-06-20 | Stop reason: HOSPADM

## 2022-06-06 RX ORDER — HYDROCODONE BITARTRATE AND ACETAMINOPHEN 5; 325 MG/1; MG/1
1 TABLET ORAL EVERY 8 HOURS PRN
Qty: 20 TABLET | Refills: 0 | Status: ON HOLD
Start: 2022-06-06 | End: 2022-06-20 | Stop reason: HOSPADM

## 2022-06-06 RX ORDER — PREGABALIN 150 MG/1
150 CAPSULE ORAL 3 TIMES DAILY
Qty: 270 CAPSULE | Refills: 0 | Status: SHIPPED
Start: 2022-06-06 | End: 2022-10-10 | Stop reason: SDUPTHER

## 2022-06-06 NOTE — TELEPHONE ENCOUNTER
I have this patient submitted for authorization with [employer] but not sure we will have approved authorization prior to 6/15/2022 since [employer] takes 7-10 business days to review. I will keep you updated.   Peterson Whiting

## 2022-06-06 NOTE — PROGRESS NOTES
Do you currently have any of the following:    Fever: No  Headache:  No  Cough: No  Shortness of breath: No  Exposed to anyone with these symptoms: No                                                                                                                Joel Rolon presents to the [facility] on 6[facility]2022. Artemio Britt is complaining of pain in lower back. and left hip. The pain is intermittent. The pain is described as aching and throbbing. Pain is rated on her best day at a 2, on her worst day at a 10, and on average at a 5 on the VAS scale. She took her last dose of Norco and excedrin migraine, lyrica, parafon forte . Artemio Britt does not have issues with constipation. Any procedures since your last visit: No,      She is not on NSAIDS and  is  on anticoagulation medications to include ASA and is managed by [facility] Church CNP  . Pacemaker or defibrillator: No Physician managing device is NA. Medication Contract and Consent for Opioid Use Documents Filed     Patient Documents     Type of Document Status Date Received Received By Description    Medication Contract Received 10/14/2020  1:22 PM TOMY THOMAS Pain Mgmt Patient Agreement    Medication Contract Received 12/6/2021  1:54 PM Glenora Dilling Medication Contract                   /78   Pulse 78   Temp 97.5 °F (36.4 °C) (Infrared)   Resp 16   Ht 5' 8\" (1.727 m)   Wt (!) 336 lb (152.4 kg)   LMP  (LMP Unknown)   SpO2 98%   BMI 51.09 kg/m²      No LMP recorded (lmp unknown). Patient has had a hysterectomy.

## 2022-06-06 NOTE — PROGRESS NOTES
[facility]  [address] [facility], [address] [place] [address]  [phone]    Follow up Note      Leeroy Mistry     Date of Visit:  6/6/2022    CC:  Patient presents for follow up   Chief Complaint   Patient presents with    Lower Back Pain       HPI:    Pain is unchanged. No new issues. Appropriate analgesia with current medications regimen: Yes. Change in quality of symptoms:no. Medication side effects: percocet caused constipation  Recent diagnostic testing:none. Recent interventional procedures:none. She has not been on anticoagulation medications to include none. The patient Fabiola Payton not been on herbal supplements.  The patient is diabetic.     Imaging:     CT of left knee 04/2021:     Impression   1. Small suprapatellar joint effusion. 2. Prior left knee arthroplasty and patellar resurfacing.  No significant   periprosthetic lucency or acute osseous abnormality. 3. Calcifications which are well corticated in the distal quadriceps tendon   measuring up to 1.5 x 0.8 cm.   4. Hardware associated streak artifact limits the exam to some degree.            MRI of Lumbar Spine 09/2020:  1. Postsurgical changes status post L3-L5 posterior spinal fusion. 2. Severe multifactorial spinal canal stenosis at L2-L3 with mass effect on    the cauda equina nerve roots. 3. Moderate multifactorial L1-L2 spinal canal stenosis with moderate right    neural foraminal stenosis. 4. Moderate left L5-S1 neural foraminal stenosis.              LUMBAR SPINE XRAY 03/4/2020  Intact lumbar vertebral height and alignment. Relative severe    multilevel disc and facet joint narrowing with subchondral sclerosis    and spurring. Previous discectomy with posterior pedicle screw plates    extending from L3 through L5. Symmetric osteoarthritis at the    sacroiliac joints manifesting as joint space narrowing and subchondral    sclerosis.        Lumbar spine CT 07/2016      1.  Status post lumbar spine fusion from L3-L5 appearing in stable,   satisfactory alignment.       2. Lumbar spondylosis as described above in part due to congenital   shortened pedicles and notable stenosis          Potential Aberrant Drug-Related Behavior:  None     Previous medication: baclofen helps some, tizanidine sleepiness, flexeril no relief, percocet constipation     Urine Drug Screenin2020 Negative for all which is consistent, pt does NOT take every day and does NOT fill exactly 30 days, can go longer than a month for refill   10/2020: consistent for hydrocodone  2021:  consistent  2022:  Consistent     OARRS report:  2020-2022:Consistent     Pain agreement:  -  Renewal date:2022       Past Medical History:   Diagnosis Date    Anxiety     Arthralgia 10/8/2013    Arthritis     Bone avascular necrosis ([facility] [address] 3/23/2015    Breast lesion 2015    Chronic back pain     Chronic fatigue 10/8/2013    Daytime somnolence 10/8/2013    Depression     Fibromyalgia     Fracture, fibula     right    GERD (gastroesophageal reflux disease)     H/O cardiovascular stress test 2020    Lexiscan    Headache     History of blood transfusion     History of fractured kneecap     (R) 2 hair line fractures    History of total knee arthroplasty 3/19/2015    Hyperlipidemia     Hypertension     Morbid obesity with BMI of 45.0-49.9, adult ([facility] [address] 10/9/2015    Obesity     Osteoarthritis     Overactive bladder 2014    PONV (postoperative nausea and vomiting)     hx 40 yrs ago    Prolonged emergence from general anesthesia     Reflux     Thyroid disease        Past Surgical History:   Procedure Laterality Date    CARDIAC CATHETERIZATION  2022    Dr Hinojosa Score  2011    COLONOSCOPY  2016    Dr. Foreman Sit N/A 2020    Hector Mack performed by Rosa M Patel MD at [address]    benign reasons    [address] Bilateral 2010    knees    LUMBAR FUSION      L3-5    ROTATOR CUFF REPAIR Left     left    TOTAL KNEE ARTHROPLASTY Bilateral 2010    did both at the same time    TRANSESOPHAGEAL ECHOCARDIOGRAM N/A 5/9/2022    TRANSESOPHAGEAL ECHOCARDIOGRAM performed by Leonarda Cuevas MD at [address] ENDOSCOPY N/A 9/29/2020    EGD BIOPSY performed by Rosa M Patel MD at [facility]       Prior to Admission medications    Medication Sig Start Date End Date Taking? Authorizing Provider   Multiple Vitamins-Minerals (HAIR SKIN AND NAILS FORMULA PO) Take by mouth as needed   Yes Historical Provider, MD   chlorzoxazone (PARAFON FORTE) 500 MG tablet  6/1/22  Yes Historical Provider, MD   HYDROcodone-acetaminophen (NORCO) 5-325 MG per tablet Take 1 tablet by mouth every 8 hours as needed for Pain for up to 9 days. Take lowest dose possible to manage pain 6/6/22 6/15/22 Yes DEANDRE Estrada   pregabalin (LYRICA) 150 MG capsule Take 1 capsule by mouth 3 times daily for 90 days.  TID 6/6/22 9/4/22 Yes DEANDRE Estrada   pseudoephedrine (SUDAFED 24 HR) 240 MG TB24 extended release tablet Take 240 mg by mouth daily   Yes Historical Provider, MD   pantoprazole (PROTONIX) 40 MG tablet Take 1 tablet by mouth 2 times daily 4/18/22  Yes Rachele Monsivais MD   albuterol sulfate HFA (VENTOLIN HFA) 108 (90 Base) MCG/ACT inhaler Inhale 2 puffs into the lungs every 6 hours as needed for Wheezing or Shortness of Breath 3/8/22  Yes Rachele Monsivais MD   DULoxetine (CYMBALTA) 30 MG extended release capsule TAKE 1 CAPSULE BY MOUTH DAILY 3/4/22  Yes Rachele Monsivais MD   aspirin-acetaminophen-caffeine (EXCEDRIN MIGRAINE) 557-226-62 MG per tablet Take 1 tablet by mouth every 6 hours as needed for Headaches   Yes Historical Provider, MD   triamcinolone (KENALOG) 0.1 % cream Apply topically 2 times daily. 2/17/22  Yes Mariajose Jameson., DPM   cetirizine (ZYRTEC) 10 MG tablet Take 1 tablet by mouth 2 times daily 2/8/22  Yes Favian Doshi MD   [facility] MCG/ACT inhaler Inhale 2 puffs into the lungs 2 times daily  9/9/21  Yes Kyrie Franks MD   fluticasone (FLONASE) 50 MCG/ACT nasal spray 1 spray by Nasal route daily  Patient taking differently: 1 spray by Nasal route daily as needed  11/9/21  Yes Favian Doshi MD   hydroCHLOROthiazide (HYDRODIURIL) 25 MG tablet Take 1 tablet by mouth every morning  Patient taking differently: Take 25 mg by mouth daily as needed  11/9/21  Yes Favian Doshi MD   topiramate (TOPAMAX) 100 MG tablet Take 1 tablet by mouth 2 times daily 11/2/21  Yes Favian Doshi MD   busPIRone (BUSPAR) 15 MG tablet TAKE 1 TABLET BY MOUTH THREE TIMES A DAY 7/30/21  Yes Favian Doshi MD   tolterodine (DETROL LA) 2 MG extended release capsule Once daily 7/30/21  Yes Favian Doshi MD   doxepin (SINEQUAN) 10 MG capsule Take 1 capsule by mouth nightly  Patient taking differently: Take 10 mg by mouth nightly as needed  2/26/21  Yes Tricia Morales PA-C       Allergies   Allergen Reactions    Carafate [Sucralfate]      Patient unable to tolerate. Unable to recall her reaction.     Glucophage [Metformin Hcl]      Severe abdominal pain, constipation      Mobic [Meloxicam] Other (See Comments)     Abdominal pain    Trazodone And Nefazodone      Unable to tolerate, patient reports made her physically ill with abdominal pain      Ultram [Tramadol]      Abdominal pain    Nickel Itching and Rash       Social History     Socioeconomic History    Marital status:      Spouse name: Not on file    Number of children: Not on file    Years of education: Not on file    Highest education level: Associate degree: academic program   Occupational History    Not on file   Tobacco Use    Smoking status: Current Every Day Smoker     Packs/day: 1.50     Years: 30.00     Pack years: 45.00     Types: Cigarettes    Smokeless tobacco: Never Used   Vaping Use    Vaping Use: Never used   Substance and Sexual Activity    Alcohol use: No     Alcohol/week: 0.0 standard drinks    Drug use: No    Sexual activity: Not Currently   Other Topics Concern    Not on file   Social History Narrative    Not on file     Social Determinants of Health     Financial Resource Strain: High Risk    Difficulty of Paying Living Expenses: Hard   Food Insecurity: No Food Insecurity    Worried About Running Out of Food in the Last Year: Never true    Ascencion of Food in the Last Year: Never true   Transportation Needs:     Lack of Transportation (Medical): Not on file    Lack of Transportation (Non-Medical):  Not on file   Physical Activity:     Days of Exercise per Week: Not on file    Minutes of Exercise per Session: Not on file   Stress:     Feeling of Stress : Not on file   Social Connections:     Frequency of Communication with Friends and Family: Not on file    Frequency of Social Gatherings with Friends and Family: Not on file    Attends Denominational Services: Not on file    Active Member of [address] [place] [address] [employer] or Organizations: Not on file    Attends Club or Organization Meetings: Not on file    Marital Status: Not on file   Intimate Partner Violence:     Fear of Current or Ex-Partner: Not on file    Emotionally Abused: Not on file    Physically Abused: Not on file    Sexually Abused: Not on file   Housing Stability:     Unable to Pay for Housing in the Last Year: Not on file    Number of [place] in the Last Year: Not on file    Unstable Housing in the Last Year: Not on file       Family History   Problem Relation Age of Onset    High Blood Pressure Mother     Diabetes Mother     Heart Disease Mother     Cancer Mother     Diabetes Father     Heart Disease Father     High Blood Pressure Father     Cancer Other 50        breast    Depression Brother        REVIEW OF SYSTEMS:     Alverto Yanes denies fever/chills, chest pain, shortness of breath, new bowel or bladder complaints. All other review of systems was negative. PHYSICAL EXAMINATION:      /78   Pulse 78   Temp 97.5 °F (36.4 °C) (Infrared)   Resp 16   Ht 5' 8\" (1.727 m)   Wt (!) 336 lb (152.4 kg)   LMP  (LMP Unknown)   SpO2 98%   BMI 51.09 kg/m²     General:      General appearance:   pleasant and well-hydrated. , in no discomfort and A & O x3  Build: Morbidly obese    HEENT:    Head:normocephalic and atraumatic  Sclera: icterus absent,    Lungs:    Breathing:Normal expansion. No wheezing. Abdomen:    Shape:non-distended and normal      Lumbar spine:    Range of motion:abnormal moderately Lateral bending, flexion, extension rotation bilateral and is painful. Extremities:    Tremors:None bilaterally upper and lower  Range of motion:Generally normal shoulders, pain with internal rotation of hips not done. Intact:Yes  Edema:Normal    Neurological:    [address]    Dermatology:    Skin:no unusual rashes, no skin lesions, no palpable subcutaneous nodules and good skin turgor    Impression:    Chronic low back with h/o L3-5 fusion/bilateral knees replaced since 2010  Refuses to have anymore injections, had at previous facility [place] and reports so painful during and after, refuses to have again                Seen Dr Catarino Tobar and is a surgical candidate but advised        weight                loss of   >30lbs or consult bariatric Surgery    Patient will be having cardiac surgery on 6/15/2022.       Plan:  Chronic low back, diffuse body pain and bilateral knee pain L>R   Not interested in anymore interventional procedures  Buccal screen reviewed and isconsistent  Continue Lyrica 150 mg TID x 90 day. RF today. Refill norco 5/325mg #90 lasts 2 months - I have given enough medication to get to her surgery date.   Surgeon may freely prescribe in the post operative period. No RF today - Parafon Forte 500mg po tid prn spasms  Difficulty getting to appointments ,   Continue with Cymbalta 40 mg QD from PCP   OARRS report reviewed  Patient encouraged to stay active and to lose weight  Against referral to physical therapy  Treatment plan discussed with the patient including medications side effects       Controlled Substance Monitoring:    Acute and Chronic Pain Monitoring:   RX Monitoring 6/6/2022   Attestation -   Periodic Controlled Substance Monitoring Possible medication side effects, risk of tolerance/dependence & alternative treatments discussed. ;No signs of potential drug abuse or diversion identified. ;Assessed functional status. ;Obtaining appropriate analgesic effect of treatment. We discussed with the patient that combining opioids, benzodiazepines, alcohol, illicit drugs or sleep aids increases the risk of respiratory depression including death. We discussed that these medications may cause drowsiness, sedation or dizziness and have counseled the patient not to drive or operate machinery. We have discussed that these medications will be prescribed only by one provider. We have discussed with the patient about age related risk factors and have thoroughly discussed the importance of taking these medications as prescribed. The patient verbalizes understanding.     ccreferring physic

## 2022-06-06 NOTE — PROGRESS NOTES
[address]   PRE-ADMISSION TESTING GENERAL INSTRUCTIONS  PAT Phone Number: [phone]      GENERAL INSTRUCTIONS:  [x] Hibiclens shower the night before and the morning of surgery. Do not use Hibiclens on your face or head. [x] Do not wear makeup, lotions, powders, deodorant. [x] Nothing by mouth after midnight, including gum, candy, mints, or water. [x] You may brush your teeth, gargle, but do NOT swallow water. [x] No tobacco products, illegal drugs, or alcohol within 24 hours of your surgery. [x] Jewelry or valuables should not be brought to the hospital. All body and/or tongue piercing's must be removed prior to arriving to hospital. ALL hair pins must be removed. [x] Bring insurance card and photo ID. [x] Transfusion Bracelet: Please bring with you to hospital, day of surgery     PARKING INSTRUCTIONS:     [x] ARRIVAL TIME: 6/15 at 5 am  · [x] Enter into the [employer] [facility]. Two people may accompany you. Masks are required. · [x] Parking Lot \"I\" is where you will park. It is located on the corner of [facility] and [facility]. The entrance is on [facility]. · To enter, press the button and the gate will lift. A free token will be provided to exit the lot. EDUCATION INSTRUCTIONS:      [x] Pre-admission Testing educational folder given  [x] Incentive Spirometry,coughing & deep breathing exercises reviewed. [x] Medication information sheet(s)   [x] Fluoroscopy-Xray used in surgery reviewed with patient. Educational pamphlet placed in chart. [x] Pain: Post-op pain is normal and to be expected. You will be asked to rate your pain from 0-10. [x] Ask your nurse for your pain medication. MEDICATION INSTRUCTIONS:    [x] Bring a complete list of your medications, please write the last time you took the medicine, give this list to the nurse.   [x] Take the following medications the morning of surgery with 1-2 ounces of water: buspar, lyrica, topamax, protonix  [x] Stop herbal supplements and vitamins 5 days before your surgery. .  [x] Use your inhalers the morning of surgery. Bring your emergency inhaler with you day of surgery. [x] Follow physician instructions regarding any blood thinners you may be taking. WHAT TO EXPECT:    [x] The day of surgery you will be greeted and checked in by the [employer].  In addition, you will be registered in the [facility] by a Patient Access Representative. Please bring your photo ID and insurance card. A nurse will greet you in accordance to the time you are needed in the pre-op area to prepare you for surgery. Please do not be discouraged if you are not greeted in the order you arrive as there are many variables that are involved in patient preparation. Your patience is greatly appreciated as you wait for your nurse. Please bring in items such as: books, magazines, newspapers, electronics, or any other items  to occupy your time in the waiting area. [x]  Delays may occur with surgery and staff will make a sincere effort to keep you informed of delays. If any delays occur with your procedure, we apologize ahead of time for your inconvenience as we recognize the value of your time.

## 2022-06-10 ENCOUNTER — HOSPITAL ENCOUNTER (OUTPATIENT)
Dept: CT IMAGING | Age: 67
Discharge: HOME | End: 2022-06-12
Payer: MEDICARE

## 2022-06-10 ENCOUNTER — HOSPITAL ENCOUNTER (OUTPATIENT)
Dept: PULMONOLOGY | Age: 67
Discharge: HOME | End: 2022-06-10
Payer: MEDICARE

## 2022-06-10 ENCOUNTER — HOSPITAL ENCOUNTER (OUTPATIENT)
Dept: INTERVENTIONAL RADIOLOGY/VASCULAR | Age: 67
Discharge: HOME | End: 2022-06-12
Payer: MEDICARE

## 2022-06-10 ENCOUNTER — HOSPITAL ENCOUNTER (OUTPATIENT)
Dept: PREADMISSION TESTING | Age: 67
Discharge: HOME | End: 2022-06-10
Payer: MEDICARE

## 2022-06-10 ENCOUNTER — HOSPITAL ENCOUNTER (OUTPATIENT)
Dept: ULTRASOUND IMAGING | Age: 67
Discharge: HOME | End: 2022-06-12
Payer: MEDICARE

## 2022-06-10 ENCOUNTER — HOSPITAL ENCOUNTER (OUTPATIENT)
Dept: GENERAL RADIOLOGY | Age: 67
Discharge: HOME | End: 2022-06-12
Payer: MEDICARE

## 2022-06-10 VITALS
HEIGHT: 68 IN | HEART RATE: 62 BPM | WEIGHT: 293 LBS | DIASTOLIC BLOOD PRESSURE: 80 MMHG | TEMPERATURE: 98 F | BODY MASS INDEX: 44.41 KG/M2 | SYSTOLIC BLOOD PRESSURE: 132 MMHG | OXYGEN SATURATION: 94 % | RESPIRATION RATE: 16 BRPM

## 2022-06-10 DIAGNOSIS — Z01.818 PRE-OP EVALUATION: ICD-10-CM

## 2022-06-10 DIAGNOSIS — D15.1 MYXOMA OF HEART: ICD-10-CM

## 2022-06-10 DIAGNOSIS — R09.89 BRUIT: ICD-10-CM

## 2022-06-10 DIAGNOSIS — I87.2 VENOUS INSUFFICIENCY: ICD-10-CM

## 2022-06-10 DIAGNOSIS — I73.9 PVD (PERIPHERAL VASCULAR DISEASE) (HCC): ICD-10-CM

## 2022-06-10 DIAGNOSIS — E08.21 DIABETES MELLITUS DUE TO UNDERLYING CONDITION WITH DIABETIC NEPHROPATHY, UNSPECIFIED WHETHER LONG TERM INSULIN USE (HCC): ICD-10-CM

## 2022-06-10 DIAGNOSIS — I35.1 AORTIC VALVE INSUFFICIENCY, ETIOLOGY OF CARDIAC VALVE DISEASE UNSPECIFIED: ICD-10-CM

## 2022-06-10 LAB
ABO/RH: NORMAL
ALBUMIN SERPL-MCNC: 4.2 G/DL (ref 3.5–5.2)
ALP BLD-CCNC: 92 U/L (ref 35–104)
ALT SERPL-CCNC: 17 U/L (ref 0–32)
ANION GAP SERPL CALCULATED.3IONS-SCNC: 12 MMOL/L (ref 7–16)
ANTIBODY SCREEN: NORMAL
APTT: 34.5 SEC (ref 24.5–35.1)
AST SERPL-CCNC: 10 U/L (ref 0–31)
BACTERIA: NORMAL /HPF
BILIRUB SERPL-MCNC: <0.2 MG/DL (ref 0–1.2)
BILIRUBIN URINE: NEGATIVE
BLOOD, URINE: NEGATIVE
BUN BLDV-MCNC: 20 MG/DL (ref 6–23)
CALCIUM SERPL-MCNC: 9.1 MG/DL (ref 8.6–10.2)
CHLORIDE BLD-SCNC: 111 MMOL/L (ref 98–107)
CLARITY: CLEAR
CO2: 23 MMOL/L (ref 22–29)
COLOR: YELLOW
CREAT SERPL-MCNC: 1.1 MG/DL (ref 0.5–1)
GFR AFRICAN AMERICAN: 60
GFR NON-AFRICAN AMERICAN: 50 ML/MIN/1.73
GLUCOSE BLD-MCNC: 96 MG/DL (ref 74–99)
GLUCOSE URINE: NEGATIVE MG/DL
HBA1C MFR BLD: 5.8 % (ref 4–5.6)
HCT VFR BLD CALC: 43.7 % (ref 34–48)
HEMOGLOBIN: 13.4 G/DL (ref 11.5–15.5)
INR BLD: 1
KETONES, URINE: NEGATIVE MG/DL
LEUKOCYTE ESTERASE, URINE: ABNORMAL
MCH RBC QN AUTO: 28.6 PG (ref 26–35)
MCHC RBC AUTO-ENTMCNC: 30.7 % (ref 32–34.5)
MCV RBC AUTO: 93.4 FL (ref 80–99.9)
NITRITE, URINE: NEGATIVE
PDW BLD-RTO: 14.1 FL (ref 11.5–15)
PH UA: 6 (ref 5–9)
PLATELET # BLD: 119 E9/L (ref 130–450)
PMV BLD AUTO: 12.4 FL (ref 7–12)
POTASSIUM SERPL-SCNC: 4.1 MMOL/L (ref 3.5–5)
PROTEIN UA: NEGATIVE MG/DL
PROTHROMBIN TIME: 10.5 SEC (ref 9.3–12.4)
RBC # BLD: 4.68 E12/L (ref 3.5–5.5)
RBC UA: NORMAL /HPF (ref 0–2)
SODIUM BLD-SCNC: 146 MMOL/L (ref 132–146)
SPECIFIC GRAVITY UA: 1.02 (ref 1–1.03)
TOTAL PROTEIN: 6.7 G/DL (ref 6.4–8.3)
UROBILINOGEN, URINE: 0.2 E.U./DL
WBC # BLD: 7.2 E9/L (ref 4.5–11.5)
WBC UA: NORMAL /HPF (ref 0–5)

## 2022-06-10 PROCEDURE — 86923 COMPATIBILITY TEST ELECTRIC: CPT

## 2022-06-10 PROCEDURE — 83036 HEMOGLOBIN GLYCOSYLATED A1C: CPT

## 2022-06-10 PROCEDURE — 86850 RBC ANTIBODY SCREEN: CPT

## 2022-06-10 PROCEDURE — 80053 COMPREHEN METABOLIC PANEL: CPT

## 2022-06-10 PROCEDURE — 87081 CULTURE SCREEN ONLY: CPT

## 2022-06-10 PROCEDURE — 93922 UPR/L XTREMITY ART 2 LEVELS: CPT

## 2022-06-10 PROCEDURE — 94729 DIFFUSING CAPACITY: CPT | Performed by: INTERNAL MEDICINE

## 2022-06-10 PROCEDURE — 85027 COMPLETE CBC AUTOMATED: CPT

## 2022-06-10 PROCEDURE — 93880 EXTRACRANIAL BILAT STUDY: CPT

## 2022-06-10 PROCEDURE — 36415 COLL VENOUS BLD VENIPUNCTURE: CPT

## 2022-06-10 PROCEDURE — 87088 URINE BACTERIA CULTURE: CPT

## 2022-06-10 PROCEDURE — 81001 URINALYSIS AUTO W/SCOPE: CPT

## 2022-06-10 PROCEDURE — 85730 THROMBOPLASTIN TIME PARTIAL: CPT

## 2022-06-10 PROCEDURE — 93005 ELECTROCARDIOGRAM TRACING: CPT

## 2022-06-10 PROCEDURE — 93880 EXTRACRANIAL BILAT STUDY: CPT | Performed by: RADIOLOGY

## 2022-06-10 PROCEDURE — 94726 PLETHYSMOGRAPHY LUNG VOLUMES: CPT | Performed by: INTERNAL MEDICINE

## 2022-06-10 PROCEDURE — 93922 UPR/L XTREMITY ART 2 LEVELS: CPT | Performed by: SURGERY

## 2022-06-10 PROCEDURE — 94375 RESPIRATORY FLOW VOLUME LOOP: CPT

## 2022-06-10 PROCEDURE — 85610 PROTHROMBIN TIME: CPT

## 2022-06-10 PROCEDURE — 71250 CT THORAX DX C-: CPT

## 2022-06-10 PROCEDURE — 86900 BLOOD TYPING SEROLOGIC ABO: CPT

## 2022-06-10 PROCEDURE — 94010 BREATHING CAPACITY TEST: CPT | Performed by: INTERNAL MEDICINE

## 2022-06-10 PROCEDURE — 71046 X-RAY EXAM CHEST 2 VIEWS: CPT

## 2022-06-10 PROCEDURE — 94729 DIFFUSING CAPACITY: CPT

## 2022-06-10 PROCEDURE — 86901 BLOOD TYPING SEROLOGIC RH(D): CPT

## 2022-06-12 LAB
EKG ATRIAL RATE: 53 BPM
EKG P AXIS: 21 DEGREES
EKG P-R INTERVAL: 162 MS
EKG Q-T INTERVAL: 448 MS
EKG QRS DURATION: 96 MS
EKG QTC CALCULATION (BAZETT): 420 MS
EKG R AXIS: 33 DEGREES
EKG T AXIS: 12 DEGREES
EKG VENTRICULAR RATE: 53 BPM
MRSA CULTURE ONLY: NORMAL
URINE CULTURE, ROUTINE: NORMAL

## 2022-06-14 ENCOUNTER — ANESTHESIA EVENT (OUTPATIENT)
Dept: OPERATING ROOM | Age: 67
DRG: 228 | End: 2022-06-14
Payer: MEDICARE

## 2022-06-14 NOTE — PROGRESS NOTES
Pre-operative testing review:   --carotids with no significant stenosis  --lenny with good flow bilaterally  --ct chest reviewed - nodules on CT scan - will set patient up with pulmonary outpatient   --TTE with no significant LV dysfunction, Mild-to-moderate aortic regurgitation, Moderate pedunculated mobile mass suggestive of myxoma attached to fossa ovalis/atrial septum was visualized in LA cavity  --pft with FEV1 89 percent of predicted and DLCO 83 percent of predicted  --hgA1c 5.8  --H&P on chart from date 5/31, to be updated DOS  --Dental clearance scanned into media     Recent Labs     06/10/22  1310 05/20/22  0942   HGB 13.4 13.3   HCT 43.7 42.8   * 123*     Recent Labs     06/10/22  1310 05/20/22  0942   BUN 20 12   CREATININE 1.1* 1.0     Lab Results   Component Value Date/Time    LABURIN  06/10/2022 01:10 PM     10 to 100,000 CFU/mL  Mixed isamar isolated. Further workup and sensitivity testing  is not routinely indicated and will not be performed. Mixed isamar isolated includes:  Mixed gram positive organisms      LABURIN 50 mg/dl 02/24/2021 01:37 PM           CREATININE   Date/Time Value Ref Range Status   06/10/2022 01:10 PM 1.1 (H) 0.5 - 1.0 mg/dL Final   05/20/2022 09:42 AM 1.0 0.5 - 1.0 mg/dL Final   11/08/2021 09:30 AM 1.2 (H) 0.5 - 1.0 mg/dL Final           [id number] Score for Atrial Fibrillation Stroke Risk   Risk   Factors  Component Value   C CHF  0   H HTN  1   A2 Age >= 75  0   D DM  0   S2 Prior Stroke/TIA  0   V Vascular Disease  0   A Age 74-69  1   Sc Sex  1    ODR1II1-NBVe  Score  2       Disclaimer:   Definition and scores for [id number]:      [id number] acronym     Score  Congestive HF      1  Hypertension       1  Age ? 75 years      2  Diabetes mellitus      1  Stroke/TIA/TE      2  Vascular disease (prior MI, PAD, or aortic plaque) 1  Age 72 to 76 years      1  Sex category      female=1, male=0    Maximum score      9    Risk Score calculation is dependent on accuracy of

## 2022-06-15 ENCOUNTER — ANESTHESIA (OUTPATIENT)
Dept: OPERATING ROOM | Age: 67
DRG: 228 | End: 2022-06-15
Payer: MEDICARE

## 2022-06-15 ENCOUNTER — HOSPITAL ENCOUNTER (INPATIENT)
Age: 67
LOS: 5 days | Discharge: NURSING, REHAB OR LONG-TERM CARE | DRG: 228 | End: 2022-06-20
Attending: THORACIC SURGERY (CARDIOTHORACIC VASCULAR SURGERY) | Admitting: THORACIC SURGERY (CARDIOTHORACIC VASCULAR SURGERY)
Payer: MEDICARE

## 2022-06-15 ENCOUNTER — APPOINTMENT (OUTPATIENT)
Dept: GENERAL RADIOLOGY | Age: 67
DRG: 228 | End: 2022-06-15
Attending: THORACIC SURGERY (CARDIOTHORACIC VASCULAR SURGERY)
Payer: MEDICARE

## 2022-06-15 DIAGNOSIS — D21.9 MYXOMA: ICD-10-CM

## 2022-06-15 DIAGNOSIS — D15.1 ATRIAL MYXOMA: Primary | ICD-10-CM

## 2022-06-15 LAB
AADO2: 100.7 MMHG
AADO2: 112 MMHG
AADO2: 173.4 MMHG
ACTIVATED CLOTTING TIME: 102 SECONDS (ref 99–130)
ACTIVATED CLOTTING TIME: 108 SECONDS (ref 99–130)
ACTIVATED CLOTTING TIME: 844 SECONDS (ref 99–130)
ACTIVATED CLOTTING TIME: >1005 SECONDS (ref 99–130)
ANION GAP SERPL CALCULATED.3IONS-SCNC: 14 MMOL/L (ref 7–16)
ANION GAP: 11 MMOL/L (ref 7–16)
APTT: 33.8 SEC (ref 24.5–35.1)
B.E.: -4.7 MMOL/L (ref -3–3)
B.E.: -6.1 MMOL/L (ref -3–3)
B.E.: -6.5 MMOL/L (ref -3–3)
B.E.: -6.5 MMOL/L (ref -3–3)
B.E.: -9 MMOL/L (ref -3–3)
BLOOD BANK DISPENSE STATUS: NORMAL
BLOOD BANK PRODUCT CODE: NORMAL
BPU ID: NORMAL
BUN BLDV-MCNC: 16 MG/DL (ref 6–23)
CALCIUM IONIZED: 1.15 MMOL/L (ref 1.15–1.33)
CALCIUM SERPL-MCNC: 7.8 MG/DL (ref 8.6–10.2)
CARDIOPULMONARY BYPASS: YES
CHLORIDE BLD-SCNC: 110 MMOL/L (ref 98–107)
CO2: 20 MMOL/L (ref 22–29)
COHB: 1 % (ref 0–1.5)
COHB: 1 % (ref 0–1.5)
COHB: 1.2 % (ref 0–1.5)
COHB: 1.5 % (ref 0–1.5)
CREAT SERPL-MCNC: 1 MG/DL (ref 0.5–1)
CRITICAL: ABNORMAL
DATE ANALYZED: ABNORMAL
DATE OF COLLECTION: ABNORMAL
DESCRIPTION BLOOD BANK: NORMAL
DEVICE: ABNORMAL
FIO2: 40 %
FIO2: 40 %
FIO2: 50 %
GFR AFRICAN AMERICAN: >60
GFR AFRICAN AMERICAN: >60
GFR NON-AFRICAN AMERICAN: 55 ML/MIN/1.73
GFR, ESTIMATED: 55 ML/MIN/1.73
GLUCOSE BLD-MCNC: 120 MG/DL (ref 74–99)
GLUCOSE BLD-MCNC: 154 MG/DL (ref 74–99)
HCO3: 17.4 MMOL/L (ref 22–26)
HCO3: 19 MMOL/L (ref 22–26)
HCO3: 19.5 MMOL/L (ref 22–26)
HCO3: 19.8 MMOL/L (ref 22–26)
HCO3: 22 MMOL/L (ref 22–26)
HCT VFR BLD CALC: 38.9 % (ref 34–48)
HEMATOCRIT: 30 % (ref 34–48)
HEMOGLOBIN: 10.1 G/DL (ref 11.5–15.5)
HEMOGLOBIN: 12.1 G/DL (ref 11.5–15.5)
HHB: 2.2 % (ref 0–5)
HHB: 2.3 % (ref 0–5)
HHB: 2.5 % (ref 0–5)
HHB: 4.4 % (ref 0–5)
INR BLD: 1
LAB: ABNORMAL
MAGNESIUM: 2.9 MG/DL (ref 1.6–2.6)
MCH RBC QN AUTO: 29.1 PG (ref 26–35)
MCHC RBC AUTO-ENTMCNC: 31.1 % (ref 32–34.5)
MCV RBC AUTO: 93.5 FL (ref 80–99.9)
METER GLUCOSE: 126 MG/DL (ref 74–99)
METER GLUCOSE: 127 MG/DL (ref 74–99)
METER GLUCOSE: 138 MG/DL (ref 74–99)
METER GLUCOSE: 141 MG/DL (ref 74–99)
METER GLUCOSE: 144 MG/DL (ref 74–99)
METHB: 0.1 % (ref 0–1.5)
METHB: 0.1 % (ref 0–1.5)
METHB: 0.2 % (ref 0–1.5)
METHB: 0.2 % (ref 0–1.5)
MODE: ABNORMAL
MODE: ABNORMAL
MODE: AC
MODE: AC
O2 SATURATION: 100 % (ref 92–98.5)
O2 SATURATION: 95.5 % (ref 92–98.5)
O2 SATURATION: 97.5 % (ref 92–98.5)
O2 SATURATION: 97.7 % (ref 92–98.5)
O2 SATURATION: 97.8 % (ref 92–98.5)
O2HB: 94.4 % (ref 94–97)
O2HB: 96 % (ref 94–97)
O2HB: 96.4 % (ref 94–97)
O2HB: 96.5 % (ref 94–97)
OPERATOR ID: 1741
OPERATOR ID: 467
OPERATOR ID: ABNORMAL
PATIENT TEMP: 37 C
PCO2 37: 47 MMHG (ref 35–45)
PCO2: 37.7 MMHG (ref 35–45)
PCO2: 38.9 MMHG (ref 35–45)
PCO2: 39.7 MMHG (ref 35–45)
PCO2: 42.1 MMHG (ref 35–45)
PDW BLD-RTO: 14.6 FL (ref 11.5–15)
PEEP/CPAP: 8 CMH2O
PFO2: 2.52 MMHG/%
PFO2: 3 MMHG/%
PFO2: 3.15 MMHG/%
PH 37: 7.28 (ref 7.35–7.45)
PH BLOOD GAS: 7.26 (ref 7.35–7.45)
PH BLOOD GAS: 7.29 (ref 7.35–7.45)
PH BLOOD GAS: 7.32 (ref 7.35–7.45)
PH BLOOD GAS: 7.32 (ref 7.35–7.45)
PLATELET # BLD: 90 E9/L (ref 130–450)
PLATELET CONFIRMATION: NORMAL
PMV BLD AUTO: 12.5 FL (ref 7–12)
PO2 37: 456.2 MMHG (ref 60–80)
PO2: 119.8 MMHG (ref 75–100)
PO2: 125.9 MMHG (ref 75–100)
PO2: 126.1 MMHG (ref 75–100)
PO2: 83.8 MMHG (ref 75–100)
POC BUN: 16 MG/DL (ref 8–23)
POC CHLORIDE: 110 MMOL/L (ref 100–108)
POC CO2: 22.5 MMOL/L (ref 22–29)
POC CREATININE: 1 MG/DL (ref 0.5–1)
POC IONIZED CALCIUM: 1.1 (ref 1.1–1.3)
POC LACTIC ACID: 1.4 (ref 0.5–2.2)
POC SODIUM: 143 MMOL/L (ref 132–146)
POC SOURCE: ABNORMAL
POTASSIUM SERPL-SCNC: 3.7 MMOL/L (ref 3.5–5.5)
POTASSIUM SERPL-SCNC: 3.93 MMOL/L (ref 3.5–5)
POTASSIUM SERPL-SCNC: 4.1 MMOL/L (ref 3.5–5)
PROTHROMBIN TIME: 11.2 SEC (ref 9.3–12.4)
PS: 10 CMH20
RBC # BLD: 4.16 E12/L (ref 3.5–5.5)
RI(T): 0.8
RI(T): 0.94
RI(T): 1.38
RR MECHANICAL: 12 B/MIN
RR MECHANICAL: 12 B/MIN
SODIUM BLD-SCNC: 144 MMOL/L (ref 132–146)
SOURCE, BLOOD GAS: ABNORMAL
THB: 12.6 G/DL (ref 11.5–16.5)
THB: 12.7 G/DL (ref 11.5–16.5)
THB: 12.8 G/DL (ref 11.5–16.5)
THB: 13 G/DL (ref 11.5–16.5)
TIME ANALYZED: 1021
TIME ANALYZED: 1148
TIME ANALYZED: 1417
TIME ANALYZED: 1555
VT MECHANICAL: 550 ML
VT MECHANICAL: 550 ML
WBC # BLD: 13.4 E9/L (ref 4.5–11.5)

## 2022-06-15 PROCEDURE — 2500000003 HC RX 250 WO HCPCS: Performed by: NURSE PRACTITIONER

## 2022-06-15 PROCEDURE — 85347 COAGULATION TIME ACTIVATED: CPT

## 2022-06-15 PROCEDURE — 02HV33Z INSERTION OF INFUSION DEVICE INTO SUPERIOR VENA CAVA, PERCUTANEOUS APPROACH: ICD-10-PCS | Performed by: THORACIC SURGERY (CARDIOTHORACIC VASCULAR SURGERY)

## 2022-06-15 PROCEDURE — 85610 PROTHROMBIN TIME: CPT

## 2022-06-15 PROCEDURE — 84132 ASSAY OF SERUM POTASSIUM: CPT

## 2022-06-15 PROCEDURE — 37799 UNLISTED PX VASCULAR SURGERY: CPT

## 2022-06-15 PROCEDURE — 3600000008 HC SURGERY OHS BASE: Performed by: THORACIC SURGERY (CARDIOTHORACIC VASCULAR SURGERY)

## 2022-06-15 PROCEDURE — 85730 THROMBOPLASTIN TIME PARTIAL: CPT

## 2022-06-15 PROCEDURE — 7100000001 HC PACU RECOVERY - ADDTL 15 MIN

## 2022-06-15 PROCEDURE — 2709999900 HC NON-CHARGEABLE SUPPLY: Performed by: THORACIC SURGERY (CARDIOTHORACIC VASCULAR SURGERY)

## 2022-06-15 PROCEDURE — 71045 X-RAY EXAM CHEST 1 VIEW: CPT

## 2022-06-15 PROCEDURE — 36415 COLL VENOUS BLD VENIPUNCTURE: CPT

## 2022-06-15 PROCEDURE — 6370000000 HC RX 637 (ALT 250 FOR IP)

## 2022-06-15 PROCEDURE — 2500000003 HC RX 250 WO HCPCS

## 2022-06-15 PROCEDURE — 2580000003 HC RX 258: Performed by: PHYSICIAN ASSISTANT

## 2022-06-15 PROCEDURE — 6360000002 HC RX W HCPCS

## 2022-06-15 PROCEDURE — 94640 AIRWAY INHALATION TREATMENT: CPT

## 2022-06-15 PROCEDURE — 82962 GLUCOSE BLOOD TEST: CPT

## 2022-06-15 PROCEDURE — 2700000000 HC OXYGEN THERAPY PER DAY

## 2022-06-15 PROCEDURE — C1713 ANCHOR/SCREW BN/BN,TIS/BN: HCPCS | Performed by: THORACIC SURGERY (CARDIOTHORACIC VASCULAR SURGERY)

## 2022-06-15 PROCEDURE — 6360000002 HC RX W HCPCS: Performed by: PHYSICIAN ASSISTANT

## 2022-06-15 PROCEDURE — B24BZZ4 ULTRASONOGRAPHY OF HEART WITH AORTA, TRANSESOPHAGEAL: ICD-10-PCS | Performed by: THORACIC SURGERY (CARDIOTHORACIC VASCULAR SURGERY)

## 2022-06-15 PROCEDURE — C1729 CATH, DRAINAGE: HCPCS | Performed by: THORACIC SURGERY (CARDIOTHORACIC VASCULAR SURGERY)

## 2022-06-15 PROCEDURE — 85027 COMPLETE CBC AUTOMATED: CPT

## 2022-06-15 PROCEDURE — 2000000000 HC ICU R&B

## 2022-06-15 PROCEDURE — A4216 STERILE WATER/SALINE, 10 ML: HCPCS

## 2022-06-15 PROCEDURE — 94664 DEMO&/EVAL PT USE INHALER: CPT

## 2022-06-15 PROCEDURE — 6360000002 HC RX W HCPCS: Performed by: NURSE PRACTITIONER

## 2022-06-15 PROCEDURE — 2580000003 HC RX 258

## 2022-06-15 PROCEDURE — 94002 VENT MGMT INPAT INIT DAY: CPT

## 2022-06-15 PROCEDURE — 88305 TISSUE EXAM BY PATHOLOGIST: CPT

## 2022-06-15 PROCEDURE — 33120 EXC ICAR TUM RESC W/CARD BYP: CPT | Performed by: STUDENT IN AN ORGANIZED HEALTH CARE EDUCATION/TRAINING PROGRAM

## 2022-06-15 PROCEDURE — 82803 BLOOD GASES ANY COMBINATION: CPT

## 2022-06-15 PROCEDURE — 82805 BLOOD GASES W/O2 SATURATION: CPT

## 2022-06-15 PROCEDURE — 6370000000 HC RX 637 (ALT 250 FOR IP): Performed by: PHYSICIAN ASSISTANT

## 2022-06-15 PROCEDURE — 33120 EXC ICAR TUM RESC W/CARD BYP: CPT | Performed by: THORACIC SURGERY (CARDIOTHORACIC VASCULAR SURGERY)

## 2022-06-15 PROCEDURE — 99233 SBSQ HOSP IP/OBS HIGH 50: CPT | Performed by: NURSE PRACTITIONER

## 2022-06-15 PROCEDURE — 2720000010 HC SURG SUPPLY STERILE: Performed by: THORACIC SURGERY (CARDIOTHORACIC VASCULAR SURGERY)

## 2022-06-15 PROCEDURE — C9113 INJ PANTOPRAZOLE SODIUM, VIA: HCPCS

## 2022-06-15 PROCEDURE — P9045 ALBUMIN (HUMAN), 5%, 250 ML: HCPCS | Performed by: NURSE PRACTITIONER

## 2022-06-15 PROCEDURE — 0PH000Z INSERTION OF RIGID PLATE INTERNAL FIXATION DEVICE INTO STERNUM, OPEN APPROACH: ICD-10-PCS | Performed by: THORACIC SURGERY (CARDIOTHORACIC VASCULAR SURGERY)

## 2022-06-15 PROCEDURE — 3600000018 HC SURGERY OHS ADDTL 15MIN: Performed by: THORACIC SURGERY (CARDIOTHORACIC VASCULAR SURGERY)

## 2022-06-15 PROCEDURE — 5A1221Z PERFORMANCE OF CARDIAC OUTPUT, CONTINUOUS: ICD-10-PCS | Performed by: THORACIC SURGERY (CARDIOTHORACIC VASCULAR SURGERY)

## 2022-06-15 PROCEDURE — 7100000000 HC PACU RECOVERY - FIRST 15 MIN

## 2022-06-15 PROCEDURE — 80048 BASIC METABOLIC PNL TOTAL CA: CPT

## 2022-06-15 PROCEDURE — 3700000001 HC ADD 15 MINUTES (ANESTHESIA): Performed by: THORACIC SURGERY (CARDIOTHORACIC VASCULAR SURGERY)

## 2022-06-15 PROCEDURE — 83735 ASSAY OF MAGNESIUM: CPT

## 2022-06-15 PROCEDURE — 82330 ASSAY OF CALCIUM: CPT

## 2022-06-15 PROCEDURE — 3700000000 HC ANESTHESIA ATTENDED CARE: Performed by: THORACIC SURGERY (CARDIOTHORACIC VASCULAR SURGERY)

## 2022-06-15 PROCEDURE — 02B70ZZ EXCISION OF LEFT ATRIUM, OPEN APPROACH: ICD-10-PCS | Performed by: THORACIC SURGERY (CARDIOTHORACIC VASCULAR SURGERY)

## 2022-06-15 DEVICE — STERNALPLATE, LADDER, NARROW: Type: IMPLANTABLE DEVICE | Site: STERNUM | Status: FUNCTIONAL

## 2022-06-15 DEVICE — LOCKING SCREW,AXS,SELF-DRILLING
Type: IMPLANTABLE DEVICE | Site: STERNUM | Status: FUNCTIONAL
Brand: AXS, SMARTLOCK

## 2022-06-15 DEVICE — STERNALPLATE, BOX: Type: IMPLANTABLE DEVICE | Site: STERNUM | Status: FUNCTIONAL

## 2022-06-15 RX ORDER — ONDANSETRON 2 MG/ML
4 INJECTION INTRAMUSCULAR; INTRAVENOUS EVERY 6 HOURS PRN
Status: DISCONTINUED | OUTPATIENT
Start: 2022-06-15 | End: 2022-06-20 | Stop reason: HOSPADM

## 2022-06-15 RX ORDER — VECURONIUM BROMIDE 1 MG/ML
INJECTION, POWDER, LYOPHILIZED, FOR SOLUTION INTRAVENOUS PRN
Status: DISCONTINUED | OUTPATIENT
Start: 2022-06-15 | End: 2022-06-15 | Stop reason: SDUPTHER

## 2022-06-15 RX ORDER — BUSPIRONE HYDROCHLORIDE 5 MG/1
15 TABLET ORAL 3 TIMES DAILY
Status: DISCONTINUED | OUTPATIENT
Start: 2022-06-15 | End: 2022-06-20 | Stop reason: HOSPADM

## 2022-06-15 RX ORDER — POTASSIUM CHLORIDE 29.8 MG/ML
20 INJECTION INTRAVENOUS PRN
Status: DISCONTINUED | OUTPATIENT
Start: 2022-06-15 | End: 2022-06-16

## 2022-06-15 RX ORDER — ALBUMIN, HUMAN INJ 5% 5 %
25 SOLUTION INTRAVENOUS PRN
Status: DISCONTINUED | OUTPATIENT
Start: 2022-06-15 | End: 2022-06-16

## 2022-06-15 RX ORDER — HEPARIN SODIUM 10000 [USP'U]/ML
INJECTION, SOLUTION INTRAVENOUS; SUBCUTANEOUS PRN
Status: DISCONTINUED | OUTPATIENT
Start: 2022-06-15 | End: 2022-06-15 | Stop reason: SDUPTHER

## 2022-06-15 RX ORDER — PROPOFOL 10 MG/ML
10 INJECTION, EMULSION INTRAVENOUS CONTINUOUS PRN
Status: DISCONTINUED | OUTPATIENT
Start: 2022-06-15 | End: 2022-06-16

## 2022-06-15 RX ORDER — PREGABALIN 150 MG/1
150 CAPSULE ORAL 3 TIMES DAILY
Status: DISCONTINUED | OUTPATIENT
Start: 2022-06-15 | End: 2022-06-17

## 2022-06-15 RX ORDER — SODIUM CHLORIDE 9 MG/ML
INJECTION, SOLUTION INTRAVENOUS CONTINUOUS PRN
Status: DISCONTINUED | OUTPATIENT
Start: 2022-06-15 | End: 2022-06-15 | Stop reason: SDUPTHER

## 2022-06-15 RX ORDER — DULOXETIN HYDROCHLORIDE 30 MG/1
30 CAPSULE, DELAYED RELEASE ORAL DAILY
Status: DISCONTINUED | OUTPATIENT
Start: 2022-06-15 | End: 2022-06-20 | Stop reason: HOSPADM

## 2022-06-15 RX ORDER — ALBUMIN, HUMAN INJ 5% 5 %
SOLUTION INTRAVENOUS
Status: DISPENSED
Start: 2022-06-15 | End: 2022-06-15

## 2022-06-15 RX ORDER — SODIUM CHLORIDE 9 MG/ML
INJECTION, SOLUTION INTRAVENOUS CONTINUOUS
Status: DISCONTINUED | OUTPATIENT
Start: 2022-06-15 | End: 2022-06-15

## 2022-06-15 RX ORDER — CHLORHEXIDINE GLUCONATE 0.12 MG/ML
15 RINSE ORAL ONCE
Status: COMPLETED | OUTPATIENT
Start: 2022-06-15 | End: 2022-06-15

## 2022-06-15 RX ORDER — INSULIN GLARGINE 100 [IU]/ML
0.15 INJECTION, SOLUTION SUBCUTANEOUS NIGHTLY
Status: DISCONTINUED | OUTPATIENT
Start: 2022-06-16 | End: 2022-06-16 | Stop reason: CLARIF

## 2022-06-15 RX ORDER — DEXTROSE MONOHYDRATE 50 MG/ML
100 INJECTION, SOLUTION INTRAVENOUS PRN
Status: DISCONTINUED | OUTPATIENT
Start: 2022-06-15 | End: 2022-06-16

## 2022-06-15 RX ORDER — FENTANYL CITRATE 0.05 MG/ML
INJECTION, SOLUTION INTRAMUSCULAR; INTRAVENOUS PRN
Status: DISCONTINUED | OUTPATIENT
Start: 2022-06-15 | End: 2022-06-15 | Stop reason: SDUPTHER

## 2022-06-15 RX ORDER — PROPOFOL 10 MG/ML
INJECTION, EMULSION INTRAVENOUS
Status: DISPENSED
Start: 2022-06-15 | End: 2022-06-15

## 2022-06-15 RX ORDER — SODIUM CHLORIDE 9 MG/ML
30 INJECTION, SOLUTION INTRAVENOUS CONTINUOUS
Status: DISCONTINUED | OUTPATIENT
Start: 2022-06-15 | End: 2022-06-16

## 2022-06-15 RX ORDER — 0.9 % SODIUM CHLORIDE 0.9 %
250 INTRAVENOUS SOLUTION INTRAVENOUS CONTINUOUS PRN
Status: DISCONTINUED | OUTPATIENT
Start: 2022-06-15 | End: 2022-06-16

## 2022-06-15 RX ORDER — ASPIRIN 81 MG/1
81 TABLET, CHEWABLE ORAL ONCE
Status: DISCONTINUED | OUTPATIENT
Start: 2022-06-15 | End: 2022-06-16

## 2022-06-15 RX ORDER — OXYCODONE HYDROCHLORIDE 5 MG/1
5 TABLET ORAL EVERY 4 HOURS PRN
Status: DISCONTINUED | OUTPATIENT
Start: 2022-06-15 | End: 2022-06-16

## 2022-06-15 RX ORDER — ALBUMIN, HUMAN INJ 5% 5 %
25 SOLUTION INTRAVENOUS ONCE
Status: COMPLETED | OUTPATIENT
Start: 2022-06-15 | End: 2022-06-15

## 2022-06-15 RX ORDER — ALBUTEROL SULFATE 90 UG/1
2 AEROSOL, METERED RESPIRATORY (INHALATION) EVERY 6 HOURS PRN
Status: DISCONTINUED | OUTPATIENT
Start: 2022-06-15 | End: 2022-06-15 | Stop reason: CLARIF

## 2022-06-15 RX ORDER — PROPOFOL 10 MG/ML
INJECTION, EMULSION INTRAVENOUS PRN
Status: DISCONTINUED | OUTPATIENT
Start: 2022-06-15 | End: 2022-06-15 | Stop reason: SDUPTHER

## 2022-06-15 RX ORDER — GLYCOPYRROLATE 1 MG/5 ML
SYRINGE (ML) INTRAVENOUS PRN
Status: DISCONTINUED | OUTPATIENT
Start: 2022-06-15 | End: 2022-06-15 | Stop reason: SDUPTHER

## 2022-06-15 RX ORDER — MEPERIDINE HYDROCHLORIDE 25 MG/ML
25 INJECTION INTRAMUSCULAR; INTRAVENOUS; SUBCUTANEOUS
Status: ACTIVE | OUTPATIENT
Start: 2022-06-15 | End: 2022-06-15

## 2022-06-15 RX ORDER — CHLORHEXIDINE GLUCONATE 0.12 MG/ML
15 RINSE ORAL 2 TIMES DAILY
Status: DISCONTINUED | OUTPATIENT
Start: 2022-06-15 | End: 2022-06-16

## 2022-06-15 RX ORDER — FENTANYL CITRATE 50 UG/ML
25 INJECTION, SOLUTION INTRAMUSCULAR; INTRAVENOUS
Status: DISCONTINUED | OUTPATIENT
Start: 2022-06-15 | End: 2022-06-16

## 2022-06-15 RX ORDER — SODIUM CHLORIDE 0.9 % (FLUSH) 0.9 %
5-40 SYRINGE (ML) INJECTION PRN
Status: DISCONTINUED | OUTPATIENT
Start: 2022-06-15 | End: 2022-06-20 | Stop reason: HOSPADM

## 2022-06-15 RX ORDER — SENNA AND DOCUSATE SODIUM 50; 8.6 MG/1; MG/1
1 TABLET, FILM COATED ORAL 2 TIMES DAILY
Status: DISCONTINUED | OUTPATIENT
Start: 2022-06-16 | End: 2022-06-16

## 2022-06-15 RX ORDER — SODIUM CHLORIDE 9 MG/ML
INJECTION, SOLUTION INTRAVENOUS PRN
Status: DISCONTINUED | OUTPATIENT
Start: 2022-06-15 | End: 2022-06-16

## 2022-06-15 RX ORDER — SODIUM CHLORIDE 9 MG/ML
INJECTION, SOLUTION INTRAVENOUS PRN
Status: DISCONTINUED | OUTPATIENT
Start: 2022-06-15 | End: 2022-06-15 | Stop reason: HOSPADM

## 2022-06-15 RX ORDER — PROTAMINE SULFATE 10 MG/ML
INJECTION, SOLUTION INTRAVENOUS PRN
Status: DISCONTINUED | OUTPATIENT
Start: 2022-06-15 | End: 2022-06-15 | Stop reason: SDUPTHER

## 2022-06-15 RX ORDER — ONDANSETRON 4 MG/1
4 TABLET, ORALLY DISINTEGRATING ORAL EVERY 8 HOURS PRN
Status: DISCONTINUED | OUTPATIENT
Start: 2022-06-15 | End: 2022-06-20 | Stop reason: HOSPADM

## 2022-06-15 RX ORDER — LANOLIN ALCOHOL/MO/W.PET/CERES
400 CREAM (GRAM) TOPICAL DAILY
Status: DISCONTINUED | OUTPATIENT
Start: 2022-06-16 | End: 2022-06-20 | Stop reason: HOSPADM

## 2022-06-15 RX ORDER — CHLORHEXIDINE GLUCONATE 4 G/100ML
SOLUTION TOPICAL ONCE
Status: DISCONTINUED | OUTPATIENT
Start: 2022-06-15 | End: 2022-06-15 | Stop reason: HOSPADM

## 2022-06-15 RX ORDER — BISACODYL 10 MG
10 SUPPOSITORY, RECTAL RECTAL DAILY PRN
Status: DISCONTINUED | OUTPATIENT
Start: 2022-06-16 | End: 2022-06-16

## 2022-06-15 RX ORDER — NEOSTIGMINE METHYLSULFATE 1 MG/ML
INJECTION, SOLUTION INTRAVENOUS PRN
Status: DISCONTINUED | OUTPATIENT
Start: 2022-06-15 | End: 2022-06-15 | Stop reason: SDUPTHER

## 2022-06-15 RX ORDER — FENTANYL CITRATE 50 UG/ML
50 INJECTION, SOLUTION INTRAMUSCULAR; INTRAVENOUS
Status: DISCONTINUED | OUTPATIENT
Start: 2022-06-15 | End: 2022-06-16

## 2022-06-15 RX ORDER — SODIUM CHLORIDE 0.9 % (FLUSH) 0.9 %
5-40 SYRINGE (ML) INJECTION EVERY 12 HOURS SCHEDULED
Status: DISCONTINUED | OUTPATIENT
Start: 2022-06-15 | End: 2022-06-20 | Stop reason: HOSPADM

## 2022-06-15 RX ORDER — MAGNESIUM SULFATE IN WATER 40 MG/ML
2000 INJECTION, SOLUTION INTRAVENOUS PRN
Status: DISCONTINUED | OUTPATIENT
Start: 2022-06-15 | End: 2022-06-16

## 2022-06-15 RX ORDER — IPRATROPIUM BROMIDE AND ALBUTEROL SULFATE 2.5; .5 MG/3ML; MG/3ML
1 SOLUTION RESPIRATORY (INHALATION)
Status: DISCONTINUED | OUTPATIENT
Start: 2022-06-15 | End: 2022-06-20 | Stop reason: HOSPADM

## 2022-06-15 RX ORDER — INSULIN LISPRO 100 [IU]/ML
0-3 INJECTION, SOLUTION INTRAVENOUS; SUBCUTANEOUS EVERY 4 HOURS
Status: DISCONTINUED | OUTPATIENT
Start: 2022-06-15 | End: 2022-06-16

## 2022-06-15 RX ORDER — OXYCODONE HYDROCHLORIDE 5 MG/1
10 TABLET ORAL EVERY 4 HOURS PRN
Status: DISCONTINUED | OUTPATIENT
Start: 2022-06-15 | End: 2022-06-16

## 2022-06-15 RX ORDER — ACETAMINOPHEN 325 MG/1
650 TABLET ORAL EVERY 4 HOURS PRN
Status: DISCONTINUED | OUTPATIENT
Start: 2022-06-15 | End: 2022-06-16

## 2022-06-15 RX ORDER — SODIUM CHLORIDE 0.9 % (FLUSH) 0.9 %
5-40 SYRINGE (ML) INJECTION EVERY 12 HOURS SCHEDULED
Status: DISCONTINUED | OUTPATIENT
Start: 2022-06-15 | End: 2022-06-15 | Stop reason: HOSPADM

## 2022-06-15 RX ORDER — MIDAZOLAM HYDROCHLORIDE 1 MG/ML
INJECTION INTRAMUSCULAR; INTRAVENOUS PRN
Status: DISCONTINUED | OUTPATIENT
Start: 2022-06-15 | End: 2022-06-15 | Stop reason: SDUPTHER

## 2022-06-15 RX ORDER — ALBUTEROL SULFATE 2.5 MG/3ML
2.5 SOLUTION RESPIRATORY (INHALATION) EVERY 6 HOURS PRN
Status: DISCONTINUED | OUTPATIENT
Start: 2022-06-15 | End: 2022-06-16

## 2022-06-15 RX ORDER — SODIUM CHLORIDE, SODIUM LACTATE, POTASSIUM CHLORIDE, CALCIUM CHLORIDE 600; 310; 30; 20 MG/100ML; MG/100ML; MG/100ML; MG/100ML
INJECTION, SOLUTION INTRAVENOUS CONTINUOUS PRN
Status: DISCONTINUED | OUTPATIENT
Start: 2022-06-15 | End: 2022-06-15 | Stop reason: SDUPTHER

## 2022-06-15 RX ORDER — ASPIRIN 81 MG/1
81 TABLET ORAL DAILY
Status: DISCONTINUED | OUTPATIENT
Start: 2022-06-16 | End: 2022-06-16

## 2022-06-15 RX ORDER — DOXEPIN HYDROCHLORIDE 10 MG/1
10 CAPSULE ORAL NIGHTLY PRN
Status: DISCONTINUED | OUTPATIENT
Start: 2022-06-15 | End: 2022-06-20 | Stop reason: HOSPADM

## 2022-06-15 RX ORDER — LIDOCAINE HYDROCHLORIDE 20 MG/ML
INJECTION, SOLUTION INTRAVENOUS PRN
Status: DISCONTINUED | OUTPATIENT
Start: 2022-06-15 | End: 2022-06-15 | Stop reason: SDUPTHER

## 2022-06-15 RX ORDER — PANTOPRAZOLE SODIUM 40 MG/1
40 TABLET, DELAYED RELEASE ORAL DAILY
Status: DISCONTINUED | OUTPATIENT
Start: 2022-06-16 | End: 2022-06-20 | Stop reason: HOSPADM

## 2022-06-15 RX ORDER — SODIUM CHLORIDE 0.9 % (FLUSH) 0.9 %
5-40 SYRINGE (ML) INJECTION PRN
Status: DISCONTINUED | OUTPATIENT
Start: 2022-06-15 | End: 2022-06-15 | Stop reason: HOSPADM

## 2022-06-15 RX ORDER — TOPIRAMATE 100 MG/1
100 TABLET, FILM COATED ORAL 2 TIMES DAILY
Status: DISCONTINUED | OUTPATIENT
Start: 2022-06-15 | End: 2022-06-17

## 2022-06-15 RX ORDER — AMINOCAPROIC ACID 250 MG/ML
INJECTION, SOLUTION INTRAVENOUS PRN
Status: DISCONTINUED | OUTPATIENT
Start: 2022-06-15 | End: 2022-06-15 | Stop reason: SDUPTHER

## 2022-06-15 RX ADMIN — FENTANYL CITRATE 25 MCG: 50 INJECTION, SOLUTION INTRAMUSCULAR; INTRAVENOUS at 16:37

## 2022-06-15 RX ADMIN — MIDAZOLAM 2 MG: 1 INJECTION INTRAMUSCULAR; INTRAVENOUS at 06:44

## 2022-06-15 RX ADMIN — PHENYLEPHRINE HYDROCHLORIDE 200 MCG: 10 INJECTION INTRAVENOUS at 07:42

## 2022-06-15 RX ADMIN — LIDOCAINE HYDROCHLORIDE 100 MG: 20 INJECTION, SOLUTION INTRAVENOUS at 06:50

## 2022-06-15 RX ADMIN — Medication 3 MG: at 09:45

## 2022-06-15 RX ADMIN — SODIUM CHLORIDE 30 ML/HR: 9 INJECTION, SOLUTION INTRAVENOUS at 10:32

## 2022-06-15 RX ADMIN — FENTANYL CITRATE 50 MCG: 50 INJECTION, SOLUTION INTRAMUSCULAR; INTRAVENOUS at 23:00

## 2022-06-15 RX ADMIN — PROPOFOL 10 MCG/KG/MIN: 10 INJECTION, EMULSION INTRAVENOUS at 10:33

## 2022-06-15 RX ADMIN — FENTANYL CITRATE 250 MCG: 50 INJECTION, SOLUTION INTRAMUSCULAR; INTRAVENOUS at 06:50

## 2022-06-15 RX ADMIN — SODIUM CHLORIDE: 9 INJECTION, SOLUTION INTRAVENOUS at 06:39

## 2022-06-15 RX ADMIN — MUPIROCIN: 20 OINTMENT TOPICAL at 22:28

## 2022-06-15 RX ADMIN — Medication 10 ML: at 22:06

## 2022-06-15 RX ADMIN — Medication 0.6 MG: at 09:45

## 2022-06-15 RX ADMIN — IPRATROPIUM BROMIDE AND ALBUTEROL SULFATE 1 AMPULE: .5; 2.5 SOLUTION RESPIRATORY (INHALATION) at 13:06

## 2022-06-15 RX ADMIN — SODIUM CHLORIDE 40 MG: 9 INJECTION, SOLUTION INTRAMUSCULAR; INTRAVENOUS; SUBCUTANEOUS at 10:56

## 2022-06-15 RX ADMIN — PROPOFOL 80 MG: 10 INJECTION, EMULSION INTRAVENOUS at 06:50

## 2022-06-15 RX ADMIN — PHENYLEPHRINE HYDROCHLORIDE 100 MCG: 10 INJECTION INTRAVENOUS at 07:43

## 2022-06-15 RX ADMIN — SODIUM CHLORIDE: 9 INJECTION, SOLUTION INTRAVENOUS at 07:07

## 2022-06-15 RX ADMIN — AMINOCAPROIC ACID 1 G/HR: 250 INJECTION, SOLUTION INTRAVENOUS at 07:20

## 2022-06-15 RX ADMIN — PHENYLEPHRINE HYDROCHLORIDE 100 MCG: 10 INJECTION INTRAVENOUS at 07:18

## 2022-06-15 RX ADMIN — MIDAZOLAM 2 MG: 1 INJECTION INTRAMUSCULAR; INTRAVENOUS at 06:39

## 2022-06-15 RX ADMIN — PHENYLEPHRINE HYDROCHLORIDE 100 MCG: 10 INJECTION INTRAVENOUS at 08:58

## 2022-06-15 RX ADMIN — BUSPIRONE HYDROCHLORIDE 15 MG: 5 TABLET ORAL at 21:59

## 2022-06-15 RX ADMIN — 0.12% CHLORHEXIDINE GLUCONATE 15 ML: 1.2 RINSE ORAL at 05:55

## 2022-06-15 RX ADMIN — INSULIN LISPRO 3 UNITS: 100 INJECTION, SOLUTION INTRAVENOUS; SUBCUTANEOUS at 14:38

## 2022-06-15 RX ADMIN — HEPARIN SODIUM 60000 UNITS: 10000 INJECTION, SOLUTION INTRAVENOUS; SUBCUTANEOUS at 07:33

## 2022-06-15 RX ADMIN — AMINOCAPROIC ACID 5000 MG: 250 INJECTION, SOLUTION INTRAVENOUS at 06:55

## 2022-06-15 RX ADMIN — VECURONIUM BROMIDE 20 MG: 1 INJECTION, POWDER, LYOPHILIZED, FOR SOLUTION INTRAVENOUS at 06:50

## 2022-06-15 RX ADMIN — PROTAMINE SULFATE 400 MG: 10 INJECTION, SOLUTION INTRAVENOUS at 08:37

## 2022-06-15 RX ADMIN — ALBUMIN (HUMAN) 25 G: 12.5 INJECTION, SOLUTION INTRAVENOUS at 10:34

## 2022-06-15 RX ADMIN — FENTANYL CITRATE 250 MCG: 50 INJECTION, SOLUTION INTRAMUSCULAR; INTRAVENOUS at 07:23

## 2022-06-15 RX ADMIN — SODIUM CHLORIDE: 9 INJECTION, SOLUTION INTRAVENOUS at 05:56

## 2022-06-15 RX ADMIN — ACETAMINOPHEN 650 MG: 325 TABLET ORAL at 22:00

## 2022-06-15 RX ADMIN — TOPIRAMATE 100 MG: 100 TABLET, FILM COATED ORAL at 21:59

## 2022-06-15 RX ADMIN — FENTANYL CITRATE 25 MCG: 50 INJECTION, SOLUTION INTRAMUSCULAR; INTRAVENOUS at 20:00

## 2022-06-15 RX ADMIN — SODIUM BICARBONATE 50 MEQ: 84 INJECTION, SOLUTION INTRAVENOUS at 10:52

## 2022-06-15 RX ADMIN — SODIUM CHLORIDE, POTASSIUM CHLORIDE, SODIUM LACTATE AND CALCIUM CHLORIDE: 600; 310; 30; 20 INJECTION, SOLUTION INTRAVENOUS at 07:07

## 2022-06-15 RX ADMIN — POTASSIUM CHLORIDE 20 MEQ: 400 INJECTION, SOLUTION INTRAVENOUS at 17:20

## 2022-06-15 RX ADMIN — CEFAZOLIN SODIUM 3000 MG: 10 INJECTION, POWDER, FOR SOLUTION INTRAVENOUS at 16:43

## 2022-06-15 RX ADMIN — PHENYLEPHRINE HYDROCHLORIDE 100 MCG: 10 INJECTION INTRAVENOUS at 07:22

## 2022-06-15 RX ADMIN — IPRATROPIUM BROMIDE AND ALBUTEROL SULFATE 1 AMPULE: .5; 2.5 SOLUTION RESPIRATORY (INHALATION) at 14:45

## 2022-06-15 RX ADMIN — CEFAZOLIN SODIUM 3000 MG: 10 INJECTION, POWDER, FOR SOLUTION INTRAVENOUS at 07:17

## 2022-06-15 RX ADMIN — PREGABALIN 150 MG: 150 CAPSULE ORAL at 21:59

## 2022-06-15 RX ADMIN — INSULIN LISPRO 3 UNITS: 100 INJECTION, SOLUTION INTRAVENOUS; SUBCUTANEOUS at 10:27

## 2022-06-15 RX ADMIN — FENTANYL CITRATE 250 MCG: 50 INJECTION, SOLUTION INTRAMUSCULAR; INTRAVENOUS at 06:54

## 2022-06-15 RX ADMIN — FENTANYL CITRATE 250 MCG: 50 INJECTION, SOLUTION INTRAMUSCULAR; INTRAVENOUS at 07:20

## 2022-06-15 RX ADMIN — IPRATROPIUM BROMIDE AND ALBUTEROL SULFATE 1 AMPULE: .5; 2.5 SOLUTION RESPIRATORY (INHALATION) at 19:44

## 2022-06-15 RX ADMIN — CEFAZOLIN SODIUM 3000 MG: 10 INJECTION, POWDER, FOR SOLUTION INTRAVENOUS at 09:05

## 2022-06-15 ASSESSMENT — PULMONARY FUNCTION TESTS
PIF_VALUE: 20
PIF_VALUE: 24
PIF_VALUE: 19
PIF_VALUE: 19
PIF_VALUE: 4
PIF_VALUE: 20
PIF_VALUE: 21
PIF_VALUE: 27
PIF_VALUE: 33
PIF_VALUE: 24
PIF_VALUE: 25
PIF_VALUE: 18
PIF_VALUE: 25
PIF_VALUE: 23
PIF_VALUE: 23
PIF_VALUE: 33
PIF_VALUE: 24

## 2022-06-15 ASSESSMENT — PAIN DESCRIPTION - ORIENTATION: ORIENTATION: MID

## 2022-06-15 ASSESSMENT — ENCOUNTER SYMPTOMS
DYSPNEA ACTIVITY LEVEL: AFTER AMBULATING 1 FLIGHT OF STAIRS
SHORTNESS OF BREATH: 1

## 2022-06-15 ASSESSMENT — PAIN DESCRIPTION - PAIN TYPE: TYPE: SURGICAL PAIN

## 2022-06-15 ASSESSMENT — PAIN - FUNCTIONAL ASSESSMENT
PAIN_FUNCTIONAL_ASSESSMENT: PREVENTS OR INTERFERES SOME ACTIVE ACTIVITIES AND ADLS
PAIN_FUNCTIONAL_ASSESSMENT: NONE - DENIES PAIN

## 2022-06-15 ASSESSMENT — PAIN SCALES - GENERAL
PAINLEVEL_OUTOF10: 6
PAINLEVEL_OUTOF10: 0
PAINLEVEL_OUTOF10: 8
PAINLEVEL_OUTOF10: 3

## 2022-06-15 ASSESSMENT — LIFESTYLE VARIABLES: SMOKING_STATUS: 1

## 2022-06-15 ASSESSMENT — PAIN DESCRIPTION - LOCATION
LOCATION: CHEST
LOCATION: STERNUM;INCISION

## 2022-06-15 ASSESSMENT — PAIN DESCRIPTION - ONSET: ONSET: ON-GOING

## 2022-06-15 ASSESSMENT — PAIN DESCRIPTION - DESCRIPTORS: DESCRIPTORS: ACHING;SORE

## 2022-06-15 ASSESSMENT — PAIN DESCRIPTION - FREQUENCY: FREQUENCY: CONTINUOUS

## 2022-06-15 NOTE — PROGRESS NOTES
06/15/22 1449   NICU Vent Information   Ventilator Stopped Yes  (EXTUBATED PER ORDER , 4L NC)   Additional Respiratory Assessments   Heart Rate 62   SpO2 99 %   Patient was extubated to 4 liters/min via nasal cannula. Breath Sounds post extubation were CLEAR. Stridor was not present post extubation. SPO2 was 99%.       Performed by  Logan Martel RCP

## 2022-06-15 NOTE — ANESTHESIA POSTPROCEDURE EVALUATION
Department of Anesthesiology  Postprocedure Note    Patient: Surinder Haynes  MRN: [id number]  YOB: 1955  Date of evaluation: 6/15/2022  Time:  10:49 AM     Procedure Summary     Date: 06/15/22 Room / Location: [facility] OR 01 / [facility]    Anesthesia Start: 2575 Anesthesia Stop: 1275    Procedure: STERNOTOMY, LEFT ATRIAL MYXOMA RESECTION, (N/A Chest) Diagnosis:       Myxoma      (MYXOMA, SEVERE AORTIC INSUFFICIENCY)    Surgeons: Mone Ruiz MD Responsible Provider: Niya Butt DO    Anesthesia Type: general ASA Status: 4          Anesthesia Type: No value filed. Christine Phase I: Christine Score: 10    Christine Phase II:      Last vitals: Reviewed and per EMR flowsheets.        Anesthesia Post Evaluation    Patient location during evaluation: ICU  Patient participation: complete - patient cannot participate  Level of consciousness: sedated and ventilated  Airway patency: patent  Nausea & Vomiting: no nausea and no vomiting  Complications: no  Cardiovascular status: blood pressure returned to baseline  Respiratory status: acceptable  Hydration status: euvolemic  Multimodal analgesia pain management approach

## 2022-06-15 NOTE — ANESTHESIA PROCEDURE NOTES
Procedure Performed: ULISES     Start Time:        End Time:      Preanesthesia Checklist:  Patient identified, IV assessed, risks and benefits discussed, monitors and equipment assessed, procedure being performed at surgeon's request and anesthesia consent obtained. General Procedure Information  Diagnostic Indications for Echo:  assessment of surgical repair, hemodynamic monitoring and assessment of valve function  Physician Requesting Echo: Mone Ruiz MD  Location performed:  OR  Intubated  Bite block placed  Heart visualized  Probe Insertion:  Easy  Probe Type:  3D and mulitplane  Modalities:  3D, color flow mapping, pulse wave Doppler and continuous wave Doppler    Echocardiographic and Doppler Measurements    Ventricles    Right Ventricle:  Cavity size normal.  Hypertrophy not present. Thrombus not present. Global function normal.    Left Ventricle:  Cavity size normal.  Hypertrophy not present. Thrombus not present. Global Function normal.  Ejection Fraction 65%. Ventricular Regional Function:  1- Basal Anteroseptal:  normal  2- Basal Anterior:  normal  3- Basal Anterolateral:  normal  4- Basal Inferolateral:  normal  5- Basal Inferior:  normal  6- Basal Inferoseptal:  normal  7- Mid Anteroseptal:  normal  8- Mid Anterior:  normal  9- Mid Anterolateral:  normal  10- Mid Inferolateral:  normal  11- Mid Inferior:  normal  12- Mid Inferoseptal:  normal  13- Apical Anterior:  normal  14- Apical Lateral:  normal  15- Apical Inferior:  normal  16- Apical Septal:  normal  17- [place]:  normal      Valves    Aortic Valve: Annulus normal.  Stenosis not present. Regurgitation mild. Leaflets normal.  Leaflet motions normal.      Mitral Valve: Annulus normal.  Stenosis not present. Regurgitation none. Leaflets normal.  Leaflet motions normal.      Tricuspid Valve: Annulus normal.  Stenosis not present. Regurgitation none. Leaflets normal.  Leaflet motions normal.    Pulmonic Valve:   Annulus normal. Stenosis not present. Regurgitation none. Other Valve Findings:       AI VC = 0.2 primary eccentric through center of valve directed to commissure of LCC/NCC. Trace AI second jet central too small to measure or quantify. Jet width < 1/3 of LVOT. P1/2t = 572. Mild AI    Aorta    Ascending Aorta:  Size normal.  Dissection not present. Aortic Arch:  Size normal.  Dissection not present. Descending Aorta:  Size normal.  Dissection not present. Other Aortic Findings:       [place]aryl [place] grade 1 atheroma throughout the aorta    Atria    Right Atrium:  Size normal.  Spontaneous echo contrast not present. Thrombus not present. Tumor not present. Device not present. Left Atrium:  Size normal.  Spontaneous echo contrast not present. Thrombus not present. Tumor present. Device not present. Left atrial appendage normal.  Other Atria Findings:       Large left atrial myxoma    Septa    Atrial Septum:  Intra-atrial septal morphology normal.      Ventricular Septum:  Intra-ventricular septum morphology normal.          Other Findings  Pericardium:  normal  Pleural Effusion:  none  Pulmonary Arteries:  normal  Pulmonary Venous Flow:  normal    Anesthesia Information  Performed Personally  Anesthesiologist:  Delvis Celaya DO      Echocardiogram Comments: All findings d/w surgeonPost Intervention Follow-up Study  Aortic Function: unchangedNoneComments: S/P myxoma resection. No residual myxoma. IAS intact. LVEF ~ 65% RVEF WNL. AI remains mild.   No other changes post bypass

## 2022-06-15 NOTE — ANESTHESIA PRE PROCEDURE
Department of Anesthesiology  Preprocedure Note       Name:  Daisy Stratton   Age:  79 y.o.  :  1955                                          MRN:  [id number]         Date:  6/15/2022      Surgeon: Beulah Trevino):  Yves Ventura MD    Procedure: Procedure(s):  STERNOTOMY, LEFT ATRIAL MYXOMA RESECTION, POSSIBLE AORTIC VALVE REPLACEMENT    Medications prior to admission:   Prior to Admission medications    Medication Sig Start Date End Date Taking? Authorizing Provider   Multiple Vitamins-Minerals (HAIR SKIN AND NAILS FORMULA PO) Take by mouth as needed   Yes Historical Provider, MD   chlorzoxazone (PARAFON FORTE) 500 MG tablet  22   Historical Provider, MD   HYDROcodone-acetaminophen (NORCO) 5-325 MG per tablet Take 1 tablet by mouth every 8 hours as needed for Pain for up to 9 days. Take lowest dose possible to manage pain 6/6/22 6/15/22  DEANDRE Hedrick   pregabalin (LYRICA) 150 MG capsule Take 1 capsule by mouth 3 times daily for 90 days. TID 22  DEANDRE Hedrick   pseudoephedrine (SUDAFED 24 HR) 240 MG TB24 extended release tablet Take 240 mg by mouth daily    Historical Provider, MD   pantoprazole (PROTONIX) 40 MG tablet Take 1 tablet by mouth 2 times daily 22   Lourdes Kolb MD   albuterol sulfate HFA (VENTOLIN HFA) 108 (90 Base) MCG/ACT inhaler Inhale 2 puffs into the lungs every 6 hours as needed for Wheezing or Shortness of Breath 3/8/22   Lourdes Kolb MD   DULoxetine (CYMBALTA) 30 MG extended release capsule TAKE 1 CAPSULE BY MOUTH DAILY 3/4/22   Lourdes Kolb MD   aspirin-acetaminophen-caffeine (EXCEDRIN MIGRAINE) 179-910-86 MG per tablet Take 1 tablet by mouth every 6 hours as needed for Headaches    Historical Provider, MD   triamcinolone (KENALOG) 0.1 % cream Apply topically 2 times daily.  22   Martine Villalobos DPM   cetirizine (ZYRTEC) 10 MG tablet Take 1 tablet by mouth 2 times daily 22   Lourdes Kolb MD   [facility] 45-21 MCG/ACT inhaler Inhale 2 puffs into the lungs 2 times daily  9/9/21   Historical Provider, MD   fluticasone (FLONASE) 50 MCG/ACT nasal spray 1 spray by Nasal route daily  Patient taking differently: 1 spray by Nasal route daily as needed  11/9/21   Jake Bustos MD   hydroCHLOROthiazide (HYDRODIURIL) 25 MG tablet Take 1 tablet by mouth every morning  Patient taking differently: Take 25 mg by mouth daily as needed  11/9/21   Jake Bustos MD   topiramate (TOPAMAX) 100 MG tablet Take 1 tablet by mouth 2 times daily 11/2/21   Jake Bustos MD   busPIRone (BUSPAR) 15 MG tablet TAKE 1 TABLET BY MOUTH THREE TIMES A DAY 7/30/21   Jake Bustos MD   tolterodine (DETROL LA) 2 MG extended release capsule Once daily 7/30/21   Jake Bustos MD   doxepin (SINEQUAN) 10 MG capsule Take 1 capsule by mouth nightly  Patient taking differently: Take 10 mg by mouth nightly as needed  2/26/21   Patience Casillas PA-C       Current medications:    Current Facility-Administered Medications   Medication Dose Route Frequency Provider Last Rate Last Admin    0.9 % sodium chloride infusion   IntraVENous Continuous DEANDRE Hernandez        0.9 % sodium chloride infusion   IntraVENous PRN DEANDRE Hernandez        ceFAZolin (ANCEF) 3,000 mg in dextrose 5 % 100 mL IVPB  3,000 mg IntraVENous On Call to [address] [facility]        chlorhexidine (HIBICLENS) 4 % liquid   Topical Once DEANDRE Hernandez        chlorhexidine (PERIDEX) 0.12 % solution 15 mL  15 mL Mouth/Throat Once Jimenez Hernandez        sodium chloride flush 0.9 % injection 5-40 mL  5-40 mL IntraVENous 2 times per day DEANDRE Hernandez        sodium chloride flush 0.9 % injection 5-40 mL  5-40 mL IntraVENous PRN DEANDRE Hernandez         Current Outpatient Medications   Medication Sig Dispense Refill    Multiple Vitamins-Minerals (HAIR SKIN AND NAILS FORMULA PO) Take by mouth as needed      chlorzoxazone (PARAFON FORTE) Reactions    Carafate [Sucralfate]      Patient unable to tolerate. Unable to recall her reaction.  Glucophage [Metformin Hcl]      Severe abdominal pain, constipation      Mobic [Meloxicam] Other (See Comments)     Abdominal pain    Trazodone And Nefazodone      Unable to tolerate, patient reports made her physically ill with abdominal pain      Ultram [Tramadol]      Abdominal pain    Nickel Itching and Rash       Problem List:    Patient Active Problem List   Diagnosis Code    Chronic back pain M54.9, G89.29    Fibromyalgia M79.7    Overactive bladder N32.81    Depression F32. A    History of total knee arthroplasty Z96.659    Nodule of left lung R91.1    History of colon polyps Z86.010    Tobacco use Z72.0    Chronic pain syndrome G89.4    Myofascial pain syndrome M79.18    Lumbar disc disorder M51.9    Lumbar spondylosis M47.816    Spinal stenosis of lumbar region without neurogenic claudication M48.061    Epigastric pain R10.13    Onychomycosis B35.1    Type II diabetes mellitus with peripheral circulatory disorder (HCC) E11.51    Venous insufficiency (chronic) (peripheral) I87.2    Tinea pedis of both feet B35.3    Hiatal hernia K44.9    Class 3 severe obesity due to excess calories with serious comorbidity and body mass index (BMI) of 50.0 to 59.9 in adult ([facility] E66.01, Z68.43    Diabetes mellitus without complication ([facility]) N04.5    Hypothyroidism E03.9    Sleep apnea U96.81    Uncomplicated asthma Z87.653    Hyperlipidemia E78.5    Opioid use, unspecified with unspecified opioid-induced disorder F11.99    Stage 3a chronic kidney disease (HCC) N18.31    LVH (left ventricular hypertrophy) I51.7    Chronic renal disease, stage III ([facility] [[id number]] N18.30    Left atrial mass I51.89    Aortic regurgitation I35.1    S/P cardiac cath Z98.890    Myxoma D21.9       Past Medical History:        Diagnosis Date    Anxiety     Arthralgia 10/8/2013    Arthritis     Bone avascular necrosis ([facility] 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    Breast lesion 12/1/2015    Chronic back pain     Chronic fatigue 10/8/2013    Daytime somnolence 10/8/2013    Depression     Fibromyalgia     Fracture, fibula     right    GERD (gastroesophageal reflux disease)     H/O cardiovascular stress test 01/29/2020    Lexiscan    Headache     History of blood transfusion     History of fractured kneecap     (R) 2 hair line fractures    History of total knee arthroplasty 3/19/2015    Hyperlipidemia     Hypertension     Morbid obesity with BMI of 45.0-49.9, adult ([facility] 10/9/2015    Obesity     Osteoarthritis     Overactive bladder 1/22/2014    PONV (postoperative nausea and vomiting)     hx 40 yrs ago    Prolonged emergence from general anesthesia     Reflux     Thyroid disease        Past Surgical History:        Procedure Laterality Date    CARDIAC CATHETERIZATION  05/23/2022    Dr Josselin Gan  2011    COLONOSCOPY  11/22/2016    Dr. Preethi Sullivan N/A 12/8/2020    Wanda Sabal performed by Laurie Daley MD at [address]    benign reasons    [address] Bilateral 2010    knees    LUMBAR FUSION      L3-5    ROTATOR CUFF REPAIR Left     left    TOTAL KNEE ARTHROPLASTY Bilateral 2010    did both at the same time    TRANSESOPHAGEAL ECHOCARDIOGRAM N/A 5/9/2022    TRANSESOPHAGEAL ECHOCARDIOGRAM performed by Stanley Coates MD at [address]  ENDOSCOPY N/A 9/29/2020    EGD BIOPSY performed by Laurie Daley MD at [address] History:    Social History     Tobacco Use    Smoking status: Current Every Day Smoker     Packs/day: 1.50     Years: 30.00     Pack years: 45.00     Types: Cigarettes    Smokeless tobacco: Never Used   Substance Use Topics    Alcohol use: No     Alcohol/week: 0.0 standard drinks Ready to quit: Not Answered  Counseling given: Not Answered      Vital Signs (Current): There were no vitals filed for this visit. BP Readings from Last 3 Encounters:   06/10/22 132/80   06/06/22 118/78   05/31/22 139/77       NPO Status:  > 8 hours NPO                                                                               BMI:   Wt Readings from Last 3 Encounters:   06/10/22 (!) 328 lb (148.8 kg)   06/06/22 (!) 336 lb (152.4 kg)   05/31/22 (!) 336 lb (152.4 kg)     There is no height or weight on file to calculate BMI.    CBC:   Lab Results   Component Value Date    WBC 7.2 06/10/2022    RBC 4.68 06/10/2022    HGB 13.4 06/10/2022    HCT 43.7 06/10/2022    MCV 93.4 06/10/2022    RDW 14.1 06/10/2022     06/10/2022       CMP:   Lab Results   Component Value Date     06/10/2022    K 4.1 06/10/2022    K 4.0 12/07/2020     06/10/2022    CO2 23 06/10/2022    BUN 20 06/10/2022    CREATININE 1.1 06/10/2022    GFRAA 60 06/10/2022    LABGLOM 50 06/10/2022    GLUCOSE 96 06/10/2022    GLUCOSE 93 02/16/2011    PROT 6.7 06/10/2022    CALCIUM 9.1 06/10/2022    BILITOT <0.2 06/10/2022    ALKPHOS 92 06/10/2022    AST 10 06/10/2022    ALT 17 06/10/2022       POC Tests: No results for input(s): POCGLU, POCNA, POCK, POCCL, POCBUN, POCHEMO, POCHCT in the last 72 hours.     Coags:   Lab Results   Component Value Date    PROTIME 10.5 06/10/2022    PROTIME 10.2 02/16/2011    INR 1.0 06/10/2022    APTT 34.5 06/10/2022       HCG (If Applicable): No results found for: PREGTESTUR, PREGSERUM, HCG, HCGQUANT     ABGs: No results found for: PHART, PO2ART, SXT4VRS, GTI3EZI, BEART, G0HZSEDG     Type & Screen (If Applicable):  No results found for: LABABO, LABRH    Drug/Infectious Status (If Applicable):  No results found for: HIV, HEPCAB    COVID-19 Screening (If Applicable):   Lab Results   Component Value Date    COVID19 Not Detected 12/02/2020     Sinus bradycardia with PAC  Otherwise normal ECG  No previous ECGs available  Confirmed by Jeb Forte ([id number]) on 12/7/2020 1:28:27 PM    LEXISCAN STRESS TEST     INDICATIONS:  The patient is a very polite and pleasant 66-year-old  female who follows with Dr. Alberto Stark. She has been having intermittent  periods of shortness of breath. Her cardiac risk factors include  hyperlipidemia and a strong family history of cardiovascular disease.     PROCEDURE:  The patient was brought to the cardiac stress lab and  connected to continuous cardiac monitoring. Resting EKG indicated  normal sinus rhythm. She was administered Lexiscan over 10-15 seconds  followed by injection of Cardiolite. She was placed in recovery at 1  minute. Throughout the examination, she had no unusual symptomatology. There was no active chest pain or shortness of breath. There was no  ectopy or dysrhythmia identified. There were no ST or T-wave  abnormalities identified. She had physiologic response to both blood  pressure and heart rate. She tolerated the procedure well. There was  no evidence of Lexiscan-induced ischemia. She will now be transferred  to the Imaging Lab for final stress pictures. Conclusions      Summary   Left ventricular size is grossly normal.   Mild left ventricular concentric hypertrophy noted. Ejection fraction is visually estimated at 50%. No evidence of left ventricular mass or thrombus noted. No regional wall motion abnormalities seen. Normal sized left atrium. Interatrial septum appears intact. No evidence of thrombus within left atrium. Borderline dilated right ventricle. No evidence of a thrombus in the right ventricle. Mildly enlarged right atrium size. Physiologic and/or trace mitral regurgitation is present. No evidence of mitral valve stenosis. Aortic valve opens well. The aortic valve is trileaflet. No hemodynamically significant aortic stenosis is present.    Physiologic and/or trace aortic regurgitation is noted. Physiologic and/or trace tricuspid regurgitation. Regular rhythm. Anesthesia Evaluation  Patient summary reviewed   history of anesthetic complications (does not happen after every anesthesia occurence): PONV. Airway: Mallampati: III  TM distance: >3 FB   Neck ROM: full  Mouth opening: > = 3 FB   Dental:      Comment: Multiple missing teeth. Patient denies loose. Pulmonary:   (+) shortness of breath (Mild exertional dyspnea which doesn't limit ADL's): no interval change,  sleep apnea: on noncompliant,  decreased breath sounds asthma: current smoker          Patient smoked on day of surgery. ROS comment: Tobacco abuse w/o dx. Of COPD. Symptoms managed with Ventolin and Symbicort   Cardiovascular:    (+) valvular problems/murmurs (mild TR):, PEREZ: after ambulating 1 flight of stairs, hyperlipidemia      ECG reviewed  Rhythm: regular  Rate: normal  Echocardiogram reviewed         Beta Blocker:  Not on Beta Blocker      ROS comment: left atrial mass on transthoracic echocardiogram      Neuro/Psych:   (+) neuromuscular disease:, headaches: migraine headaches, psychiatric history: stable with treatmentdepression/anxiety              ROS comment: Vertigo GI/Hepatic/Renal:   (+) hiatal hernia, GERD: well controlled, morbid obesity (SMO with a BMI of 51.1 kg/m2 and excessive daytime somnolence )         ROS comment: Overactive bladder. Endo/Other:    (+) Diabetes (A1C 5.7%)Type II DM, no interval change, , hypothyroidism::., .          Pt had no PAT visit        ROS comment: OA, DJD, spinal stenosis with fibromyalgia and chronic pain. Chronic fatigue syndrome. Abdominal:   (+) obese ( Super morbidly obese),           Vascular:   + PVD, aortic or cerebral, . Other Findings:             Anesthesia Plan      general     ASA 4       Induction: intravenous.   arterial line, CVP, BIS, central line and ULISES  MIPS: Postoperative opioids intended, Prophylactic antiemetics administered and Postoperative ventilation. Anesthetic plan and risks discussed with patient. Plan discussed with CRNA.                     Kat Mi,

## 2022-06-15 NOTE — BRIEF OP NOTE
Brief Postoperative Note    Darshan Fulshear  YOB: 1955  [id number]    Pre-operative Diagnosis: LA myxoma, moderate AI    Post-operative Diagnosis: Same, mild AI    Operation: Sternotomy/Resection of LA myxoma with repair of the septum and dome of the LA/Rigid internal fixation of the sternum with Huger plates x 3/72 modifier    Anesthesia: General    Surgeon: Nguyen Thornton    Assistants: Kalli/Alexis/Paul    Estimated Blood Loss: 8311    Complications: None    Specimens:   ID Type Source Tests Collected by Time Destination   A : MYXOMA Tissue Tissue SURGICAL PATHOLOGY Alejandro Toro MD 6/15/2022 3127        Implants:  Implant Name Type Inv.  Item Serial No.  Lot No. LRB No. Used Action   PLATE FIXATION BOX STERNAL 4 HOLES - [id number]  PLATE FIXATION BOX STERNAL 4 HOLES  [employer]WD  N/A 1 Implanted   DEVICE FIXATION LADDER PLATE NARROW 14 HOLES - [id number]  DEVICE FIXATION LADDER PLATE NARROW 14 HOLES  [employer]-WD  N/A 1 Implanted   SCREW BNE SELF DRILLING 2.3X14 MM LCK - [id number]  SCREW BNE SELF DRILLING 2.3X14 MM LCK  [employer] HOW-WD  N/A 12 Implanted   SCREW LOCKING SD 2.3X18MM 5P - [id number]  SCREW LOCKING SD 2.3X18MM 5P  [employer] CAMILLE-WD  N/A 4 Implanted         Drains:   Chest Tube Mediastinal 1 (Active)   Status Continuous Suction 06/15/22 0902   Suction -20 cm H2O 06/15/22 0902   Dressing Status New dressing applied;Clean, dry & intact 06/15/22 0902   Chest Tube Dressing Other (Comment) 06/15/22 0902       Chest Tube Mediastinal 2 (Active)   Status Continuous Suction 06/15/22 0902   Suction -20 cm H2O 06/15/22 0902   Dressing Status New dressing applied;Clean, dry & intact 06/15/22 0902   Chest Tube Dressing Other (Comment) 06/15/22 0902       Chest Tube Pleural 3 (Active)   Status Continuous Suction 06/15/22 0902   Suction -20 cm H2O 06/15/22 0902   Dressing Status New dressing applied;Clean, dry & intact 06/15/22 0902   Chest Tube Dressing Other (Comment) 06/15/22 0902       Chest Tube Pleural 4 (Active)   Status Continuous Suction 06/15/22 0903   Suction -20 cm H2O 06/15/22 0903   Dressing Status New dressing applied;Clean, dry & intact 06/15/22 0903   Chest Tube Dressing Other (Comment) 06/15/22 0903       Urinary Catheter Serrato-Temperature (Active)       Findings: mild AI    Electronically signed by Petrona Newman MD on 6/15/2022 at 9:10 AM

## 2022-06-15 NOTE — LETTER
[address] Encounter Date/Time: 6/15/2022 [address] [facility] [address] Account: [de-identified]    MRN: [id number]    Patient: Janeen Pantoja    Contact Serial #: [id number]      ENCOUNTER          Patient Class: I Private Enc? 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 Relationship to Patient Home Phone Work Phone   1. merna iraheta  2. daniela iraheta No    Other  Girlfriend [phone]                 GUARANTOR            Guarantor: Janeen Pantoja     : 1955   Address: [address] [place] [address]; [address] Sex: Female     [place] [address]     Relation to Patient: Self       Home Phone: [phone]   Guarantor ID: [id number]       Work Phone:     Guarantor Employer: DISABLED         Status: DISABLED      COVERAGE        PRIMARY INSURANCE   Payor: [facility] MEDICARE Plan: Gregoria Tab[employer] [place]*   Payor Address: ,          Group Number: [employer] Insurance Type: INDEMNITY   Subscriber Name: Sonja Tony : 1955   Subscriber ID: [id number] Pat. Rel. to Sub: Self   SECONDARY INSURANCE   Payor: [place]ICAID [place] Plan: [address] DEPT OF*   [phone] Department Medicaid  CERTIFICATION OF NECESSITY  FOR NON-EMERGENCY TRANSPORTATION   BY GROUND AMBULANCE      Individual Information   1. Name: Janeen Pantoja 2. [place] Medicaid Billing Number:    3.  Address: [address] [place] [address]      Transportation Provider Information   4. Provider Name:    5. [place] Medicaid Provider Number:  National Provider Identifier (NPI):      Certification  7. Criteria:  During transport, this individual requires:  [x] Medical treatment or continuous     supervision by an EMT. [] The administration or regulation of oxygen by another person. [] Supervised protective restraint. 8. Period Beginning Date:    9. Length  [x] Not more than 10 day(s)  [] One Year     Additional Information Relevant to Certification   10. Comments or Explanations, If Necessary or Appropriate     Confusion, max assist X2 for bed mobility, can not transfer to chair safely     Certifying Practitioner Information   11. Name of Practitioner:    12. [place] Medicaid Provider Number, If Applicable:  [address] Provider Identifier (NPI):      Signature Information   14. Date of Signature:  15. Name of Person Signing: Dejah CALIX   16. Signature and Professional Designation: Electronically signed by ASHLY Sanchez LSW on 2022 at 1:56 PM       [facility] [address]  Rev. 2015 Address:  [place] [address]          Group Number:   Insurance Type: [id number] Name: Brittny Walsh : 1955   Subscriber ID: [id number] Pat.  Rel. to Sub: SELF           CSN: [id number]

## 2022-06-15 NOTE — PROGRESS NOTES
06/15/22 1441   NICU Vent Information   Vt Exhaled 546 mL   Rate Measured 39 br/min   Minute Volume 18.6 Liters   Pressure Support 0 cmH20   FiO2  40 %   Peak Inspiratory Pressure 4 cmH2O   I:E Ratio 1:1.60   Sensitivity 3   PEEP/CPAP (cmH2O) 0   Mean Airway Pressure 1 cmH20   Additional Respiratory Assessments   Heart Rate 63   Resp (!) 35   SpO2 100 %   Vent Alarm Settings   High Pressure  45 cmH2O   Low Exhaled Vt  0 mL   Spontaneous Parameters performed    VT = 546 ml  f = 39  B/M  Ve = 18.6 L/M  NIF = -31  cmH2O  VC = 424 L  RSBI = 75    POSITIVE CUFF LEAK  Performed by Fredo Espinoza RCP

## 2022-06-15 NOTE — LETTER
[address] Encounter Date/Time: 6/15/2022 [address] [facility] [address] Account: [de-identified]    MRN: [id number]    Patient: Nicole Anand    Contact Serial #: [id number]      ENCOUNTER          Patient Class: I Private Enc? 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 Relationship to Patient Home Phone Work Phone   1. merna iraheta  2. daniela iraheta No    Other  Girlfriend [phone]                 GUARANTOR            Guarantor: Nicole Anand     : 1955   Address: [address] [place] [address]; [address] Sex: Female     [place] [address]     Relation to Patient: Self       Home Phone: [phone]   Guarantor ID: [id number]       Work Phone:     Guarantor Employer: DISABLED         Status: DISABLED      COVERAGE        PRIMARY INSURANCE   Payor: [facility] MEDICARE Plan: Jorge AGUILAR*   Payor Address: ,          Group Number: [employer] Insurance Type: INDEMNITY   Subscriber Name: Zachery Boyer : 1955   Subscriber ID: [id number] Pat. Rel. to Sub: Self   SECONDARY INSURANCE   Payor: [place]ID [place] Plan: Latasha Damian DEPT OF*   Payor Address: [address] Medicaid  CERTIFICATION OF NECESSITY  FOR TRANSPORTATION   BY WHEELCHAIR VAN     Individual Information   1. Name: Nicole Anand 2. [facility] Medicaid Billing Number:    3.  Address: [address] [place] [address]      Transportation Provider Information   4. Provider Name:    5. [place] Medicaid Provider Number:  National Provider Identifier (NPI):      Certification  7. Criteria:  By signing this document, the practitioner certifies that two statements are true:  A. This individual must be accompanied by a mobility-related assistive device from the point of pick-up to the point of drop-off. B. Transport of this individual by standard passenger vehicle or common carrier is precluded or contraindicated. 8. Period Beginning Date:    9. Length  [x] Not more than2 day(s)  [] One Year     Additional Information Relevant to Certification   10. Comments or Explanations, If Necessary or Appropriate     CABG , sternal precautions, weak     Certifying Practitioner Information   11. Name of Practitioner:    12. [place] Medicaid Provider Number, If Applicable:  [address] Provider Identifier (NPI):      Signature Information   14. Date of Signature: 22 15. Name of Person Signing: Brayden CALIX   16. Signature and Professional Designation: Electronically signed by ASHLY Prado, MATTW on 2022 at 12:25 PM       [place] [address]  Rev. 2015  [address], [facility], [address]          Group Number:   Insurance Type: [id number] Name: Tam Devlin : 1955   Subscriber ID: [id number] Pat.  Rel. to Sub: SELF           CSN: [id number]

## 2022-06-15 NOTE — PROGRESS NOTES
CVICU Admission Note    Name: Cachorro Cash  MRN: [id number]    CC: Postoperative Critical Care Management     Indication for Surgery/Procedure: LA myxoma   LVEF:  Nomal    RVF:  Normal     Important/Relevant PMH/PSH: HTN HPL, sleep apnea, asthma, DM, thyroid disease, spinal stenosis, fibromyalgia, anxiety, chronic back pain, headaches, current smoker, obesity, PONV    Procedure/Surgeries: 6/15/2022 Sternotomy/Resection of LA myxoma with repair of the septum and dome of the LA/Rigid internal fixation of the sternum with [place] plates x 2    Pacing wires: Ventricular       Physical Exam:    BP (!) 146/65   Pulse 99   Temp 97 °F (36.1 °C) (Temporal)   Resp 18   Ht 5' 8\" (1.727 m)   Wt (!) 328 lb (148.8 kg)   LMP  (LMP Unknown)   SpO2 98%   BMI 49.87 kg/m²     Recent Labs     06/15/22  0808   HCT 30.0*     Recent Labs     06/15/22  0808   K 3.7   CREATININE 1.0         Post operative CXR:      Atelectasis, No pneumothorax noted, Mildly increased vascular markings bilateral, No significant pleural effusion. ETT/lines/drains appear to be in proper position. Final Radiology report pending. General Appearance: Intubated, sedated. Hemodynamically stable, no gtts. .   Eyes: PERRL  Pulmonary: Diminished bibasilar. No wheezes, no accessory muscle use noted   Ventilator: Mode: AC/VC, 50% FiO2, 8 PEEP, 550 Vt   Cardiovascular: RRR, no heaves or thrills palpated   Telemetry: SR   Abdomen: Soft, OG to LIWS   Extremities:  Palpable pulses all extremities, trace edema   Neurologic/Psych: Sedation   Skin: Warm and dry    Incision: MSI SHAGUFTA intact, right groin stitch       Assessment/Plan: Day of Surgery     1. LA myxoma S/p Sternotomy/Resection of LA myxoma with repair of the septum and dome of the LA/Rigid internal fixation of the sternum with [place] plates x 2  - ULISES Findings: Mild AI   - ASA  - Perioperative Ancef  - Monitor chest tube output and hemodynamics closely  - IVF resuscitation PRN     2.  Acute Pulmonary Insufficiency Following Surgery    - 2/2 surgery; Preop CT chest with nodules-- follow up with Pulmonology outpatient   - Intubated on ventilator  - Wean vent settings and extubate patient once awake, following commands, VASQUEZ, and no signs of bleeding  - ABGs per protocol and PRN     3. Acute Post Operative Pain on chronic pain   - Chronic back pain, fibromyalgia, spinal stenosis; on PRN Norco, lyrica, chlorzoxazone, topamax, Doxepin   - PRN fentanyl for pain management until able to take PO      4.  DM   - HgbA1C 5.7%  - SSI q 4 hours for glycemic control, start RHI infusion for BG >180 per protocol        Electronically signed by GIUSEPPE Fernandez - CNP on 6/15/2022 at 9:25 AM

## 2022-06-15 NOTE — PROGRESS NOTES
Inpatient Cardiology Progress note     PATIENT IS BEING FOLLOWED FOR: LA Myxoma     Alida Freeman is a 79 y.o. female who is known to Dr. Philippe Jacinto (initial encounter ULISES (ordered per primary) on 5/9/2022 and [facility] 5/23/2022). Patient has not yet established with Dr. Philippe Jacinto in outpatient setting. PMHx: Hyperretension, hyperlipidemia, thyroid disease, thrombocytopenia, morbid obesity BMI 49.87, anxiety, arthritis, avascular necrosis of the bone, chronic fatigue, chronic back pain, depression, fibromyalgia, GERD, carpal tunnel release, cholecystectomy, colonoscopy, hiatal hernia repair, hysterectomy, lumbar fusion L3-L5, rotator cuff repair left, total knee arthroplasty bilateral, tubal ligation, EGD. HPI:  Patient was being followed by his PCP (11/10/2021) and was ordered ECHO for worsening PEREZ and BLE edema. TTE was not completed until 3/3/2022 showing EF 37-30%, stage I diastolic dysfunction, mild LVH, normal RV function, trace MR, mild TR, there is a 1.2 x 1.9 cm atrial septal mass seen in the left atrial side suspicious for left atrial myxoma --> recommended to have a ULISES (5/9/2022) --> showing no evidence of thrombus within the left atrial appendage, moderate pedunculated mobile mass suggestive of myxoma attached to fossa ovalis/atrial septum was visualized and LA cavity, no evidence of thrombus or mass in the right atrium, trace MR, aortic valve opens well, the aortic valve is trileaflet and appears mildly sclerotic with mild to moderate AR, no pericardial effusion. Patient underwent a diagnostic LHC (5/23/2022, Dr. Philippe Jacinto) showing angiographically normal coronary arteries --> referred to CTS. On 6/15/2022, patient underwent a Sternotomy/Resection of LA myxoma with repair of the septum and dome of the LA/Rigid internal fixation of the sternum with [place] plates x 2 per CTS.     · Labs 6/16/2022: Potassium 3.7, chloride 115, CO2 18, BUN 18, creatinine 0.9, magnesium 2.4, glucose 155, WBC 11.6, H&H 10. 9/34.7, platelet 84. · ABG: pH 7.40, PCO2 31.7, PO2 69.7, HCO3 19.4, O2 sat 93.7. · CXR 6/16/2022: No pneumothorax. SUBJECTIVE: At this time patient is hard to keep awake and very somnolent. Patient unable to answer subjective questions appropriate this time in ICU setting. OBJECTIVE: Patient currently in CVICU on 6 L nasal cannula. Patient is hard to arouse and keep awake. Per nursing staff patient has had continued somnolence and will be monitored in CVICU until mental status is back to baseline. Patient having serial ABGs drawn. Otherwise, patient on heart monitor, chest tubes have been removed, and Serrato in place. The patient currently receiving albumin and potassium supplementation. The patient was febrile this morning, nursing staff reports CT surgery believes postoperative, but is being treated with ATB at this time. VS stable at this time. Lungs diminished bilaterally with +1 lower extremity edema to ankles. ROS: Due to patient's somnolence unable to establish reliable ROS. PHYSICAL EXAM:   BP (!) 103/54   Pulse 62   Temp 98.3 °F (36.8 °C) (Axillary)   Resp (!) 32   Ht 5' 8\" (1.727 m)   Wt (!) 328 lb (148.8 kg)   LMP  (LMP Unknown)   SpO2 99%   BMI 49.87 kg/m²    B/P Range last 24 hours: Systolic (06RAH), LGP:067 , Min:103 , ZYU:931    Diastolic (11WSX), HZA:14, Min:54, Max:65    CONST: Well developed, obese 49-year-old  female well nourished middle-aged obese  female who appears stated age. Awake, alert and cooperative. No apparent distress  HEENT:   Head- Normocephalic, atraumatic   Eyes- Conjunctivae pink, anicteric  Throat- Oral mucosa pink and moist  Neck-  No stridor, trachea midline, no jugular venous distention. No carotid bruit  CHEST: Chest symmetrical and non-tender to palpation. No accessory muscle use or intercostal retractions. MSI shannan intact.   RESPIRATORY:  Lung sounds - clear throughout fields, diminished bilateral lower lobes.  CARDIOVASCULAR:     Heart Inspection- shows no noted pulsations  Heart Palpation- no heaves or thrills; PMI is non-displaced   Heart Ausculation- Regular rate and rhythm, no murmur. No s3, s4 or rub   PV: +1 bilateral pedal edema at ankles. No varicosities. Pedal pulses palpable, no clubbing or cyanosis   ABDOMEN: Soft, non-tender to light palpation. Bowel sounds present. No palpable masses no organomegaly; no abdominal bruit  MS: Good muscle strength and tone. No atrophy or abnormal movements. : Deferred  SKIN: Warm and dry no statis dermatitis or ulcers   NEURO / PSYCH: Somnolent at this time hard to arouse and keep awake.       Intake/Output Summary (Last 24 hours) at 6/15/2022 1520  Last data filed at 6/15/2022 1400  Gross per 24 hour   Intake 2722.17 ml   Output 1645 ml   Net 1077.17 ml       Weight:   Wt Readings from Last 3 Encounters:   06/15/22 (!) 328 lb (148.8 kg)   06/10/22 (!) 328 lb (148.8 kg)   06/06/22 (!) 336 lb (152.4 kg)     Current Inpatient Medications:   busPIRone  15 mg Oral TID    DULoxetine  30 mg Oral Daily    pregabalin  150 mg Oral TID    topiramate  100 mg Oral BID    sodium chloride flush  5-40 mL IntraVENous 2 times per day    [START ON 6/16/2022] aspirin  81 mg Oral Daily    chlorhexidine  15 mL Mouth/Throat BID    [START ON 6/16/2022] magnesium oxide  400 mg Oral Daily    mupirocin   Nasal BID    [START ON 6/16/2022] sennosides-docusate sodium  1 tablet Oral BID    [START ON 6/16/2022] pantoprazole  40 mg Oral Daily    ceFAZolin (ANCEF) IVPB  3,000 mg IntraVENous Q8H    ipratropium-albuterol  1 ampule Inhalation Q4H WA    [START ON 6/16/2022] insulin glargine  0.15 Units/kg SubCUTAneous Nightly    insulin lispro  0-3 Units SubCUTAneous Q4H    aspirin  81 mg Oral Once       IV Infusions (if any):   sodium chloride 30 mL/hr (06/15/22 1032)    sodium chloride      propofol Stopped (06/15/22 1157)    sodium chloride      norepinephrine      nitroprusside (NIPRIDE) 50 mg in D5W infusion      insulin      dextrose         DIAGNOSTIC/ LABORATORY DATA:  Labs:   CBC:   Recent Labs     06/15/22  0808 06/15/22  1000   WBC  --  13.4*   HGB  --  12.1   HCT 30.0* 38.9   PLT  --  90*     BMP:   Recent Labs     06/15/22  0808 06/15/22  1000   NA  --  144   K 3.7 4.1   CO2  --  20*   BUN  --  16   CREATININE 1.0 1.0   LABGLOM  --  55   CALCIUM  --  7.8*     Mag:   Recent Labs     06/15/22  1000   MG 2.9*     TFT:   Lab Results   Component Value Date    TSH 1.490 04/28/2021    T4FREE 1.17 11/07/2017      HgA1c:   Lab Results   Component Value Date    LABA1C 5.8 (H) 06/10/2022     No results found for: EAG    BNP: No results for input(s): BNP in the last 72 hours. PT/INR:   Recent Labs     06/15/22  1000   PROTIME 11.2   INR 1.0     APTT:  Recent Labs     06/15/22  1000   APTT 33.8     FASTING LIPID PANEL:  Lab Results   Component Value Date    CHOL 227 11/08/2021    HDL 57 11/08/2021    LDLCALC 132 11/08/2021    TRIG 192 11/08/2021       Telemetry:    CXR: 6/15/2022  1.  Recent cardiac surgery and support tubes and devices appear in   appropriate position.       2.  Left apical small pneumothorax measuring 10% or less.  Left basilar   atelectasis.         Prior cardiac testing:  · TTE (Dr. Jeronimo Lira): 3/3/2022: EF 14-38%, stage I diastolic dysfunction, mild LVH, normal RV function, trace MR, mild TR, there is a 1.2 x 1.9 cm atrial septal mass seen in the left atrial side suspicious for left atrial myxoma. · ULISES (5/9/2022, Dr. Shefali Sarmiento) --> showing no evidence of thrombus within the left atrial appendage, moderate pedunculated mobile mass suggestive of myxoma attached to fossa ovalis/atrial septum was visualized and LA cavity, no evidence of thrombus or mass in the right atrium, trace MR, aortic valve opens well, the aortic valve is trileaflet and appears mildly sclerotic with mild to moderate AR, no pericardial effusion.    · [facility] (5/23/2022, Dr. Shefali Sarmiento) showing angiographically normal coronary arteries. ASSESSMENT:   1. LA myxoma s/p Sternotomy/Resection of LA myxoma with repair of the septum and dome of the LA/Rigid internal fixation of the sternum with [place] plates x 2 per CTS (4/10/4354). 2. Febrile--patient currently on ATB, management by primary service. 3. Respiratory insufficiency s/p general anesthesia--patient currently tolerating 6 L nasal cannula. 4. Normal coronaries on [address] [place] [address] 5/23/2022.   5. Hypertension  6. HLD, not previously on statin therapy. ASCVD risk score 23.3%. 7. GERD  8. TRAVIS, on CPAP  9. Asthma  10. Pulmonary nodules (3 mm nodules in RUL and RML) on CT 6/10/2022. 11. Type 2 diabetes mellitus: Hgb A1c 5.7%  12. Hypothyroidism, replacement therapy  13. Spinal stenosis / Chronic back pain   14. Fibromyalgia/anxiety  15. Chronic back pain  16. Morbid obesity: BMI 49.87 kg/m2  17. Current smoker: 30 pack years. 18. Chronic Thrombocytopenia (since ~2016): platelet count 05H. PLAN:  1. Continue current management as per CTS. 2. Check Lipid panel   3. Aggressive risk factor modification   4. Smoking cessation   5.  Will follow     Electronically signed by GIUSEPPE Marquez CNP on 6/16/2022 at 2:36 PM

## 2022-06-16 ENCOUNTER — APPOINTMENT (OUTPATIENT)
Dept: GENERAL RADIOLOGY | Age: 67
DRG: 228 | End: 2022-06-16
Attending: THORACIC SURGERY (CARDIOTHORACIC VASCULAR SURGERY)
Payer: MEDICARE

## 2022-06-16 DIAGNOSIS — R91.1 NODULE OF UPPER LOBE OF RIGHT LUNG: ICD-10-CM

## 2022-06-16 DIAGNOSIS — R91.1 NODULE OF LEFT LUNG: Primary | ICD-10-CM

## 2022-06-16 LAB
ANION GAP SERPL CALCULATED.3IONS-SCNC: 11 MMOL/L (ref 7–16)
B.E.: -4.2 MMOL/L (ref -3–3)
B.E.: -4.4 MMOL/L (ref -3–3)
BUN BLDV-MCNC: 18 MG/DL (ref 6–23)
CALCIUM IONIZED: 1.21 MMOL/L (ref 1.15–1.33)
CALCIUM SERPL-MCNC: 8.1 MG/DL (ref 8.6–10.2)
CHLORIDE BLD-SCNC: 115 MMOL/L (ref 98–107)
CO2: 18 MMOL/L (ref 22–29)
COHB: 0.3 % (ref 0–1.5)
COHB: 0.9 % (ref 0–1.5)
CREAT SERPL-MCNC: 0.9 MG/DL (ref 0.5–1)
CRITICAL: ABNORMAL
CRITICAL: ABNORMAL
CRITICAL: NORMAL
DATE ANALYZED: ABNORMAL
DATE ANALYZED: ABNORMAL
DATE ANALYZED: NORMAL
DATE OF COLLECTION: ABNORMAL
DATE OF COLLECTION: ABNORMAL
DATE OF COLLECTION: NORMAL
GFR AFRICAN AMERICAN: >60
GFR NON-AFRICAN AMERICAN: >60 ML/MIN/1.73
GLUCOSE BLD-MCNC: 155 MG/DL (ref 74–99)
HCO3: 19.3 MMOL/L (ref 22–26)
HCO3: 19.4 MMOL/L (ref 22–26)
HCT VFR BLD CALC: 34.7 % (ref 34–48)
HEMOGLOBIN: 10.9 G/DL (ref 11.5–15.5)
HHB: 6.3 % (ref 0–5)
HHB: 8.4 % (ref 0–5)
LAB: ABNORMAL
LAB: ABNORMAL
LAB: NORMAL
Lab: ABNORMAL
Lab: ABNORMAL
Lab: NORMAL
MAGNESIUM: 2.4 MG/DL (ref 1.6–2.6)
MCH RBC QN AUTO: 29.1 PG (ref 26–35)
MCHC RBC AUTO-ENTMCNC: 31.4 % (ref 32–34.5)
MCV RBC AUTO: 92.5 FL (ref 80–99.9)
METER GLUCOSE: 128 MG/DL (ref 74–99)
METER GLUCOSE: 130 MG/DL (ref 74–99)
METER GLUCOSE: 131 MG/DL (ref 74–99)
METER GLUCOSE: 136 MG/DL (ref 74–99)
METER GLUCOSE: 141 MG/DL (ref 74–99)
METER GLUCOSE: 142 MG/DL (ref 74–99)
METHB: 0 % (ref 0–1.5)
METHB: 0.2 % (ref 0–1.5)
MODE: ABNORMAL
MODE: ABNORMAL
MODE: NORMAL
O2 SATURATION: 91.5 % (ref 92–98.5)
O2 SATURATION: 93.7 % (ref 92–98.5)
O2HB: 90.5 % (ref 94–97)
O2HB: 93.4 % (ref 94–97)
OPERATOR ID: 1632
OPERATOR ID: 1632
OPERATOR ID: 2243
PATIENT TEMP: 37 C
PCO2: 30.7 MMHG (ref 35–45)
PCO2: 31.7 MMHG (ref 35–45)
PDW BLD-RTO: 14.6 FL (ref 11.5–15)
PH BLOOD GAS: 7.41 (ref 7.35–7.45)
PH BLOOD GAS: 7.42 (ref 7.35–7.45)
PLATELET # BLD: 84 E9/L (ref 130–450)
PLATELET CONFIRMATION: NORMAL
PMV BLD AUTO: 13.3 FL (ref 7–12)
PO2: 60 MMHG (ref 75–100)
PO2: 69.7 MMHG (ref 75–100)
POTASSIUM SERPL-SCNC: 3.64 MMOL/L (ref 3.5–5)
POTASSIUM SERPL-SCNC: 3.7 MMOL/L (ref 3.5–5)
POTASSIUM SERPL-SCNC: 4.7 MMOL/L (ref 3.5–5)
RBC # BLD: 3.75 E12/L (ref 3.5–5.5)
SODIUM BLD-SCNC: 144 MMOL/L (ref 132–146)
SOURCE, BLOOD GAS: ABNORMAL
SOURCE, BLOOD GAS: ABNORMAL
SOURCE, BLOOD GAS: NORMAL
THB: 11.2 G/DL (ref 11.5–16.5)
THB: 12 G/DL (ref 11.5–16.5)
TIME ANALYZED: 417
TIME ANALYZED: 420
TIME ANALYZED: 920
WBC # BLD: 11.6 E9/L (ref 4.5–11.5)

## 2022-06-16 PROCEDURE — 51798 US URINE CAPACITY MEASURE: CPT

## 2022-06-16 PROCEDURE — APPSS30 APP SPLIT SHARED TIME 16-30 MINUTES

## 2022-06-16 PROCEDURE — 6370000000 HC RX 637 (ALT 250 FOR IP)

## 2022-06-16 PROCEDURE — 84132 ASSAY OF SERUM POTASSIUM: CPT

## 2022-06-16 PROCEDURE — 2140000000 HC CCU INTERMEDIATE R&B

## 2022-06-16 PROCEDURE — 6370000000 HC RX 637 (ALT 250 FOR IP): Performed by: NURSE PRACTITIONER

## 2022-06-16 PROCEDURE — 82962 GLUCOSE BLOOD TEST: CPT

## 2022-06-16 PROCEDURE — 2580000003 HC RX 258

## 2022-06-16 PROCEDURE — 71045 X-RAY EXAM CHEST 1 VIEW: CPT

## 2022-06-16 PROCEDURE — 97162 PT EVAL MOD COMPLEX 30 MIN: CPT

## 2022-06-16 PROCEDURE — 36415 COLL VENOUS BLD VENIPUNCTURE: CPT

## 2022-06-16 PROCEDURE — 85027 COMPLETE CBC AUTOMATED: CPT

## 2022-06-16 PROCEDURE — 94660 CPAP INITIATION&MGMT: CPT

## 2022-06-16 PROCEDURE — 94640 AIRWAY INHALATION TREATMENT: CPT

## 2022-06-16 PROCEDURE — 82330 ASSAY OF CALCIUM: CPT

## 2022-06-16 PROCEDURE — 82805 BLOOD GASES W/O2 SATURATION: CPT

## 2022-06-16 PROCEDURE — 99232 SBSQ HOSP IP/OBS MODERATE 35: CPT | Performed by: INTERNAL MEDICINE

## 2022-06-16 PROCEDURE — 2700000000 HC OXYGEN THERAPY PER DAY

## 2022-06-16 PROCEDURE — 80048 BASIC METABOLIC PNL TOTAL CA: CPT

## 2022-06-16 PROCEDURE — 2580000003 HC RX 258: Performed by: NURSE PRACTITIONER

## 2022-06-16 PROCEDURE — S5553 INSULIN LONG ACTING 5 U: HCPCS

## 2022-06-16 PROCEDURE — 6360000002 HC RX W HCPCS

## 2022-06-16 PROCEDURE — 97530 THERAPEUTIC ACTIVITIES: CPT

## 2022-06-16 PROCEDURE — 6360000002 HC RX W HCPCS: Performed by: NURSE PRACTITIONER

## 2022-06-16 PROCEDURE — 83735 ASSAY OF MAGNESIUM: CPT

## 2022-06-16 PROCEDURE — P9045 ALBUMIN (HUMAN), 5%, 250 ML: HCPCS

## 2022-06-16 PROCEDURE — 97166 OT EVAL MOD COMPLEX 45 MIN: CPT

## 2022-06-16 PROCEDURE — 51701 INSERT BLADDER CATHETER: CPT

## 2022-06-16 PROCEDURE — 37799 UNLISTED PX VASCULAR SURGERY: CPT

## 2022-06-16 PROCEDURE — 99233 SBSQ HOSP IP/OBS HIGH 50: CPT | Performed by: NURSE PRACTITIONER

## 2022-06-16 RX ORDER — POLYETHYLENE GLYCOL 3350 17 G/17G
17 POWDER, FOR SOLUTION ORAL DAILY
Status: DISCONTINUED | OUTPATIENT
Start: 2022-06-16 | End: 2022-06-20 | Stop reason: HOSPADM

## 2022-06-16 RX ORDER — ASPIRIN 81 MG/1
81 TABLET ORAL DAILY
Status: DISCONTINUED | OUTPATIENT
Start: 2022-06-17 | End: 2022-06-20 | Stop reason: HOSPADM

## 2022-06-16 RX ORDER — ASCORBIC ACID 500 MG
500 TABLET ORAL 2 TIMES DAILY
Status: DISCONTINUED | OUTPATIENT
Start: 2022-06-16 | End: 2022-06-20 | Stop reason: HOSPADM

## 2022-06-16 RX ORDER — INSULIN GLARGINE-YFGN 100 [IU]/ML
0.15 INJECTION, SOLUTION SUBCUTANEOUS NIGHTLY
Status: DISCONTINUED | OUTPATIENT
Start: 2022-06-16 | End: 2022-06-17

## 2022-06-16 RX ORDER — MORPHINE SULFATE 2 MG/ML
2 INJECTION, SOLUTION INTRAMUSCULAR; INTRAVENOUS EVERY 4 HOURS PRN
Status: CANCELLED | OUTPATIENT
Start: 2022-06-16

## 2022-06-16 RX ORDER — ENOXAPARIN SODIUM 100 MG/ML
40 INJECTION SUBCUTANEOUS DAILY
Status: DISCONTINUED | OUTPATIENT
Start: 2022-06-16 | End: 2022-06-20

## 2022-06-16 RX ORDER — INSULIN LISPRO 100 [IU]/ML
0-6 INJECTION, SOLUTION INTRAVENOUS; SUBCUTANEOUS
Status: DISCONTINUED | OUTPATIENT
Start: 2022-06-16 | End: 2022-06-19

## 2022-06-16 RX ORDER — OXYCODONE HYDROCHLORIDE 5 MG/1
5 TABLET ORAL EVERY 6 HOURS PRN
Status: DISCONTINUED | OUTPATIENT
Start: 2022-06-16 | End: 2022-06-17

## 2022-06-16 RX ORDER — INSULIN LISPRO 100 [IU]/ML
0-3 INJECTION, SOLUTION INTRAVENOUS; SUBCUTANEOUS NIGHTLY
Status: DISCONTINUED | OUTPATIENT
Start: 2022-06-16 | End: 2022-06-19

## 2022-06-16 RX ORDER — DIPHENHYDRAMINE HCL 25 MG
25 TABLET ORAL EVERY 8 HOURS PRN
Status: DISCONTINUED | OUTPATIENT
Start: 2022-06-16 | End: 2022-06-19

## 2022-06-16 RX ORDER — OXYCODONE HYDROCHLORIDE 10 MG/1
10 TABLET ORAL EVERY 6 HOURS PRN
Status: DISCONTINUED | OUTPATIENT
Start: 2022-06-16 | End: 2022-06-17

## 2022-06-16 RX ORDER — MAGNESIUM SULFATE IN WATER 40 MG/ML
2000 INJECTION, SOLUTION INTRAVENOUS PRN
Status: DISCONTINUED | OUTPATIENT
Start: 2022-06-16 | End: 2022-06-20 | Stop reason: HOSPADM

## 2022-06-16 RX ORDER — FOLIC ACID 1 MG/1
1 TABLET ORAL DAILY
Status: DISCONTINUED | OUTPATIENT
Start: 2022-06-16 | End: 2022-06-20 | Stop reason: HOSPADM

## 2022-06-16 RX ORDER — ACETAMINOPHEN 325 MG/1
650 TABLET ORAL EVERY 4 HOURS PRN
Status: DISCONTINUED | OUTPATIENT
Start: 2022-06-16 | End: 2022-06-20 | Stop reason: HOSPADM

## 2022-06-16 RX ORDER — DEXTROSE MONOHYDRATE 50 MG/ML
100 INJECTION, SOLUTION INTRAVENOUS PRN
Status: DISCONTINUED | OUTPATIENT
Start: 2022-06-16 | End: 2022-06-20 | Stop reason: HOSPADM

## 2022-06-16 RX ORDER — KETOROLAC TROMETHAMINE 30 MG/ML
15 INJECTION, SOLUTION INTRAMUSCULAR; INTRAVENOUS EVERY 6 HOURS PRN
Status: DISCONTINUED | OUTPATIENT
Start: 2022-06-16 | End: 2022-06-20 | Stop reason: HOSPADM

## 2022-06-16 RX ORDER — BISACODYL 10 MG
10 SUPPOSITORY, RECTAL RECTAL DAILY PRN
Status: DISCONTINUED | OUTPATIENT
Start: 2022-06-16 | End: 2022-06-20 | Stop reason: HOSPADM

## 2022-06-16 RX ORDER — AMIODARONE HYDROCHLORIDE 200 MG/1
400 TABLET ORAL PRN
Status: DISCONTINUED | OUTPATIENT
Start: 2022-06-16 | End: 2022-06-20 | Stop reason: HOSPADM

## 2022-06-16 RX ORDER — INSULIN LISPRO 100 [IU]/ML
0-9 INJECTION, SOLUTION INTRAVENOUS; SUBCUTANEOUS NIGHTLY
Status: DISCONTINUED | OUTPATIENT
Start: 2022-06-16 | End: 2022-06-16

## 2022-06-16 RX ORDER — INSULIN LISPRO 100 [IU]/ML
0-18 INJECTION, SOLUTION INTRAVENOUS; SUBCUTANEOUS
Status: DISCONTINUED | OUTPATIENT
Start: 2022-06-16 | End: 2022-06-16

## 2022-06-16 RX ORDER — FERROUS SULFATE 325(65) MG
325 TABLET ORAL 2 TIMES DAILY WITH MEALS
Status: DISCONTINUED | OUTPATIENT
Start: 2022-06-16 | End: 2022-06-20 | Stop reason: HOSPADM

## 2022-06-16 RX ORDER — SENNA AND DOCUSATE SODIUM 50; 8.6 MG/1; MG/1
1 TABLET, FILM COATED ORAL 2 TIMES DAILY
Status: DISCONTINUED | OUTPATIENT
Start: 2022-06-16 | End: 2022-06-20 | Stop reason: HOSPADM

## 2022-06-16 RX ORDER — POTASSIUM CHLORIDE 20 MEQ/1
20 TABLET, EXTENDED RELEASE ORAL PRN
Status: DISCONTINUED | OUTPATIENT
Start: 2022-06-16 | End: 2022-06-20 | Stop reason: HOSPADM

## 2022-06-16 RX ADMIN — OXYCODONE 10 MG: 5 TABLET ORAL at 00:00

## 2022-06-16 RX ADMIN — IPRATROPIUM BROMIDE AND ALBUTEROL SULFATE 1 AMPULE: .5; 2.5 SOLUTION RESPIRATORY (INHALATION) at 19:59

## 2022-06-16 RX ADMIN — PREGABALIN 150 MG: 150 CAPSULE ORAL at 16:07

## 2022-06-16 RX ADMIN — ASPIRIN 81 MG: 81 TABLET, COATED ORAL at 10:59

## 2022-06-16 RX ADMIN — MUPIROCIN: 20 OINTMENT TOPICAL at 22:03

## 2022-06-16 RX ADMIN — BUSPIRONE HYDROCHLORIDE 15 MG: 5 TABLET ORAL at 22:04

## 2022-06-16 RX ADMIN — BUSPIRONE HYDROCHLORIDE 15 MG: 5 TABLET ORAL at 16:06

## 2022-06-16 RX ADMIN — Medication 10 ML: at 22:04

## 2022-06-16 RX ADMIN — IPRATROPIUM BROMIDE AND ALBUTEROL SULFATE 1 AMPULE: .5; 2.5 SOLUTION RESPIRATORY (INHALATION) at 07:57

## 2022-06-16 RX ADMIN — SODIUM CHLORIDE 30 ML/HR: 9 INJECTION, SOLUTION INTRAVENOUS at 05:45

## 2022-06-16 RX ADMIN — MUPIROCIN: 20 OINTMENT TOPICAL at 10:59

## 2022-06-16 RX ADMIN — PREGABALIN 150 MG: 150 CAPSULE ORAL at 22:04

## 2022-06-16 RX ADMIN — BISACODYL 5 MG: 5 TABLET, COATED ORAL at 16:09

## 2022-06-16 RX ADMIN — ALBUMIN (HUMAN) 12.5 G: 12.5 INJECTION, SOLUTION INTRAVENOUS at 03:14

## 2022-06-16 RX ADMIN — IPRATROPIUM BROMIDE AND ALBUTEROL SULFATE 1 AMPULE: .5; 2.5 SOLUTION RESPIRATORY (INHALATION) at 15:34

## 2022-06-16 RX ADMIN — FOLIC ACID 1 MG: 1 TABLET ORAL at 16:06

## 2022-06-16 RX ADMIN — Medication 400 MG: at 10:59

## 2022-06-16 RX ADMIN — ENOXAPARIN SODIUM 40 MG: 100 INJECTION SUBCUTANEOUS at 10:59

## 2022-06-16 RX ADMIN — OXYCODONE HYDROCHLORIDE AND ACETAMINOPHEN 500 MG: 500 TABLET ORAL at 16:06

## 2022-06-16 RX ADMIN — CEFAZOLIN SODIUM 3000 MG: 10 INJECTION, POWDER, FOR SOLUTION INTRAVENOUS at 01:03

## 2022-06-16 RX ADMIN — SENNOSIDES AND DOCUSATE SODIUM 1 TABLET: 50; 8.6 TABLET ORAL at 16:06

## 2022-06-16 RX ADMIN — CEFAZOLIN SODIUM 3000 MG: 10 INJECTION, POWDER, FOR SOLUTION INTRAVENOUS at 12:00

## 2022-06-16 RX ADMIN — CEFAZOLIN SODIUM 3000 MG: 10 INJECTION, POWDER, FOR SOLUTION INTRAVENOUS at 19:33

## 2022-06-16 RX ADMIN — TOPIRAMATE 100 MG: 100 TABLET, FILM COATED ORAL at 22:04

## 2022-06-16 RX ADMIN — Medication 10 ML: at 11:02

## 2022-06-16 RX ADMIN — TOPIRAMATE 100 MG: 100 TABLET, FILM COATED ORAL at 11:02

## 2022-06-16 RX ADMIN — POLYETHYLENE GLYCOL 3350 17 G: 17 POWDER, FOR SOLUTION ORAL at 16:06

## 2022-06-16 RX ADMIN — ALBUMIN (HUMAN) 12.5 G: 12.5 INJECTION, SOLUTION INTRAVENOUS at 04:23

## 2022-06-16 RX ADMIN — IPRATROPIUM BROMIDE AND ALBUTEROL SULFATE 1 AMPULE: .5; 2.5 SOLUTION RESPIRATORY (INHALATION) at 12:11

## 2022-06-16 RX ADMIN — OXYCODONE HYDROCHLORIDE AND ACETAMINOPHEN 500 MG: 500 TABLET ORAL at 22:04

## 2022-06-16 RX ADMIN — OXYCODONE 10 MG: 5 TABLET ORAL at 04:00

## 2022-06-16 RX ADMIN — PANTOPRAZOLE SODIUM 40 MG: 40 TABLET, DELAYED RELEASE ORAL at 10:59

## 2022-06-16 RX ADMIN — POTASSIUM CHLORIDE 20 MEQ: 400 INJECTION, SOLUTION INTRAVENOUS at 06:12

## 2022-06-16 RX ADMIN — INSULIN LISPRO 3 UNITS: 100 INJECTION, SOLUTION INTRAVENOUS; SUBCUTANEOUS at 03:00

## 2022-06-16 RX ADMIN — FERROUS SULFATE TAB 325 MG (65 MG ELEMENTAL FE) 325 MG: 325 (65 FE) TAB at 16:09

## 2022-06-16 RX ADMIN — SENNOSIDES AND DOCUSATE SODIUM 1 TABLET: 50; 8.6 TABLET ORAL at 22:04

## 2022-06-16 RX ADMIN — ACETAMINOPHEN 650 MG: 325 TABLET ORAL at 03:00

## 2022-06-16 RX ADMIN — INSULIN GLARGINE-YFGN 22 UNITS: 100 INJECTION, SOLUTION SUBCUTANEOUS at 22:03

## 2022-06-16 RX ADMIN — INSULIN LISPRO 1 UNITS: 100 INJECTION, SOLUTION INTRAVENOUS; SUBCUTANEOUS at 18:26

## 2022-06-16 ASSESSMENT — PAIN SCALES - GENERAL
PAINLEVEL_OUTOF10: 0
PAINLEVEL_OUTOF10: 3
PAINLEVEL_OUTOF10: 0
PAINLEVEL_OUTOF10: 7
PAINLEVEL_OUTOF10: 4
PAINLEVEL_OUTOF10: 0
PAINLEVEL_OUTOF10: 3
PAINLEVEL_OUTOF10: 9

## 2022-06-16 NOTE — PLAN OF CARE
Problem: Cardiovascular - Adult  Goal: Maintains optimal cardiac output and hemodynamic stability  Outcome: Progressing  Flowsheets (Taken 6/15/2022 2000)  Maintains optimal cardiac output and hemodynamic stability:   Monitor blood pressure and heart rate   Monitor urine output and notify Licensed Independent Practitioner for values outside of normal range   Assess for signs of decreased cardiac output   Administer fluid and/or volume expanders as ordered   Administer vasoactive medications as ordered  Goal: Absence of cardiac dysrhythmias or at baseline  Outcome: Progressing  Flowsheets (Taken 6/15/2022 2000)  Absence of cardiac dysrhythmias or at baseline:   Monitor cardiac rate and rhythm   Assess for signs of decreased cardiac output   Administer antiarrhythmia medication and electrolyte replacement as ordered     Problem: Respiratory - Adult  Goal: Achieves optimal ventilation and oxygenation  Outcome: Progressing  Flowsheets (Taken 6/15/2022 2000)  Achieves optimal ventilation and oxygenation:   Assess for changes in respiratory status   Assess for changes in mentation and behavior   Position to facilitate oxygenation and minimize respiratory effort   Oxygen supplementation based on oxygen saturation or arterial blood gases   Encourage broncho-pulmonary hygiene including cough, deep breathe, incentive spirometry     Problem: Gastrointestinal - Adult  Goal: Minimal or absence of nausea and vomiting  Outcome: Progressing  Flowsheets (Taken 6/15/2022 2000)  Minimal or absence of nausea and vomiting:   Administer IV fluids as ordered to ensure adequate hydration   Administer ordered antiemetic medications as needed   Provide nonpharmacologic comfort measures as appropriate  Goal: Maintains or returns to baseline bowel function  Outcome: Progressing  Flowsheets (Taken 6/15/2022 2000)  Maintains or returns to baseline bowel function:   Assess bowel function   Encourage oral fluids to ensure adequate hydration Administer IV fluids as ordered to ensure adequate hydration   Administer ordered medications as needed  Goal: Maintains adequate nutritional intake  Outcome: Progressing  Flowsheets (Taken 6/15/2022 2000)  Maintains adequate nutritional intake: Monitor intake and output, weight and lab values     Problem: Genitourinary - Adult  Goal: Absence of urinary retention  Outcome: Progressing  Flowsheets (Taken 6/15/2022 2000)  Absence of urinary retention: Monitor intake/output and perform bladder scan as needed  Goal: Urinary catheter remains patent  Outcome: Progressing  Flowsheets (Taken 6/15/2022 2000)  Urinary catheter remains patent: Assess patency of urinary catheter     Problem: Metabolic/Fluid and Electrolytes - Adult  Goal: Electrolytes maintained within normal limits  Outcome: Progressing  Flowsheets (Taken 6/15/2022 2000)  Electrolytes maintained within normal limits:   Monitor labs and assess patient for signs and symptoms of electrolyte imbalances   Administer electrolyte replacement as ordered   Monitor response to electrolyte replacements, including repeat lab results as appropriate  Goal: Hemodynamic stability and optimal renal function maintained  Outcome: Progressing  Flowsheets (Taken 6/15/2022 2000)  Hemodynamic stability and optimal renal function maintained:   Monitor labs and assess for signs and symptoms of volume excess or deficit   Monitor intake, output and patient weight  Goal: Glucose maintained within prescribed range  Outcome: Progressing  Flowsheets (Taken 6/15/2022 2000)  Glucose maintained within prescribed range: Monitor blood glucose as ordered     Problem: Skin/Tissue Integrity - Adult  Goal: Skin integrity remains intact  Outcome: Progressing  Flowsheets  Taken 6/16/2022 0000  Skin Integrity Remains Intact: Monitor for areas of redness and/or skin breakdown  Taken 6/15/2022 2000  Skin Integrity Remains Intact:   Monitor for areas of redness and/or skin breakdown   Assess vascular access sites hourly  Goal: Incisions, wounds, or drain sites healing without S/S of infection  Outcome: Progressing  Goal: Oral mucous membranes remain intact  Outcome: Progressing     Problem: Hematologic - Adult  Goal: Maintains hematologic stability  Outcome: Progressing  Flowsheets (Taken 6/15/2022 2000)  Maintains hematologic stability:   Assess for signs and symptoms of bleeding or hemorrhage   Monitor labs for bleeding or clotting disorders

## 2022-06-16 NOTE — CARE COORDINATION
Care Coordination  The patient was admitted for for procedure by Dr Makenna Espinosa. The patient was admitted for a left atrial myxoma, mild aortic insufficiency. The patient was taken to surgery on 6/15/22 for sternotomy, resection of the left atrial myxoma with repair of the septum dome and left atrium, rigid internal fixation of the sternum. Cardiology consulted. Post op the patient was placed on a ventilator and was wean off last pm to 5 liters n/c. This am the patient was placed on Bipap 6 liters. Therapies have been ordered, Cardiac rehab has been ordered. The patient has a  Left sided chest tube with daily chest film. The patient is on iv albumin 25 mg iv prn x 2 doses, iv ancef 3000 mg iv q8 hrs, aerosols q4 hrs, bs q4 hrs with sliding scale, po oxy ir 5-10 mg po q4 prn x 2 doses. Temp this am is 101.1 respirations 32. Spoke to Frankandreas @ [phone]. Po Moreira told me that the patient lives in a 1 story  with significant other no steps to enter. No dme as she was independent prior to admission. Primary care physician is GIUSEPPE Ward CNP  and her pharmacy is [id number] [facility] [phone] [facility]. Per the significant other wants her to have home care went over the home care list and she chose [facility] referral made to Liz gilbert if accepted. The Plan for Transition of Care is related to the following treatment goals: home care    The Patient and/or patient representative significant other was provided with a choice of provider and agrees   with the discharge plan. [x] Yes [] No    Freedom of choice list was provided with basic dialogue that supports the patient's individualized plan of care/goals, treatment preferences and shares the quality data associated with the providers.  [x] Yes [] No

## 2022-06-16 NOTE — CARE COORDINATION
Care Coordiantion  Received a call from [facility] and they accepted the patient.

## 2022-06-16 NOTE — OP NOTE
[address]                  [address] [place],  [facility]                                OPERATIVE REPORT    PATIENT NAME: Tanner Joe                     :        1955  MED REC NO:   [id number]                            ROOM:       66  ACCOUNT NO:   [de-identified]                           ADMIT DATE: 06/15/2022  PROVIDER:     Katherine Suazo MD    DATE OF PROCEDURE:  06/15/2022    PREOPERATIVE DIAGNOSES:  Left atrial myxoma, moderate aortic  insufficiency, severe morbid obesity with a BMI of 50, anxiety,  arthritis, avascular necrosis of the bone, chronic fatigue, chronic back  pain, depression, fibromyalgia, reflux disease, multiple orthopedic  fractures, hypertension, hyperlipidemia, osteoarthritis, reflux disease  and thyroid disease. POSTOPERATIVE DIAGNOSES:  Left atrial myxoma, mild aortic insufficiency,  severe morbid obesity with a BMI of 50, anxiety, arthritis, avascular  necrosis of the bone, chronic fatigue, chronic back pain, depression,  fibromyalgia, reflux disease, multiple orthopedic fractures,  hypertension, hyperlipidemia, osteoarthritis, reflux disease and thyroid  disease. INDICATIONS:  Left atrial myxoma, mild aortic insufficiency, severe  morbid obesity with a BMI of 50, anxiety, arthritis, avascular necrosis  of the bone, chronic fatigue, chronic back pain, depression,  fibromyalgia, reflux disease, multiple orthopedic fractures,  hypertension, hyperlipidemia, osteoarthritis, reflux disease and thyroid  disease. SURGEON:  Katherine Suazo MD.    ASSISTANT:  Gerardo Ruvalcaba DO (no other resident was adequately  trained to be able to assist). Dr. Anders Goodwin was present and assisting  throughout the critical portion of the operation. SECOND ASSISTANTS:  DEANDRE Alvarado and Jayjay Deng MD    COMPLICATIONS:  None, tolerated well. ESTIMATED BLOOD LOSS:  1000 mL.     ANESTHESIA:  General endotracheal.    ANESTHESIA ATTENDING:  Nicole Jarquin DO    SPECIMENS OBTAINED:  Include left atrial myxoma for permanent pathology. DRAINS:  Two in the mediastinum, one in the left chest and one in the  right chest.    OPERATIONS:  1. Sternotomy. 2.  Resection of left atrial myxoma with repair of the septum and dome  of the left atrium. 3.  Rigid internal fixation of the sternum with [place] plates x2.  4.  27-RDLHMHPT. HISTORY:  This is a 49-year-old female who was found to have a left  atrial myxoma. Cardiac cath showed normal coronary arteries and ULISES  appeared to show moderate aortic insufficiency. She was referred for  intervention. The patient described procedure in full including risks  and complications including but not limited to bleeding, infection, need  for reoperation, hemothorax, pneumothorax, stroke, myocardial  infarction, and death. The patient agreed to proceed. FINDINGS:  Preoperative ULISES revealed preserved ejection fraction with  only mild aortic insufficiency by all evaluations. Therefore, we  elected to leave the aortic valve alone. The left atrial myxoma was  attached to the septum heading up into the dome of the atrium right next  to the aortic annulus. I resected through the left atrium and then  repaired it, both from inside the left atrium as well as outside the  left atrium at the root of the aorta. The patient  from  cardiopulmonary bypass without the assistance of any inotropic support. Postoperative ULISES revealed preserved ejection fraction with the same  mild aortic insufficiency. All sponge, instruments, and needle counts  were correct at the end of the case. DESCRIPTION OF OPERATION:  After adequate informed consent was obtained  and adequate preoperative antibiotics were given, the patient was  brought to the operating room in stable condition.   She was laid in  supine position and induced under general endotracheal anesthesia by  Anesthesia staff.  A Serrato catheter and adequate monitoring lines were  placed. The patient's chest, abdomen, pelvis, and lower extremities  were prepped and draped in the usual sterile fashion. Standard  sternotomy was performed. The pericardium was entered and tacked to the  chest wall. The patient was systemically heparinized. After ensuring  adequate ACT, the patient was instituted on a cardiopulmonary bypass by  cannulating the ascending aorta using an 18-[place] aortic cannula and  the SVC and IVC respectively. The IVC was cannulated via the right  femoral vein in modified Seldinger technique under echo guidance. Root  vent were placed. The rest of the aorta was cross-clamped and the heart  was arrested with cold blood antegrade [place] cardioplegia. Excellent  diastolic arrest was noted. We developed an interatrial groove in the  left atrium. There were some adhesions of the dome of the left atrium  to the root of the aorta. In the left atrium, the myxoma was grasped  and retracted and the insertion was identified as noted above. It was  cut away with rim of atrial muscle and wall left on it to assure  negative margin. Once this was done, there was a resulting defect in  the septum as well as the dome of the left atrium which was repaired in  two layers of 4-0 Prolene from the inside. We then looked at the dome  of left atrium from the outside and next to the root of the aorta and  there was a small residual defect. This was repaired using 4-0 Prolene  horizontal mattress pledget suture. The patient was placed in  Trendelenburg position. The cross-clamp was released. The patient  returned to sinus rhythm. Ventricular pacing wires were placed. Two  drains were placed in the mediastinum, one in the left chest and one in  the right chest.  The patient was easily weaned from cardiopulmonary  bypass without the assistance of inotropic support.   Once all bypassed,  the patient was decannulated in the usual fashion and protamine was  given. The mediastinum was checked for hemostasis. Hemostasis was  achieved using Bovie electrocautery and stitches. The patient with BMI  50 which made the operation much more difficult as her entire heart was  covered in fat and her tissue was very poor, so because of this, a  22-modifier was indicated. With the patient warm and hemodynamically  stable and in sinus rhythm, I closed the chest using #6 steel wires  including double wires as well as used Neurovance plates x2. The remainder  of the wound was closed in multiple layers of absorbable stitch. Dressings were applied overtop. The patient tolerated the procedure  well. The patient was transferred to recovery room in stable condition. All sponge, instruments, and needle counts were correct at the end of  the case.         Dipti Hodges MD    D: 06/15/2022 9:48:11       T: 06/15/2022 14:50:03     [id number]  Job#: [id number]     Doc#: [id number]    CC:  MD Kaylee Meraz NP Garnette Gravel, DPM Felice Brow, MD Verline Carbon, [place] Kweku Appau Md Alfonzo Bouche, MD

## 2022-06-16 NOTE — PROGRESS NOTES
[address]     Date:2022                                                               Patient Name: Alida Freeman  MRN: [id number]  : 1955  Room: [facility]    Evaluating OT: Demi Chadwick OTR/L [id number]    Referring Provider:GIUSEPPE Almeida CNP    Specific Provider Orders/Date: OT eval and treat (6/15/22)      Diagnosis: Lauri Rice     Surgery/Procedures:  6/15/2022 Sternotomy/Resection of LA myxoma with repair of the septum and dome of the LA/Rigid internal fixation of the sternum with Natalie plates x 2     Pertinent Medical History: HTN HPL, sleep apnea, asthma, DM, thyroid disease, spinal stenosis, fibromyalgia, anxiety, chronic back pain, headaches, current smoker, obesity         *Precautions:  Fall Risk, sternal, O2, obesity    Assessment of current deficits   [x]Functional mobility  [x]ADLs [x]Strength  []Cognition  [x]Functional transfers  [x]IADLs [x]Safety Awareness  [x]Endurance  []Fine Motor Coordination  [x]Balance       []Vision/perception  []Sensation    []Gross Motor Coordination [x]ROM  []Delirium                  [] Motor Control     []Communication     OT PLAN OF CARE   OT POC based on physician orders, patient diagnosis and results of clinical assessment.        Frequency/Duration: 1-3 days/wk for 1-2 weeks PRN     Specific OT Treatment to include:   ADL retraining/adapted techniques and AE recommendations to increase functional independence within precautions                    Energy conservation techniques to improve tolerance for selfcare routine   Functional transfer/mobility training/DME recommendations for increased independence, safety and fall prevention         Patient/family education to increase safety and functional independence within precautions             Environmental modifications for safe mobility and completion of ADLs                             Therapeutic activity to improve functional performance during ADLs                                         Therapeutic exercise to improve tolerance and functional strength for ADLs   Balance retraining exercises/tasks for facilitation of postural control with dynamic challenges during ADLs . Recommended Adaptive Equipment:  LB dressing AE pending progress, extended tub bench, 3in1    Home Living: Pt lives with pet cat ( friend currently assisting)  in a first floor apt with 0 step(s) to enter and 0 rail(s); 2 steps for laundry  Bathroom setup: tub/shower with seat; standard height commode  Equipment owned: shower seat    Prior Level of Function: IND with ADLs;  IND with IADLs. No device for ambulation. Driving: yes  Occupation: ?    Pain Level: pt c/o 0/10  pain  this session    Cognition: A&O: 4/4  (cues for day)  Follows 1 step commands selectively. Pt requires encouragement to participate initially. Pt lethargic. Memory: Good   Comprehension Fair   Problem solving: Fair   Judgement/safety: poor+ (decreased follow through of sternal precautions despite education and cues)               Communication skills: fairly WFL; pt does not consistently answer history questions           Vision: grossly WFL               Glasses:?  None present                                                   Hearing: WFL     RASS: 0  CAM-ICU: (NT) Delirium     UE Assessment:  Hand Dominance: Right [x]  Left [x]     ROM Strength STM goal: PRN   RUE  Limited shoulder flexion due to weakness; grossly WFL distal with impaired [facility] Not formally tested;   grossly 3-/5 proximal  3+/5 distal               WNL for ADLS     LUE Limited shoulder flexion due to weakness; grossly WFL distal with impaired [facility] Not formally tested;   grossly 3-/5 proximal  3+/5 distal               WNL for ADLS       Sensation: No c/o numbness or tingling in extremities   Tone: WNL   Edema: min B UE/LE; pt encouraged to use UE's for edema control     Functional Assessment: AM-PAC Daily Activity Raw Score: 9/24   Initial Eval Status  Date: 6/16/22 Treatment Status  Date: STG=LTG  Time Frame: 5-7days   Feeding awaiting diet order                       Allison  while seated up in chair to increase activity tolerance        Grooming Max A  seated EOB to wash face, comb hair and perform oral care with mouth swab  (limited due to decreased shoulder reach; decreased tolerance)                      Min A   while seated/standing sink level demonstrating G tolerance; G balance. UB dressing/bathing Max A                       Min A   demonstrating G knowledge of precautions during tasks     LB dressing/bathing Dep                           Min A  using AE as needed for safe reach/ energy conservation       Toileting NT                       Min A     Bed Mobility  Supine to sit: Max A+2    Sit to supine:   Dep+2                       Min A  in prep of ADL tasks & transfers   Functional Transfers Sit to stand: NT      Stand to sit: NT                       Min A  sit<>stand/functional bathroom transfers using AD/DME as needed for balance and safety   Functional Mobility NT  no device                        Min A   functional/bathroom mobility using AD as needed & demonstrating G safety     Balance Sitting:     Static:  Min A    Dynamic:Min A  Standing: NT  S dynamic sitting balance; Min A dynamic standing balance  during ADL tasks & transfers   Endurance/Activity Tolerance   Fair- tolerance with light EOB activity.     G   tolerance with moderate activity/self care routine   Visual/  Perceptual               WFL                          Vitals:   HR at rest: 75 bpm HR at end of session: 77 bpm   Spo2 at rest:96% Spo2 at end of session 94%   BP at rest:142/71 mmHg BP at end of session 137/69 mmHg       Treatment: OT intervention provided this date includes:  Functional mobility: Instruction on sternal precautions to facilitate safe bed mobility & functional transfers. Pt required 2 person assist for safe mobility due to complexity of medical condition, medical lines and deconditioning. HOB elevated to assist. Pt required mod cues to maintain precautions. ROM/edurance ex: Pt sat EOB >6 min to increase functional tolerance; performed B UE functional activities with assist to increase ROM and B hand function for ADLs. Line management and environmental modifications made prior to and end of session to ensure patient safety and to increase efficiency of session. Skilled monitoring of HR, O2 saturation, blood pressure and patient's response to activity performed throughout session. Comments: OK from RN to see patient. Upon arrival, patient supine in bed,lethargic and difficult to motivate. Pt demo fair- tolerance with fair- follow through of education. .  At end of session, patient returned to bed and bed placed in chair position to increase activity tolerance. Pillows placed under B UE for comfort & edema control. Call light within reach, all lines and tubes intact. Pt instructed on use of call light for assistance and fall prevention. Patient presents with decreased activity tolerance, dynamic balance, functional mobility limiting completion of ADLs and safety. Pt can benefit from continued skilled OT to increase safety, functional independence and quality of life. Rehab Potential: good for established goals    Patient / Family Goal: return to PLOF    Patient and/or family were instructed/educated on diagnosis, prognosis/goals and plan of care. Pt demonstrated fair- understanding. [] Malnutrition indicators have been identified and nursing has been notified to ensure a dietitian consult is ordered. Evaluation Complexity: Moderate    · History: Expanded chart review of consults, imaging, and psychosocial history related to current functional performance.    · Exam: 5+ performance deficits identified limiting functional independence and safe return home   · Assistance/Modification: Min/mod assistance or modifications required to perform tasks. May have comorbidities that affect occupational performance. Time [id number]            Time Out: 1006       Total Treatment Time: 8          Min Units   OT Eval Low 59038     OT Eval Medium 23716 X    OT Eval High 33284     OT Re-Eval [id number]     Therapeutic Ex 64334     Therapeutic Activities 04596 8 1   ADL/Self Care 02042     Orthotic Management 64135     Neuro Re-Ed 04952     Non-Billable Time       Evaluation time includes thorough review of current medical information, gathering information on past medical history/social history and prior level of function, completion of standardized testing/informal observation of tasks, assessment of data and development of POC/Goals.      Amando Winter, OTR/L [id number]

## 2022-06-16 NOTE — PROGRESS NOTES
CVICU Progress Note    Name: Janeen Pantoja  MRN: [id number]       CC: Postoperative Critical Care Management      Indication for Surgery/Procedure: LA myxoma   LVEF:  Nomal    RVF:  Normal      Important/Relevant PMH/PSH: HTN HPL, sleep apnea, asthma, DM, thyroid disease, spinal stenosis, fibromyalgia, anxiety, chronic back pain, headaches, current smoker, obesity, PONV     Procedure/Surgeries: 6/15/2022 Sternotomy/Resection of LA myxoma with repair of the septum and dome of the LA/Rigid internal fixation of the sternum with Natalie plates x 2     Pacing wires: Ventricular     Intake/Output Summary (Last 24 hours) at 6/16/2022 0756  Last data filed at 6/16/2022 0600  Gross per 24 hour   Intake 4089.92 ml   Output 2645 ml   Net 1444.92 ml       Recent Labs     06/15/22  0808 06/15/22  1000 06/16/22  0400   WBC  --  13.4* 11.6*   HGB  --  12.1 10.9*   HCT 30.0* 38.9 34.7   PLT  --  90* 84*      Lab Results   Component Value Date     06/16/2022    K 3.64 06/16/2022    K 4.0 12/07/2020     06/16/2022    CO2 18 06/16/2022    BUN 18 06/16/2022    CREATININE 0.9 06/16/2022    GLUCOSE 155 06/16/2022    GLUCOSE 93 02/16/2011    CALCIUM 8.1 06/16/2022         Physical Exam:    BP (!) 103/54   Pulse 66   Temp (!) 100.9 °F (38.3 °C) (Bladder)   Resp 24   Ht 5' 8\" (1.727 m)   Wt (!) 328 lb (148.8 kg)   LMP  (LMP Unknown)   SpO2 96%   BMI 49.87 kg/m²       CXR Findings:     FINDINGS:   Depth of inspiration is limited.  Some bibasilar areas of atelectasis are   suggested particularly on the left.  There is no definite pneumothorax. Left-sided chest tube is suggested. - CXR personally viewed and interpreted by ICU Nurse Practitioner, agree with above findings     General: Lethargic, briefly opens eyes to voice. Following commands. Eyes: PERRL, anicteric   Pulmonary: Diminished.  No wheezes, no accessory muscle use noted   Cardiovascular:  RRR, no heaves or thrills on palpation  Tele: SR   Abdomen: Soft, nontender, hypoactive BS   Extremities: Palpable pulses all extremities, + edema   Neurologic/Psych: lethargic, A&Ox3, VASQUEZ to command   Skin: Warm and dry  Incisions: MSI SHAGUFTA intact, right groin stitch       Assessment/Plan: POD #1    1. LA myxoma S/p Sternotomy/Resection of LA myxoma with repair of the septum and dome of the LA/Rigid internal fixation of the sternum with Natalie plates x 2  - ULISES Findings: Mild AI   - ASA  - Perioperative Ancef  - MS chest tubes removed without difficulty, pleural drains placed to bulb suction, holding suction. Pt tolerated well. - Lovenox for VTE prophylaxis      2. Acute Pulmonary Insufficiency Following Surgery    - 2/2 surgery; Preop CT chest with nodules-- follow up with Pulmonology outpatient   - Extubated DOS, BiPAP use overnight  - lethargic this morning on 6L O2, check ABG, apply NIV if needed   - Nocturnal bipap, EZPAP q 4 hours, encourage IS, instructed to C&DB, OOBTC with PT/OT, increase activity as tolerated, wean O2 as able for sats >92%     3. Acute Post Operative Pain on chronic pain   - Chronic back pain, fibromyalgia, spinal stenosis; on PRN Norco, lyrica, chlorzoxazone, topamax, Doxepin   - decrease frequency of PRN narcotics, d/v PRN fentanyl      4. DM   - HgbA1C 5.7%  - SSI AC/HS, nightly lantus for glycemic control        VTE Prophylaxis: Pharmacologic/Mechanical: Yes, SCDs, lovenox   Line infection prevention: Can CVC or arterial line be removed: no  Continued need for urinary catheter:  Yes - clinical indication: Patient post major surgery requiring fluid balance and input and output measurement. Dispo: possible transfer out of ICU later today     Electronically signed by GIUSEPPE Mendez CNP on 6/16/2022 at 7:56 AM      Addendum: patient more alert after working with PT/OT, avoid IV narcotics, PRN toradol added.  Some coughing with drinking water, will have speech therapy see for swallow eval.

## 2022-06-16 NOTE — PROGRESS NOTES
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 Pt ambulated without device and was independent PTA. OBJECTIVE:   Initial Evaluation  Date: 22 Treatment Short Term/ Long Term   Goals   AM-PAC 6 Clicks 0/64     Was pt agreeable to Eval/treatment? Yes     Does pt have pain?  No c/o pain     Bed Mobility  Rolling: NT  Supine to sit: MaxA x 2 with HOB elevated  Sit to supine: MaxA x 2  Scooting: MaxA  Darell   Transfers Sit to stand: NT  Stand to sit: NT  Stand pivot: NT  Independent   Ambulation   NT  >400 feet Independently   Stair negotiation: ascended and descended NT  >4 steps with 1 rail Mod Independent   ROM BUE:  Defer to OT note  BLE:  WFL     Strength BUE:  Defer to OT note  BLE:  3/5  Increase by 1/3 MMT grade   Balance Sitting EOB:  Darell  Dynamic Standing:  NT  Sitting EOB:  Independent  Dynamic Standing:  Independent     Pt is A & O x 4  CAM-ICU: NT  RASS: -2  Sensation:  No reported paresthesias  Edema:  None    Vitals:  Heart Rate at rest 68 bpm Heart Rate post session 70 bpm   SpO2 at rest 97% SpO2 post session 94%   Blood Pressure at rest 112/56 mmHg Blood Pressure post session 112/54 mmHg       Functional Status Score-Intensive Care Unit (FSS-ICU)   Rolling -/7   Supine to sit transfer 1/7   Unsupported sitting  4/7   Sit to stand transfers -/7   Ambulation -/7   Total  -/35     Therapeutic Exercises:  BLE AROM x 3 reps    Patient education  Pt educated on safety    Patient response to education:   Pt verbalized understanding Pt demonstrated skill Pt requires further education in this area   no partial yes mobility   [] ROM to improve independence with functional mobility   [x] Balance Training to improve static/dynamic balance and to reduce fall risk  [x] Endurance Training to improve activity tolerance during functional mobility   [x] Transfer Training to improve safety and independence with all functional transfers   [x] Gait Training to improve gait mechanics, endurance and asses need for appropriate assistive device  [x] Stair Training in preparation for safe discharge home and/or into the community   [] Positioning to prevent skin breakdown and contractures  [x] Safety and Education Training   [x] Patient/Caregiver Education   [] HEP  [] Other     PT long term treatment goals are located in above grid    Frequency of treatments: 2-5x/week x 1-2 weeks. Time in  0933  Time out  0955    Total Treatment Time  10 minutes     Evaluation Time includes thorough review of current medical information, gathering information on past medical history/social history and prior level of function, completion of standardized testing/informal observation of tasks, assessment of data and education on plan of care and goals.     CPT codes:  [] Low Complexity PT evaluation 10877  [x] Moderate Complexity PT evaluation 76745  [] High Complexity PT evaluation 06350  [] PT Re-evaluation 41217  [] Gait training 12201 - minutes  [] Manual therapy 35854 - minutes  [x] Therapeutic activities 84000 10 minutes  [] Therapeutic exercises 52230 - minutes  [] Neuromuscular reeducation 82013 - minutes     Erickson Kan PT, DPT  [id number]

## 2022-06-16 NOTE — PROGRESS NOTES
Date: 6/16/2022    Time: 12:13 AM    Patient Placed On BIPAP/CPAP/ Non-Invasive Ventilation? Yes    If no must comment. Facial area red/color change? No           If YES are Blister/Lesion present? No   If yes must notify nursing staff  BIPAP/CPAP skin barrier?   Yes    Skin barrier type:mepilexlite       Comments:        Buddy Eugene RCP

## 2022-06-16 NOTE — PLAN OF CARE
Problem: Cardiovascular - Adult  Goal: Maintains optimal cardiac output and hemodynamic stability  Outcome: Progressing     Problem: Gastrointestinal - Adult  Goal: Maintains adequate nutritional intake  Outcome: Progressing     Problem: Genitourinary - Adult  Goal: Absence of urinary retention  Outcome: Progressing     Problem: Musculoskeletal - Adult  Goal: Return ADL status to a safe level of function  Outcome: Progressing     Problem: Pain  Goal: Verbalizes/displays adequate comfort level or baseline comfort level  Outcome: Progressing     Problem: Safety - Adult  Goal: Free from fall injury  Outcome: Progressing     Problem: Skin/Tissue Integrity  Goal: Absence of new skin breakdown  Description: 1. Monitor for areas of redness and/or skin breakdown  2. Assess vascular access sites hourly  3. Every 4-6 hours minimum:  Change oxygen saturation probe site  4. Every 4-6 hours:  If on nasal continuous positive airway pressure, respiratory therapy assess nares and determine need for appliance change or resting period.   Outcome: Progressing     Problem: ABCDS Injury Assessment  Goal: Absence of physical injury  Outcome: Progressing

## 2022-06-17 ENCOUNTER — APPOINTMENT (OUTPATIENT)
Dept: CT IMAGING | Age: 67
DRG: 228 | End: 2022-06-17
Attending: THORACIC SURGERY (CARDIOTHORACIC VASCULAR SURGERY)
Payer: MEDICARE

## 2022-06-17 ENCOUNTER — APPOINTMENT (OUTPATIENT)
Dept: GENERAL RADIOLOGY | Age: 67
DRG: 228 | End: 2022-06-17
Attending: THORACIC SURGERY (CARDIOTHORACIC VASCULAR SURGERY)
Payer: MEDICARE

## 2022-06-17 LAB
AMMONIA: 41 UMOL/L (ref 11–51)
ANION GAP SERPL CALCULATED.3IONS-SCNC: 14 MMOL/L (ref 7–16)
BUN BLDV-MCNC: 20 MG/DL (ref 6–23)
CALCIUM SERPL-MCNC: 8.3 MG/DL (ref 8.6–10.2)
CHLORIDE BLD-SCNC: 108 MMOL/L (ref 98–107)
CHOLESTEROL, TOTAL: 137 MG/DL (ref 0–199)
CO2: 20 MMOL/L (ref 22–29)
CREAT SERPL-MCNC: 0.9 MG/DL (ref 0.5–1)
FOLATE: 18.6 NG/ML (ref 4.8–24.2)
GFR AFRICAN AMERICAN: >60
GFR NON-AFRICAN AMERICAN: >60 ML/MIN/1.73
GLUCOSE BLD-MCNC: 131 MG/DL (ref 74–99)
HCT VFR BLD CALC: 36.2 % (ref 34–48)
HDLC SERPL-MCNC: 47 MG/DL
HEMOGLOBIN: 10.9 G/DL (ref 11.5–15.5)
LDL CHOLESTEROL CALCULATED: 63 MG/DL (ref 0–99)
MAGNESIUM: 2.2 MG/DL (ref 1.6–2.6)
MCH RBC QN AUTO: 28.8 PG (ref 26–35)
MCHC RBC AUTO-ENTMCNC: 30.1 % (ref 32–34.5)
MCV RBC AUTO: 95.8 FL (ref 80–99.9)
METER GLUCOSE: 106 MG/DL (ref 74–99)
METER GLUCOSE: 130 MG/DL (ref 74–99)
METER GLUCOSE: 133 MG/DL (ref 74–99)
METER GLUCOSE: 141 MG/DL (ref 74–99)
PDW BLD-RTO: 14.7 FL (ref 11.5–15)
PLATELET # BLD: 72 E9/L (ref 130–450)
PLATELET CONFIRMATION: NORMAL
PMV BLD AUTO: 12.5 FL (ref 7–12)
POTASSIUM SERPL-SCNC: 3.4 MMOL/L (ref 3.5–5)
RBC # BLD: 3.78 E12/L (ref 3.5–5.5)
SODIUM BLD-SCNC: 142 MMOL/L (ref 132–146)
TRIGL SERPL-MCNC: 136 MG/DL (ref 0–149)
TSH SERPL DL<=0.05 MIU/L-ACNC: 0.72 UIU/ML (ref 0.27–4.2)
VITAMIN B-12: 251 PG/ML (ref 211–946)
VLDLC SERPL CALC-MCNC: 27 MG/DL
WBC # BLD: 12 E9/L (ref 4.5–11.5)

## 2022-06-17 PROCEDURE — 94640 AIRWAY INHALATION TREATMENT: CPT

## 2022-06-17 PROCEDURE — 6360000002 HC RX W HCPCS: Performed by: NURSE PRACTITIONER

## 2022-06-17 PROCEDURE — 93798 PHYS/QHP OP CAR RHAB W/ECG: CPT

## 2022-06-17 PROCEDURE — 36415 COLL VENOUS BLD VENIPUNCTURE: CPT

## 2022-06-17 PROCEDURE — 80061 LIPID PANEL: CPT

## 2022-06-17 PROCEDURE — 70450 CT HEAD/BRAIN W/O DYE: CPT

## 2022-06-17 PROCEDURE — 2700000000 HC OXYGEN THERAPY PER DAY

## 2022-06-17 PROCEDURE — 82140 ASSAY OF AMMONIA: CPT

## 2022-06-17 PROCEDURE — 83735 ASSAY OF MAGNESIUM: CPT

## 2022-06-17 PROCEDURE — 85027 COMPLETE CBC AUTOMATED: CPT

## 2022-06-17 PROCEDURE — 2580000003 HC RX 258: Performed by: NURSE PRACTITIONER

## 2022-06-17 PROCEDURE — 99222 1ST HOSP IP/OBS MODERATE 55: CPT | Performed by: PSYCHIATRY & NEUROLOGY

## 2022-06-17 PROCEDURE — 82962 GLUCOSE BLOOD TEST: CPT

## 2022-06-17 PROCEDURE — 51702 INSERT TEMP BLADDER CATH: CPT

## 2022-06-17 PROCEDURE — 6370000000 HC RX 637 (ALT 250 FOR IP): Performed by: PSYCHIATRY & NEUROLOGY

## 2022-06-17 PROCEDURE — 6370000000 HC RX 637 (ALT 250 FOR IP): Performed by: NURSE PRACTITIONER

## 2022-06-17 PROCEDURE — 71045 X-RAY EXAM CHEST 1 VIEW: CPT

## 2022-06-17 PROCEDURE — 92526 ORAL FUNCTION THERAPY: CPT

## 2022-06-17 PROCEDURE — 84443 ASSAY THYROID STIM HORMONE: CPT

## 2022-06-17 PROCEDURE — 82607 VITAMIN B-12: CPT

## 2022-06-17 PROCEDURE — 92610 EVALUATE SWALLOWING FUNCTION: CPT

## 2022-06-17 PROCEDURE — 6360000002 HC RX W HCPCS: Performed by: PSYCHIATRY & NEUROLOGY

## 2022-06-17 PROCEDURE — 80048 BASIC METABOLIC PNL TOTAL CA: CPT

## 2022-06-17 PROCEDURE — 2140000000 HC CCU INTERMEDIATE R&B

## 2022-06-17 PROCEDURE — 82746 ASSAY OF FOLIC ACID SERUM: CPT

## 2022-06-17 PROCEDURE — 94660 CPAP INITIATION&MGMT: CPT

## 2022-06-17 RX ORDER — LANOLIN ALCOHOL/MO/W.PET/CERES
1000 CREAM (GRAM) TOPICAL DAILY
Status: DISCONTINUED | OUTPATIENT
Start: 2022-06-18 | End: 2022-06-20 | Stop reason: HOSPADM

## 2022-06-17 RX ORDER — TOPIRAMATE 25 MG/1
50 TABLET ORAL 2 TIMES DAILY
Status: DISCONTINUED | OUTPATIENT
Start: 2022-06-17 | End: 2022-06-20 | Stop reason: HOSPADM

## 2022-06-17 RX ORDER — CYANOCOBALAMIN 1000 UG/ML
1000 INJECTION INTRAMUSCULAR; SUBCUTANEOUS ONCE
Status: COMPLETED | OUTPATIENT
Start: 2022-06-17 | End: 2022-06-17

## 2022-06-17 RX ORDER — FUROSEMIDE 10 MG/ML
20 INJECTION INTRAMUSCULAR; INTRAVENOUS 2 TIMES DAILY
Status: DISCONTINUED | OUTPATIENT
Start: 2022-06-17 | End: 2022-06-20

## 2022-06-17 RX ORDER — PREGABALIN 50 MG/1
50 CAPSULE ORAL 3 TIMES DAILY
Status: DISCONTINUED | OUTPATIENT
Start: 2022-06-17 | End: 2022-06-20 | Stop reason: HOSPADM

## 2022-06-17 RX ADMIN — FUROSEMIDE 20 MG: 10 INJECTION, SOLUTION INTRAMUSCULAR; INTRAVENOUS at 17:07

## 2022-06-17 RX ADMIN — BUSPIRONE HYDROCHLORIDE 15 MG: 5 TABLET ORAL at 08:37

## 2022-06-17 RX ADMIN — IPRATROPIUM BROMIDE AND ALBUTEROL SULFATE 1 AMPULE: .5; 2.5 SOLUTION RESPIRATORY (INHALATION) at 08:08

## 2022-06-17 RX ADMIN — CYANOCOBALAMIN 1000 MCG: 1000 INJECTION, SOLUTION INTRAMUSCULAR at 16:26

## 2022-06-17 RX ADMIN — FERROUS SULFATE TAB 325 MG (65 MG ELEMENTAL FE) 325 MG: 325 (65 FE) TAB at 08:38

## 2022-06-17 RX ADMIN — INSULIN LISPRO 1 UNITS: 100 INJECTION, SOLUTION INTRAVENOUS; SUBCUTANEOUS at 17:04

## 2022-06-17 RX ADMIN — PANTOPRAZOLE SODIUM 40 MG: 40 TABLET, DELAYED RELEASE ORAL at 08:38

## 2022-06-17 RX ADMIN — AMIODARONE HYDROCHLORIDE 150 MG: 50 INJECTION, SOLUTION INTRAVENOUS at 16:25

## 2022-06-17 RX ADMIN — BUSPIRONE HYDROCHLORIDE 15 MG: 5 TABLET ORAL at 13:54

## 2022-06-17 RX ADMIN — MUPIROCIN: 20 OINTMENT TOPICAL at 08:47

## 2022-06-17 RX ADMIN — PREGABALIN 50 MG: 50 CAPSULE ORAL at 13:54

## 2022-06-17 RX ADMIN — ENOXAPARIN SODIUM 40 MG: 100 INJECTION SUBCUTANEOUS at 08:37

## 2022-06-17 RX ADMIN — MUPIROCIN: 20 OINTMENT TOPICAL at 22:30

## 2022-06-17 RX ADMIN — BISACODYL 5 MG: 5 TABLET, COATED ORAL at 08:39

## 2022-06-17 RX ADMIN — TOPIRAMATE 100 MG: 100 TABLET, FILM COATED ORAL at 08:37

## 2022-06-17 RX ADMIN — POLYETHYLENE GLYCOL 3350 17 G: 17 POWDER, FOR SOLUTION ORAL at 08:37

## 2022-06-17 RX ADMIN — Medication 400 MG: at 08:38

## 2022-06-17 RX ADMIN — MAGNESIUM SULFATE HEPTAHYDRATE 2000 MG: 40 INJECTION, SOLUTION INTRAVENOUS at 17:04

## 2022-06-17 RX ADMIN — FOLIC ACID 1 MG: 1 TABLET ORAL at 08:39

## 2022-06-17 RX ADMIN — Medication 10 ML: at 22:28

## 2022-06-17 RX ADMIN — FERROUS SULFATE TAB 325 MG (65 MG ELEMENTAL FE) 325 MG: 325 (65 FE) TAB at 17:04

## 2022-06-17 RX ADMIN — SENNOSIDES AND DOCUSATE SODIUM 1 TABLET: 50; 8.6 TABLET ORAL at 22:19

## 2022-06-17 RX ADMIN — POTASSIUM CHLORIDE 60 MEQ: 20 TABLET, EXTENDED RELEASE ORAL at 09:06

## 2022-06-17 RX ADMIN — OXYCODONE HYDROCHLORIDE AND ACETAMINOPHEN 500 MG: 500 TABLET ORAL at 22:19

## 2022-06-17 RX ADMIN — OXYCODONE HYDROCHLORIDE AND ACETAMINOPHEN 500 MG: 500 TABLET ORAL at 08:38

## 2022-06-17 RX ADMIN — METOPROLOL TARTRATE 12.5 MG: 25 TABLET, FILM COATED ORAL at 08:39

## 2022-06-17 RX ADMIN — PREGABALIN 150 MG: 150 CAPSULE ORAL at 08:39

## 2022-06-17 RX ADMIN — SENNOSIDES AND DOCUSATE SODIUM 1 TABLET: 50; 8.6 TABLET ORAL at 08:39

## 2022-06-17 RX ADMIN — DULOXETINE HYDROCHLORIDE 30 MG: 30 CAPSULE, DELAYED RELEASE ORAL at 08:38

## 2022-06-17 RX ADMIN — KETOROLAC TROMETHAMINE 15 MG: 30 INJECTION, SOLUTION INTRAMUSCULAR; INTRAVENOUS at 18:33

## 2022-06-17 RX ADMIN — BUSPIRONE HYDROCHLORIDE 15 MG: 5 TABLET ORAL at 22:19

## 2022-06-17 RX ADMIN — FUROSEMIDE 20 MG: 10 INJECTION, SOLUTION INTRAMUSCULAR; INTRAVENOUS at 08:38

## 2022-06-17 RX ADMIN — CEFAZOLIN SODIUM 3000 MG: 10 INJECTION, POWDER, FOR SOLUTION INTRAVENOUS at 01:12

## 2022-06-17 RX ADMIN — TOPIRAMATE 50 MG: 25 TABLET, FILM COATED ORAL at 22:19

## 2022-06-17 RX ADMIN — Medication 10 ML: at 08:38

## 2022-06-17 RX ADMIN — ASPIRIN 81 MG: 81 TABLET, COATED ORAL at 08:39

## 2022-06-17 RX ADMIN — PREGABALIN 50 MG: 50 CAPSULE ORAL at 22:25

## 2022-06-17 ASSESSMENT — PAIN DESCRIPTION - DESCRIPTORS: DESCRIPTORS: ACHING;DULL;DISCOMFORT

## 2022-06-17 ASSESSMENT — PAIN SCALES - GENERAL
PAINLEVEL_OUTOF10: 0
PAINLEVEL_OUTOF10: 10
PAINLEVEL_OUTOF10: 0

## 2022-06-17 ASSESSMENT — PAIN DESCRIPTION - LOCATION: LOCATION: CHEST

## 2022-06-17 ASSESSMENT — PAIN DESCRIPTION - ORIENTATION: ORIENTATION: MID

## 2022-06-17 ASSESSMENT — PAIN - FUNCTIONAL ASSESSMENT: PAIN_FUNCTIONAL_ASSESSMENT: PREVENTS OR INTERFERES SOME ACTIVE ACTIVITIES AND ADLS

## 2022-06-17 NOTE — PROGRESS NOTES
Comprehensive Nutrition Assessment    Type and Reason for Visit:  Initial,Consult,Patient Education    Nutrition Recommendations/Plan:   1. Recommend and start Glucerna supplement BID and Don wound healing supplement BID to help meet increased nutritional needs from surgical wound healing. Malnutrition Assessment:  Malnutrition Status: At risk for malnutrition (Comment) (06/17/22 1306)    Context:  Acute Illness     Findings of the 6 clinical characteristics of malnutrition:  Energy Intake:  Mild decrease in energy intake (Comment) (x 3 days)  Weight Loss:  No significant weight loss     Body Fat Loss:  Unable to assess     Muscle Mass Loss:  Unable to assess    Fluid Accumulation:  No significant fluid accumulation     Strength:  Not Performed    Nutrition Assessment:    Patient off floor at this time ; s/p AVR/sternotomy 6/15 ; hx of CAD and DM ; adm w/ myxoma ; noted clinical indicators of mild oropharyngeal phase dysphagia per speech (recommending soft and bite sized diet w/ thin liquids) ;  will start ONS    Nutrition Related Findings:    I&Os WNL, 1+ edema, hypoactive BS, constipation, chest tubes x 2, obesity ; Wound Type: Surgical Incision (Incision x 1)       Current Nutrition Intake & Therapies:    Average Meal Intake: 26-50%     ADULT DIET; Regular; 4 carb choices (60 gm/meal); Low Fat/Low Chol/High Fiber/GRIS    Anthropometric Measures:  Height: 5' 8\" (172.7 cm)  Ideal Body Weight (IBW): 140 lbs (64 kg)       Current Body Weight: 339 lb (153.8 kg) (6/17, bedscale), 242.1 % IBW.  Weight Source: Bed Scale  Current BMI (kg/m2): 51.6  Usual Body Weight: 328 lb (148.8 kg) (3/8/22, actual)  % Weight Change (Calculated): 3.4                    BMI Categories: Obese Class 3 (BMI 40.0 or greater)    Estimated Daily Nutrient Needs:  Energy Requirements Based On: Formula  Weight Used for Energy Requirements: Current  Energy (kcal/day): 3224-4939 (REE 2124 x 1.2 SF)  Weight Used for Protein Requirements: Ideal  Protein (g/day): 130-140 (2.0-2.2g/kg IBW)  Method Used for Fluid Requirements: 1 ml/kcal  Fluid (ml/day): 1628-5921    Nutrition Diagnosis:   · Increased nutrient needs related to increase demand for energy/nutrients as evidenced by wounds (surgical)      Nutrition Interventions:   Food and/or Nutrient Delivery: Continue Current Diet,Start Oral Nutrition Supplement  Nutrition Education/Counseling: Education initiated  Coordination of Nutrition Care: Continue to monitor while inpatient,Speech Therapy,Swallow Evaluation       Goals:  Previous Goal Met: Progressing toward Goal(s)  Goals: PO intake 75% or greater,by next RD assessment       Nutrition Monitoring and Evaluation:   Behavioral-Environmental Outcomes: None Identified  Food/Nutrient Intake Outcomes: Food and Nutrient Intake,Supplement Intake  Physical Signs/Symptoms Outcomes: Biochemical Data,Chewing or Swallowing,GI Status,Fluid Status or Edema,Hemodynamic Status,Meal Time Behavior,Constipation,Nutrition Focused Physical Findings,Skin,Weight    Discharge Planning:     Too soon to determine     Mayelin Nunn RD, LD  Contact: [phone]

## 2022-06-17 NOTE — PROGRESS NOTES
06/16/22 2127   NIV Type   Skin Assessment Clean, dry, & intact   Skin Protection for O2 Device Yes   Orientation Middle   Location Nose   NIV Started/Stopped On   Equipment Type v60   Mode Bilevel   Mask Type Full face mask   Mask Size Medium   Settings/Measurements   PIP Observed 13 cm H20   IPAP 12 cmH20   CPAP/EPAP 5 cmH2O   Rate Ordered 14   Resp (!) 33   Insp Rise Time (%) 2 %   FiO2  50 %   I Time/ I Time % 0.9 s   Vt Exhaled 417 mL   Minute Volume 13.2 Liters   Mask Leak (lpm) 55 lpm   Comfort Level Fair   Using Accessory Muscles No   pt placed on bipap no problem. mepilex placed.

## 2022-06-17 NOTE — PROGRESS NOTES
SPEECH/LANGUAGE PATHOLOGY  CLINICAL ASSESSMENT OF SWALLOWING FUNCTION   and PLAN OF CARE    PATIENT NAME:  Gunnar Deshpande  (female)     MRN:  [id number]    :  1955  (79 y.o.)  STATUS:  Inpatient: Room 6504/6504-A    TODAY'S DATE:  2022  REFERRING PROVIDER:   CORA RussellCNP  SPECIFIC PROVIDER ORDER: SLP swallowing-dysphagia evaluation and treatment Date of order:  22  REASON FOR REFERRAL: to assess oropharyngeal function  EVALUATING THERAPIST: TONNY Cali                 RESULTS:    DYSPHAGIA DIAGNOSIS:   Clinical indicators of mild  oropharyngeal phase dysphagia       DIET RECOMMENDATIONS:  Soft and bite size consistency solids (IDDSI level 6) with  thin liquids (IDDSI level 0)- No straws     FEEDING RECOMMENDATIONS:     Assistance level:  Set-up is required for all oral intake      Compensatory strategies recommended: Small bites/sips, Alternate solids and liquids and No straw      Discussed recommendations with nursing and/or faxed report to referring provider: Yes    SPEECH THERAPY  PLAN OF CARE   The dysphagia POC is established based on physician order, dysphagia diagnosis and results of clinical assessment     Meal time assessment for 1-2 sessions to provide diet modification and compensatory strategy implementation due to staff report of dysphagia symptoms during meals and inconsistent episodes of clinical indicators of dysphagia during intake    Conditions Requiring Skilled Therapeutic Intervention for dysphagia:    Patient is performing below functional baseline d/t  current acute condition, Multiple diagnoses, multiple medications, and increased dependency upon caregivers.   Coughing during PO intake      Specific dysphagia interventions to include:     Compensatory strategy training   Trials of upgraded diet/liquid   Meal time assessment for 1-2 sessions to provide diet modification and compensatory strategy implementation due to staff report of dysphagia symptoms during meals and inconsistent episodes of clinical indicators of dysphagia during intake    Specific instructions for next treatment:  ongoing PO analysis to upgrade diet and evaluate tolerance of current PO recommendation and initiate instruction of compensatory strategies  Patient Treatment Goals:    Short Term Goals:  Pt will complete PO trials of upgraded diet textures with SLP only to determine the least restrictive PO diet to maintain adequate nutrition/hydration with no more than 1 overt s/s of pen/asp. Pt will participate in meal time assessment for 1-2 sessions to provide diet modification and compensatory strategy implementation due to staff report of dysphagia symptoms during meals and inconsistent episodes of clinical indicators of dysphagia during intake    Long Term Goals:   Pt will maintain adequate nutrition/hydration via PO intake of the least restrictive oral diet with implementation of safe swallow/ compensatory strategies and decrease signs/symptoms of aspiration to less than 1 x/day. Pt will improve oropharyngeal swallow function to ensure airway protection during PO intake to maintain adequate nutrition/hydration and decrease signs/symptoms of aspiration to less than 1 x/day. Patient/family Goal:    Did not state. Will further assess during treatment. 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]    VISIT DIAGNOSIS:      PATIENT REPORT/COMPLAINT: coughing with liquids by straw  RN cleared patient for participation in assessment     yes     PRIOR LEVEL OF SWALLOW FUNCTION:    PAST HISTORY OF DYSPHAGIA?: none reported    Home diet: Regular consistency solids (IDDSI level 7) with  thin liquids (IDDSI level 0)  Current Diet Order:  ADULT DIET; Regular; 4 carb choices (60 gm/meal);  Low Fat/Low Chol/High Fiber/GRIS    PROCEDURE:  Consistencies Administered During the Evaluation   Liquids: thin liquid   Solids:  pureed foods, soft solid foods and solid foods      Method of Intake:   cup, straw, spoon  Self fed, Fed by clinician      Position:   Seated, upright    CLINICAL ASSESSMENT:  Oral Stage:       Decreased mastication due to: lethargy and w/ dry solids only  Oral residuals were noted :  on the base of the tongue- mild residuals w/ dry solids only      Pharyngeal Stage:    Immediate wet cough was noted after presentation of thin liquid when fed by clinician on initial sip. Patient self-fed remainder of thin liquid trials w/ no further s/s of pen/aspiration noted. Cognition:   Follows 1-2 - step directions appropriate for this assessment and Confusion noted    Oral Peripheral Examination   Generalized oral weakness    Current Respiratory Status    7liters nasal cannula     Parameters of Speech Production  Respiration:  Adequate for speech production  Quality:   Within functional limits  Intensity: Within functional limits    Volitional Swallow: not able to elicit     Volitional Cough:   present     Pain: No pain reported. EDUCATION:   The Speech Language Pathologist (SLP) completed education regarding results of evaluation and that intervention is warranted at this time. Learner: Patient  Education: Reviewed results and recommendations of this evaluation  Evaluation of Education:  [employer] understanding    This plan may be re-evaluated and revised as warranted. Evaluation Time includes thorough review of current medical information, gathering information on past medical history/social history and prior level of function, completion of standardized testing/informal observation of tasks, assessment of data and education on plan of care and goals. [x]The admitting diagnosis and active problem list, have been reviewed prior to initiation of this evaluation.         ACTIVE PROBLEM LIST:   Patient Active Problem List   Diagnosis    Chronic back pain    Fibromyalgia    Overactive bladder    Depression    History of total knee arthroplasty    Nodule of left lung    History of colon polyps    Tobacco use    Chronic pain syndrome    Myofascial pain syndrome    Lumbar disc disorder    Lumbar spondylosis    Spinal stenosis of lumbar region without neurogenic claudication    Epigastric pain    Onychomycosis    Type II diabetes mellitus with peripheral circulatory disorder (HCC)    Venous insufficiency (chronic) (peripheral)    Tinea pedis of both feet    Hiatal hernia    Class 3 severe obesity due to excess calories with serious comorbidity and body mass index (BMI) of 50.0 to 59.9 in adult ([facility] [address]    Diabetes mellitus without complication (HCC)    Hypothyroidism    Sleep apnea    Uncomplicated asthma    Hyperlipidemia    Opioid use, unspecified with unspecified opioid-induced disorder    Stage 3a chronic kidney disease ([address]    LVH (left ventricular hypertrophy)    Chronic renal disease, stage III ([address] [333298]    Left atrial mass    Aortic regurgitation    S/P cardiac cath    Myxoma    Acute postoperative pulmonary insufficiency (HCC)    Acute post-operative pain    Type 2 diabetes mellitus without complication, without long-term current use of insulin ([facility] [address]         CPT code:  42615  bedside swallow michael Freeman M.S., CCC-SLP  Speech-Language Pathologist  [phone]

## 2022-06-17 NOTE — PROGRESS NOTES
POD#2 Awake, alert. Slow to respond. Will answer some questions after much time passes, will stare and not answer some questions as well. Denies CP, palpitations, SOB at rest, dizziness/lightheadedness. Vitals:    06/17/22 0323 06/17/22 0335 06/17/22 0615 06/17/22 0717   BP: (!) 120/44 (!) 129/58  (!) 155/63   Pulse: 70   68   Resp: (!) 31   18   Temp: 98.2 °F (36.8 °C)   98.4 °F (36.9 °C)   TempSrc: Temporal   Oral   SpO2: 98%  97% 97%   Weight:   (!) 339 lb 8 oz (154 kg)    Height:         O2: 7L/NC      Intake/Output Summary (Last 24 hours) at 6/17/2022 0719  Last data filed at 6/17/2022 0606  Gross per 24 hour   Intake --   Output 580 ml   Net -580 ml         +BM pre-op    UO: 200mL/8hr via straight cath  CT output: Left pleural: 20mL/8hr (70mL/24hrs)     Left pleural: 30mL/8hr (100mL/24hrs)      Recent Labs     06/15/22  1000 06/16/22  0400 06/17/22  0505   WBC 13.4* 11.6* 12.0*   HGB 12.1 10.9* 10.9*   HCT 38.9 34.7 36.2   PLT 90* 84* 72*      Recent Labs     06/15/22  1000 06/16/22  0400 06/17/22  0505   BUN 16 18 20   CREATININE 1.0 0.9 0.9         Telemetry: SR      PE  Cardiac: RRR  Lungs: decreased bases  Chest incision with intact SHAGUFTA DSD. Sternum stable. Prior chest tube site incisions C/D/I, no erythema with intact sutures. Chest tubes x 2 and Epicardial pacing wires present and secure. Abd: Soft, nontender, obese, +BS  Ext: Incisions C/D/I, approximated, no erythema, + edema           A/P: POD#2    1. LA myxoma, moderate AI  --Stable s/p Sternotomy/Resection of LA myxoma with repair of the septum and dome of the LA/Rigid internal fixation of the sternum with Natalie plates x 1/14 modifier on 6/15/2022  --Post op ULISES reveals nor residual myxoma, LVEF~65%, RVEF WNL  --Scr stable  --ASA/will add BB today to aid in maintain of SR and BP control post-operatively   --reinforce sternal precautions  --continue epicardial pacing wires  --chest tubes without significant output and without airleaks. Continue chest tubes today, will likely remove tomorrow. 2. Expected acute blood loss anemia secondary to open heart surgery  --stable      3. NSR  --initiate BB with hold parameters        4. Expected acute pulmonary insufficiency in the setting following surgery  --wean oxygen to keep SpO2 greater than or equal to 92%  --continue duonebs with ezpap  --encourage C&DB, SMI  --currently on 6L O2/NC  --weight up from baseline, initiate BID IV lasix      5. Acute Post Operative Pain on chronic pain   - Chronic back pain, fibromyalgia, spinal stenosis; on PRN Norco, lyrica, chlorzoxazone, topamax, Doxepin   --lethargic, slow to respond--stop all narcotics, ok to continue toradol and tylenol prn      6. Slow to respond  --stop all narcotics (she has not received any since 0400 on 6/16)  --consult neurology for evaluation      7. Thrombocytopenia  --no s/s active bleeding  --plt count 72K today  --continue lovenox unless plt count < 70K d/t current immobility  --continue ASA unless plt count < 50K      8. Urinary retention  --hx of overactive bladder--on detrol LA at home  --required straight cath x 1 overnight  --urethral catheter to be re-inserted this AM--will attempt voiding trial once more ambulatory      9. Constipation--expected delayed return of bowel function  --secondary to anesthesia, narcotics, decreased oral intake, and decreased physical activity   --no BM since prior to surgery  --Continue daily MOM/oral bisacodyl until +BM and senna-s as scheduled. --Will give daily suppository until +BM starting POD 3 if no result by then   --Encouraged continued increase in oral intake and activity. 10. Expected deconditioning in the setting following surgery  --Increase activity as tolerated  --PT/OT  --has not yet ambulated        11.  Hx of depression and anxiety  --on home rx of cymbalta and buspar      DVT prophylaxis:  --continue bilateral knee high CHINMA[employer] hose  --continue PCDs  --continue progressive ambulation  --continue lovenox for dvt prophylaxis and continue knee high CHINMAY hose/pcds/progressive ambulation      Dispo: home vs. Rehab pending progression in activity level        This patient's case and care plan was discussed with the attending surgeon

## 2022-06-17 NOTE — CARE COORDINATION
Attempted to meet with pt who is off the floor, but also has a telesitter. PT 6/24 not ambulating. Called and spoke with  Miladys Thao, who is not POA , according to Miladys Thao pt has an estranged son Eusebio Pandya and sister. Attempted to call Eusebio Pandya, [phone], left message for return call. 1:40pm pt back in room, met with pt who speaks very slow, slow to respond and stares into space most of conversation. Pt does know she is at [facility], knows name, year. Pt agreeable for this LSW to speak to Miladys Thao about some choices for [facility], then will verify with pt for final choices. Called Miladys Thao several times no answer and unable to leave message. Envelope and ambulance form in soft chart, exemption form started will need completed once accepting facility. Will need a COVID test at discharge. Michael Rivero, MSW, LSW

## 2022-06-17 NOTE — PROGRESS NOTES
Nutrition Education        Provided educational handouts and sample meal plans on cardiac/diabetic diet. Pt off floor at this time. Left materials at bedside. Will attempt to  patient at a later time.       Lyndsay Harmon RD, LD  Contact Number: [phone]

## 2022-06-17 NOTE — CONSULTS
Abdielfaraz Scott[address]eta[address]  Neurology Consult    Date:  6/17/2022  Patient Name:  Sharla Martines  YOB: 1955  MRN: [id number]     PCP:  GIUSEPPE Hardin CNP   Referring:  No ref. provider found      Chief Complaint: altered mental status    History obtained from: chart, staff    Assessment  Sharla Martines is a 79 y.o. female with confusion and somnolence following surgery for left atrial myxoma. Her exam is fairly non-focal when seen today other than her increased somnolence and inattention. I suspect this is most likely due to relatively high doses of several centrally acting medications in the post-operative period. Plan  · Continue buspirone and duloxetine at home doses for now  · Reduce Lyrica 150 TID to 50 TID  · Decrease topiramate 100 BID to 50 BID  · Hold doxepin and diphenhydramine (not given this admission yet)  · Can resume home doses of PGB and TPM if patient returns to baseline cognition  · Recommend MRI brain w/o contrast if mentation fails to improve        History of Present Illness:  Sharla Martines is a 79 y.o. right handed female presenting for evaluation of altered mental status. She was admitted on 6/15/23 for left atrial myxoma resection. Post-operatively she was noted to be somnolent and slow to respond. The patient herself is unable to provide any significant history about why she is currently in the hospital beyond her heart surgery. She states she is on Lyrica for fibromyalgia, and cannot state exactly what she takes topiramate for, but denies any history of seizures/epilepsy.       Review of Systems:  No headache, double vision reported    Unable to obtain review of symptoms due to altered mental status    Medical History:   Past Medical History:   Diagnosis Date    Anxiety     Arthralgia 10/8/2013    Arthritis     Bone avascular necrosis ([facility] [address] 3/23/2015    Breast lesion 12/1/2015    Chronic back pain     Chronic fatigue 10/8/2013    Daytime somnolence 10/8/2013    Depression     Fibromyalgia     Fracture, fibula     right    GERD (gastroesophageal reflux disease)     H/O cardiovascular stress test 01/29/2020    Lexiscan    Headache     History of blood transfusion     History of fractured kneecap     (R) 2 hair line fractures    History of total knee arthroplasty 3/19/2015    Hyperlipidemia     Hypertension     Morbid obesity with BMI of 45.0-49.9, adult ([address] 10/9/2015    Obesity     Osteoarthritis     Overactive bladder 1/22/2014    PONV (postoperative nausea and vomiting)     hx 40 yrs ago    Prolonged emergence from general anesthesia     Reflux     Thyroid disease         Surgical History:   Past Surgical History:   Procedure Laterality Date    AORTIC VALVE REPLACEMENT N/A 6/15/2022    STERNOTOMY, LEFT ATRIAL MYXOMA RESECTION, performed by Tiffany Fitzpatrick MD at [address] [facility]  05/23/2022    Dr Shahla Pedroza  2011    COLONOSCOPY  11/22/2016    Dr. Carmen Acosta N/A 12/8/2020    Buel Mustache performed by Jaspal Villanueva MD at [address] ([facility])  1991    benign reasons     JOINT REPLACEMENT Bilateral 2010    knees    LUMBAR FUSION      L3-5    ROTATOR CUFF REPAIR Left     left    TOTAL KNEE ARTHROPLASTY Bilateral 2010    did both at the same time    TRANSESOPHAGEAL ECHOCARDIOGRAM N/A 5/9/2022    TRANSESOPHAGEAL ECHOCARDIOGRAM performed by Nazia Barraza MD at [address] ENDOSCOPY N/A 9/29/2020    EGD BIOPSY performed by Jaspal Villanueva MD at [facility] ENDOSCOPY        Family History:   Family History   Problem Relation Age of Onset    High Blood Pressure Mother     Diabetes Mother     Heart Disease Mother     Cancer Mother     Diabetes Father     Heart Disease Father     High Blood Pressure Father     Cancer Other 50        breast    Depression Brother        Social History:  Social History     Tobacco Use    Smoking status: Current Every Day Smoker     Packs/day: 1.50     Years: 30.00     Pack years: 45.00     Types: Cigarettes    Smokeless tobacco: Never Used   Vaping Use    Vaping Use: Never used   Substance Use Topics    Alcohol use: No     Alcohol/week: 0.0 standard drinks    Drug use: No        Current Medications:      Current Facility-Administered Medications   Medication Dose Route Frequency Provider Last Rate Last Admin    metoprolol tartrate (LOPRESSOR) tablet 12.5 mg  12.5 mg Oral BID Shira Ebbing, APRN - CNP   12.5 mg at 06/17/22 0839    furosemide (LASIX) injection 20 mg  20 mg IntraVENous BID Shira Ebbing, APRN - CNP   20 mg at 06/17/22 3021    cyanocobalamin injection 1,000 mcg  1,000 mcg IntraMUSCular Once Aren Sal DO        [START ON 6/18/2022] vitamin B-12 (CYANOCOBALAMIN) tablet 1,000 mcg  1,000 mcg Oral Daily Aren Sal DO        enoxaparin (LOVENOX) injection 40 mg  40 mg SubCUTAneous Daily Shira Ebbing, APRN - CNP   40 mg at 06/17/22 0837    aspirin EC tablet 81 mg  81 mg Oral Daily Shira Ebbing, APRN - CNP   81 mg at 06/17/22 0839    acetaminophen (TYLENOL) tablet 650 mg  650 mg Oral Q4H PRN Kathy Ann, APRN - CNP        bisacodyl (DULCOLAX) EC tablet 5 mg  5 mg Oral Daily Kathy Ann, APRN - CNP   5 mg at 06/17/22 0839    sennosides-docusate sodium (SENOKOT-S) 8.6-50 MG tablet 1 tablet  1 tablet Oral BID Madina Briseno, APRN - CNP   1 tablet at 06/17/22 0839    polyethylene glycol (GLYCOLAX) packet 17 g  17 g Oral Daily Kathy Ann, APRN - CNP   17 g at 06/17/22 0837    diphenhydrAMINE (BENADRYL) tablet 25 mg  25 mg Oral Q8H PRN Kathy Ann, APRN - CNP        ferrous sulfate (IRON 325) tablet 325 mg  325 mg Oral BID WC Kathy Ann, APRN - CNP   325 mg at 06/17/22 0838    ascorbic acid (VITAMIN C) tablet 500 mg  500 mg Oral BID Carmen Jefferson, APRN - CNP   500 mg at 13/55/19 6195    folic acid (FOLVITE) tablet 1 mg  1 mg Oral Daily Sherkathrin Jefferson, APRN - CNP   1 mg at 06/17/22 0839    potassium chloride (KLOR-CON M) extended release tablet 20 mEq  20 mEq Oral PRN Sherle Jefferson, APRN - CNP   60 mEq at 06/17/22 0906    insulin lispro (HUMALOG) injection vial 0-6 Units  0-6 Units SubCUTAneous TID WC Kathy Ann, APRN - CNP   1 Units at 06/16/22 1826    insulin lispro (HUMALOG) injection vial 0-3 Units  0-3 Units SubCUTAneous Nightly Kathy Ann, APRN - CNP        bisacodyl (DULCOLAX) suppository 10 mg  10 mg Rectal Daily PRN Kathy Ann, APRN - CNP        amiodarone (CORDARONE) tablet 400 mg  400 mg Oral PRN Kathy Ann, APRN - CNP        amiodarone (CORDARONE) 150 mg in dextrose 5 % 100 mL bolus  150 mg IntraVENous PRN Kathy Ann, APRN - CNP        magnesium sulfate 2000 mg in 50 mL IVPB premix  2,000 mg IntraVENous PRN Kathy Ann, APRN - CNP        sodium chloride (OCEAN, BABY AYR) 0.65 % nasal spray 1 spray  1 spray Each Nostril PRN Kathy Ann, APRN - CNP        trimethobenzamide (TIGAN) injection 200 mg  200 mg IntraMUSCular Q6H PRN Kathy Ann, APRN - CNP        glucose chewable tablet 16 g  4 tablet Oral PRN Kathy Ann, APRN - CNP        dextrose bolus 10% 125 mL  125 mL IntraVENous PRN Kathy Ann, APRN - CNP        Or    dextrose bolus 10% 250 mL  250 mL IntraVENous PRN Kathy Ann, APRN - CNP        glucagon (rDNA) injection 1 mg  1 mg IntraMUSCular PRN Kathy Ann, APRN - CNP        dextrose 5 % solution  100 mL/hr IntraVENous PRN Kathy Ann, APRN - CNP        ketorolac (TORADOL) injection 15 mg  15 mg IntraVENous Q6H PRN Kathy Ann, APRN - CNP        busPIRone (BUSPAR) tablet 15 mg  15 mg Oral TID Carmen Jefferson, APRN - CNP   15 mg at 06/17/22 0837    doxepin (SINEQUAN) capsule 10 mg  10 mg Oral Nightly PRN Carmen Chou APRN - CNP        DULoxetine (CYMBALTA) extended release capsule 30 mg  30 mg Oral Daily GIUSEPPE Rodriguez CNP   30 mg at 06/17/22 0838    pregabalin (LYRICA) capsule 150 mg  150 mg Oral TID GIUSEPPE Yousif CNP   150 mg at 06/17/22 0839    topiramate (TOPAMAX) tablet 100 mg  100 mg Oral BID GIUSEPPE Yousif CNP   100 mg at 06/17/22 0837    sodium chloride flush 0.9 % injection 5-40 mL  5-40 mL IntraVENous 2 times per day GIUSEPPE Yousif CNP   10 mL at 06/17/22 0838    sodium chloride flush 0.9 % injection 5-40 mL  5-40 mL IntraVENous PRN GIUSEPPE Rodriguez CNP        ondansetron (ZOFRAN-ODT) disintegrating tablet 4 mg  4 mg Oral Q8H PRN GIUSEPPE Rodriguez CNP        Or    ondansetron (ZOFRAN) injection 4 mg  4 mg IntraVENous Q6H PRN GIUSEPPE Rodriguez CNP        magnesium oxide (MAG-OX) tablet 400 mg  400 mg Oral Daily GIUSEPPE Rodriguez - CNP   400 mg at 06/17/22 0838    mupirocin (BACTROBAN) 2 % ointment   Nasal BID GIUSEPPE Yousif CNP   Given at 06/17/22 0847    pantoprazole (PROTONIX) tablet 40 mg  40 mg Oral Daily GIUSEPPE Rodriguez - CNP   40 mg at 06/17/22 0838    ipratropium-albuterol (DUONEB) nebulizer solution 1 ampule  1 ampule Inhalation Q4H WA GIUSEPPE Rodriguez CNP   1 ampule at 06/17/22 9255        Allergies: Allergies   Allergen Reactions    Carafate [Sucralfate]      Patient unable to tolerate. Unable to recall her reaction.     Glucophage [Metformin Hcl]      Severe abdominal pain, constipation      Mobic [Meloxicam] Other (See Comments)     Abdominal pain    Trazodone And Nefazodone      Unable to tolerate, patient reports made her physically ill with abdominal pain      Ultram [Tramadol]      Abdominal pain    Nickel Itching and Rash        Physical Examination  Vitals   Vitals:    06/17/22 0615 06/17/22 0717 06/17/22 1100 06/17/22 1301   BP:  (!) 155/63 (!) 100/55    Pulse:  68 69    Resp:  18 18    Temp:  98.4 °F (36.9 °C) 98.6 °F (37 °C)    TempSrc:  Oral Oral CREATININE  --   --  0.9   GLUCOSE  --   --  131*   CALCIUM  --   --  8.3*   WBC  --   --  12.0*   RBC  --   --  3.78   HGB  --   --  10.9*   HCT  --   --  36.2   MCV  --   --  95.8   MCH  --   --  28.8   MCHC  --   --  30.1*   RDW  --   --  14.7   PLT  --   --  72*   MPV  --   --  12.5*   PH 7.405  --   --    PO2 69.7*  --   --    PCO2 31.7*  --   --    HCO3 19.4*  --   --    BE -4.4*  --   --    O2SAT 93.7  --   --     < > = values in this interval not displayed. Imaging  XR CHEST PORTABLE   Final Result   Stable chest over the past 24 hours post open heart surgery. XR CHEST PORTABLE   Final Result   No pneumothorax identified         XR CHEST PORTABLE   Final Result   1. Recent cardiac surgery and support tubes and devices appear in   appropriate position. 2.  Left apical small pneumothorax measuring 10% or less. Left basilar   atelectasis.          CT HEAD WO CONTRAST    (Results Pending)               Electronically signed by Robin Arreola DO on 6/17/2022 at 1:20 PM

## 2022-06-17 NOTE — PROGRESS NOTES
SPEECH LANGUAGE PATHOLOGY  DAILY PROGRESS NOTE        PATIENT NAME:  Gerda Haas      :  1955          TODAY'S DATE:  2022 ROOM:  [address] [place], [place] [id number]    Patient seen for skilled dysphagia tx. SLP educated patient on recommendation for soft/bite sized diet w/ thin liquids by cup only, no straws recommended. SLP reviewed recommended compensatory swallow strategies including small bites/sips, slow rate, alt solids/liquids, and no straws. Patient verbalized understanding and agreement. Will cont w/ POC.      CPT code(s) 97331  dysphagia tx  Total minutes :  10 minutes

## 2022-06-17 NOTE — PLAN OF CARE
Problem: Discharge Planning  Goal: Discharge to home or other facility with appropriate resources  Outcome: Progressing     Problem: Chronic Conditions and Co-morbidities  Goal: Patient's chronic conditions and co-morbidity symptoms are monitored and maintained or improved  Outcome: Progressing     Problem: Cardiovascular - Adult  Goal: Maintains optimal cardiac output and hemodynamic stability  Outcome: Progressing  Goal: Absence of cardiac dysrhythmias or at baseline  Outcome: Progressing     Problem: Respiratory - Adult  Goal: Achieves optimal ventilation and oxygenation  Outcome: Progressing     Problem: Genitourinary - Adult  Goal: Urinary catheter remains patent  Outcome: Progressing     Problem: Skin/Tissue Integrity - Adult  Goal: Skin integrity remains intact  Outcome: Progressing  Goal: Incisions, wounds, or drain sites healing without S/S of infection  Outcome: Progressing     Problem: Pain  Goal: Verbalizes/displays adequate comfort level or baseline comfort level  Outcome: Progressing     Problem: Safety - Adult  Goal: Free from fall injury  Outcome: Progressing     Problem: Skin/Tissue Integrity  Goal: Absence of new skin breakdown  Description: 1. Monitor for areas of redness and/or skin breakdown  2. Assess vascular access sites hourly  3. Every 4-6 hours minimum:  Change oxygen saturation probe site  4. Every 4-6 hours:  If on nasal continuous positive airway pressure, respiratory therapy assess nares and determine need for appliance change or resting period.   Outcome: Progressing     Problem: ABCDS Injury Assessment  Goal: Absence of physical injury  Outcome: Progressing

## 2022-06-17 NOTE — PATIENT CARE CONFERENCE
P Quality Flow/Interdisciplinary Rounds Progress Note        Quality Flow Rounds held on June 17, 2022    Disciplines Attending:  Bedside Nurse, ,  and Nursing Unit Leadership    Trudy Zaragoza was admitted on 6/15/2022  5:32 AM    Anticipated Discharge Date:  Expected Discharge Date: 06/19/22    Disposition:    Gerardo Score:  Gerardo Scale Score: 19    Readmission Risk              Risk of Unplanned Readmission:  15           Discussed patient goal for the day, patient clinical progression, and barriers to discharge.   The following Goal(s) of the Day/Commitment(s) have been identified:  Diagnostics - Report Results      Danelle Mccabe RN  June 17, 2022

## 2022-06-18 PROBLEM — T88.7XXA MEDICATION SIDE EFFECT: Status: ACTIVE | Noted: 2022-06-18

## 2022-06-18 PROBLEM — R41.82 AMS (ALTERED MENTAL STATUS): Status: ACTIVE | Noted: 2022-06-18

## 2022-06-18 LAB
ANION GAP SERPL CALCULATED.3IONS-SCNC: 9 MMOL/L (ref 7–16)
BUN BLDV-MCNC: 20 MG/DL (ref 6–23)
CALCIUM SERPL-MCNC: 7.9 MG/DL (ref 8.6–10.2)
CHLORIDE BLD-SCNC: 111 MMOL/L (ref 98–107)
CO2: 20 MMOL/L (ref 22–29)
CREAT SERPL-MCNC: 0.9 MG/DL (ref 0.5–1)
GFR AFRICAN AMERICAN: >60
GFR NON-AFRICAN AMERICAN: >60 ML/MIN/1.73
GLUCOSE BLD-MCNC: 139 MG/DL (ref 74–99)
HCT VFR BLD CALC: 31.5 % (ref 34–48)
HEMOGLOBIN: 9.7 G/DL (ref 11.5–15.5)
MAGNESIUM: 2.6 MG/DL (ref 1.6–2.6)
MCH RBC QN AUTO: 29 PG (ref 26–35)
MCHC RBC AUTO-ENTMCNC: 30.8 % (ref 32–34.5)
MCV RBC AUTO: 94 FL (ref 80–99.9)
METER GLUCOSE: 100 MG/DL (ref 74–99)
METER GLUCOSE: 110 MG/DL (ref 74–99)
METER GLUCOSE: 114 MG/DL (ref 74–99)
METER GLUCOSE: 97 MG/DL (ref 74–99)
PDW BLD-RTO: 14.5 FL (ref 11.5–15)
PLATELET # BLD: 76 E9/L (ref 130–450)
PLATELET CONFIRMATION: NORMAL
PMV BLD AUTO: 13 FL (ref 7–12)
POTASSIUM SERPL-SCNC: 3.8 MMOL/L (ref 3.5–5)
RBC # BLD: 3.35 E12/L (ref 3.5–5.5)
SODIUM BLD-SCNC: 140 MMOL/L (ref 132–146)
WBC # BLD: 10.3 E9/L (ref 4.5–11.5)

## 2022-06-18 PROCEDURE — 6370000000 HC RX 637 (ALT 250 FOR IP): Performed by: PSYCHIATRY & NEUROLOGY

## 2022-06-18 PROCEDURE — 6370000000 HC RX 637 (ALT 250 FOR IP): Performed by: NURSE PRACTITIONER

## 2022-06-18 PROCEDURE — 6360000002 HC RX W HCPCS: Performed by: NURSE PRACTITIONER

## 2022-06-18 PROCEDURE — 36415 COLL VENOUS BLD VENIPUNCTURE: CPT

## 2022-06-18 PROCEDURE — 2580000003 HC RX 258: Performed by: NURSE PRACTITIONER

## 2022-06-18 PROCEDURE — 85027 COMPLETE CBC AUTOMATED: CPT

## 2022-06-18 PROCEDURE — 82962 GLUCOSE BLOOD TEST: CPT

## 2022-06-18 PROCEDURE — 80048 BASIC METABOLIC PNL TOTAL CA: CPT

## 2022-06-18 PROCEDURE — 94660 CPAP INITIATION&MGMT: CPT

## 2022-06-18 PROCEDURE — 6370000000 HC RX 637 (ALT 250 FOR IP): Performed by: PHYSICIAN ASSISTANT

## 2022-06-18 PROCEDURE — 94640 AIRWAY INHALATION TREATMENT: CPT

## 2022-06-18 PROCEDURE — 93798 PHYS/QHP OP CAR RHAB W/ECG: CPT

## 2022-06-18 PROCEDURE — 2140000000 HC CCU INTERMEDIATE R&B

## 2022-06-18 PROCEDURE — 83735 ASSAY OF MAGNESIUM: CPT

## 2022-06-18 PROCEDURE — 2700000000 HC OXYGEN THERAPY PER DAY

## 2022-06-18 PROCEDURE — 99233 SBSQ HOSP IP/OBS HIGH 50: CPT | Performed by: NURSE PRACTITIONER

## 2022-06-18 RX ORDER — AMIODARONE HYDROCHLORIDE 200 MG/1
400 TABLET ORAL 3 TIMES DAILY
Status: DISCONTINUED | OUTPATIENT
Start: 2022-06-18 | End: 2022-06-20 | Stop reason: HOSPADM

## 2022-06-18 RX ORDER — CETIRIZINE HYDROCHLORIDE 10 MG/1
10 TABLET ORAL DAILY
Status: DISCONTINUED | OUTPATIENT
Start: 2022-06-18 | End: 2022-06-20 | Stop reason: HOSPADM

## 2022-06-18 RX ADMIN — ASPIRIN 81 MG: 81 TABLET, COATED ORAL at 08:36

## 2022-06-18 RX ADMIN — KETOROLAC TROMETHAMINE 15 MG: 30 INJECTION, SOLUTION INTRAMUSCULAR; INTRAVENOUS at 22:45

## 2022-06-18 RX ADMIN — Medication 400 MG: at 08:38

## 2022-06-18 RX ADMIN — OXYCODONE HYDROCHLORIDE AND ACETAMINOPHEN 500 MG: 500 TABLET ORAL at 08:36

## 2022-06-18 RX ADMIN — BUSPIRONE HYDROCHLORIDE 15 MG: 5 TABLET ORAL at 14:14

## 2022-06-18 RX ADMIN — OXYCODONE HYDROCHLORIDE AND ACETAMINOPHEN 500 MG: 500 TABLET ORAL at 21:23

## 2022-06-18 RX ADMIN — PREGABALIN 50 MG: 50 CAPSULE ORAL at 14:14

## 2022-06-18 RX ADMIN — DULOXETINE HYDROCHLORIDE 30 MG: 30 CAPSULE, DELAYED RELEASE ORAL at 08:34

## 2022-06-18 RX ADMIN — MUPIROCIN: 20 OINTMENT TOPICAL at 08:59

## 2022-06-18 RX ADMIN — SENNOSIDES AND DOCUSATE SODIUM 1 TABLET: 50; 8.6 TABLET ORAL at 08:36

## 2022-06-18 RX ADMIN — SODIUM CHLORIDE, PRESERVATIVE FREE 10 ML: 5 INJECTION INTRAVENOUS at 02:48

## 2022-06-18 RX ADMIN — FOLIC ACID 1 MG: 1 TABLET ORAL at 08:35

## 2022-06-18 RX ADMIN — KETOROLAC TROMETHAMINE 15 MG: 30 INJECTION, SOLUTION INTRAMUSCULAR; INTRAVENOUS at 15:32

## 2022-06-18 RX ADMIN — SENNOSIDES AND DOCUSATE SODIUM 1 TABLET: 50; 8.6 TABLET ORAL at 21:23

## 2022-06-18 RX ADMIN — METOPROLOL TARTRATE 12.5 MG: 25 TABLET, FILM COATED ORAL at 21:23

## 2022-06-18 RX ADMIN — KETOROLAC TROMETHAMINE 15 MG: 30 INJECTION, SOLUTION INTRAMUSCULAR; INTRAVENOUS at 08:36

## 2022-06-18 RX ADMIN — KETOROLAC TROMETHAMINE 15 MG: 30 INJECTION, SOLUTION INTRAMUSCULAR; INTRAVENOUS at 02:47

## 2022-06-18 RX ADMIN — BUSPIRONE HYDROCHLORIDE 15 MG: 5 TABLET ORAL at 21:24

## 2022-06-18 RX ADMIN — TOPIRAMATE 50 MG: 25 TABLET, FILM COATED ORAL at 21:24

## 2022-06-18 RX ADMIN — BISACODYL 5 MG: 5 TABLET, COATED ORAL at 08:37

## 2022-06-18 RX ADMIN — IPRATROPIUM BROMIDE AND ALBUTEROL SULFATE 1 AMPULE: .5; 2.5 SOLUTION RESPIRATORY (INHALATION) at 09:00

## 2022-06-18 RX ADMIN — TOPIRAMATE 50 MG: 25 TABLET, FILM COATED ORAL at 08:34

## 2022-06-18 RX ADMIN — PANTOPRAZOLE SODIUM 40 MG: 40 TABLET, DELAYED RELEASE ORAL at 08:37

## 2022-06-18 RX ADMIN — BUSPIRONE HYDROCHLORIDE 15 MG: 5 TABLET ORAL at 08:34

## 2022-06-18 RX ADMIN — ENOXAPARIN SODIUM 40 MG: 100 INJECTION SUBCUTANEOUS at 08:34

## 2022-06-18 RX ADMIN — FUROSEMIDE 20 MG: 10 INJECTION, SOLUTION INTRAMUSCULAR; INTRAVENOUS at 17:11

## 2022-06-18 RX ADMIN — PREGABALIN 50 MG: 50 CAPSULE ORAL at 09:15

## 2022-06-18 RX ADMIN — FERROUS SULFATE TAB 325 MG (65 MG ELEMENTAL FE) 325 MG: 325 (65 FE) TAB at 08:36

## 2022-06-18 RX ADMIN — ACETAMINOPHEN 650 MG: 325 TABLET, FILM COATED ORAL at 18:58

## 2022-06-18 RX ADMIN — MUPIROCIN: 20 OINTMENT TOPICAL at 21:15

## 2022-06-18 RX ADMIN — AMIODARONE HYDROCHLORIDE 400 MG: 200 TABLET ORAL at 18:13

## 2022-06-18 RX ADMIN — AMIODARONE HYDROCHLORIDE 400 MG: 200 TABLET ORAL at 21:23

## 2022-06-18 RX ADMIN — ACETAMINOPHEN 650 MG: 325 TABLET, FILM COATED ORAL at 05:21

## 2022-06-18 RX ADMIN — FUROSEMIDE 20 MG: 10 INJECTION, SOLUTION INTRAMUSCULAR; INTRAVENOUS at 08:39

## 2022-06-18 RX ADMIN — Medication 10 ML: at 08:33

## 2022-06-18 RX ADMIN — CETIRIZINE HYDROCHLORIDE 10 MG: 10 TABLET, FILM COATED ORAL at 11:44

## 2022-06-18 RX ADMIN — Medication 1000 MCG: at 08:34

## 2022-06-18 RX ADMIN — PREGABALIN 50 MG: 50 CAPSULE ORAL at 21:23

## 2022-06-18 RX ADMIN — Medication 10 ML: at 21:15

## 2022-06-18 RX ADMIN — METOPROLOL TARTRATE 12.5 MG: 25 TABLET, FILM COATED ORAL at 08:35

## 2022-06-18 ASSESSMENT — PAIN DESCRIPTION - ONSET
ONSET: PROGRESSIVE
ONSET: PROGRESSIVE

## 2022-06-18 ASSESSMENT — PAIN DESCRIPTION - PAIN TYPE
TYPE: ACUTE PAIN
TYPE: ACUTE PAIN

## 2022-06-18 ASSESSMENT — PAIN DESCRIPTION - LOCATION
LOCATION: BUTTOCKS;BACK
LOCATION: STERNUM
LOCATION: CHEST
LOCATION: CHEST
LOCATION: STERNUM

## 2022-06-18 ASSESSMENT — PAIN - FUNCTIONAL ASSESSMENT
PAIN_FUNCTIONAL_ASSESSMENT: ACTIVITIES ARE NOT PREVENTED

## 2022-06-18 ASSESSMENT — PAIN DESCRIPTION - FREQUENCY
FREQUENCY: INTERMITTENT
FREQUENCY: INTERMITTENT

## 2022-06-18 ASSESSMENT — PAIN SCALES - GENERAL
PAINLEVEL_OUTOF10: 10
PAINLEVEL_OUTOF10: 8
PAINLEVEL_OUTOF10: 7
PAINLEVEL_OUTOF10: 8

## 2022-06-18 ASSESSMENT — PAIN DESCRIPTION - DESCRIPTORS
DESCRIPTORS: ACHING;DISCOMFORT;SORE
DESCRIPTORS: ACHING;TIGHTNESS

## 2022-06-18 ASSESSMENT — PAIN DESCRIPTION - ORIENTATION
ORIENTATION: MID
ORIENTATION: MID

## 2022-06-18 NOTE — PROGRESS NOTES
POD#3 Awake, alert. No complaints. Denies CP, palpitations, SOB at rest, dizziness/lightheadedness. Patient's responsiveness and demeanor has exponentially changed since yesterday. She responds to all questions and is out of bed in the chair. Vitals:    06/17/22 2312 06/18/22 0251 06/18/22 0325 06/18/22 0746   BP:  (!) 122/58  110/60   Pulse: 64 64  68   Resp: 20 18  16   Temp: 96.9 °F (36.1 °C) 96.8 °F (36 °C)  97 °F (36.1 °C)   TempSrc: Temporal Temporal  Temporal   SpO2: 99% 94%  100%   Weight:   (!) 340 lb 12.8 oz (154.6 kg)    Height:         O2: 4L/NC      Intake/Output Summary (Last 24 hours) at 6/18/2022 0954  Last data filed at 6/18/2022 0655  Gross per 24 hour   Intake --   Output 3660 ml   Net -3660 ml       +BM pre-op    UO: 1000mL/8hr   CT output: Left Pleural: 20mL/8hr (40mL/24hrs); Right pleural 95ml/8hrs (145ml/24hrs)      Recent Labs     06/16/22  0400 06/17/22  0505 06/18/22  0443   WBC 11.6* 12.0* 10.3   HGB 10.9* 10.9* 9.7*   HCT 34.7 36.2 31.5*   PLT 84* 72* 76*      Recent Labs     06/16/22  0400 06/17/22  0505 06/18/22  0443   BUN 18 20 20   CREATININE 0.9 0.9 0.9       Telemetry: SR      PE  Cardiac: RRR  Lungs: decreased bases  Chest incision with intact SHAGUFTA DSD. Sternum stable. Prior chest tube site incisions C/D/I, no erythema with intact sutures. Chest tubes x 2 and Epicardial pacing wires present and secure. Abd: Soft, nontender, +BS  Ext: VASQUEZ, + edema; groin suture in place         A/P: POD#3     1. LA myxoma, moderate AI  --Stable s/p Sternotomy/Resection of LA myxoma with repair of the septum and dome of the LA/Rigid internal fixation of the sternum with Natalie plates x 1/38 modifier on 6/15/2022  --Post op ULISES reveals nor residual myxoma, LVEF~65%, RVEF WNL  --Scr stable 0.9  --ASA/BB  --reinforce sternal precautions  --continue epicardial pacing wires  --chest tubes without significant output and without airleaks.  Remove left pleural today.  Keep right pleural. Chest tube removed without difficulty. Patient tolerated well.       2. Expected acute blood loss anemia secondary to open heart surgery  --stable; hgb 9.7        3. NSR  --initiate BB with hold parameters        4. Expected acute pulmonary insufficiency in the setting following surgery  --wean oxygen to keep SpO2 greater than or equal to 92%  --continue duonebs with ezpap  --encourage C&DB, SMI  --currently on 4L O2/NC  --weight up from baseline, continue BID IV lasix        5. Acute Post Operative Pain on chronic pain   --Chronic back pain, fibromyalgia, spinal stenosis; on PRN Norco, lyrica, chlorzoxazone, topamax, Doxepin   --Neuro consulted - appreciate mediation adjustments   --6/17lethargic, slow to respond--stop all narcotics, ok to continue toradol and tylenol prn - neuro consulted   --6/18 patient OOB in chair, responding to all questions appropriately - continue current regimen of meds        6. Slow to respond  --stop all narcotics (she has not received any since 0400 on 6/16)  --consult neurology for evaluation  --Much improved today (see #5)        7. Thrombocytopenia  --no s/s active bleeding  --plt count 76K today  --continue lovenox unless plt count < 70K d/t current immobility  --continue ASA unless plt count < 50K        8. Urinary retention  --hx of overactive bladder--on detrol LA at home  --required straight cath x 1 overnight  --urethral catheter to be re-inserted this AM--will attempt voiding trial once more ambulatory        9. Constipation--expected delayed return of bowel function  --secondary to anesthesia, narcotics, decreased oral intake, and decreased physical activity   --no BM since prior to surgery  --Continue daily MOM/oral bisacodyl until +BM and senna-s as scheduled. --Will give daily suppository until +BM starting POD 3 if no result by then   --Encouraged continued increase in oral intake and activity.          10.  Expected deconditioning in the setting following surgery  --Increase

## 2022-06-18 NOTE — PROGRESS NOTES
Edwin Butt is a 79 y.o. 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 CT head was unremarkable. She is on high doses of Lyrica and topiramate at home-as well as other psychotropic agents--for her underlying pain and mental health issues. HPI:  She is awake and oriented today with no complaints. She received received 2 doses of oxycodone overnight for pain. Lyrica and topiramate were reduced yesterday and doxepin and diphenhydramine were placed on hold. She was agitated yesterday afternoon trying to get out of bed, but fine overnight and today. The nurse corroborates this. There is no family present she is medically stable.     No chest pain or palpitations  No SOB  No vertigo, lightheadedness or loss of consciousness  No falls, tripping or stumbling  No incontinence of bowels or bladder  No itching or bruising appreciated  No numbness, tingling or focal arm/leg weakness  No speech or swallowing troubles    ROS otherwise negative     Current Facility-Administered Medications   Medication Dose Route Frequency Provider Last Rate Last Admin    metoprolol tartrate (LOPRESSOR) tablet 12.5 mg  12.5 mg Oral BID GIUSEPPE Tairq CNP   12.5 mg at 06/18/22 0835    furosemide (LASIX) injection 20 mg  20 mg IntraVENous BID GIUSEPPE Tariq CNP   20 mg at 06/18/22 0839    vitamin B-12 (CYANOCOBALAMIN) tablet 1,000 mcg  1,000 mcg Oral Daily Gaby Ahle, DO   1,000 mcg at 06/18/22 0834    pregabalin (LYRICA) capsule 50 mg  50 mg Oral TID Gaby Ahle, DO   50 mg at 06/18/22 0915    topiramate (TOPAMAX) tablet 50 mg  50 mg Oral BID Gaby Ahle, DO   50 mg at 06/18/22 0834    enoxaparin (LOVENOX) injection 40 mg  40 mg SubCUTAneous Daily Devika Martin, APRN - CNP   40 mg at 06/18/22 0834    aspirin EC tablet 81 mg  81 mg Oral Daily Devika Kanenstein, APRN - CNP   81 mg at 06/18/22 0836    acetaminophen (TYLENOL) tablet 650 mg  650 mg Oral Q4H PRN Ivan Guajardos, APRN - CNP   650 mg at 06/18/22 0521    bisacodyl (DULCOLAX) EC tablet 5 mg  5 mg Oral Daily Ivan Ahsan, APRN - CNP   5 mg at 06/18/22 0837    sennosides-docusate sodium (SENOKOT-S) 8.6-50 MG tablet 1 tablet  1 tablet Oral BID Ivan Ahsan, APRN - CNP   1 tablet at 06/18/22 0836    polyethylene glycol (GLYCOLAX) packet 17 g  17 g Oral Daily Kathy Ann, APRN - CNP   17 g at 06/17/22 0837    [Held by provider] diphenhydrAMINE (BENADRYL) tablet 25 mg  25 mg Oral Q8H PRN Ivan Ahsan, APRN - CNP        ferrous sulfate (IRON 325) tablet 325 mg  325 mg Oral BID  Kathy Ann, APRN - CNP   325 mg at 06/18/22 0836    ascorbic acid (VITAMIN C) tablet 500 mg  500 mg Oral BID Ivan Ahsan, APRN - CNP   500 mg at 68/11/80 6859    folic acid (FOLVITE) tablet 1 mg  1 mg Oral Daily Ivan Ahsan, APRN - CNP   1 mg at 06/18/22 0835    potassium chloride (KLOR-CON M) extended release tablet 20 mEq  20 mEq Oral PRN Ivan Ahsan, APRN - CNP   60 mEq at 06/17/22 0906    insulin lispro (HUMALOG) injection vial 0-6 Units  0-6 Units SubCUTAneous TID  Kathy Ann, APRN - CNP   1 Units at 06/17/22 1704    insulin lispro (HUMALOG) injection vial 0-3 Units  0-3 Units SubCUTAneous Nightly Kathy Ann, APRN - CNP        bisacodyl (DULCOLAX) suppository 10 mg  10 mg Rectal Daily PRN Ivan Ahsan, APRN - CNP        amiodarone (CORDARONE) tablet 400 mg  400 mg Oral PRN Kathy Ann, APRN - CNP        amiodarone (CORDARONE) 150 mg in dextrose 5 % 100 mL bolus  150 mg IntraVENous PRN Ivan Foreman, APRN - CNP   Stopped at 06/17/22 1635    magnesium sulfate 2000 mg in 50 mL IVPB premix  2,000 mg IntraVENous PRN Ivan Foreman, APRN - CNP   Stopped at 06/17/22 1900    sodium chloride (OCEAN, BABY AYR) 0.65 % nasal spray 1 spray  1 spray Each Nostril PRN Kathy Ann, APRN - CNP        trimethobenzamide (TIGAN) injection 200 mg  200 mg IntraMUSCular Q6H PRN Kathy Ann, APRN - CNP        glucose chewable tablet 16 g  4 tablet Oral PRN Kathy Ann, APRN - CNP        dextrose bolus 10% 125 mL  125 mL IntraVENous PRN Kathy Ann, APRN - CNP        Or    dextrose bolus 10% 250 mL  250 mL IntraVENous PRN Kathy Ann, APRN - CNP        glucagon (rDNA) injection 1 mg  1 mg IntraMUSCular PRN Kathy Ann, APRN - CNP        dextrose 5 % solution  100 mL/hr IntraVENous PRN Kathy Ann, APRN - CNP        ketorolac (TORADOL) injection 15 mg  15 mg IntraVENous Q6H PRN Kathy Ann, APRN - CNP   15 mg at 06/18/22 0836    busPIRone (BUSPAR) tablet 15 mg  15 mg Oral TID Rice Pals, APRN - CNP   15 mg at 06/18/22 0834    [Held by provider] doxepin (SINEQUAN) capsule 10 mg  10 mg Oral Nightly PRN Rice Pals, APRN - CNP        DULoxetine (CYMBALTA) extended release capsule 30 mg  30 mg Oral Daily Kathy Ann, APRN - CNP   30 mg at 06/18/22 0834    sodium chloride flush 0.9 % injection 5-40 mL  5-40 mL IntraVENous 2 times per day Rice Pals, APRN - CNP   10 mL at 06/18/22 0833    sodium chloride flush 0.9 % injection 5-40 mL  5-40 mL IntraVENous PRN Rice Pals, APRN - CNP   10 mL at 06/18/22 0248    ondansetron (ZOFRAN-ODT) disintegrating tablet 4 mg  4 mg Oral Q8H PRN Kathy Ann, APRN - CNP        Or    ondansetron (ZOFRAN) injection 4 mg  4 mg IntraVENous Q6H PRN Kathy Ann, APRN - CNP        magnesium oxide (MAG-OX) tablet 400 mg  400 mg Oral Daily Kathy Ann, APRN - CNP   400 mg at 06/18/22 0838    mupirocin (BACTROBAN) 2 % ointment   Nasal BID Rice Pals, APRN - CNP   Given at 06/18/22 0859    pantoprazole (PROTONIX) tablet 40 mg  40 mg Oral Daily GIUSEPPE Rodriguez CNP   40 mg at 06/18/22 0837    ipratropium-albuterol (DUONEB) nebulizer solution 1 ampule  1 ampule Inhalation Q4H WA Kathy Juarez, APRN - CNP   1 ampule at 06/18/22 0900       Objective:     /60   Pulse 68   Temp 97 °F (36.1 °C) (Temporal)   Resp 16   Ht 5' 8\" (1.727 m)   Wt (!) 340 lb 12.8 oz (154.6 kg)   LMP  (LMP Unknown)   SpO2 100%   BMI 51.82 kg/m²     General appearance: alert, appears stated age, cooperative and no distress  Head: normocephalic, without obvious abnormality, atraumatic  Eyes: conjunctivae/corneas clear.  .  Lungs: nonlabored on room air  Heart: regular rate and rhythm--dressing to sternum  Extremities: BLE CHINMAY hose; dependent edema--tender to touch  Pulses: 2+ and symmetric  Skin: color, texture, turgor normal---no rashes or lesions      Mental Status: alert, oriented x4--pleasant and cooperative    Appropriate attention/concentration  Intact fundus of knowledge  Intact memories    Speech: no dysarthria  Language: no aphasias    Cranial Nerves:  I: smell NA   II: visual acuity  NA   II: visual fields Full to confrontation   II: pupils RENO   III,VII: ptosis None   III,IV,VI: extraocular muscles  Full ROM   V: mastication Normal   V: facial light touch sensation  Normal   V,VII: corneal reflex     VII: facial muscle function - upper  Normal   VII: facial muscle function - lower Normal   VIII: hearing Normal   IX: soft palate elevation  Normal   IX,X: gag reflex    XI: trapezius strength  5/5   XI: sternocleidomastoid strength 5/5   XI: neck extension strength  5/5   XII: tongue strength  Normal     Motor:  5/5 BUE  4+/5 BLE but symmetric  Obese bulk; normal tone  No abnormal movements    Sensory:  LT normal in all limbs    Coordination:   FN, FFM normal b/l    Laboratory/Radiology:     CBC with Differential:    Lab Results   Component Value Date    WBC 10.3 06/18/2022    RBC 3.35 06/18/2022    HGB 9.7 06/18/2022    HCT 31.5 06/18/2022    HCT 30.0 06/15/2022    PLT 76 06/18/2022    MCV 94.0 06/18/2022    MCH 29.0 06/18/2022 MCHC 30.8 06/18/2022    RDW 14.5 06/18/2022    SEGSPCT 61 10/04/2013    LYMPHOPCT 32.9 11/08/2021    MONOPCT 9.5 11/08/2021    BASOPCT 1.7 11/08/2021    MONOSABS 0.50 11/08/2021    LYMPHSABS 1.73 11/08/2021    EOSABS 0.21 11/08/2021    BASOSABS 0.09 11/08/2021     CMP:    Lab Results   Component Value Date     06/18/2022    K 3.8 06/18/2022    K 4.0 12/07/2020     06/18/2022    CO2 20 06/18/2022    BUN 20 06/18/2022    CREATININE 0.9 06/18/2022    GFRAA >60 06/18/2022    LABGLOM >60 06/18/2022    GLUCOSE 139 06/18/2022    GLUCOSE 93 02/16/2011    PROT 6.7 06/10/2022    LABALBU 4.2 06/10/2022    CALCIUM 7.9 06/18/2022    BILITOT <0.2 06/10/2022    ALKPHOS 92 06/10/2022    AST 10 06/10/2022    ALT 17 06/10/2022     TSH:    Lab Results   Component Value Date    TSH 0.721 06/17/2022     UA 6/10: neg    CTH 6/17: unremarkable    All pertinent labs and images personally reviewed today    Assessment:     AMS: secondary to high doses of sedating medications in the postoperative period. Exam has significantly improved with dose reductions, and is normal today.     Plan:     Limit medications that affect CNS as able    Neuro signing off-call with new issues    No need for outpatient follow-up    GIUSEPPE Em CNP  9:49 AM  6/18/2022

## 2022-06-18 NOTE — PATIENT CARE CONFERENCE
[facility] Quality Flow/Interdisciplinary Rounds Progress Note        Quality Flow Rounds held on June 18, 2022    Disciplines Attending:  Bedside Nurse and Nursing Unit Leadership    Kamron Neville was admitted on 6/15/2022  5:32 AM    Anticipated Discharge Date:  Expected Discharge Date: 06/19/22    Disposition:    Gerardo Score:  Gerardo Scale Score: 17    Readmission Risk              Risk of Unplanned Readmission:  16           Discussed patient goal for the day, patient clinical progression, and barriers to discharge.   The following Goal(s) of the Day/Commitment(s) have been identified:  Labs - Report Results      Deniz Barry RN  June 18, 2022

## 2022-06-19 LAB
ANION GAP SERPL CALCULATED.3IONS-SCNC: 14 MMOL/L (ref 7–16)
BUN BLDV-MCNC: 26 MG/DL (ref 6–23)
CALCIUM SERPL-MCNC: 8.1 MG/DL (ref 8.6–10.2)
CHLORIDE BLD-SCNC: 108 MMOL/L (ref 98–107)
CO2: 19 MMOL/L (ref 22–29)
CREAT SERPL-MCNC: 0.9 MG/DL (ref 0.5–1)
GFR AFRICAN AMERICAN: >60
GFR NON-AFRICAN AMERICAN: >60 ML/MIN/1.73
GLUCOSE BLD-MCNC: 109 MG/DL (ref 74–99)
HCT VFR BLD CALC: 32.4 % (ref 34–48)
HEMOGLOBIN: 9.7 G/DL (ref 11.5–15.5)
MAGNESIUM: 2.4 MG/DL (ref 1.6–2.6)
MCH RBC QN AUTO: 28.4 PG (ref 26–35)
MCHC RBC AUTO-ENTMCNC: 29.9 % (ref 32–34.5)
MCV RBC AUTO: 94.7 FL (ref 80–99.9)
METER GLUCOSE: 105 MG/DL (ref 74–99)
METER GLUCOSE: 115 MG/DL (ref 74–99)
METER GLUCOSE: 118 MG/DL (ref 74–99)
PDW BLD-RTO: 14.5 FL (ref 11.5–15)
PLATELET # BLD: 101 E9/L (ref 130–450)
PMV BLD AUTO: 12.3 FL (ref 7–12)
POTASSIUM SERPL-SCNC: 3.5 MMOL/L (ref 3.5–5)
RBC # BLD: 3.42 E12/L (ref 3.5–5.5)
SODIUM BLD-SCNC: 141 MMOL/L (ref 132–146)
WBC # BLD: 8.3 E9/L (ref 4.5–11.5)

## 2022-06-19 PROCEDURE — 94660 CPAP INITIATION&MGMT: CPT

## 2022-06-19 PROCEDURE — 6360000002 HC RX W HCPCS: Performed by: NURSE PRACTITIONER

## 2022-06-19 PROCEDURE — 6370000000 HC RX 637 (ALT 250 FOR IP): Performed by: PSYCHIATRY & NEUROLOGY

## 2022-06-19 PROCEDURE — 85027 COMPLETE CBC AUTOMATED: CPT

## 2022-06-19 PROCEDURE — 2140000000 HC CCU INTERMEDIATE R&B

## 2022-06-19 PROCEDURE — 6370000000 HC RX 637 (ALT 250 FOR IP): Performed by: NURSE PRACTITIONER

## 2022-06-19 PROCEDURE — 83735 ASSAY OF MAGNESIUM: CPT

## 2022-06-19 PROCEDURE — 82962 GLUCOSE BLOOD TEST: CPT

## 2022-06-19 PROCEDURE — 94640 AIRWAY INHALATION TREATMENT: CPT

## 2022-06-19 PROCEDURE — 2580000003 HC RX 258: Performed by: NURSE PRACTITIONER

## 2022-06-19 PROCEDURE — 36415 COLL VENOUS BLD VENIPUNCTURE: CPT

## 2022-06-19 PROCEDURE — 93798 PHYS/QHP OP CAR RHAB W/ECG: CPT

## 2022-06-19 PROCEDURE — 2700000000 HC OXYGEN THERAPY PER DAY

## 2022-06-19 PROCEDURE — 80048 BASIC METABOLIC PNL TOTAL CA: CPT

## 2022-06-19 PROCEDURE — 6370000000 HC RX 637 (ALT 250 FOR IP): Performed by: PHYSICIAN ASSISTANT

## 2022-06-19 RX ORDER — DIPHENHYDRAMINE HCL 25 MG
25 TABLET ORAL ONCE
Status: COMPLETED | OUTPATIENT
Start: 2022-06-19 | End: 2022-06-19

## 2022-06-19 RX ORDER — DIPHENHYDRAMINE HCL 25 MG
25 TABLET ORAL NIGHTLY PRN
Status: DISCONTINUED | OUTPATIENT
Start: 2022-06-19 | End: 2022-06-20 | Stop reason: HOSPADM

## 2022-06-19 RX ADMIN — ACETAMINOPHEN 650 MG: 325 TABLET, FILM COATED ORAL at 08:29

## 2022-06-19 RX ADMIN — ACETAMINOPHEN 650 MG: 325 TABLET, FILM COATED ORAL at 22:05

## 2022-06-19 RX ADMIN — ENOXAPARIN SODIUM 40 MG: 100 INJECTION SUBCUTANEOUS at 08:29

## 2022-06-19 RX ADMIN — PREGABALIN 50 MG: 50 CAPSULE ORAL at 21:17

## 2022-06-19 RX ADMIN — PANTOPRAZOLE SODIUM 40 MG: 40 TABLET, DELAYED RELEASE ORAL at 08:32

## 2022-06-19 RX ADMIN — PREGABALIN 50 MG: 50 CAPSULE ORAL at 14:05

## 2022-06-19 RX ADMIN — ACETAMINOPHEN 650 MG: 325 TABLET, FILM COATED ORAL at 14:12

## 2022-06-19 RX ADMIN — AMIODARONE HYDROCHLORIDE 400 MG: 200 TABLET ORAL at 08:28

## 2022-06-19 RX ADMIN — IPRATROPIUM BROMIDE AND ALBUTEROL SULFATE 1 AMPULE: .5; 2.5 SOLUTION RESPIRATORY (INHALATION) at 16:10

## 2022-06-19 RX ADMIN — TOPIRAMATE 50 MG: 25 TABLET, FILM COATED ORAL at 08:29

## 2022-06-19 RX ADMIN — METOPROLOL TARTRATE 12.5 MG: 25 TABLET, FILM COATED ORAL at 08:30

## 2022-06-19 RX ADMIN — TOPIRAMATE 50 MG: 25 TABLET, FILM COATED ORAL at 21:17

## 2022-06-19 RX ADMIN — MUPIROCIN: 20 OINTMENT TOPICAL at 08:30

## 2022-06-19 RX ADMIN — DIPHENHYDRAMINE HYDROCHLORIDE 25 MG: 25 TABLET ORAL at 01:31

## 2022-06-19 RX ADMIN — FOLIC ACID 1 MG: 1 TABLET ORAL at 08:31

## 2022-06-19 RX ADMIN — OXYCODONE HYDROCHLORIDE AND ACETAMINOPHEN 500 MG: 500 TABLET ORAL at 08:32

## 2022-06-19 RX ADMIN — DULOXETINE HYDROCHLORIDE 30 MG: 30 CAPSULE, DELAYED RELEASE ORAL at 08:30

## 2022-06-19 RX ADMIN — Medication 400 MG: at 08:32

## 2022-06-19 RX ADMIN — BISACODYL 5 MG: 5 TABLET, COATED ORAL at 08:31

## 2022-06-19 RX ADMIN — BUSPIRONE HYDROCHLORIDE 15 MG: 5 TABLET ORAL at 14:05

## 2022-06-19 RX ADMIN — Medication 1000 MCG: at 08:29

## 2022-06-19 RX ADMIN — BUSPIRONE HYDROCHLORIDE 15 MG: 5 TABLET ORAL at 08:29

## 2022-06-19 RX ADMIN — Medication 10 ML: at 21:17

## 2022-06-19 RX ADMIN — IPRATROPIUM BROMIDE AND ALBUTEROL SULFATE 1 AMPULE: .5; 2.5 SOLUTION RESPIRATORY (INHALATION) at 11:41

## 2022-06-19 RX ADMIN — OXYCODONE HYDROCHLORIDE AND ACETAMINOPHEN 500 MG: 500 TABLET ORAL at 21:17

## 2022-06-19 RX ADMIN — PREGABALIN 50 MG: 50 CAPSULE ORAL at 08:32

## 2022-06-19 RX ADMIN — AMIODARONE HYDROCHLORIDE 400 MG: 200 TABLET ORAL at 21:16

## 2022-06-19 RX ADMIN — FUROSEMIDE 20 MG: 10 INJECTION, SOLUTION INTRAMUSCULAR; INTRAVENOUS at 17:04

## 2022-06-19 RX ADMIN — POTASSIUM CHLORIDE 60 MEQ: 20 TABLET, EXTENDED RELEASE ORAL at 21:16

## 2022-06-19 RX ADMIN — CETIRIZINE HYDROCHLORIDE 10 MG: 10 TABLET, FILM COATED ORAL at 08:29

## 2022-06-19 RX ADMIN — SENNOSIDES AND DOCUSATE SODIUM 1 TABLET: 50; 8.6 TABLET ORAL at 08:31

## 2022-06-19 RX ADMIN — MUPIROCIN: 20 OINTMENT TOPICAL at 21:17

## 2022-06-19 RX ADMIN — ASPIRIN 81 MG: 81 TABLET, COATED ORAL at 08:30

## 2022-06-19 RX ADMIN — BUSPIRONE HYDROCHLORIDE 15 MG: 5 TABLET ORAL at 21:17

## 2022-06-19 RX ADMIN — KETOROLAC TROMETHAMINE 15 MG: 30 INJECTION, SOLUTION INTRAMUSCULAR; INTRAVENOUS at 06:21

## 2022-06-19 RX ADMIN — IPRATROPIUM BROMIDE AND ALBUTEROL SULFATE 1 AMPULE: .5; 2.5 SOLUTION RESPIRATORY (INHALATION) at 08:09

## 2022-06-19 RX ADMIN — KETOROLAC TROMETHAMINE 15 MG: 30 INJECTION, SOLUTION INTRAMUSCULAR; INTRAVENOUS at 19:21

## 2022-06-19 RX ADMIN — Medication 10 ML: at 08:30

## 2022-06-19 RX ADMIN — FUROSEMIDE 20 MG: 10 INJECTION, SOLUTION INTRAMUSCULAR; INTRAVENOUS at 08:32

## 2022-06-19 RX ADMIN — KETOROLAC TROMETHAMINE 15 MG: 30 INJECTION, SOLUTION INTRAMUSCULAR; INTRAVENOUS at 12:27

## 2022-06-19 RX ADMIN — AMIODARONE HYDROCHLORIDE 400 MG: 200 TABLET ORAL at 14:05

## 2022-06-19 ASSESSMENT — PAIN DESCRIPTION - LOCATION
LOCATION: LEG
LOCATION: SHOULDER

## 2022-06-19 ASSESSMENT — PAIN SCALES - GENERAL
PAINLEVEL_OUTOF10: 8
PAINLEVEL_OUTOF10: 8
PAINLEVEL_OUTOF10: 0
PAINLEVEL_OUTOF10: 10
PAINLEVEL_OUTOF10: 7
PAINLEVEL_OUTOF10: 8
PAINLEVEL_OUTOF10: 8
PAINLEVEL_OUTOF10: 0
PAINLEVEL_OUTOF10: 9
PAINLEVEL_OUTOF10: 8

## 2022-06-19 ASSESSMENT — PAIN DESCRIPTION - DESCRIPTORS: DESCRIPTORS: ACHING

## 2022-06-19 ASSESSMENT — PAIN DESCRIPTION - ORIENTATION: ORIENTATION: UPPER;LOWER

## 2022-06-19 ASSESSMENT — PAIN - FUNCTIONAL ASSESSMENT: PAIN_FUNCTIONAL_ASSESSMENT: ACTIVITIES ARE NOT PREVENTED

## 2022-06-19 ASSESSMENT — PAIN DESCRIPTION - PAIN TYPE: TYPE: ACUTE PAIN

## 2022-06-19 ASSESSMENT — PAIN DESCRIPTION - FREQUENCY: FREQUENCY: INTERMITTENT

## 2022-06-19 NOTE — PROGRESS NOTES
Resp tried to educate patient on the importance of wearing the bipap. Nursing has educated patient on the importance of wearing the bipap as well as CTS. Patient still refusing to wear bipap.

## 2022-06-19 NOTE — PROGRESS NOTES
POD#4 Awake, alert. Denies CP, palpitations, SOB at rest, dizziness/lightheadedness. Patient very sleepy this morning. Says she does not sleep at night and she feels horrible today. I asked patient to wear her bipap multiple times, suggested another mask, and she adamantly refuses. Vitals:    06/19/22 0055 06/19/22 0238 06/19/22 0341 06/19/22 0745   BP: (!) 113/57  (!) 107/58 104/60   Pulse: 60  62 68   Resp:   20 20   Temp:   97.7 °F (36.5 °C) 98 °F (36.7 °C)   TempSrc:   Temporal Temporal   SpO2:   98% 98%   Weight:  (!) 330 lb 6.4 oz (149.9 kg)     Height:           O2: 2L/NC      Intake/Output Summary (Last 24 hours) at 6/19/2022 1015  Last data filed at 6/19/2022 0913  Gross per 24 hour   Intake 420 ml   Output 3285 ml   Net -2865 ml       +BM on 6/19    UO: 600mL/8hr   CT output: Pleural: 30mL/8hr (90mL/24hrs)      Recent Labs     06/17/22  0505 06/18/22  0443 06/19/22  0531   WBC 12.0* 10.3 8.3   HGB 10.9* 9.7* 9.7*   HCT 36.2 31.5* 32.4*   PLT 72* 76* 101*      Recent Labs     06/17/22  0505 06/18/22  0443 06/19/22  0531   BUN 20 20 26*   CREATININE 0.9 0.9 0.9       Telemetry: Afib      PE  Cardiac: irreg  Lungs: decreased bases  Chest incision with intact SHAGUFTA DSD. Sternum stable. Prior chest tube site incisions C/D/I, no erythema with intact sutures. Chest tubes x 1 and Epicardial pacing wires present and secure.    Abd: Soft, nontender, +BS  Ext: Incisions C/D/I, approximated, no erythema, + edema; groin stitch        A/P: POD#4     1. LA myxoma, moderate AI  --Stable s/p Sternotomy/Resection of LA myxoma with repair of the septum and dome of the LA/Rigid internal fixation of the sternum with Natalie plates x 5/04 modifier on 6/15/2022  --Post op ULISES reveals nor residual myxoma, LVEF~65%, RVEF WNL  --Scr stable 0.9  --ASA/BB - may need eliquis   --reinforce sternal precautions  --continue epicardial pacing wires  --chest tube without significant output and without airleaks.  Chest tube removed without difficulty. Patient tolerated well.  --groin stitch removed; no hematoma; groin soft         2. Expected acute blood loss anemia secondary to open heart surgery  --stable; hgb 9.7        3. NSR/ PAF  --Afib RVR 6/17 for 5 hours - pt received amio bolus and mag  --Afib rvr again 6/18 around 5pm - has been in and out of afib since but rate has been controlled   --Currently SR with frequent PVCs and HR in low 60s  --Continue BB with hold parameters  --Continue PO amio with plans to taper on DC  --May need eliquis prior to DC        4. Expected acute pulmonary insufficiency in the setting following surgery  --wean oxygen to keep SpO2 greater than or equal to 92%  --continue duonebs with ezpap  --encourage C&DB, SMI  --currently on 2L O2/NC  --weight up from baseline, continue BID IV lasix        5. Acute Post Operative Pain on chronic pain   --Chronic back pain, fibromyalgia, spinal stenosis; on PRN Norco, lyrica, chlorzoxazone, topamax, Doxepin   --Neuro consulted - appreciate mediation adjustments   --6/17lethargic, slow to respond--stop all narcotics, ok to continue toradol and tylenol prn - neuro consulted   --6/18 patient OOB in chair, responding to all questions appropriately - continue current regimen of meds  --6/19 patient sleeping in bed, responds appropriately but falls right back asleep - states she doesn't sleep at night - adamantly refusing bipap - will call respiratory to try bipap - patient must be up in chair for meals and avoid frequent daytime naps         6. Slow to respond  --stop all narcotics (she has not received any since 0400 on 6/16)  --consult neurology for evaluation - signed off   --Improved today (see #5)        7. Thrombocytopenia  --no s/s active bleeding  --plt count 101K today  --continue lovenox unless plt count < 70K d/t current immobility  --continue ASA unless plt count < 50K        8.  Urinary retention  --hx of overactive bladder--on detrol LA at home  --required straight cath x 1 overnight  --urethral catheter to be re-inserted--will attempt voiding trial once more ambulatory        9. Constipation--expected delayed return of bowel function  --secondary to anesthesia, narcotics, decreased oral intake, and decreased physical activity   --resolved  --Continue daily MOM/oral bisacodyl until +BM and senna-s as scheduled. --Encouraged continued increase in oral intake and activity.          10. Expected deconditioning in the setting following surgery  --Increase activity as tolerated  --PT/OT  --Ambulated 20ft        11.  Hx of depression and anxiety  --on home rx of cymbalta and buspar  --Continue meds per neurology        DVT prophylaxis:  --continue bilateral knee high CHINMAY hose  --continue PCDs  --continue progressive ambulation  --continue lovenox for dvt prophylaxis and continue knee high CHINMAY hose/pcds/progressive ambulation        Dispo: Rehab maybe tomorrow if HR more stable       This patient's case and care plan was discussed with the attending surgeon

## 2022-06-19 NOTE — PROGRESS NOTES
Pt refused to walk today. Got her up to go to bathroom once, but would not leave her room. Asked her three times for a walk.

## 2022-06-19 NOTE — PATIENT CARE CONFERENCE
P Quality Flow/Interdisciplinary Rounds Progress Note        Quality Flow Rounds held on June 19, 2022    Disciplines Attending:  Bedside Nurse, ,  and Nursing Unit Leadership    Claudina Lombard was admitted on 6/15/2022  5:32 AM    Anticipated Discharge Date:  Expected Discharge Date: 06/19/22    Disposition:    Gerardo Score:  Gerardo Scale Score: 17    Readmission Risk              Risk of Unplanned Readmission:  16           Discussed patient goal for the day, patient clinical progression, and barriers to discharge.   The following Goal(s) of the Day/Commitment(s) have been identified:  Diagnostics - Report Results      Radha Zamudio RN  June 19, 2022

## 2022-06-20 VITALS
OXYGEN SATURATION: 96 % | SYSTOLIC BLOOD PRESSURE: 107 MMHG | HEART RATE: 65 BPM | WEIGHT: 293 LBS | BODY MASS INDEX: 44.41 KG/M2 | RESPIRATION RATE: 19 BRPM | DIASTOLIC BLOOD PRESSURE: 51 MMHG | TEMPERATURE: 98.9 F | HEIGHT: 68 IN

## 2022-06-20 LAB
ANION GAP SERPL CALCULATED.3IONS-SCNC: 11 MMOL/L (ref 7–16)
BUN BLDV-MCNC: 23 MG/DL (ref 6–23)
CALCIUM SERPL-MCNC: 8.3 MG/DL (ref 8.6–10.2)
CHLORIDE BLD-SCNC: 112 MMOL/L (ref 98–107)
CO2: 21 MMOL/L (ref 22–29)
CREAT SERPL-MCNC: 1 MG/DL (ref 0.5–1)
GFR AFRICAN AMERICAN: >60
GFR NON-AFRICAN AMERICAN: 55 ML/MIN/1.73
GLUCOSE BLD-MCNC: 102 MG/DL (ref 74–99)
HCT VFR BLD CALC: 31.1 % (ref 34–48)
HEMOGLOBIN: 9.5 G/DL (ref 11.5–15.5)
MAGNESIUM: 2.3 MG/DL (ref 1.6–2.6)
MCH RBC QN AUTO: 28.7 PG (ref 26–35)
MCHC RBC AUTO-ENTMCNC: 30.5 % (ref 32–34.5)
MCV RBC AUTO: 94 FL (ref 80–99.9)
PDW BLD-RTO: 14.4 FL (ref 11.5–15)
PLATELET # BLD: 127 E9/L (ref 130–450)
PMV BLD AUTO: 12.4 FL (ref 7–12)
POTASSIUM SERPL-SCNC: 3.8 MMOL/L (ref 3.5–5)
RBC # BLD: 3.31 E12/L (ref 3.5–5.5)
SARS-COV-2, NAAT: NOT DETECTED
SODIUM BLD-SCNC: 144 MMOL/L (ref 132–146)
WBC # BLD: 8.3 E9/L (ref 4.5–11.5)

## 2022-06-20 PROCEDURE — 94640 AIRWAY INHALATION TREATMENT: CPT

## 2022-06-20 PROCEDURE — 6360000002 HC RX W HCPCS: Performed by: NURSE PRACTITIONER

## 2022-06-20 PROCEDURE — 97535 SELF CARE MNGMENT TRAINING: CPT

## 2022-06-20 PROCEDURE — 83735 ASSAY OF MAGNESIUM: CPT

## 2022-06-20 PROCEDURE — 6370000000 HC RX 637 (ALT 250 FOR IP): Performed by: NURSE PRACTITIONER

## 2022-06-20 PROCEDURE — 6360000002 HC RX W HCPCS: Performed by: PHYSICIAN ASSISTANT

## 2022-06-20 PROCEDURE — 93798 PHYS/QHP OP CAR RHAB W/ECG: CPT

## 2022-06-20 PROCEDURE — 6370000000 HC RX 637 (ALT 250 FOR IP): Performed by: PHYSICIAN ASSISTANT

## 2022-06-20 PROCEDURE — 94660 CPAP INITIATION&MGMT: CPT

## 2022-06-20 PROCEDURE — 6370000000 HC RX 637 (ALT 250 FOR IP): Performed by: PSYCHIATRY & NEUROLOGY

## 2022-06-20 PROCEDURE — 36415 COLL VENOUS BLD VENIPUNCTURE: CPT

## 2022-06-20 PROCEDURE — 2580000003 HC RX 258: Performed by: NURSE PRACTITIONER

## 2022-06-20 PROCEDURE — 80048 BASIC METABOLIC PNL TOTAL CA: CPT

## 2022-06-20 PROCEDURE — 97110 THERAPEUTIC EXERCISES: CPT

## 2022-06-20 PROCEDURE — 97530 THERAPEUTIC ACTIVITIES: CPT

## 2022-06-20 PROCEDURE — 87635 SARS-COV-2 COVID-19 AMP PRB: CPT

## 2022-06-20 PROCEDURE — 2700000000 HC OXYGEN THERAPY PER DAY

## 2022-06-20 PROCEDURE — 85027 COMPLETE CBC AUTOMATED: CPT

## 2022-06-20 RX ORDER — FUROSEMIDE 20 MG/1
20 TABLET ORAL SEE ADMIN INSTRUCTIONS
Qty: 60 TABLET | Refills: 0
Start: 2022-06-20 | End: 2022-08-16 | Stop reason: SDUPTHER

## 2022-06-20 RX ORDER — POTASSIUM CHLORIDE 750 MG/1
10 TABLET, EXTENDED RELEASE ORAL DAILY
Qty: 21 TABLET | Refills: 0
Start: 2022-06-20 | End: 2022-08-15

## 2022-06-20 RX ORDER — ASCORBIC ACID 500 MG
500 TABLET ORAL 2 TIMES DAILY
Qty: 60 TABLET | Refills: 0
Start: 2022-06-20 | End: 2022-08-15

## 2022-06-20 RX ORDER — ASPIRIN 81 MG/1
81 TABLET ORAL DAILY
Qty: 30 TABLET | Refills: 3
Start: 2022-06-20 | End: 2022-08-16 | Stop reason: SDUPTHER

## 2022-06-20 RX ORDER — AMIODARONE HYDROCHLORIDE 200 MG/1
200 TABLET ORAL SEE ADMIN INSTRUCTIONS
Qty: 56 TABLET | Refills: 0
Start: 2022-06-20 | End: 2022-08-15

## 2022-06-20 RX ORDER — FERROUS SULFATE 325(65) MG
325 TABLET ORAL 2 TIMES DAILY WITH MEALS
Qty: 60 TABLET | Refills: 0
Start: 2022-06-20 | End: 2022-08-15

## 2022-06-20 RX ORDER — FUROSEMIDE 10 MG/ML
20 INJECTION INTRAMUSCULAR; INTRAVENOUS DAILY
Status: DISCONTINUED | OUTPATIENT
Start: 2022-06-20 | End: 2022-06-20 | Stop reason: HOSPADM

## 2022-06-20 RX ORDER — FOLIC ACID 1 MG/1
1 TABLET ORAL DAILY
Qty: 30 TABLET | Refills: 0
Start: 2022-06-20 | End: 2022-08-15

## 2022-06-20 RX ADMIN — Medication 10 ML: at 09:30

## 2022-06-20 RX ADMIN — PREGABALIN 50 MG: 50 CAPSULE ORAL at 13:57

## 2022-06-20 RX ADMIN — CETIRIZINE HYDROCHLORIDE 10 MG: 10 TABLET, FILM COATED ORAL at 09:24

## 2022-06-20 RX ADMIN — PANTOPRAZOLE SODIUM 40 MG: 40 TABLET, DELAYED RELEASE ORAL at 09:24

## 2022-06-20 RX ADMIN — Medication 400 MG: at 09:24

## 2022-06-20 RX ADMIN — BUSPIRONE HYDROCHLORIDE 15 MG: 5 TABLET ORAL at 09:23

## 2022-06-20 RX ADMIN — IPRATROPIUM BROMIDE AND ALBUTEROL SULFATE 1 AMPULE: .5; 2.5 SOLUTION RESPIRATORY (INHALATION) at 13:13

## 2022-06-20 RX ADMIN — OXYCODONE HYDROCHLORIDE AND ACETAMINOPHEN 500 MG: 500 TABLET ORAL at 09:26

## 2022-06-20 RX ADMIN — METOPROLOL TARTRATE 12.5 MG: 25 TABLET, FILM COATED ORAL at 09:23

## 2022-06-20 RX ADMIN — BUSPIRONE HYDROCHLORIDE 15 MG: 5 TABLET ORAL at 13:57

## 2022-06-20 RX ADMIN — ACETAMINOPHEN 650 MG: 325 TABLET, FILM COATED ORAL at 10:23

## 2022-06-20 RX ADMIN — AMIODARONE HYDROCHLORIDE 400 MG: 200 TABLET ORAL at 13:57

## 2022-06-20 RX ADMIN — PREGABALIN 50 MG: 50 CAPSULE ORAL at 09:24

## 2022-06-20 RX ADMIN — APIXABAN 5 MG: 5 TABLET, FILM COATED ORAL at 09:24

## 2022-06-20 RX ADMIN — DULOXETINE HYDROCHLORIDE 30 MG: 30 CAPSULE, DELAYED RELEASE ORAL at 09:24

## 2022-06-20 RX ADMIN — AMIODARONE HYDROCHLORIDE 400 MG: 200 TABLET ORAL at 09:25

## 2022-06-20 RX ADMIN — FUROSEMIDE 20 MG: 10 INJECTION, SOLUTION INTRAMUSCULAR; INTRAVENOUS at 09:22

## 2022-06-20 RX ADMIN — TOPIRAMATE 50 MG: 25 TABLET, FILM COATED ORAL at 09:23

## 2022-06-20 RX ADMIN — Medication 1000 MCG: at 09:24

## 2022-06-20 RX ADMIN — ASPIRIN 81 MG: 81 TABLET, COATED ORAL at 09:25

## 2022-06-20 RX ADMIN — FOLIC ACID 1 MG: 1 TABLET ORAL at 09:22

## 2022-06-20 RX ADMIN — KETOROLAC TROMETHAMINE 15 MG: 30 INJECTION, SOLUTION INTRAMUSCULAR; INTRAVENOUS at 05:28

## 2022-06-20 ASSESSMENT — PAIN SCALES - GENERAL
PAINLEVEL_OUTOF10: 5
PAINLEVEL_OUTOF10: 10

## 2022-06-20 ASSESSMENT — PAIN DESCRIPTION - LOCATION: LOCATION: SHOULDER

## 2022-06-20 NOTE — PROGRESS NOTES
fatigued. O2 sat was 96% on room air and  at rest.  Performed sit to stand and patient denied lt headedness but reported feeling weak. She was able to take small steps forward and backward but with much effort and increased time. Patient stated she needed to sit and backed up quickly to chair. After performing stand to sit, patient agreed to perform seated exercises. O2 sat was 96% after taking a couple steps but heart rate increased to 119. Patient presents with weakness, debility, impaired activity tolerance and endurance and would benefit from continued rehab prior to returning home. Call button left in reach and all needs met at end of session. Treatment:  Patient practiced and was instructed in the following treatment:     VC's for sternal precautions and proper technique to perform sit <> stand transfers.  Patient education on pursed lip breathing and activity modification/energy conservation.  Patient educated on importance of increasing activity level in the hospital and performing exercises to prevent immobility related complications.     Time in  10:25  Time out  10:48    Total Treatment Time  23 minutes     CPT codes:  [] Low Complexity PT evaluation 81159  [] Moderate Complexity PT evaluation 98605  [] High Complexity PT evaluation 42448  [] PT Re-evaluation 47992  [] Gait training 18465 - minutes  [] Manual therapy 47231 - minutes  [x] Therapeutic activities 00088 15 minutes  [x] Therapeutic exercises 61752 8 minutes  [] Neuromuscular reeducation 45328 - minutes     Abdoulaye Thomas, PT, DPT  [id number]

## 2022-06-20 NOTE — PROGRESS NOTES
--Currently SR  and HR in low 60s  --Continue BB with hold parameters  --Continue PO amio with plans to taper on DC- currently on 400mg tid   --Eliquis- to start today         4. Expected acute pulmonary insufficiency in the setting following surgery  --continue duonebs with ezpap  --encourage C&DB, SMI  --currently on RA  -- weight back to baseline and net negative- transition from BID lasix to daily- will transition to PO on DC         5. Acute Post Operative Pain on chronic pain   --Chronic back pain, fibromyalgia, spinal stenosis; on PRN Norco, lyrica, chlorzoxazone, topamax, Doxepin   --Neuro consulted - appreciate mediation adjustments   --6/17lethargic, slow to respond--stop all narcotics, ok to continue toradol and tylenol prn - neuro consulted   --6/18 patient OOB in chair, responding to all questions appropriately - continue current regimen of meds  --6/19 patient sleeping in bed, responds appropriately but falls right back asleep - states she doesn't sleep at night - adamantly refusing bipap - will call respiratory to try bipap - patient must be up in chair for meals and avoid frequent daytime naps   -- 6/20 more awake today, did wear cpap overnight         6. Slow to respond  --stop all narcotics (she has not received any since 0400 on 6/16)  --consult neurology for evaluation - signed off   --Improved today (see #5)        7. Thrombocytopenia  --no s/s active bleeding  --plt count 127K today  --continue lovenox unless plt count < 70K d/t current immobility  --continue ASA unless plt count < 50K        8. Urinary retention  --hx of overactive bladder--on detrol LA at home  --required straight cath x 1 post op   --urethral catheter  re-inserted--will consult urology for recs for reis for DC         9.  Constipation--expected delayed return of bowel function  --secondary to anesthesia, narcotics, decreased oral intake, and decreased physical activity   --resolved  --Continue daily MOM/oral bisacodyl until +BM and senna-s as scheduled. --Encouraged continued increase in oral intake and activity.          10. Expected deconditioning in the setting following surgery  --Increase activity as tolerated  --PT/OT  --Ambulated 20ft        11.  Hx of depression and anxiety  --on home rx of cymbalta and buspar  --Continue meds per neurology        DVT prophylaxis:  --continue bilateral knee high CHINMAY hose  --continue PCDs  --continue progressive ambulation  -- continue knee high CHINMAY hose/pcds/progressive ambulation        Dispo: Rehab when precert obtained- discussed with SW, have to re-start precert today         This patient's case and care plan was discussed with the attending surgeon

## 2022-06-20 NOTE — PROGRESS NOTES
Patient restless, grunting, educated patient she would greatly benefit from using BiPAP at night. Patient refuses BiPAP. Patient was repositioned to sit up in chair x2 assist. Will continue to monitor, VS stable.

## 2022-06-20 NOTE — CONSULTS
6/20/2022 1:37 PM  Service: Urology  Group: [facility] (Aamir/Dinorah/Geovani)    Sharla Martines  [id number]     Chief Complaint:    Urinary retention    History of Present Illness: The patient is a 79 y.o. 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    She had a reis catheter inserted for surgery  This was removed 4 days ago  She was unable to void and a reis was reinserted   She does report relief when this was inserted  She has never seen a Urologist in the past  Typically has no bothersome voiding symptoms  She is being discharged to acute rehab today     Past Medical History:   Diagnosis Date    Anxiety     [facility] 03/23/2015    Breast lesion 12/01/2015    Chronic back pain     Chronic fatigue 10/08/2013    Daytime somnolence 10/08/2013    Depression     Fibromyalgia     Fracture, fibula     right    GERD (gastroesophageal reflux disease)     History of blood transfusion     Hyperlipidemia     Hypertension     Morbid obesity with BMI of 45.0-49.9, adult ([facility] 10/09/2015    Obesity     Osteoarthritis     Overactive bladder 01/22/2014    PONV (postoperative nausea and vomiting)     hx 40 yrs ago    Prolonged emergence from general anesthesia     Reflux     Thyroid disease          Past Surgical History:   Procedure Laterality Date    AORTIC VALVE REPLACEMENT N/A 6/15/2022    STERNOTOMY, LEFT ATRIAL MYXOMA RESECTION, performed by Johny Cruz MD at [address] [facility]  05/23/2022    Dr Kenroy Arvizu  2011    COLONOSCOPY  11/22/2016    Dr. Debbi Thomas N/A 12/8/2020    Hood Good performed by Catalina Lawler MD at [place] [address] ([facility])  1991    benign reasons     JOINT REPLACEMENT Bilateral 2010    knees    LUMBAR FUSION      L3-5    ROTATOR CUFF REPAIR Left     left    TOTAL KNEE ARTHROPLASTY Bilateral 2010    did both at the same time    TRANSESOPHAGEAL ECHOCARDIOGRAM N/A 5/9/2022    TRANSESOPHAGEAL ECHOCARDIOGRAM performed by Reyna Hendricks MD at [address] ENDOSCOPY N/A 9/29/2020    EGD BIOPSY performed by Anna Spaulding MD at [place] [address]       Medications Prior to Admission:    Medications Prior to Admission: pregabalin (LYRICA) 150 MG capsule, Take 1 capsule by mouth 3 times daily for 90 days. TID  [DISCONTINUED] Multiple Vitamins-Minerals (HAIR SKIN AND NAILS FORMULA PO), Take by mouth as needed  [DISCONTINUED] chlorzoxazone (PARAFON FORTE) 500 MG tablet,   [DISCONTINUED] HYDROcodone-acetaminophen (NORCO) 5-325 MG per tablet, Take 1 tablet by mouth every 8 hours as needed for Pain for up to 9 days. Take lowest dose possible to manage pain  pseudoephedrine (SUDAFED 24 HR) 240 MG TB24 extended release tablet, Take 240 mg by mouth daily  pantoprazole (PROTONIX) 40 MG tablet, Take 1 tablet by mouth 2 times daily  albuterol sulfate HFA (VENTOLIN HFA) 108 (90 Base) MCG/ACT inhaler, Inhale 2 puffs into the lungs every 6 hours as needed for Wheezing or Shortness of Breath  DULoxetine (CYMBALTA) 30 MG extended release capsule, TAKE 1 CAPSULE BY MOUTH DAILY  triamcinolone (KENALOG) 0.1 % cream, Apply topically 2 times daily.   [DISCONTINUED] aspirin-acetaminophen-caffeine (EXCEDRIN MIGRAINE) 250-250-65 MG per tablet, Take 1 tablet by mouth every 6 hours as needed for Headaches  cetirizine (ZYRTEC) 10 MG tablet, Take 1 tablet by mouth 2 times daily  ADVAIR HFA 45-21 MCG/ACT inhaler, Inhale 2 puffs into the lungs 2 times daily   fluticasone (FLONASE) 50 MCG/ACT nasal spray, 1 spray by Nasal route daily (Patient taking differently: 1 spray by Nasal route daily as needed )  [DISCONTINUED] hydroCHLOROthiazide (HYDRODIURIL) 25 MG tablet, Take 1 tablet by mouth every morning (Patient taking differently: Take 25 mg by mouth daily as needed )  topiramate (TOPAMAX) 100 MG tablet, Take 1 tablet by mouth 2 times daily  busPIRone (BUSPAR) 15 MG tablet, TAKE 1 TABLET BY MOUTH THREE TIMES A DAY  [DISCONTINUED] tolterodine (DETROL LA) 2 MG extended release capsule, Once daily  doxepin (SINEQUAN) 10 MG capsule, Take 1 capsule by mouth nightly (Patient taking differently: Take 10 mg by mouth nightly as needed )    Allergies:    Carafate [sucralfate], Glucophage [metformin hcl], Mobic [meloxicam], Trazodone and nefazodone, Ultram [tramadol], and Nickel    Social History:    reports that she has been smoking cigarettes. She has a 45.00 pack-year smoking history. She has never used smokeless tobacco. She reports that she does not drink alcohol and does not use drugs. Family History:   Non-contributory to this Urological problem  family history includes Cancer in her mother; Cancer (age of onset: 50) in an other family member; Depression in her brother; Diabetes in her father and mother; Heart Disease in her father and mother; High Blood Pressure in her father and mother. Review of Systems:  Constitutional: No fever or chills, weak    Respiratory: negative for cough and hemoptysis  Cardiovascular: negative for chest pain and dyspnea  Gastrointestinal: negative for abdominal pain, diarrhea, nausea and vomiting   Derm: negative for rash and skin lesion(s)  Neurological: negative for seizures and tremors  Musculoskeletal: Negative    Psychiatric: Negative   : As above in the HPI, otherwise negative  All other reviews are negative    Physical Exam:     Vitals:  BP (!) 99/58   Pulse 63   Temp 98.2 °F (36.8 °C) (Temporal)   Resp 18   Ht 5' 8\" (1.727 m)   Wt (!) 328 lb 12.8 oz (149.1 kg)   LMP  (LMP Unknown)   SpO2 96% Comment: walking  BMI 49.99 kg/m²     General:  Awake, alert, oriented X 3. No apparent distress.   HEENT:  Normocephalic, atraumatic. Lungs:  Respirations symmetric and non-labored. Abdomen:  soft, nontender, no masses  Extremities:  No clubbing, cyanosis, or edema  Skin:  Warm and dry, no open lesions or rashes  Neuro: There are no motor or sensory deficits in the 4 quadrant extremities   Rectal: deferred  Genitourinary: Serrato draining yellow urine     Labs:     Recent Labs     06/18/22 0443 06/19/22  0531 06/20/22  0527   WBC 10.3 8.3 8.3   RBC 3.35* 3.42* 3.31*   HGB 9.7* 9.7* 9.5*   HCT 31.5* 32.4* 31.1*   MCV 94.0 94.7 94.0   MCH 29.0 28.4 28.7   MCHC 30.8* 29.9* 30.5*   RDW 14.5 14.5 14.4   PLT 76* 101* 127*   MPV 13.0* 12.3* 12.4*         Recent Labs     06/18/22 0443 06/19/22  0531 06/20/22  0527   CREATININE 0.9 0.9 1.0       No results found for: PSA          Assessment/plan:  Urinary retention      Continue the Serrato catheter at this time  She can undergo voiding trial as an outpatient in rehab once she is more ambulatory.   No further  interventions are planned at this time  Please call with any further questions or concerns  Thank you for allowing us to participate in her care               Electronically signed by GIUSEPPE White CNP on 6/20/2022 at 1:37 PM     Agree with above assessment and plan

## 2022-06-20 NOTE — PLAN OF CARE
Problem: Discharge Planning  Goal: Discharge to home or other facility with appropriate resources  Outcome: Progressing     Problem: Gastrointestinal - Adult  Goal: Minimal or absence of nausea and vomiting  Outcome: Progressing     Problem: Gastrointestinal - Adult  Goal: Maintains or returns to baseline bowel function  Outcome: Progressing     Problem: Genitourinary - Adult  Goal: Urinary catheter remains patent  Outcome: Progressing

## 2022-06-20 NOTE — PROGRESS NOTES
Occupational Therapy  OT BEDSIDE TREATMENT NOTE   [facility] [address]  [address] [place] [address]      Date:2022  Patient Name: Rachel Blanc  MRN: [id number]  : 1955  Room: [address] [place] [address]     Evaluating OT: Areli Art, OTR/L [id number]     Referring Provider:GIUSEPPE Gutierrez CNP    Specific Provider Orders/Date: OT eval and treat (6/15/22)        Diagnosis:  LA myxoma       Surgery/Procedures:  6/15/2022 Sternotomy/Resection of LA myxoma with repair of the septum and dome of the LA/Rigid internal fixation of the sternum with Natalie plates x 2      Pertinent Medical History: HTN HPL, sleep apnea, asthma, DM, thyroid disease, spinal stenosis, fibromyalgia, anxiety, chronic back pain, headaches, current smoker, obesity         *Precautions:  Fall Risk, sternal, O2, obesity     Assessment of current deficits   [x]? Functional mobility            [x]?ADLs           [x]? Strength                  []?Cognition  [x]? Functional transfers          [x]? IADLs          [x]? Safety Awareness   [x]? Endurance  []? Fine Motor Coordination   [x]? Balance       []? Vision/perception    []? Sensation      []? Gross Motor Coordination [x]? ROM           []? Delirium                  []? Motor Control     []? Communication      OT PLAN OF CARE   OT POC based on physician orders, patient diagnosis and results of clinical assessment.        Frequency/Duration: 1-3 days/wk for 1-2 weeks PRN     Specific OT Treatment to include:   ADL retraining/adapted techniques and AE recommendations to increase functional independence within precautions                    Energy conservation techniques to improve tolerance for selfcare routine   Functional transfer/mobility training/DME recommendations for increased independence, safety and fall prevention         Patient/family education to increase safety and functional independence within precautions             Environmental modifications for safe mobility and completion of ADLs                             Therapeutic activity to improve functional performance during ADLs                                         Therapeutic exercise to improve tolerance and functional strength for ADLs   Balance retraining exercises/tasks for facilitation of postural control with dynamic challenges during ADLs .       Recommended Adaptive Equipment:  LB dressing AE pending progress, extended tub bench, 3in1     Home Living: Pt lives with pet cat ( friend currently assisting)  in a first floor apt with 0 step(s) to enter and 0 rail(s); 2 steps for laundry  Bathroom setup: tub/shower with seat; standard height commode  Equipment owned: shower seat     Prior Level of Function: IND with ADLs;  IND with IADLs. No device for ambulation. Driving: yes  Occupation: ?     Pain Level: pt reports 8/10 right shoulder and back pain     Cognition: A&O: 4/4  (cues for day)  Follows 1 step commands. Memory: Good              Comprehension Fair+              Problem solving: Fair              Judgement/safety: fair-, unable to verbalize sternal precautions this date                 Communication skills: fairly WFL; pt does not consistently answer history questions              Vision: grossly WFL                     Glasses:?  None present                                                        Hearing: [facility]                   Functional Assessment: AM-PAC Daily Activity Raw Score: 15/24    Initial Eval Status  Date: 6/16/22 Treatment Status  Date: 6/20/22 STG=LTG  Time Frame: 5-7days   Feeding awaiting diet order  set up                      Allison  while seated up in chair to increase activity tolerance         Grooming Max A  seated EOB to wash face, comb hair and perform oral care with mouth swab  (limited due to decreased shoulder reach; decreased tolerance) Min A  To comb hair seated in bedside chair                      Min A   while seated/standing sink level demonstrating G tolerance; G balance.      UB dressing/bathing Max A  Min A  To don/doff gown seated in bedside chair                      Min A   demonstrating G knowledge of precautions during tasks      LB dressing/bathing Dep       Max A  To don/doff socks seated in bedside chair                       Min A  using AE as needed for safe reach/ energy conservation        Toileting NT  Max A- hygiene  Max A- clothing management                      Min A      Bed Mobility  Supine to sit: Max A+2     Sit to supine:   Dep+2  per last tx                      Min A  in prep of ADL tasks & transfers   Functional Transfers Sit to stand: NT        Stand to sit: NT Mod A- sit<->stand  Cuing for body mechanics   x2                      Min A  sit<>stand/functional bathroom transfers using AD/DME as needed for balance and safety   Functional Mobility NT  no device N/T  Pt declined this date                        Min A   functional/bathroom mobility using AD as needed & demonstrating G safety      Balance Sitting:     Static:  Min A    Dynamic:Min A  Standing: NT Sitting:     Static:  SBA    Dynamic:Min A  Standing: mod A S dynamic sitting balance; Min A dynamic standing balance  during ADL tasks & transfers   Endurance/Activity Tolerance    Fair- tolerance with light EOB activity.    Fair G   tolerance with moderate activity/self care routine   Visual/  Perceptual               WFL                                       Comments: Upon arrival pt seated in bedside chair. Educated on sternal precautions, demonstrates Poor+ understanding. Pt educated on adaptive techniques to increase independence and safety during ADL's, and functional transfers while maintaining precautions. At end of session pt left seated in bedside chair, call light within reach. · Pt has made fair progress towards set goals.      · Continue with current plan of care    Treatment Time In: 9:00            Treatment Time Out: 9:23             Treatment Charges: Mins Units   Ther Ex  83595     Manual Therapy 57592     Thera Activities 12297 10 1   ADL/Home Mgt 90526 13 1   Neuro Re-ed 75866     Group Therapy      Orthotic manage/training  [id number]     Non-Billable Time     Total Timed Treatment 23 [address]

## 2022-06-20 NOTE — CARE COORDINATION
Spoke with [facility], needed update therapy, called #[phone] for updates. [facility] able to accept today, precert obtained good through 6/21. Messaged CTS, awaiting urology for reis care. Envelope and ambulance form in soft chart, will need -COVID day of discharge. Zuleyma Etienne, MSW, LSW

## 2022-06-20 NOTE — DISCHARGE INSTR - COC
Continuity of Care Form    Patient Name: Taryn Stone   :  1955  MRN:  [id number]    Admit date:  6/15/2022  Discharge date:  2022    Code Status Order: Full Code   Advance Directives:   [facility] Documentation       Date/Time Healthcare Directive Type of Healthcare Directive Copy in [facility] [address] Agent's Name Healthcare Agent's Phone Number    06/15/22 0539 No, patient does not have an advance directive for healthcare treatment -- -- -- -- --            Admitting Physician:  Lanie Knott MD  PCP: GIUSEPPE Ward CNP    Discharging Nurse: Lindsey Rodriguez [facility] Unit/Room#: 0928/1321-N  Discharging Unit Phone Number: [phone]    Emergency Contact:   Extended Emergency Contact Information  Primary Emergency Contact: merna iraheta  Address: [facility], [address] [place] [address] Phone: [phone]  Mobile Phone: [phone]  Relation: Other  Preferred language: [address] needed?  No  Secondary Emergency Contact: daniela iraheta  Relation: Girlfriend    Past Surgical History:  Past Surgical History:   Procedure Laterality Date    AORTIC VALVE REPLACEMENT N/A 6/15/2022    STERNOTOMY, LEFT ATRIAL MYXOMA RESECTION, performed by Lanie Knott MD at [address] [facility]  2022    Dr Jeevan Mullins      COLONOSCOPY  2016    Dr. Tere Gaucher N/A 2020    Jelani Alford performed by Fabricio Danielson MD at [address] [place] [address] ([address] [place] [address])      benign reasons     JOINT REPLACEMENT Bilateral 2010    knees    LUMBAR FUSION      L3-5    ROTATOR CUFF REPAIR Left     left    TOTAL KNEE ARTHROPLASTY Bilateral 2010    did both at the same time    TRANSESOPHAGEAL ECHOCARDIOGRAM N/A 2022    TRANSESOPHAGEAL ECHOCARDIOGRAM performed by Moshe Ozuna N18.31    LVH (left ventricular hypertrophy) I51.7    Chronic renal disease, stage III [facility]) [[id number]] N18.30    Left atrial mass I51.89    Aortic regurgitation I35.1    S/P cardiac cath Z98.890    Myxoma D21.9    Acute postoperative pulmonary insufficiency (HCC) J95.2    Acute post-operative pain G89.18    Type 2 diabetes mellitus without complication, without long-term current use of insulin (HCC) E11.9    AMS (altered mental status) R41.82    Medication side effect T88. 7XXA       Isolation/Infection:   Isolation            No Isolation          Patient Infection Status       Infection Onset Added Last Indicated Last Indicated By Review Planned Expiration Resolved Resolved By    None active    Resolved    COVID-19 (Rule Out) 12/02/20 12/02/20 12/02/20 COVID-19 Ambulatory (Ordered)   12/04/20 Rule-Out Test Resulted    COVID-19 (Rule Out) 09/23/20 09/23/20 09/23/20 Covid-19 Ambulatory (Ordered)   09/25/20 Rule-Out Test Resulted    COVID-19 (Rule Out) 08/28/20 08/28/20 08/28/20 COVID-19 Ambulatory (Ordered)   08/31/20 Rule-Out Test Resulted            Nurse Assessment:  Last Vital Signs: /70   Pulse 62   Temp 97.5 °F (36.4 °C) (Temporal)   Resp 20   Ht 5' 8\" (1.727 m)   Wt (!) 328 lb 12.8 oz (149.1 kg)   LMP  (LMP Unknown)   SpO2 99%   BMI 49.99 kg/m²     Last documented pain score (0-10 scale): Pain Level: 5  Last Weight:   Wt Readings from Last 1 Encounters:   06/20/22 (!) 328 lb 12.8 oz (149.1 kg)     Mental Status:  oriented, alert, and thought processes intact    IV Access:  - None    Nursing Mobility/ADLs:  Walking   Assisted  Transfer  Assisted  Bathing  Assisted  Dressing  Assisted  Toileting  Assisted  Feeding  Independent  Med Admin  Independent  Med Delivery   whole    Wound Care Documentation and Therapy:  Incision 06/15/22 Sternum (Active)   Dressing Status Dry;Clean; Intact 06/20/22 0400   Dressing Change Due 06/22/22 06/20/22 0400   Dressing/Treatment Negative pressure wound therapy 06/20/22 0400   Closure Other (Comment) 06/20/22 0400   Margins Other (Comment) 06/20/22 0400   Incision Assessment Other (Comment) 06/20/22 0400   Drainage Amount None 06/20/22 0400   Odor None 06/20/22 0400   Sana-incision Assessment Intact 06/18/22 0030   Number of days: 5       Incision 12/08/20 Abdomen Lower;Medial (Active)   Number of days: 558        Elimination:  Continence: Bowel: Yes  Bladder: Yes  Urinary Catheter: Indication for Use of Catheter: Acute urinary retention/obstruction   Colostomy/Ileostomy/Ileal Conduit: No       Date of Last BM: 6/20/2022    Intake/Output Summary (Last 24 hours) at 6/20/2022 0914  Last data filed at 6/20/2022 0400  Gross per 24 hour   Intake 330 ml   Output 2000 ml   Net -1670 ml     I/O last 3 completed shifts: In: 330 [P.O.:330]  Out: 3000 [Urine:2950; Chest Tube:50]    Safety Concerns: At Risk for Falls and sternal precautions    Impairments/Disabilities:      None    Nutrition Therapy:  Current Nutrition Therapy:   - Oral Diet:  Carb Control 5 carbs/meal (2000kcals/day)    Routes of Feeding: Oral  Liquids: Thin Liquids  Daily Fluid Restriction: no  Last Modified Barium Swallow with Video (Video Swallowing Test): not done    Treatments at the Time of Hospital Discharge:   Respiratory Treatments: see orders  Oxygen Therapy:  is not on home oxygen therapy.   Ventilator:    - CPAP   only when sleeping    Rehab Therapies: Physical Therapy and Occupational Therapy  Weight Bearing Status/Restrictions: No weight bearing restrictions  Other Medical Equipment (for information only, NOT a DME order):  hospital bed  Other Treatments: please follow sternal precautions    Patient's personal belongings (please select all that are sent with patient):  None    RN SIGNATURE:  Electronically signed by Ralph Louis RN on 6/20/22 at 3:24 PM EDT    CASE MANAGEMENT/SOCIAL WORK SECTION    Inpatient Status Date: ***    Readmission Risk Assessment Score:  Readmission Risk Risk of Unplanned Readmission:  16           Discharging to Facility/ Agency   Name:   Address:  Phone:  Fax:    Dialysis Facility (if applicable)   Name:  Address:  Dialysis Schedule:  Phone:  Fax:    / signature: {Esignature:[id number]}    PHYSICIAN SECTION    Prognosis: {Prognosis:0008384748}    Condition at Discharge: Kenn Pastor Patient Condition:136114105}    Rehab Potential (if transferring to Rehab): {Prognosis:5386245536}    Recommended Labs or Other Treatments After Discharge: ***    Physician Certification: I certify the above information and transfer of Gerda Haas  is necessary for the continuing treatment of the diagnosis listed and that she requires {Admit to Appropriate Level of Care:27738} for {GREATER/LESS:499106529} 30 days.      Update Admission H&P: see last progress note     PHYSICIAN SIGNATURE:  Electronically signed by Orion Dale PA-C on 6/20/2022 at 9:14 AM

## 2022-06-20 NOTE — PROGRESS NOTES
Patient refusing potassium supplements at this time. Educated patient on the importance of taking the potassium pills and she still is refusing at this time.

## 2022-06-20 NOTE — CARE COORDINATION
Pt is [facility] medicare dual, will need [employer]. Called [employer]) for 4pm pick via wheelchair, reservation #[phone]. Envelope and ambullete form in soft chart, will need a COVID, COVID test given to bedside RN. Notified bedside RN, charge RN, , pt, and friend Rupal Mayer of discharge/time. Valorie Robertson, MSW, LSW

## 2022-06-20 NOTE — PATIENT CARE CONFERENCE
[facility] Quality Flow/Interdisciplinary Rounds Progress Note        Quality Flow Rounds held on June 20, 2022    Disciplines Attending:  Bedside Nurse, ,  and Nursing Unit Leadership    Alida Freeman was admitted on 6/15/2022  5:32 AM    Anticipated Discharge Date:  Expected Discharge Date: 06/20/22    Disposition:    Gerardo Score:  Gerardo Scale Score: 17    Readmission Risk              Risk of Unplanned Readmission:  16           Discussed patient goal for the day, patient clinical progression, and barriers to discharge.   The following Goal(s) of the Day/Commitment(s) have been identified:  Diagnostics - Report Results and Labs - Report Results      Kasey Flores RN  June 20, 2022

## 2022-06-21 NOTE — DISCHARGE SUMMARY
Physician Discharge Summary     Patient ID:  Gabino Steve  [id number]  21 y.o.  1955    Admit date: 6/15/2022    Discharge date and time: 6/20/2022  4:36 PM     Admitting Physician: Sander Shafer MD     Discharge Physician: Krissy Ramos MD     Discharge Diagnoses:   Left atrial myxoma, mild aortic insufficiency,  severe morbid obesity with a BMI of 50, anxiety, arthritis, avascular  necrosis of the bone, chronic fatigue, chronic back pain, depression,  fibromyalgia, reflux disease, multiple orthopedic fractures,  hypertension, hyperlipidemia, osteoarthritis, reflux disease and thyroid  Disease. OPERATIONS:  1. Sternotomy. 2.  Resection of left atrial myxoma with repair of the septum and dome  of the left atrium. 3.  Rigid internal fixation of the sternum with Natalie plates     Discharged Condition: stable    Indication for Admission: This is a 24-year-old female who was found to have a left  atrial myxoma. Cardiac cath showed normal coronary arteries and ULISES  appeared to show moderate aortic insufficiency. She was referred for  intervention. The patient described procedure in full including risks  and complications including but not limited to bleeding, infection, need  for reoperation, hemothorax, pneumothorax, stroke, myocardial  infarction, and death. The patient agreed to proceed    Hospital Course: Course as above, and on 6/15/22 the patient underwent Sternotomy/Resection of LA myxoma with repair of the septum and dome of the LA/Rigid internal fixation of the sternum with Natalie plates x 2 Postoperatively, she was transferred to the CVICU in guarded stable condition and extubated once indications met. Once appropriate, she was transferred to the monitored stepdown unit and beta blockade was initiated. The mediastinal/pleural chest tubes, and epicardial pacing wires were discontinued in the usual fashion.  Supplemental oxygen was weaned off, neurology was consulted for recommendations on pain control given hx of chronic pain and patient's pre op medication regimen   in addition to being increasingly lethargic post operatively. Medications were adjusted and patient's mentation improved, reis was re-inserted and urology was consulted for additional recs   She was deemed ready for discharge to rehab  on POD 5  in stable condition with a post-operative appointment scheduled. Consults: urology, neurology     Discharge Exam:  Cardiac: RRR  Lungs: decreased bases  Chest incision c/d/i . Sternum stable. Prior chest tube site incisions C/D/I, no erythema with intact sutures.    Abd: Soft, nontender, +BS  Ext VASQUEZ, groins soft     Disposition: rehab     Patient Instructions:      Medication List      START taking these medications    amiodarone 200 MG tablet  Commonly known as: CORDARONE  Take 1 tablet by mouth See Admin Instructions Take two tablets two times daily x 1 week, then take two tablets one time daily x 1 week, then take one tablet one  Time daily x 2 weeks, then stop medication     apixaban 5 MG Tabs tablet  Commonly known as: ELIQUIS  Take 1 tablet by mouth 2 times daily     ascorbic acid 500 MG tablet  Commonly known as: VITAMIN C  Take 1 tablet by mouth 2 times daily     aspirin 81 MG EC tablet  Take 1 tablet by mouth daily     cyanocobalamin 1000 MCG tablet  Take 1 tablet by mouth daily for 14 days     ferrous sulfate 325 (65 Fe) MG tablet  Commonly known as: IRON 325  Take 1 tablet by mouth 2 times daily (with meals)     folic acid 1 MG tablet  Commonly known as: FOLVITE  Take 1 tablet by mouth daily     furosemide 20 MG tablet  Commonly known as: Lasix  Take 1 tablet by mouth See Admin Instructions Take two tablets one time daily x 1 week, then take one tablet one time daily x 2 weeks     metoprolol tartrate 25 MG tablet  Commonly known as: LOPRESSOR  Take 0.5 tablets by mouth 2 times daily     potassium chloride 10 MEQ extended release tablet  Commonly known as: KLOR-CON M  Take 1 tablet by mouth daily for 21 days        CHANGE how you take these medications    doxepin 10 MG capsule  Commonly known as: SINEQUAN  Take 1 capsule by mouth nightly  What changed:   · when to take this  · reasons to take this     fluticasone 50 MCG/ACT nasal spray  Commonly known as: Flonase  1 spray by Nasal route daily  What changed:   · when to take this  · reasons to take this        CONTINUE taking these medications    Advair HFA 45-21 MCG/ACT inhaler  Generic drug: fluticasone-salmeterol     albuterol sulfate  (90 Base) MCG/ACT inhaler  Commonly known as: Ventolin HFA  Inhale 2 puffs into the lungs every 6 hours as needed for Wheezing or Shortness of Breath     busPIRone 15 MG tablet  Commonly known as: BUSPAR  TAKE 1 TABLET BY MOUTH THREE TIMES A DAY     cetirizine 10 MG tablet  Commonly known as: ZYRTEC  Take 1 tablet by mouth 2 times daily     DULoxetine 30 MG extended release capsule  Commonly known as: CYMBALTA  TAKE 1 CAPSULE BY MOUTH DAILY     pantoprazole 40 MG tablet  Commonly known as: PROTONIX  Take 1 tablet by mouth 2 times daily     pregabalin 150 MG capsule  Commonly known as: LYRICA  Take 1 capsule by mouth 3 times daily for 90 days. TID     topiramate 100 MG tablet  Commonly known as: TOPAMAX  Take 1 tablet by mouth 2 times daily     triamcinolone 0.1 % cream  Commonly known as: KENALOG  Apply topically 2 times daily.         STOP taking these medications    aspirin-acetaminophen-caffeine 250-250-65 MG per tablet  Commonly known as: EXCEDRIN MIGRAINE     chlorzoxazone 500 MG tablet  Commonly known as: PARAFON FORTE     HAIR SKIN AND NAILS FORMULA PO     hydroCHLOROthiazide 25 MG tablet  Commonly known as: HYDRODIURIL     HYDROcodone-acetaminophen 5-325 MG per tablet  Commonly known as: Norco     tolterodine 2 MG extended release capsule  Commonly known as: DETROL LA        ASK your doctor about these medications    pseudoephedrine 240 MG Tb24 extended release tablet  Commonly known as: SUDAFED 24

## 2022-06-29 NOTE — PROGRESS NOTES
Clinical Documentation Reviewer    PROVIDER RESPONSE TEXT:    Acute Pulmonary Insufficiency Following Surgery is present as evidenced by   Need for Bipap and hypoxia with hypoventilation    Query created by: Carine Bettencourt on 6/29/2022 8:47 AM      Electronically signed by:  Dori España MD 6/29/2022 11:17 AM

## 2022-07-07 ENCOUNTER — OFFICE VISIT (OUTPATIENT)
Dept: FAMILY MEDICINE CLINIC | Age: 67
End: 2022-07-07
Payer: MEDICARE

## 2022-07-07 VITALS
OXYGEN SATURATION: 95 % | HEIGHT: 68 IN | SYSTOLIC BLOOD PRESSURE: 114 MMHG | TEMPERATURE: 98 F | RESPIRATION RATE: 16 BRPM | BODY MASS INDEX: 44.41 KG/M2 | WEIGHT: 293 LBS | HEART RATE: 80 BPM | DIASTOLIC BLOOD PRESSURE: 60 MMHG

## 2022-07-07 DIAGNOSIS — I51.89 LEFT ATRIAL MASS: Primary | ICD-10-CM

## 2022-07-07 DIAGNOSIS — E11.51 TYPE II DIABETES MELLITUS WITH PERIPHERAL CIRCULATORY DISORDER (HCC): ICD-10-CM

## 2022-07-07 PROCEDURE — 99212 OFFICE O/P EST SF 10 MIN: CPT | Performed by: FAMILY MEDICINE

## 2022-07-07 PROCEDURE — 2022F DILAT RTA XM EVC RTNOPTHY: CPT | Performed by: FAMILY MEDICINE

## 2022-07-07 PROCEDURE — 3017F COLORECTAL CA SCREEN DOC REV: CPT | Performed by: FAMILY MEDICINE

## 2022-07-07 PROCEDURE — 1123F ACP DISCUSS/DSCN MKR DOCD: CPT | Performed by: FAMILY MEDICINE

## 2022-07-07 PROCEDURE — 4004F PT TOBACCO SCREEN RCVD TLK: CPT | Performed by: FAMILY MEDICINE

## 2022-07-07 PROCEDURE — G8399 PT W/DXA RESULTS DOCUMENT: HCPCS | Performed by: FAMILY MEDICINE

## 2022-07-07 PROCEDURE — 1090F PRES/ABSN URINE INCON ASSESS: CPT | Performed by: FAMILY MEDICINE

## 2022-07-07 PROCEDURE — G8417 CALC BMI ABV UP PARAM F/U: HCPCS | Performed by: FAMILY MEDICINE

## 2022-07-07 PROCEDURE — 1111F DSCHRG MED/CURRENT MED MERGE: CPT | Performed by: FAMILY MEDICINE

## 2022-07-07 PROCEDURE — G8427 DOCREV CUR MEDS BY ELIG CLIN: HCPCS | Performed by: FAMILY MEDICINE

## 2022-07-07 PROCEDURE — 3044F HG A1C LEVEL LT 7.0%: CPT | Performed by: FAMILY MEDICINE

## 2022-07-07 ASSESSMENT — LIFESTYLE VARIABLES: HOW OFTEN DO YOU HAVE A DRINK CONTAINING ALCOHOL: NEVER

## 2022-07-07 ASSESSMENT — ENCOUNTER SYMPTOMS
ALLERGIC/IMMUNOLOGIC NEGATIVE: 1
RESPIRATORY NEGATIVE: 1
GASTROINTESTINAL NEGATIVE: 1
EYES NEGATIVE: 1

## 2022-07-07 NOTE — PROGRESS NOTES
OFFICE PROGRESS NOTE      SUBJECTIVE:        Patient ID:   Nicole Anand is a 79 y.o. female who presents for   Chief Complaint   Patient presents with    Follow-Up from Hospital     pt is not taking iron because causing diarrhea: has stitches that need removed           HPI:     Patient here following the cardiac surgery  She said that she did not an appointment to see her primary care till August of this year        Prior to Visit Medications    Medication Sig Taking? Authorizing Provider   aspirin 81 MG EC tablet Take 1 tablet by mouth daily Yes Carlos A Jo PA-C   amiodarone (CORDARONE) 200 MG tablet Take 1 tablet by mouth See Admin Instructions Take two tablets two times daily x 1 week, then take two tablets one time daily x 1 week, then take one tablet one  Time daily x 2 weeks, then stop medication Yes Carlos A Jo PA-C   apixaban (ELIQUIS) 5 MG TABS tablet Take 1 tablet by mouth 2 times daily Yes Carlos A Jo PA-C   metoprolol tartrate (LOPRESSOR) 25 MG tablet Take 0.5 tablets by mouth 2 times daily Yes Carlos A Jo PA-C   folic acid (FOLVITE) 1 MG tablet Take 1 tablet by mouth daily Yes Carlos A Jo PA-C   ascorbic acid (VITAMIN C) 500 MG tablet Take 1 tablet by mouth 2 times daily Yes Carlos A Jo PA-C   furosemide (LASIX) 20 MG tablet Take 1 tablet by mouth See Admin Instructions Take two tablets one time daily x 1 week, then take one tablet one time daily x 2 weeks Yes Carlos A Jo PA-C   potassium chloride (KLOR-CON M) 10 MEQ extended release tablet Take 1 tablet by mouth daily for 21 days Yes Carlos A Jo PA-C   pregabalin (LYRICA) 150 MG capsule Take 1 capsule by mouth 3 times daily for 90 days. TID  Patient taking differently: Take 50 mg by mouth 3 times daily.  TID Yes DEANDRE Schilling   pseudoephedrine (SUDAFED 24 HR) 240 MG TB24 extended release tablet Take 240 mg by mouth daily Yes Historical Provider, MD pantoprazole (PROTONIX) 40 MG tablet Take 1 tablet by mouth 2 times daily Yes Vijay Mccabe MD   albuterol sulfate HFA (VENTOLIN HFA) 108 (90 Base) MCG/ACT inhaler Inhale 2 puffs into the lungs every 6 hours as needed for Wheezing or Shortness of Breath Yes Vijay Mccabe MD   DULoxetine (CYMBALTA) 30 MG extended release capsule TAKE 1 CAPSULE BY MOUTH DAILY Yes Vijay Mccabe MD   ADVAIR [place] 74-55 MCG/ACT inhaler Inhale 2 puffs into the lungs 2 times daily  Yes Kyrie Franks MD   fluticasone (FLONASE) 50 MCG/ACT nasal spray 1 spray by Nasal route daily  Patient taking differently: 1 spray by Nasal route daily as needed  Yes Vijay Mccabe MD   topiramate (TOPAMAX) 100 MG tablet Take 1 tablet by mouth 2 times daily  Patient taking differently: Take 50 mg by mouth 2 times daily  Yes Vijay Mccabe MD   busPIRone (BUSPAR) 15 MG tablet TAKE 1 TABLET BY MOUTH THREE TIMES A DAY Yes Vijay Mccabe MD   doxepin (SINEQUAN) 10 MG capsule Take 1 capsule by mouth nightly  Patient taking differently: Take 10 mg by mouth nightly as needed  Yes Deniz Shah PA-C   ferrous sulfate (IRON 325) 325 (65 Fe) MG tablet Take 1 tablet by mouth 2 times daily (with meals)  Patient not taking: Reported on 7/7/2022  Chula Wilde PA-C   vitamin B-12 1000 MCG tablet Take 1 tablet by mouth daily for 14 days  Chula Wilde PA-C   triamcinolone (KENALOG) 0.1 % cream Apply topically 2 times daily.   Berny Salas DPM   cetirizine (ZYRTEC) 10 MG tablet Take 1 tablet by mouth 2 times daily  Vijay Mccabe MD      Social History     Socioeconomic History    Marital status:      Spouse name: None    Number of children: None    Years of education: None    Highest education level: Associate degree: academic program   Occupational History    None   Tobacco Use    Smoking status: Current Every Day Smoker     Packs/day: 1.50     Years: 30.00     Pack years: 45.00 Types: Cigarettes    Smokeless tobacco: Never Used   Vaping Use    Vaping Use: Never used   Substance and Sexual Activity    Alcohol use: No     Alcohol/week: 0.0 standard drinks    Drug use: No    Sexual activity: Not Currently   Other Topics Concern    None   Social History Narrative    None     Social Determinants of Health     Financial Resource Strain: High Risk    Difficulty of Paying Living Expenses: Hard   Food Insecurity: No Food Insecurity    Worried About Running Out of Food in the Last Year: Never true    Ascencion of Food in the Last Year: Never true   Transportation Needs:     Lack of Transportation (Medical): Not on file    Lack of Transportation (Non-Medical): Not on file   Physical Activity:     Days of Exercise per Week: Not on file    Minutes of Exercise per Session: Not on file   Stress:     Feeling of Stress : Not on file   Social Connections:     Frequency of Communication with Friends and Family: Not on file    Frequency of Social Gatherings with Friends and Family: Not on file    Attends [employer] Services: Not on file    Active Member of [address] [place] [address] or Organizations: Not on file    Attends Club or Organization Meetings: Not on file    Marital Status: Not on file   Intimate Partner Violence:     Fear of Current or Ex-Partner: Not on file    Emotionally Abused: Not on file    Physically Abused: Not on file    Sexually Abused: Not on file   Housing Stability:     Unable to Pay for Housing in the Last Year: Not on file    Number of [place] in the Last Year: Not on file    Unstable Housing in the Last Year: Not on file       I have reviewed Sheree's allergies, medications, problem list, medical, social and family history and have updated as needed in the electronic medical record  Review Of Systems:    Review of Systems   Constitutional: Negative. HENT: Negative. Eyes: Negative. Respiratory: Negative.     Cardiovascular: Positive for chest pain (Musculoskeletal postop). Gastrointestinal: Negative. Endocrine: Negative. Musculoskeletal: Negative. Skin: Negative. Allergic/Immunologic: Negative. Neurological: Negative. Hematological: Negative. Psychiatric/Behavioral: Negative. OBJECTIVE:     VS:  Wt Readings from Last 3 Encounters:   07/07/22 (!) 319 lb (144.7 kg)   06/20/22 (!) 328 lb 12.8 oz (149.1 kg)   06/10/22 (!) 328 lb (148.8 kg)     Temp Readings from Last 3 Encounters:   07/07/22 98 °F (36.7 °C) (Temporal)   06/20/22 98.9 °F (37.2 °C) (Temporal)   06/10/22 98 °F (36.7 °C) (Temporal)     BP Readings from Last 3 Encounters:   07/07/22 114/60   06/20/22 (!) 107/51   06/10/22 132/80        Physical Exam  Constitutional:       Appearance: She is well-developed. HENT:      Head: Normocephalic and atraumatic. Eyes:      Conjunctiva/sclera: Conjunctivae normal.      Pupils: Pupils are equal, round, and reactive to light. Cardiovascular:      Rate and Rhythm: Normal rate and regular rhythm. Heart sounds: Normal heart sounds. Pulmonary:      Effort: Pulmonary effort is normal.      Breath sounds: Normal breath sounds. Abdominal:      General: Bowel sounds are normal.      Palpations: Abdomen is soft. Comments: Sutures present on abdominal wall   Musculoskeletal:         General: Normal range of motion. Cervical back: Normal range of motion and neck supple. Skin:     General: Skin is warm and dry. Neurological:      Mental Status: She is alert and oriented to person, place, and time. Psychiatric:         Thought Content:  Thought content normal.            Labs :    Lab Results   Component Value Date    WBC 8.3 06/20/2022    HGB 9.5 (L) 06/20/2022    HCT 31.1 (L) 06/20/2022     (L) 06/20/2022    CHOL 137 06/17/2022    TRIG 136 06/17/2022    HDL 47 06/17/2022    ALT 17 06/10/2022    AST 10 06/10/2022     06/20/2022    K 3.8 06/20/2022     (H) 06/20/2022    CREATININE 1.0 06/20/2022    BUN 23 06/20/2022    CO2 21 (L) 06/20/2022    TSH 0.721 06/17/2022    INR 1.0 06/15/2022    LABA1C 5.8 (H) 06/10/2022    LABMICR <12.0 04/28/2021     Lab Results   Component Value Date/Time    COLORU Yellow 06/10/2022 01:10 PM    NITRU Negative 06/10/2022 01:10 PM    GLUCOSEU Negative 06/10/2022 01:10 PM    KETUA Negative 06/10/2022 01:10 PM    UROBILINOGEN 0.2 06/10/2022 01:10 PM    BILIRUBINUR Negative 06/10/2022 01:10 PM    BILIRUBINUR negative 03/08/2022 11:57 AM     No results found for: PSA, CEA, , [id number],       Controlled Substances Monitoring:                                    ASSESSMENT     Patient Active Problem List    Diagnosis Date Noted    Left atrial mass 05/10/2022    Aortic regurgitation 05/10/2022    AMS (altered mental status) 06/18/2022    Medication side effect 06/18/2022    Myxoma 06/15/2022    Acute postoperative pulmonary insufficiency (HCC)     Acute post-operative pain     Type 2 diabetes mellitus without complication, without long-term current use of insulin ([address]     S/P cardiac cath 05/23/2022    Chronic renal disease, stage III [facility]) [[id number]] 04/20/2022    LVH (left ventricular hypertrophy) 03/08/2022    Stage 3a chronic kidney disease ([facility] [address] 11/10/2021    Opioid use, unspecified with unspecified opioid-induced disorder 08/17/2021    Hyperlipidemia 05/07/2021    Class 3 severe obesity due to excess calories with serious comorbidity and body mass index (BMI) of 50.0 to 59.9 in adult [facility]) 04/28/2021    Diabetes mellitus without complication ([address] 07/26/6918    Hypothyroidism 04/28/2021    Sleep apnea 31/29/8994    Uncomplicated asthma 19/32/0678    Hiatal hernia     Onychomycosis 10/22/2020    Type II diabetes mellitus with peripheral circulatory disorder ([address] 10/22/2020    Venous insufficiency (chronic) (peripheral) 10/22/2020    Tinea pedis of both feet 10/22/2020    Epigastric pain     Chronic pain syndrome 03/04/2020    Myofascial pain syndrome 03/04/2020    Lumbar disc disorder 03/04/2020    Lumbar spondylosis 03/04/2020    Spinal stenosis of lumbar region without neurogenic claudication 03/04/2020    Tobacco use 06/19/2017    History of colon polyps 11/22/2016    Nodule of left lung 10/31/2016    History of total knee arthroplasty 03/19/2015    Depression 02/04/2015    Overactive bladder 01/22/2014    Fibromyalgia 01/14/2014    Chronic back pain 11/12/2013        Diagnosis:     ICD-10-CM    1. Left atrial mass  I51.89    2. Type II diabetes mellitus with peripheral circulatory disorder (HCC)  E11.51        PLAN:     Follow-up with the primary care  Follow-up with the surgeon      Patient Instructions   Continue present treatment  Follow with the primary care  Follow-up with the surgeon  Return to clinic earlier if any problems      Return in about 4 days (around 7/11/2022) for With Libra Galvan. Mack Vera reviewed my findings and recommendations with James Rueda.     Electronicallysigned by Michelle Tan MD on 7/7/22 at 2:11 PM EDT

## 2022-07-13 ENCOUNTER — TELEPHONE (OUTPATIENT)
Dept: CARDIOTHORACIC SURGERY | Age: 67
End: 2022-07-13

## 2022-07-13 NOTE — TELEPHONE ENCOUNTER
[facility]. Pt has a small opening in sternal incision about 1 \" from bottom, 1cm x1cm. Yellowish drainage. No fever. Pt not wearing surgical bra.  Please advise

## 2022-07-14 NOTE — TELEPHONE ENCOUNTER
Instructed Santos Romo to cleanse incision 2x daily with betadine , keep covered change 2x daily. Instruct pt to wear surgical bra. Watch for sings of infection.

## 2022-07-14 NOTE — TELEPHONE ENCOUNTER
Cleanse chest incision twice daily with betadine until opening heals. Keep covered if any drainage and change dressing twice daily with betadine cleansings. Call if any swelling, redness, purulent drainage, or fever/chills. Wear surgical bra to prevent any worsening or further dehiscense of incision. If it gets any worse, then have her call and come to office for check. At this point though, start with above.

## 2022-07-18 ENCOUNTER — TELEPHONE (OUTPATIENT)
Dept: FAMILY MEDICINE CLINIC | Age: 67
End: 2022-07-18

## 2022-07-18 ASSESSMENT — ENCOUNTER SYMPTOMS
SHORTNESS OF BREATH: 0
COUGH: 0

## 2022-07-18 NOTE — TELEPHONE ENCOUNTER
----- Message from Shirlene Palumbo LPN sent at 3/46/0290  8:13 AM EDT -----  Subject: Appointment Request    Reason for Call: Established Patient Appointment needed: Routine Existing   Condition Follow Up    QUESTIONS    Reason for appointment request? Available appointments did not meet   patient need     Additional Information for Provider? pt needs to reschedule her appt for   today with Clare Diaz  ---------------------------------------------------------------------------  --------------  [phone]  [phone];  Do not leave any message, patient will call back for answer  ---------------------------------------------------------------------------  --------------  SCRIPT ANSWERS  HERRERA Screen: Tomdonis Sheldon

## 2022-07-18 NOTE — PROGRESS NOTES
Subjective:      Patient ID: Yesenia Salas is a 79 y.o. female who presents to office for routine 1 month follow up s/p  Sternotomy/Resection of LA myxoma with repair of the septum and dome of the LA/Rigid internal fixation of the sternum with Natalie plates x 2 on 3/07/55. Patient states she is doing well and denies CP, or SOB. She has experienced a opening at the bottom of her sternal inc starting last week. She denies any fever, chills, drainage or redness. HPI    Review of Systems   Constitutional:  Negative for chills and fever. Respiratory:  Negative for cough and shortness of breath. Cardiovascular:  Negative for chest pain, palpitations and leg swelling. Neurological:  Negative for syncope and light-headedness. Objective:   Physical Exam  Constitutional:       Appearance: Normal appearance. Cardiovascular:      Rate and Rhythm: Normal rate and regular rhythm. Pulses: Normal pulses. Heart sounds: Normal heart sounds. Pulmonary:      Effort: Pulmonary effort is normal.      Breath sounds: Normal breath sounds. Comments: Sternum stable   Abdominal:      General: Bowel sounds are normal.   Musculoskeletal:         General: No swelling. Normal range of motion. Cervical back: Normal range of motion and neck supple. Neurological:      Mental Status: She is alert and oriented to person, place, and time. Assessment:      S/p  Sternotomy/Resection of LA myxoma with repair of the septum and dome of the LA/Rigid internal fixation of the sternum with [place] plates x 2       Plan:      Remove sternal precautions in 2 weeks  CT sutures removed without difficulty   Cardiac rehab referral  Continue follow up with PCP, Cardiology as scheduled. Encouraged to call office with any questions, concerns. Otherwise no further follow up necessary from CTS standpoint.             Tez Mendez PA-C

## 2022-07-18 NOTE — TELEPHONE ENCOUNTER
Called pt; no answer. Will cancel today's appt and try calling pt later. Called pt and pt is already rescheduled for 8.15.22 and plans to keep that appt.

## 2022-07-19 ENCOUNTER — OFFICE VISIT (OUTPATIENT)
Dept: CARDIOTHORACIC SURGERY | Age: 67
End: 2022-07-19

## 2022-07-19 VITALS
HEART RATE: 90 BPM | HEIGHT: 68 IN | WEIGHT: 293 LBS | BODY MASS INDEX: 44.41 KG/M2 | SYSTOLIC BLOOD PRESSURE: 146 MMHG | DIASTOLIC BLOOD PRESSURE: 85 MMHG

## 2022-07-19 DIAGNOSIS — D15.1 ATRIAL MYXOMA: Primary | ICD-10-CM

## 2022-07-19 PROCEDURE — 99024 POSTOP FOLLOW-UP VISIT: CPT | Performed by: PHYSICIAN ASSISTANT

## 2022-07-21 ENCOUNTER — PATIENT MESSAGE (OUTPATIENT)
Dept: FAMILY MEDICINE CLINIC | Age: 67
End: 2022-07-21

## 2022-07-21 ENCOUNTER — HOSPITAL ENCOUNTER (OUTPATIENT)
Dept: CARDIAC REHAB | Age: 67
Setting detail: THERAPIES SERIES
Discharge: HOME | End: 2022-07-21

## 2022-07-21 NOTE — TELEPHONE ENCOUNTER
From: Claudina Lombard  To: Jamie Iniguez  Sent: 7/21/2022 8:31 AM EDT  Subject: NEED REFILL FOR METOPROLOL TART 25 mg    I am out. Didn't realize till I took last one this morning. If emailed, early enough, can be delivered today.      Thank you,  Janett Bright

## 2022-07-27 ENCOUNTER — HOSPITAL ENCOUNTER (OUTPATIENT)
Dept: CARDIAC REHAB | Age: 67
Setting detail: THERAPIES SERIES
Discharge: HOME | End: 2022-07-27

## 2022-08-01 ENCOUNTER — OFFICE VISIT (OUTPATIENT)
Dept: CARDIOLOGY CLINIC | Age: 67
End: 2022-08-01
Payer: MEDICARE

## 2022-08-01 VITALS
SYSTOLIC BLOOD PRESSURE: 130 MMHG | HEIGHT: 68 IN | BODY MASS INDEX: 44.41 KG/M2 | RESPIRATION RATE: 16 BRPM | DIASTOLIC BLOOD PRESSURE: 70 MMHG | HEART RATE: 97 BPM | WEIGHT: 293 LBS

## 2022-08-01 DIAGNOSIS — I48.91 ATRIAL FIBRILLATION, UNSPECIFIED TYPE (HCC): Primary | ICD-10-CM

## 2022-08-01 DIAGNOSIS — I35.1 AORTIC VALVE INSUFFICIENCY, ETIOLOGY OF CARDIAC VALVE DISEASE UNSPECIFIED: ICD-10-CM

## 2022-08-01 PROCEDURE — 1123F ACP DISCUSS/DSCN MKR DOCD: CPT | Performed by: INTERNAL MEDICINE

## 2022-08-01 PROCEDURE — 99214 OFFICE O/P EST MOD 30 MIN: CPT | Performed by: INTERNAL MEDICINE

## 2022-08-01 PROCEDURE — 93000 ELECTROCARDIOGRAM COMPLETE: CPT | Performed by: INTERNAL MEDICINE

## 2022-08-01 NOTE — PROGRESS NOTES
[facility] Progress Note  Dr. Ochoa Letters      Referring Physician: Windy Fong, APRN - CNP  CHIEF COMPLAINT:   Chief Complaint   Patient presents with    Follow-Up from [address] [place] [address]:   Patient is 79years old female with history of atrial myxoma status postsurgical excision, hypertension, hyperlipidemia, paroxysmal postop atrial fibrillation, fibromyalgia, is here for follow-up appointment. Patient denies any chest pain, no shortness of breath, no lightheadedness, no dizziness, no palpitations, no pedal edema, no PND, no orthopnea, no syncope, no presyncopal episodes.   Functional capacity is improving    Past Medical History:   Diagnosis Date    Anxiety     [facility] 03/23/2015    Breast lesion 12/01/2015    Chronic back pain     Chronic fatigue 10/08/2013    Daytime somnolence 10/08/2013    Depression     Fibromyalgia     Fracture, fibula     right    GERD (gastroesophageal reflux disease)     History of blood transfusion     Hyperlipidemia     Hypertension     Morbid obesity with BMI of 45.0-49.9, adult ([facility] 10/09/2015    Obesity     Osteoarthritis     Overactive bladder 01/22/2014    PONV (postoperative nausea and vomiting)     hx 40 yrs ago    Prolonged emergence from general anesthesia     Reflux     Thyroid disease          Past Surgical History:   Procedure Laterality Date    AORTIC VALVE REPLACEMENT N/A 6/15/2022    STERNOTOMY, LEFT ATRIAL MYXOMA RESECTION, performed by Harjit Quevedo MD at [address] [place] [address]  05/23/2022    Dr Reshma Adamson  2011    COLONOSCOPY  11/22/2016    Dr. Jason Willett N/A 12/8/2020    Chadd Shinnston performed by Theo Guerrero MD at [facility] ([address] [place] [address])  1991    benign reasons     JOINT REPLACEMENT Bilateral 2010    knees    LUMBAR FUSION      L3-5    ROTATOR CUFF REPAIR Left     left    TOTAL KNEE ARTHROPLASTY Bilateral 2010    did both at the same time    TRANSESOPHAGEAL ECHOCARDIOGRAM N/A 5/9/2022    TRANSESOPHAGEAL ECHOCARDIOGRAM performed by Anny Gee MD at [place] [address] 9/29/2020    EGD BIOPSY performed by Catherine Frank MD at [place] [address]         Current Outpatient Medications   Medication Sig Dispense Refill    metoprolol tartrate (LOPRESSOR) 25 MG tablet Take 0.5 tablets by mouth in the morning and 0.5 tablets before bedtime. 60 tablet 3    apixaban (ELIQUIS) 5 MG TABS tablet Take 1 tablet by mouth 2 times daily 60 tablet 3    aspirin 81 MG EC tablet Take 1 tablet by mouth daily 30 tablet 3    ascorbic acid (VITAMIN C) 500 MG tablet Take 1 tablet by mouth 2 times daily 60 tablet 0    furosemide (LASIX) 20 MG tablet Take 1 tablet by mouth See Admin Instructions Take two tablets one time daily x 1 week, then take one tablet one time daily x 2 weeks 60 tablet 0    pregabalin (LYRICA) 150 MG capsule Take 1 capsule by mouth 3 times daily for 90 days.  TID (Patient taking differently: Take 50 mg by mouth in the morning and 50 mg at noon and 50 mg before bedtime. TID.) 270 capsule 0    pseudoephedrine (SUDAFED 24 HR) 240 MG TB24 extended release tablet Take 240 mg by mouth daily      pantoprazole (PROTONIX) 40 MG tablet Take 1 tablet by mouth 2 times daily 180 tablet 0    albuterol sulfate HFA (VENTOLIN HFA) 108 (90 Base) MCG/ACT inhaler Inhale 2 puffs into the lungs every 6 hours as needed for Wheezing or Shortness of Breath 1 each 3    DULoxetine (CYMBALTA) 30 MG extended release capsule TAKE 1 CAPSULE BY MOUTH DAILY 90 capsule 1    cetirizine (ZYRTEC) 10 MG tablet Take 1 tablet by mouth 2 times daily 180 tablet 1    ADVAIR HFA 45-21 MCG/ACT inhaler Inhale 2 puffs into the lungs 2 times daily       fluticasone (FLONASE) 50 MCG/ACT nasal spray 1 spray by Nasal route daily (Patient taking differently: 1 spray by Nasal route daily as needed) 3 each 3    topiramate (TOPAMAX) 100 MG tablet Take 1 tablet by mouth 2 times daily (Patient taking differently: Take 50 mg by mouth in the morning and 50 mg before bedtime. ) 180 tablet 3    busPIRone (BUSPAR) 15 MG tablet TAKE 1 TABLET BY MOUTH THREE TIMES A  tablet 3    doxepin (SINEQUAN) 10 MG capsule Take 1 capsule by mouth nightly (Patient taking differently: Take 10 mg by mouth nightly as needed) 30 capsule 2    amiodarone (CORDARONE) 200 MG tablet Take 1 tablet by mouth See Admin Instructions Take two tablets two times daily x 1 week, then take two tablets one time daily x 1 week, then take one tablet one  Time daily x 2 weeks, then stop medication (Patient not taking: Reported on 8/1/2022) 56 tablet 0    ferrous sulfate (IRON 325) 325 (65 Fe) MG tablet Take 1 tablet by mouth 2 times daily (with meals) 60 tablet 0    folic acid (FOLVITE) 1 MG tablet Take 1 tablet by mouth daily 30 tablet 0    vitamin B-12 1000 MCG tablet Take 1 tablet by mouth daily for 14 days 14 tablet 0    potassium chloride (KLOR-CON M) 10 MEQ extended release tablet Take 1 tablet by mouth daily for 21 days 21 tablet 0    triamcinolone (KENALOG) 0.1 % cream Apply topically 2 times daily. 90 g 2     No current facility-administered medications for this visit.          Allergies as of 08/01/2022 - Fully Reviewed 08/01/2022   Allergen Reaction Noted    Carafate [sucralfate]  10/06/2020    Glucophage [metformin hcl]  08/24/2020    Mobic [meloxicam] Other (See Comments) 06/14/2016    Trazodone and nefazodone  08/04/2020    Ultram [tramadol]  06/14/2016    Nickel Itching and Rash 10/06/2015       Social History     Socioeconomic History    Marital status:      Spouse name: Not on file    Number of children: Not on file    Years of education: Not on file    Highest education level: Associate degree: academic program   Occupational History    Not on file   Tobacco Use    Smoking status: Former abnormality, atraumatic, pink, moist mucous membranes. NECK:  Supple, symmetrical, trachea midline, no adenopathy, thyroid symmetric, not enlarged and no tenderness, skin normal  LUNGS:  No increased work of breathing, good air exchange, clear to auscultation bilaterally, no crackles or wheezing  CARDIOVASCULAR:  Normal apical impulse, regular rate and rhythm, normal S1 and S2, no S3 or S4, and no murmur noted and no JVD, no carotid bruit, no pedal edema, good carotid upstroke bilaterally. ABDOMEN:  Soft, nontender, no masses, no hepatomegaly or splenomegaly, BS+  CHEST: nontender to palpation, expands symmetrically  MUSCULOSKELETAL:  No clubbing no cyanosis. there is no redness, warmth, or swelling of the joints  full range of motion noted  NEUROLOGIC:  Alert, awake,oriented x3. SKIN:  no bruising or bleeding, normal skin color, texture, turgor and no redness, warmth, or swelling        /70   Pulse 97   Resp 16   Ht 5' 8\" (1.727 m)   Wt (!) 319 lb (144.7 kg)   LMP  (LMP Unknown)   BMI 48.50 kg/m²     DATA:   I personally reviewed the visit EKG with the following interpretation:Sinus rhythm, incomplete right bundle branch block, low voltage QRS in the precordial leads    ECHO: 5/9/2022,Summary   No previous echo for comparison. Technically adequate study. Left ventricular ejection fraction is grossly normal.   Mild-to-moderate aortic regurgitation is noted. Moderate pedunculated mobile mass suggestive of myxoma attached to fossa   ovalis/atrial septum was visualized in [place] cavity . Stress Test: 1/29/2020,  1. Pharmacologic stress myocardial perfusion imaging within normal   limits. 2. Stress induced ischemia is not demonstrated. Wall motion:       SPECT gated examination of myocardial contractility was performed in   the usual fashion in conjunction with the SPECT perfusion study.   Left   ventricular chamber size is normal.  Left ventricular myocardium   demonstrates normal contractility at stress with expected thickening   of the left ventricular myocardium during systole. IMPRESSION:     1. Unremarkable study. Ejection fraction:       SPECT gated images of the left ventricular myocardium were obtained   for purposes of left ventricular ejection fraction determination. Left ventricular ejection fraction is calculated at equals 74%. IMPRESSION:    1. LVEF 74%. Angiography: 5/9/2022,IMPRESSION:  Angiographically normal coronary arteries.     Cardiology Labs: BMP:    Lab Results   Component Value Date/Time     06/20/2022 05:27 AM    K 3.8 06/20/2022 05:27 AM    K 4.0 12/07/2020 12:22 PM     06/20/2022 05:27 AM    CO2 21 06/20/2022 05:27 AM    BUN 23 06/20/2022 05:27 AM    CREATININE 1.0 06/20/2022 05:27 AM     CMP:    Lab Results   Component Value Date/Time     06/20/2022 05:27 AM    K 3.8 06/20/2022 05:27 AM    K 4.0 12/07/2020 12:22 PM     06/20/2022 05:27 AM    CO2 21 06/20/2022 05:27 AM    BUN 23 06/20/2022 05:27 AM    CREATININE 1.0 06/20/2022 05:27 AM    PROT 6.7 06/10/2022 01:10 PM     CBC:    Lab Results   Component Value Date/Time    WBC 8.3 06/20/2022 05:27 AM    RBC 3.31 06/20/2022 05:27 AM    HGB 9.5 06/20/2022 05:27 AM    HCT 31.1 06/20/2022 05:27 AM    HCT 30.0 06/15/2022 08:08 AM    MCV 94.0 06/20/2022 05:27 AM    RDW 14.4 06/20/2022 05:27 AM     06/20/2022 05:27 AM     PT/INR:  No results found for: PTINR  PT/INR Warfarin:  No components found for: PTPATWAR, PTINRWAR  PTT:    Lab Results   Component Value Date/Time    APTT 33.8 06/15/2022 10:00 AM     PTT Heparin:  No components found for: APTTHEP  Magnesium:    Lab Results   Component Value Date/Time    MG 2.3 06/20/2022 05:27 AM     TSH:    Lab Results   Component Value Date/Time    TSH 0.721 06/17/2022 03:07 PM     TROPONIN:  No components found for: TROP  BNP:  No results found for: BNP  FASTING LIPID PANEL:    Lab Results   Component Value Date/Time    CHOL 137 06/17/2022 05:05 AM    HDL 47 06/17/2022 05:05 AM    TRIG 136 06/17/2022 05:05 AM     No orders to display     I have personally reviewed the laboratory, cardiac diagnostic and radiographic testing as outlined above:  Records from recent hospitalization reviewed and summarized as above    IMPRESSION:  1.  S/p surgical excision of left atrial myxoma: Doing fine, will continue current treatment  2. Paroxysmal atrial fibrillation: Short bursts post surgical surgery, no recurrence, will put on 4-week event monitor for further evaluation  3. Hypertension: Controlled  4. Hyperlipidemia  5. Fibromyalgia    RECOMMENDATIONS:   1.  4-week event monitor  2. Discontinue Eliquis  3. Continue the rest of medications  4. Continue cardiac rehab  5. Follow-up with CLARISSA Liz as scheduled  6. Follow-up with Dr. Alan Kaminski in 3 months, sooner if symptomatic for any reason    I have reviewed my findings and recommendations with patient    Electronically signed by Erika Goldberg MD on 8/1/2022 at 5:12 PM    NOTE: This report was transcribed using voice recognition software.  Every effort was made to ensure accuracy; however, inadvertent computerized transcription errors may be present

## 2022-08-02 ENCOUNTER — PATIENT MESSAGE (OUTPATIENT)
Dept: CARDIOTHORACIC SURGERY | Age: 67
End: 2022-08-02

## 2022-08-02 NOTE — TELEPHONE ENCOUNTER
From: Emelina Painting  To: Dr. Yue Bolanos: 8/2/2022 11:56 AM EDT  Subject: Regarding open source on surgery still open. Been 2 weeks, What should I do? Dr. Ronaldo Mcguire I should let him know if it hasn't healed in  2 weeks.      Thank you,  Deborah Smith

## 2022-08-15 ENCOUNTER — OFFICE VISIT (OUTPATIENT)
Dept: FAMILY MEDICINE CLINIC | Age: 67
End: 2022-08-15
Payer: MEDICARE

## 2022-08-15 VITALS
RESPIRATION RATE: 18 BRPM | SYSTOLIC BLOOD PRESSURE: 128 MMHG | HEART RATE: 83 BPM | BODY MASS INDEX: 44.41 KG/M2 | WEIGHT: 293 LBS | OXYGEN SATURATION: 97 % | DIASTOLIC BLOOD PRESSURE: 76 MMHG | HEIGHT: 68 IN

## 2022-08-15 DIAGNOSIS — J30.9 ALLERGIC RHINITIS, UNSPECIFIED SEASONALITY, UNSPECIFIED TRIGGER: ICD-10-CM

## 2022-08-15 DIAGNOSIS — K21.9 GASTROESOPHAGEAL REFLUX DISEASE WITHOUT ESOPHAGITIS: ICD-10-CM

## 2022-08-15 DIAGNOSIS — F32.1 CURRENT MODERATE EPISODE OF MAJOR DEPRESSIVE DISORDER WITHOUT PRIOR EPISODE (HCC): Primary | ICD-10-CM

## 2022-08-15 DIAGNOSIS — R06.02 SHORTNESS OF BREATH ON EXERTION: ICD-10-CM

## 2022-08-15 DIAGNOSIS — J45.30 MILD PERSISTENT ASTHMA WITHOUT COMPLICATION: ICD-10-CM

## 2022-08-15 DIAGNOSIS — Z76.0 MEDICATION REFILL: ICD-10-CM

## 2022-08-15 DIAGNOSIS — M79.7 FIBROMYALGIA: ICD-10-CM

## 2022-08-15 LAB
ALBUMIN SERPL-MCNC: 4.1 G/DL (ref 3.5–5.2)
ALP BLD-CCNC: 90 U/L (ref 35–104)
ALT SERPL-CCNC: 9 U/L (ref 0–32)
ANION GAP SERPL CALCULATED.3IONS-SCNC: 11 MMOL/L (ref 7–16)
AST SERPL-CCNC: 12 U/L (ref 0–31)
BASOPHILS ABSOLUTE: 0.11 E9/L (ref 0–0.2)
BASOPHILS RELATIVE PERCENT: 1.7 % (ref 0–2)
BILIRUB SERPL-MCNC: <0.2 MG/DL (ref 0–1.2)
BUN BLDV-MCNC: 13 MG/DL (ref 6–23)
CALCIUM SERPL-MCNC: 9.4 MG/DL (ref 8.6–10.2)
CHLORIDE BLD-SCNC: 113 MMOL/L (ref 98–107)
CO2: 22 MMOL/L (ref 22–29)
CREAT SERPL-MCNC: 1.1 MG/DL (ref 0.5–1)
EOSINOPHILS ABSOLUTE: 0.34 E9/L (ref 0.05–0.5)
EOSINOPHILS RELATIVE PERCENT: 5.3 % (ref 0–6)
GFR AFRICAN AMERICAN: 60
GFR NON-AFRICAN AMERICAN: 49 ML/MIN/1.73
GLUCOSE BLD-MCNC: 101 MG/DL (ref 74–99)
HCT VFR BLD CALC: 38.9 % (ref 34–48)
HEMOGLOBIN: 12.1 G/DL (ref 11.5–15.5)
IMMATURE GRANULOCYTES #: 0.02 E9/L
IMMATURE GRANULOCYTES %: 0.3 % (ref 0–5)
LYMPHOCYTES ABSOLUTE: 1.64 E9/L (ref 1.5–4)
LYMPHOCYTES RELATIVE PERCENT: 25.6 % (ref 20–42)
MCH RBC QN AUTO: 27.9 PG (ref 26–35)
MCHC RBC AUTO-ENTMCNC: 31.1 % (ref 32–34.5)
MCV RBC AUTO: 89.8 FL (ref 80–99.9)
MONOCYTES ABSOLUTE: 0.48 E9/L (ref 0.1–0.95)
MONOCYTES RELATIVE PERCENT: 7.5 % (ref 2–12)
NEUTROPHILS ABSOLUTE: 3.81 E9/L (ref 1.8–7.3)
NEUTROPHILS RELATIVE PERCENT: 59.6 % (ref 43–80)
PDW BLD-RTO: 13.9 FL (ref 11.5–15)
PLATELET # BLD: 139 E9/L (ref 130–450)
PMV BLD AUTO: 12.8 FL (ref 7–12)
POTASSIUM SERPL-SCNC: 4.6 MMOL/L (ref 3.5–5)
RBC # BLD: 4.33 E12/L (ref 3.5–5.5)
SODIUM BLD-SCNC: 146 MMOL/L (ref 132–146)
TOTAL PROTEIN: 7.1 G/DL (ref 6.4–8.3)
WBC # BLD: 6.4 E9/L (ref 4.5–11.5)

## 2022-08-15 PROCEDURE — 1090F PRES/ABSN URINE INCON ASSESS: CPT | Performed by: NURSE PRACTITIONER

## 2022-08-15 PROCEDURE — G8427 DOCREV CUR MEDS BY ELIG CLIN: HCPCS | Performed by: NURSE PRACTITIONER

## 2022-08-15 PROCEDURE — 99214 OFFICE O/P EST MOD 30 MIN: CPT | Performed by: NURSE PRACTITIONER

## 2022-08-15 PROCEDURE — G8417 CALC BMI ABV UP PARAM F/U: HCPCS | Performed by: NURSE PRACTITIONER

## 2022-08-15 PROCEDURE — 1123F ACP DISCUSS/DSCN MKR DOCD: CPT | Performed by: NURSE PRACTITIONER

## 2022-08-15 PROCEDURE — G8399 PT W/DXA RESULTS DOCUMENT: HCPCS | Performed by: NURSE PRACTITIONER

## 2022-08-15 PROCEDURE — 3017F COLORECTAL CA SCREEN DOC REV: CPT | Performed by: NURSE PRACTITIONER

## 2022-08-15 PROCEDURE — 1036F TOBACCO NON-USER: CPT | Performed by: NURSE PRACTITIONER

## 2022-08-15 RX ORDER — TOLTERODINE 2 MG/1
2 CAPSULE, EXTENDED RELEASE ORAL DAILY
COMMUNITY
End: 2022-09-20 | Stop reason: SDUPTHER

## 2022-08-15 SDOH — ECONOMIC STABILITY: FOOD INSECURITY: WITHIN THE PAST 12 MONTHS, YOU WORRIED THAT YOUR FOOD WOULD RUN OUT BEFORE YOU GOT MONEY TO BUY MORE.: NEVER TRUE

## 2022-08-15 SDOH — ECONOMIC STABILITY: FOOD INSECURITY: WITHIN THE PAST 12 MONTHS, THE FOOD YOU BOUGHT JUST DIDN'T LAST AND YOU DIDN'T HAVE MONEY TO GET MORE.: NEVER TRUE

## 2022-08-15 ASSESSMENT — ENCOUNTER SYMPTOMS
CONSTIPATION: 0
SINUS PAIN: 0
COLOR CHANGE: 0
SHORTNESS OF BREATH: 1
DIARRHEA: 0
SINUS PRESSURE: 0
BACK PAIN: 1
TROUBLE SWALLOWING: 0
SORE THROAT: 0
CHEST TIGHTNESS: 0
NAUSEA: 0
RHINORRHEA: 0
VOMITING: 0
FACIAL SWELLING: 0
WHEEZING: 1
ABDOMINAL PAIN: 0
VOICE CHANGE: 0
COUGH: 1

## 2022-08-15 ASSESSMENT — SOCIAL DETERMINANTS OF HEALTH (SDOH): HOW HARD IS IT FOR YOU TO PAY FOR THE VERY BASICS LIKE FOOD, HOUSING, MEDICAL CARE, AND HEATING?: NOT HARD AT ALL

## 2022-08-15 NOTE — PROGRESS NOTES
NEW PATIENT PROGRESS NOTE  [address] [facility] [address]  Dept: [phone]   Chief Complaint   Patient presents with    New Patient     Former patient of Dr. Fajardo Co    Diabetes     Needs some vaccines, overdue for AWV, and is due for a microalbumin test and regular labs    Wheezing     Wheezing in her chest at night for the last 3-4 days and an occasional cough. Not related to lying down       ASSESSMENT/PLAN   1. Current moderate episode of major depressive disorder without prior episode [facility])  Assessment & Plan:   Stable referral to psychiatry for medication management. At this time continue current medications. Denies any suicidal or homicidal ideations. Orders:  -     busPIRone (BUSPAR) 15 MG tablet; TAKE 1 TABLET BY MOUTH THREE TIMES A DAY, Disp-270 tablet, R-0Normal  -     DULoxetine (CYMBALTA) 30 MG extended release capsule; Take 1 capsule by mouth in the morning., Disp-90 capsule, R-1Normal  -     Amb External Referral To Psychiatry  2. Shortness of breath on exertion  -     albuterol sulfate HFA (VENTOLIN HFA) 108 (90 Base) MCG/ACT inhaler; Inhale 2 puffs into the lungs every 6 hours as needed for Wheezing or Shortness of Breath, Disp-1 each, R-3Normal  -     CBC with Auto Differential; Future  -     Comprehensive Metabolic Panel; Future  3. Mild persistent asthma without complication  Assessment & Plan:   Stable at this time continue Advair and albuterol as needed. Avoid triggers  Orders:  -     albuterol sulfate HFA (VENTOLIN HFA) 108 (90 Base) MCG/ACT inhaler; Inhale 2 puffs into the lungs every 6 hours as needed for Wheezing or Shortness of Breath, Disp-1 each, R-3Normal  4. Allergic rhinitis, unspecified seasonality, unspecified trigger  Assessment & Plan:   Somewhat stable at this time continue zyrtec and flonase consider ENT referral if not improving  Orders:  -     cetirizine (ZYRTEC) 10 MG tablet;  Take 1 tablet by mouth in the morning and 1 tablet before bedtime. , Disp-180 tablet, R-1Normal  -     fluticasone (FLONASE) 50 MCG/ACT nasal spray; 1 spray by Nasal route daily as needed for Allergies, Disp-1 each, R-3Normal  5. Fibromyalgia  -     DULoxetine (CYMBALTA) 30 MG extended release capsule; Take 1 capsule by mouth in the morning., Disp-90 capsule, R-1Normal  6. Gastroesophageal reflux disease without esophagitis  Assessment & Plan:  Stable continue pantoprazole   -Discussed elevated the HOB 30 degrees  -Discussed limiting spicy, fatty, greasy foods  -Discussed limit caffeine consumption to 1 - 2 cups daily  -Discussed waiting at least 3 - 4 hours before going to bed after eating  -Discussed not to eat and bend over immediately or lift heavy items.  -Discussed weight reduction if needed even 5 - 10 pounds.  -Discussed taking medications 1 hour prior to eating. Orders:  -     pantoprazole (PROTONIX) 40 MG tablet; Take 1 tablet by mouth in the morning and 1 tablet before bedtime. , Disp-180 tablet, R-0Normal  7. Medication refill  -     topiramate (TOPAMAX) 100 MG tablet; Take 1 tablet by mouth in the morning and 1 tablet before bedtime. , Disp-180 tablet, R-0Normal       Reviewed labs: BMP, CBC Lipids,   Reviewed notes: DR Len Zepeda 8/1/22,   Reviewed radiology: CT chest 6/10/22 emphysema and pulmonary nodules,  Reviewed past medical records     Discussed reducing caffeine intake and Discussed taking medications as directed and adverse effects    Reach and stay at a healthy weight. This will lower your risk for many problems, such as obesity, diabetes, heart disease, and high blood pressure. Get at least 30 minutes of exercise on most days of the week. Walking is a good choice. You also may want to do other activities, such as running, swimming, cycling, or playing tennis or team sports. Do not smoke. Smoking can make health problems worse.  If you need help quitting, talk to your doctor about stop-smoking programs and medicines. These can increase your chances of quitting for good. Protect your skin from too much sun. When you're outdoors from 10 a.m. to 4 p.m., stay in the shade or cover up with clothing and a hat with a wide brim. Wear sunglasses that block UV rays. Even when it's cloudy, put broad-spectrum sunscreen (SPF 30 or higher) on any exposed skin. See a dentist one or two times a year for checkups and to have your teeth cleaned. Wear a seat belt in the car. Limit alcohol to 1 drink a day. Too much alcohol can cause health problems. Return in about 3 months (around 11/15/2022) for Medicare well visit and obesity. HPI:     Here to establish care was seeing DR Bella King    Last Pap 1991 hysterectomy, No LMP recorded (lmp unknown). Patient has had a hysterectomy. 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  she follows with DR Marcela Mills recent notes reviewed and is to get another Zio monitor placed for total of 4 weeks due to post operative atrial fibrillation currently off eliquis and amiodarone. She is prediabetes with A1c 5.8% on lab in June 2022. She has asthma but when I reviewed the CT lung she also has emphysema with a 3 mm nodule in the RUL and mid right lobe will need repeat in 1 year. She does have some SOB, cough and wheezing at times worse over the last 3 - 4 days. No limitations in her activity. She has problems with her back and sees Dr Cheli Holden but was told she needed to lose weight prior to any other surgery. She has peripheral neuropathy which is stable at this time on the Lyrica. She takes topiramate, buspar and cymbalta for mood stabilizer discussed to see psychiatrist as this is something I do not manage.    Feels depression is stable, denies any suicidal or homicidal ideations. GERD has been stable    Allergies   Allergen Reactions    Carafate [Sucralfate]      Patient unable to tolerate. Unable to recall her reaction. Glucophage [Metformin Hcl]      Severe abdominal pain, constipation      Mobic [Meloxicam] Other (See Comments)     Abdominal pain    Trazodone And Nefazodone      Unable to tolerate, patient reports made her physically ill with abdominal pain      Ultram [Tramadol]      Abdominal pain    Nickel Itching and Rash        Current Outpatient Medications:     albuterol sulfate HFA (VENTOLIN HFA) 108 (90 Base) MCG/ACT inhaler, Inhale 2 puffs into the lungs every 6 hours as needed for Wheezing or Shortness of Breath, Disp: 1 each, Rfl: 3    busPIRone (BUSPAR) 15 MG tablet, TAKE 1 TABLET BY MOUTH THREE TIMES A DAY, Disp: 270 tablet, Rfl: 0    cetirizine (ZYRTEC) 10 MG tablet, Take 1 tablet by mouth in the morning and 1 tablet before bedtime. , Disp: 180 tablet, Rfl: 1    DULoxetine (CYMBALTA) 30 MG extended release capsule, Take 1 capsule by mouth in the morning., Disp: 90 capsule, Rfl: 1    fluticasone (FLONASE) 50 MCG/ACT nasal spray, 1 spray by Nasal route daily as needed for Allergies, Disp: 1 each, Rfl: 3    metoprolol tartrate (LOPRESSOR) 25 MG tablet, Take 0.5 tablets by mouth in the morning and 0.5 tablets before bedtime. , Disp: 60 tablet, Rfl: 3    pantoprazole (PROTONIX) 40 MG tablet, Take 1 tablet by mouth in the morning and 1 tablet before bedtime. , Disp: 180 tablet, Rfl: 0    topiramate (TOPAMAX) 100 MG tablet, Take 1 tablet by mouth in the morning and 1 tablet before bedtime. , Disp: 180 tablet, Rfl: 0    aspirin 81 MG EC tablet, Take 1 tablet by mouth in the morning., Disp: 90 tablet, Rfl: 3    furosemide (LASIX) 20 MG tablet, Take 1 tablet by mouth See Admin Instructions Take two tablets one time daily x 1 week, then take one tablet one time daily x 2 weeks, Disp: 60 tablet, Rfl: 0    potassium chloride (KLOR-CON M) 10 MEQ extended release tablet, Take 1 tablet by mouth in the morning for 21 days. , Disp: 21 tablet, Rfl: 0    tolterodine (DETROL LA) 2 MG extended release capsule, Take 2 mg by mouth in the morning., Disp: , Rfl:     pregabalin (LYRICA) 150 MG capsule, Take 1 capsule by mouth 3 times daily for 90 days.  TID (Patient taking differently: Take 150 mg by mouth in the morning and 150 mg at noon and 150 mg before bedtime.), Disp: 270 capsule, Rfl: 0    pseudoephedrine (SUDAFED 24 HR) 240 MG TB24 extended release tablet, Take 240 mg by mouth daily, Disp: , Rfl:     ADVAIR HFA 45-21 MCG/ACT inhaler, Inhale 2 puffs into the lungs 2 times daily , Disp: , Rfl:    Past Medical History:   Diagnosis Date    Acute post-operative pain     Allergic rhinitis     AMS (altered mental status) 6/18/2022    Anxiety     Asthma     Bone avascular necrosis ([address] 03/23/2015    Breast lesion 12/01/2015    Chronic back pain     Chronic fatigue 10/08/2013    Chronic renal disease, stage III ([address] [029081]     Daytime somnolence 10/08/2013    Depression     Diabetes mellitus ([address]     Diabetes mellitus without complication ([address] 1/03/6667    Epigastric pain     Fibromyalgia     Fracture, fibula     right    GERD (gastroesophageal reflux disease)     Headache     History of blood transfusion     Hyperlipidemia     Hypertension     Hypothyroidism     Irritable bowel syndrome     Left atrial mass 5/10/2022    Morbid obesity with BMI of 45.0-49.9, adult ([address] 10/09/2015    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    Neuropathy     Obesity     Opioid use, unspecified with unspecified opioid-induced disorder     Osteoarthritis     Overactive bladder 01/22/2014    PONV (postoperative nausea and vomiting)     hx 40 yrs ago    Prolonged emergence from general anesthesia     Reflux     S/P cardiac cath 5/23/2022    Sleep apnea     Stage 3a chronic kidney disease ([address]     Thyroid disease     Type 2 diabetes mellitus without complication ([address]     Type 2 diabetes mellitus without complication, without long-term current use of insulin [facility])       Past Surgical History:   Procedure Laterality Date    AORTIC VALVE REPLACEMENT N/A 6/15/2022    STERNOTOMY, LEFT ATRIAL MYXOMA RESECTION, performed by Chaparro Cr MD at [address] [facility]  2022    Dr El Rojo      COLONOSCOPY  2016    Dr. dEy [place] N/A 2020    Quan Drain performed by Oliver Smith MD at [address] ([address] [facility])      benign reasons     JOINT REPLACEMENT Bilateral 2010    knees    LUMBAR FUSION      L3-5    ROTATOR CUFF REPAIR Left     left    TOTAL KNEE ARTHROPLASTY Bilateral 2010    did both at the same time    TRANSESOPHAGEAL ECHOCARDIOGRAM N/A 2022    TRANSESOPHAGEAL ECHOCARDIOGRAM performed by Brandon Evans MD at [address] [facility] N/A 2020    EGD BIOPSY performed by Oliver Smith MD at [address] History   Problem Relation Age of Onset    High Blood Pressure Mother     Diabetes Mother     Heart Disease Mother     Cancer Mother         skin    Diabetes Father     Heart Disease Father     High Blood Pressure Father     COPD Father     COPD Sister     Macular Degen Sister     COPD Brother     Heart Disease Brother     Depression Brother     Heart Disease Brother     Cancer Other 50        breast      Social History     Socioeconomic History    Marital status:      Spouse name: None    Number of children: None    Years of education: None    Highest education level: Associate degree: academic program   Tobacco Use    Smoking status: Former     Packs/day: 1.50     Years: 30.00     Pack years: 45.00     Types: Cigarettes     Quit date: 2022     Years since quittin.1    Smokeless tobacco: Never   Vaping Use    Vaping Use: Never used   Substance and Sexual Activity    Alcohol use:  No Alcohol/week: 0.0 standard drinks    Drug use: No    Sexual activity: Not Currently     Social Determinants of Health     Financial Resource Strain: Low Risk     Difficulty of Paying Living Expenses: Not hard at all   Food Insecurity: No Food Insecurity    Worried About Running Out of Food in the Last Year: Never true    Ran Out of Food in the Last Year: Never true         I have reviewed Sheree's allergies, medications, problem list, medical, social and family history and have updated as needed in the electronic medical record    Review of Systems   Constitutional:  Positive for chills. Negative for activity change, appetite change, diaphoresis, fatigue, fever and unexpected weight change. HENT:  Negative for congestion, dental problem, drooling, ear discharge, ear pain, facial swelling, hearing loss, mouth sores, nosebleeds, postnasal drip, rhinorrhea, sinus pressure, sinus pain, sneezing, sore throat, tinnitus, trouble swallowing and voice change. Eyes:  Negative for visual disturbance. Respiratory:  Positive for cough, shortness of breath and wheezing. Negative for chest tightness. Cardiovascular:  Positive for leg swelling. Negative for chest pain and palpitations. Gastrointestinal:  Negative for abdominal pain, constipation, diarrhea, nausea and vomiting. Endocrine: Positive for polydipsia. Negative for cold intolerance, heat intolerance, polyphagia and polyuria. Genitourinary:  Negative for difficulty urinating, frequency and urgency. Musculoskeletal:  Positive for back pain and gait problem. Negative for arthralgias, joint swelling, myalgias, neck pain and neck stiffness. Skin:  Negative for color change, pallor, rash and wound. Allergic/Immunologic: Positive for environmental allergies. Negative for food allergies and immunocompromised state.    Neurological:  Negative for dizziness, tremors, seizures, syncope, facial asymmetry, speech difficulty, weakness, light-headedness, numbness and headaches. Hematological:  Negative for adenopathy. Does not bruise/bleed easily. Psychiatric/Behavioral:  Negative for agitation, behavioral problems, confusion, decreased concentration, dysphoric mood, hallucinations, self-injury, sleep disturbance and suicidal ideas. The patient is not nervous/anxious and is not hyperactive. OBJECTIVE:     VS:  Wt Readings from Last 3 Encounters:   08/15/22 (!) 322 lb 14.4 oz (146.5 kg)   08/01/22 (!) 319 lb (144.7 kg)   07/19/22 (!) 319 lb (144.7 kg)                       Vitals:    08/15/22 1350   BP: 128/76   Pulse: 83   Resp: 18   SpO2: 97%   Weight: (!) 322 lb 14.4 oz (146.5 kg)   Height: 5' 8\" (1.727 m)       General: Alert and oriented to person, place, and time, well developed and well nourished, in no acute distress  SKIN: Warm and dry, intact without any rash, masses or lesions  HEAD: normocephalic, atraumatic  Eyes: sclera/conjunctiva clear, PERRLA, EOMI's intact  ENT: tympanic membranes, external ear and ear canal normal bilaterally, normal hearing, Nose without deformity, nasal mucosa and turbinates normal without polyps   Throat: clear, tongue midline, tonsils 1+, drainage, no masses or lesions noted, good dentition  Neck: supple and non-tender without mass, trachea midline, no cervical lymphadenopathy, no bruit, no thyromegaly or nodules  Cardiovascular: regular rate and regular rhythm, normal S1 and S2,  no murmurs, rubs, clicks, or gallop. Distal pulses intact, no carotid bruits. No edema  Pulmonary/Chest: clear to auscultation bilaterally, no wheezes, rales or rhonchi, normal air movement, no respiratory distress  Abdomen: soft, non-tender, non-distended, normal bowel sounds, no masses or hepatosplenomegaly  Musculoskeletal: Normal ROM, no joint swelling, deformity or tenderness   Neurologic: reflexes normal and symmetric, no cranial nerve deficit, gait, coordination and speech normal  Extremities: no clubbing, cyanosis, or edema. Psychiatric: Good eye contact, normal mood and affect, answers questions appropriately            I have reviewed my findings and recommendations with James Rueda.     Taryn Epperson, APRN - CNP, NP-C, FNP-BC

## 2022-08-16 ENCOUNTER — NURSE ONLY (OUTPATIENT)
Dept: CARDIOLOGY CLINIC | Age: 67
End: 2022-08-16

## 2022-08-16 PROBLEM — J30.9 ALLERGIC RHINITIS: Status: ACTIVE | Noted: 2022-08-16

## 2022-08-16 PROBLEM — E11.9 DIABETES MELLITUS WITHOUT COMPLICATION (HCC): Status: RESOLVED | Noted: 2021-04-28 | Resolved: 2022-08-16

## 2022-08-16 PROBLEM — K21.9 GASTROESOPHAGEAL REFLUX DISEASE WITHOUT ESOPHAGITIS: Status: ACTIVE | Noted: 2022-08-16

## 2022-08-16 PROBLEM — Z98.890 S/P CARDIAC CATH: Status: RESOLVED | Noted: 2022-05-23 | Resolved: 2022-08-16

## 2022-08-16 PROBLEM — R06.02 SHORTNESS OF BREATH ON EXERTION: Status: ACTIVE | Noted: 2022-08-16

## 2022-08-16 PROBLEM — D21.9 MYXOMA: Status: RESOLVED | Noted: 2022-06-15 | Resolved: 2022-08-16

## 2022-08-16 PROBLEM — R41.82 AMS (ALTERED MENTAL STATUS): Status: RESOLVED | Noted: 2022-06-18 | Resolved: 2022-08-16

## 2022-08-16 PROBLEM — I51.89 LEFT ATRIAL MASS: Status: RESOLVED | Noted: 2022-05-10 | Resolved: 2022-08-16

## 2022-08-16 RX ORDER — ASPIRIN 81 MG/1
81 TABLET ORAL DAILY
Qty: 90 TABLET | Refills: 3 | Status: SHIPPED | OUTPATIENT
Start: 2022-08-16

## 2022-08-16 RX ORDER — DULOXETIN HYDROCHLORIDE 30 MG/1
30 CAPSULE, DELAYED RELEASE ORAL DAILY
Qty: 90 CAPSULE | Refills: 1 | Status: SHIPPED
Start: 2022-08-16 | End: 2022-09-20 | Stop reason: SDUPTHER

## 2022-08-16 RX ORDER — BUSPIRONE HYDROCHLORIDE 15 MG/1
TABLET ORAL
Qty: 270 TABLET | Refills: 0 | Status: SHIPPED
Start: 2022-08-16 | End: 2022-09-20 | Stop reason: SDUPTHER

## 2022-08-16 RX ORDER — FLUTICASONE PROPIONATE 50 MCG
1 SPRAY, SUSPENSION (ML) NASAL DAILY PRN
Qty: 1 EACH | Refills: 3 | Status: SHIPPED | OUTPATIENT
Start: 2022-08-16 | End: 2022-09-15

## 2022-08-16 RX ORDER — FUROSEMIDE 20 MG/1
20 TABLET ORAL SEE ADMIN INSTRUCTIONS
Qty: 60 TABLET | Refills: 0 | Status: SHIPPED | OUTPATIENT
Start: 2022-08-16

## 2022-08-16 RX ORDER — CETIRIZINE HYDROCHLORIDE 10 MG/1
10 TABLET ORAL 2 TIMES DAILY
Qty: 180 TABLET | Refills: 1 | Status: SHIPPED
Start: 2022-08-16 | End: 2022-09-20 | Stop reason: SDUPTHER

## 2022-08-16 RX ORDER — ALBUTEROL SULFATE 90 UG/1
2 AEROSOL, METERED RESPIRATORY (INHALATION) EVERY 6 HOURS PRN
Qty: 1 EACH | Refills: 3 | Status: SHIPPED
Start: 2022-08-16 | End: 2022-10-10 | Stop reason: SDUPTHER

## 2022-08-16 RX ORDER — POTASSIUM CHLORIDE 750 MG/1
10 TABLET, EXTENDED RELEASE ORAL DAILY
Qty: 21 TABLET | Refills: 0 | Status: SHIPPED | OUTPATIENT
Start: 2022-08-16 | End: 2022-09-06

## 2022-08-16 RX ORDER — PANTOPRAZOLE SODIUM 40 MG/1
40 TABLET, DELAYED RELEASE ORAL 2 TIMES DAILY
Qty: 180 TABLET | Refills: 0 | Status: SHIPPED
Start: 2022-08-16 | End: 2022-09-20 | Stop reason: SDUPTHER

## 2022-08-16 RX ORDER — TOPIRAMATE 100 MG/1
100 TABLET, FILM COATED ORAL 2 TIMES DAILY
Qty: 180 TABLET | Refills: 0 | Status: SHIPPED
Start: 2022-08-16 | End: 2022-09-20 | Stop reason: SDUPTHER

## 2022-08-16 NOTE — PROGRESS NOTES
Patient to radiology via stretcher accompanied by tech.   Patient was in today and had her second ZIO AT 28 day placed per Dr. Maurizio Miller. Patient given instructions, questions answered. Patient verbalized understanding. Monitor [id number].      Tobias Babcock, [facility]

## 2022-08-16 NOTE — ASSESSMENT & PLAN NOTE
Stable continue pantoprazole   -Discussed elevated the HOB 30 degrees  -Discussed limiting spicy, fatty, greasy foods  -Discussed limit caffeine consumption to 1 - 2 cups daily  -Discussed waiting at least 3 - 4 hours before going to bed after eating  -Discussed not to eat and bend over immediately or lift heavy items.  -Discussed weight reduction if needed even 5 - 10 pounds.  -Discussed taking medications 1 hour prior to eating.

## 2022-08-22 ENCOUNTER — TELEPHONE (OUTPATIENT)
Dept: PAIN MANAGEMENT | Age: 67
End: 2022-08-22

## 2022-08-23 DIAGNOSIS — M51.9 LUMBAR DISC DISORDER: ICD-10-CM

## 2022-08-23 DIAGNOSIS — M79.7 FIBROMYALGIA: ICD-10-CM

## 2022-08-23 DIAGNOSIS — M48.061 SPINAL STENOSIS OF LUMBAR REGION WITHOUT NEUROGENIC CLAUDICATION: ICD-10-CM

## 2022-08-23 DIAGNOSIS — M47.816 LUMBAR SPONDYLOSIS: ICD-10-CM

## 2022-08-23 DIAGNOSIS — G89.29 CHRONIC LOW BACK PAIN WITH SCIATICA, SCIATICA LATERALITY UNSPECIFIED, UNSPECIFIED BACK PAIN LATERALITY: ICD-10-CM

## 2022-08-23 DIAGNOSIS — M54.40 CHRONIC LOW BACK PAIN WITH SCIATICA, SCIATICA LATERALITY UNSPECIFIED, UNSPECIFIED BACK PAIN LATERALITY: ICD-10-CM

## 2022-08-23 RX ORDER — HYDROCODONE BITARTRATE AND ACETAMINOPHEN 5; 325 MG/1; MG/1
1 TABLET ORAL EVERY 8 HOURS PRN
Qty: 90 TABLET | Refills: 0 | Status: SHIPPED
Start: 2022-08-23 | End: 2022-10-17

## 2022-08-27 ENCOUNTER — APPOINTMENT (OUTPATIENT)
Dept: GENERAL RADIOLOGY | Age: 67
End: 2022-08-27
Payer: MEDICARE

## 2022-08-27 ENCOUNTER — HOSPITAL ENCOUNTER (EMERGENCY)
Age: 67
Discharge: HOME | End: 2022-08-27
Attending: EMERGENCY MEDICINE
Payer: MEDICARE

## 2022-08-27 VITALS
SYSTOLIC BLOOD PRESSURE: 116 MMHG | DIASTOLIC BLOOD PRESSURE: 62 MMHG | HEART RATE: 90 BPM | WEIGHT: 293 LBS | BODY MASS INDEX: 48.96 KG/M2 | RESPIRATION RATE: 18 BRPM | OXYGEN SATURATION: 99 % | TEMPERATURE: 97.6 F

## 2022-08-27 DIAGNOSIS — R06.02 SHORTNESS OF BREATH ON EXERTION: Primary | ICD-10-CM

## 2022-08-27 DIAGNOSIS — J45.30 MILD PERSISTENT ASTHMA WITHOUT COMPLICATION: ICD-10-CM

## 2022-08-27 LAB
ANION GAP SERPL CALCULATED.3IONS-SCNC: 8 MMOL/L (ref 7–16)
BASOPHILS ABSOLUTE: 0.11 E9/L (ref 0–0.2)
BASOPHILS RELATIVE PERCENT: 1.6 % (ref 0–2)
BUN BLDV-MCNC: 19 MG/DL (ref 6–23)
CALCIUM SERPL-MCNC: 9.3 MG/DL (ref 8.6–10.2)
CHLORIDE BLD-SCNC: 109 MMOL/L (ref 98–107)
CO2: 24 MMOL/L (ref 22–29)
CREAT SERPL-MCNC: 1.6 MG/DL (ref 0.5–1)
D DIMER: 226 NG/ML DDU
EOSINOPHILS ABSOLUTE: 0.36 E9/L (ref 0.05–0.5)
EOSINOPHILS RELATIVE PERCENT: 5.3 % (ref 0–6)
GFR AFRICAN AMERICAN: 39
GFR NON-AFRICAN AMERICAN: 32 ML/MIN/1.73
GLUCOSE BLD-MCNC: 103 MG/DL (ref 74–99)
HCT VFR BLD CALC: 42.1 % (ref 34–48)
HEMOGLOBIN: 12.7 G/DL (ref 11.5–15.5)
IMMATURE GRANULOCYTES #: 0.01 E9/L
IMMATURE GRANULOCYTES %: 0.1 % (ref 0–5)
LYMPHOCYTES ABSOLUTE: 1.54 E9/L (ref 1.5–4)
LYMPHOCYTES RELATIVE PERCENT: 22.7 % (ref 20–42)
MCH RBC QN AUTO: 27.6 PG (ref 26–35)
MCHC RBC AUTO-ENTMCNC: 30.2 % (ref 32–34.5)
MCV RBC AUTO: 91.5 FL (ref 80–99.9)
MONOCYTES ABSOLUTE: 0.45 E9/L (ref 0.1–0.95)
MONOCYTES RELATIVE PERCENT: 6.6 % (ref 2–12)
NEUTROPHILS ABSOLUTE: 4.3 E9/L (ref 1.8–7.3)
NEUTROPHILS RELATIVE PERCENT: 63.7 % (ref 43–80)
PDW BLD-RTO: 13.7 FL (ref 11.5–15)
PLATELET # BLD: 131 E9/L (ref 130–450)
PMV BLD AUTO: 12.7 FL (ref 7–12)
POTASSIUM REFLEX MAGNESIUM: 4.4 MMOL/L (ref 3.5–5)
RBC # BLD: 4.6 E12/L (ref 3.5–5.5)
SODIUM BLD-SCNC: 141 MMOL/L (ref 132–146)
TROPONIN, HIGH SENSITIVITY: 11 NG/L (ref 0–9)
TROPONIN, HIGH SENSITIVITY: 12 NG/L (ref 0–9)
WBC # BLD: 6.8 E9/L (ref 4.5–11.5)

## 2022-08-27 PROCEDURE — 96374 THER/PROPH/DIAG INJ IV PUSH: CPT

## 2022-08-27 PROCEDURE — 85025 COMPLETE CBC W/AUTO DIFF WBC: CPT

## 2022-08-27 PROCEDURE — 80048 BASIC METABOLIC PNL TOTAL CA: CPT

## 2022-08-27 PROCEDURE — 99285 EMERGENCY DEPT VISIT HI MDM: CPT

## 2022-08-27 PROCEDURE — 84484 ASSAY OF TROPONIN QUANT: CPT

## 2022-08-27 PROCEDURE — 6370000000 HC RX 637 (ALT 250 FOR IP): Performed by: STUDENT IN AN ORGANIZED HEALTH CARE EDUCATION/TRAINING PROGRAM

## 2022-08-27 PROCEDURE — 94664 DEMO&/EVAL PT USE INHALER: CPT

## 2022-08-27 PROCEDURE — 6360000002 HC RX W HCPCS: Performed by: STUDENT IN AN ORGANIZED HEALTH CARE EDUCATION/TRAINING PROGRAM

## 2022-08-27 PROCEDURE — 93005 ELECTROCARDIOGRAM TRACING: CPT | Performed by: PHYSICIAN ASSISTANT

## 2022-08-27 PROCEDURE — 36415 COLL VENOUS BLD VENIPUNCTURE: CPT

## 2022-08-27 PROCEDURE — 71046 X-RAY EXAM CHEST 2 VIEWS: CPT

## 2022-08-27 PROCEDURE — 2580000003 HC RX 258: Performed by: STUDENT IN AN ORGANIZED HEALTH CARE EDUCATION/TRAINING PROGRAM

## 2022-08-27 PROCEDURE — 85378 FIBRIN DEGRADE SEMIQUANT: CPT

## 2022-08-27 PROCEDURE — 94640 AIRWAY INHALATION TREATMENT: CPT

## 2022-08-27 RX ORDER — IPRATROPIUM BROMIDE AND ALBUTEROL SULFATE 2.5; .5 MG/3ML; MG/3ML
3 SOLUTION RESPIRATORY (INHALATION) ONCE
Status: COMPLETED | OUTPATIENT
Start: 2022-08-27 | End: 2022-08-27

## 2022-08-27 RX ORDER — METHYLPREDNISOLONE SODIUM SUCCINATE 125 MG/2ML
125 INJECTION, POWDER, LYOPHILIZED, FOR SOLUTION INTRAMUSCULAR; INTRAVENOUS ONCE
Status: COMPLETED | OUTPATIENT
Start: 2022-08-27 | End: 2022-08-27

## 2022-08-27 RX ORDER — 0.9 % SODIUM CHLORIDE 0.9 %
1000 INTRAVENOUS SOLUTION INTRAVENOUS ONCE
Status: COMPLETED | OUTPATIENT
Start: 2022-08-27 | End: 2022-08-27

## 2022-08-27 RX ORDER — ALBUTEROL SULFATE 90 UG/1
2 AEROSOL, METERED RESPIRATORY (INHALATION) 4 TIMES DAILY PRN
Qty: 18 G | Refills: 0 | Status: SHIPPED | OUTPATIENT
Start: 2022-08-27 | End: 2022-08-29 | Stop reason: SDUPTHER

## 2022-08-27 RX ADMIN — IPRATROPIUM BROMIDE AND ALBUTEROL SULFATE 3 AMPULE: .5; 2.5 SOLUTION RESPIRATORY (INHALATION) at 15:33

## 2022-08-27 RX ADMIN — SODIUM CHLORIDE 1000 ML: 9 INJECTION, SOLUTION INTRAVENOUS at 17:05

## 2022-08-27 RX ADMIN — METHYLPREDNISOLONE SODIUM SUCCINATE 125 MG: 125 INJECTION, POWDER, FOR SOLUTION INTRAMUSCULAR; INTRAVENOUS at 15:31

## 2022-08-27 ASSESSMENT — ENCOUNTER SYMPTOMS
ABDOMINAL PAIN: 0
EYE PAIN: 0
WHEEZING: 1
CHEST TIGHTNESS: 0
SHORTNESS OF BREATH: 1
COUGH: 0
COLOR CHANGE: 0
NAUSEA: 0
BACK PAIN: 0
SORE THROAT: 0
BLOOD IN STOOL: 0
PHOTOPHOBIA: 0
RHINORRHEA: 0
DIARRHEA: 0
ABDOMINAL DISTENTION: 0
CONSTIPATION: 0
VOMITING: 0

## 2022-08-27 NOTE — ED PROVIDER NOTES
Name: James Rueda    MRN: [id number]     Date / Time Roomed:  8/27/2022  2:51 PM  ED Bed Assignment:  22/22    ------------------ History of Present Illness --------------------  8/27/22, Time: 3:03 PM EDT   Chief Complaint   Patient presents with    Wheezing     Off and on since she had a mass removed from her heart on June 15th. HPI    James Rueda is a 79 y.o. 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 Farrukh Huber), sees cardiologist Dr. Jovanni Garcia, who presents to the ED today for wheezing, which began 3 months ago. Pt relates wheezing is worse at night. Only feels mild wheezing at this time. She states she has chronic shortness of breath and today is no more than usual.  The patient describes this as constant however worse at night and mild to moderate in severity. She has tried her inhalers which only helps mildly. She denies any cough, fevers, productive sputum, chest pain, abd pain, GI or  complaints. The pt denies other ROS at this time. PCP: [facility] CNP. Last Echo: 3/3/22  Technically difficult examination. Mild concentric left ventricular hypertrophy. There is doppler evidence of stage I diastolic dysfunction. Ejection fraction is visually estimated at 60 to 65%. There is about 1.2 X 1.9cm atrial septal mass seen in the left atrial side suspicious for Left atrial myxoma. Consider ULISES for further  evaluation  Normal right ventricular size and function. Normal right atrium size.   Mild tricuspid regurgitation.    -------------------- PMH --------------------  Past Medical History:  has a past medical history of Acute post-operative pain, Allergic rhinitis, AMS (altered mental status), Anxiety, Asthma, Bone avascular necrosis ([address] Breast lesion, Chronic back pain, Chronic fatigue, Chronic renal disease, stage III ([address] [061550], Daytime somnolence, Depression, Diabetes mellitus ([address] Diabetes mellitus without complication ([address] Epigastric pain, Fibromyalgia, Fracture, fibula, GERD (gastroesophageal reflux disease), Headache, History of blood transfusion, Hyperlipidemia, Hypertension, Hypothyroidism, Irritable bowel syndrome, Left atrial mass, Morbid obesity with BMI of 45.0-49.9, adult ([address] Myxoma, Neuropathy, Obesity, Opioid use, unspecified with unspecified opioid-induced disorder, Osteoarthritis, Overactive bladder, PONV (postoperative nausea and vomiting), Prolonged emergence from general anesthesia, Reflux, S/P cardiac cath, Sleep apnea, Stage 3a chronic kidney disease ([address] Thyroid disease, Type 2 diabetes mellitus without complication ([address] and Type 2 diabetes mellitus without complication, without long-term current use of insulin ([address] Past Surgical History:  has a past surgical history that includes Rotator cuff repair (Left); Cholecystectomy; Hysterectomy (1991); Total knee arthroplasty (Bilateral, 2010); lumbar fusion; Carpal tunnel release (Bilateral); Colonoscopy (2011); Colonoscopy (11/22/2016); Tubal ligation; Upper gastrointestinal endoscopy (N/A, 9/29/2020); hiatal hernia repair (N/A, 12/8/2020); transesophageal echocardiogram (N/A, 5/9/2022); Cardiac catheterization (05/23/2022); joint replacement (Bilateral, 2010); and Aortic valve replacement (N/A, 6/15/2022). Social History:  reports that she quit smoking about 2 months ago. Her smoking use included cigarettes. She has a 45.00 pack-year smoking history. She has never used smokeless tobacco. She reports that she does not drink alcohol and does not use drugs.     Family History: family history includes COPD in her brother, father, and sister; Cancer in her mother; Cancer (age of onset: 50) in an other family member; Depression in her brother; Diabetes in her father and mother; Heart Disease in her brother, brother, father, and mother; High Blood Pressure in her father and mother; Mor Chambers in her sister. Allergies: Carafate [sucralfate], Glucophage [metformin hcl], Mobic [meloxicam], Trazodone and nefazodone, Ultram [tramadol], and Nickel    The patients past medical history has been reviewed. -------------------- Current Meds --------------------  Meds: No current facility-administered medications for this encounter. Current Outpatient Medications:     albuterol sulfate HFA (VENTOLIN HFA) 108 (90 Base) MCG/ACT inhaler, Inhale 2 puffs into the lungs 4 times daily as needed for Wheezing, Disp: 18 g, Rfl: 0    HYDROcodone-acetaminophen (NORCO) 5-325 MG per tablet, Take 1 tablet by mouth every 8 hours as needed for Pain for up to 60 days. Take lowest dose possible to manage pain, Disp: 90 tablet, Rfl: 0    albuterol sulfate HFA (VENTOLIN HFA) 108 (90 Base) MCG/ACT inhaler, Inhale 2 puffs into the lungs every 6 hours as needed for Wheezing or Shortness of Breath, Disp: 1 each, Rfl: 3    busPIRone (BUSPAR) 15 MG tablet, TAKE 1 TABLET BY MOUTH THREE TIMES A DAY, Disp: 270 tablet, Rfl: 0    cetirizine (ZYRTEC) 10 MG tablet, Take 1 tablet by mouth in the morning and 1 tablet before bedtime. , Disp: 180 tablet, Rfl: 1    DULoxetine (CYMBALTA) 30 MG extended release capsule, Take 1 capsule by mouth in the morning., Disp: 90 capsule, Rfl: 1    fluticasone (FLONASE) 50 MCG/ACT nasal spray, 1 spray by Nasal route daily as needed for Allergies, Disp: 1 each, Rfl: 3    metoprolol tartrate (LOPRESSOR) 25 MG tablet, Take 0.5 tablets by mouth in the morning and 0.5 tablets before bedtime. , Disp: 60 tablet, Rfl: 3    pantoprazole (PROTONIX) 40 MG tablet, Take 1 tablet by mouth in the morning and 1 tablet before bedtime. , Disp: 180 tablet, Rfl: 0    topiramate (TOPAMAX) 100 MG tablet, Take 1 tablet by mouth in the morning and 1 tablet before bedtime. , Disp: 180 tablet, Rfl: 0    aspirin 81 MG EC tablet, Take 1 tablet by mouth in the morning., Disp: 90 tablet, Rfl: 3    furosemide (LASIX) 20 MG tablet, Take 1 tablet by mouth See Admin Instructions Take two tablets one time daily x 1 week, then take one tablet one time daily x 2 weeks, Disp: 60 tablet, Rfl: 0    potassium chloride (KLOR-CON M) 10 MEQ extended release tablet, Take 1 tablet by mouth in the morning for 21 days. , Disp: 21 tablet, Rfl: 0    tolterodine (DETROL LA) 2 MG extended release capsule, Take 2 mg by mouth in the morning., Disp: , Rfl:     pregabalin (LYRICA) 150 MG capsule, Take 1 capsule by mouth 3 times daily for 90 days. TID (Patient taking differently: Take 150 mg by mouth in the morning and 150 mg at noon and 150 mg before bedtime.), Disp: 270 capsule, Rfl: 0    pseudoephedrine (SUDAFED 24 HR) 240 MG TB24 extended release tablet, Take 240 mg by mouth daily, Disp: , Rfl:     ADVAIR HFA 45-21 MCG/ACT inhaler, Inhale 2 puffs into the lungs 2 times daily , Disp: , Rfl:      The patients home medications have been reviewed. -------------------- ROS --------------------  Review of Systems   Constitutional:  Negative for chills, fatigue and fever. HENT:  Negative for congestion, rhinorrhea and sore throat. Eyes:  Negative for photophobia, pain and visual disturbance. Respiratory:  Positive for shortness of breath (chronic, no more today than usual) and wheezing. Negative for cough and chest tightness. Cardiovascular:  Negative for chest pain, palpitations and leg swelling. Gastrointestinal:  Negative for abdominal distention, abdominal pain, blood in stool, constipation, diarrhea, nausea and vomiting. Genitourinary:  Negative for difficulty urinating, dysuria, hematuria, vaginal bleeding and vaginal discharge. Musculoskeletal:  Negative for back pain, neck pain and neck stiffness. Skin:  Negative for color change, rash and wound. Neurological:  Negative for dizziness, syncope, light-headedness and headaches.    Psychiatric/Behavioral:  Negative for agitation, behavioral problems and confusion.       -------------------- PE --------------------  Physical Exam  Constitutional:       General: She is not in acute distress. Appearance: Normal appearance. She is obese. She is not ill-appearing, toxic-appearing or diaphoretic. HENT:      Head: Normocephalic and atraumatic. Right Ear: External ear normal.      Left Ear: External ear normal.      Nose: Nose normal. No rhinorrhea. Mouth/Throat:      Pharynx: Oropharynx is clear. Eyes:      General: No scleral icterus. Right eye: No discharge. Left eye: No discharge. Extraocular Movements: Extraocular movements intact. Conjunctiva/sclera: Conjunctivae normal.      Pupils: Pupils are equal, round, and reactive to light. Cardiovascular:      Rate and Rhythm: Normal rate and regular rhythm. Pulses: Normal pulses. Pulmonary:      Effort: Pulmonary effort is normal. No tachypnea, bradypnea, accessory muscle usage, prolonged expiration or respiratory distress. Breath sounds: No stridor. Examination of the right-upper field reveals decreased breath sounds. Examination of the left-upper field reveals decreased breath sounds. Examination of the right-middle field reveals decreased breath sounds. Examination of the left-middle field reveals decreased breath sounds. Decreased breath sounds present. No wheezing, rhonchi or rales. Abdominal:      General: Abdomen is flat. There is no distension. Palpations: Abdomen is soft. Tenderness: There is no abdominal tenderness. There is no guarding. Musculoskeletal:         General: No swelling or tenderness. Normal range of motion. Cervical back: Normal range of motion. Right lower leg: No edema. Left lower leg: No edema. Skin:     General: Skin is warm and dry. Capillary Refill: Capillary refill takes less than 2 seconds. Coloration: Skin is not jaundiced. Findings: No erythema or rash. Neurological:      General: No focal deficit present.       Mental Status: She is alert and oriented to person, place, and time. Psychiatric:         Mood and Affect: Mood normal.         Behavior: Behavior normal.        -------------------- Procedures --------------------  Procedures     MDM  Number of Diagnoses or Management Options  Mild persistent asthma without complication  Shortness of breath on exertion  Diagnosis management comments: Pt is a 51-year-old female with history of emphysema, asthma, atrial myxoma surgery 3 months ago who presents today for chronic wheezing and shortness of breath greater than 3 months, worse at night. Not on AC. Patient only relates mild at this time. Patient alert, oriented, no distress. /55 other vitals within normal limits. Sats 97% on room air, patient speaking in full uninterrupted sentences, nondistressed, respirations 20 nonlabored. Oropharynx clear, nonerythematous. Lung sounds but diminished bilaterally. No LE edema or erythema appreciated. Duo nebs and Solu-Medrol given. Lab work was remarkable for creatinine 1.6, which is a bit elevated from her baseline. IV fluids ordered. No ischemic changes on EKG, trops 12> 11, cardiac etiology felt less likely. Patient did have improvement of her breathing with treatment. Lung sounds are less diminished bilaterally. CXR showed elevation of the right silva-diaphragm however D-dimer wnl, PE felt less likely. No signs of pneumonia, no leukocytosis. At this time, no further workup was indicated. Pt symptoms felt likely due to mild asthma exacerbation and she was feeling improved after treatment and exam improved on re-eval. Recommended f/u with PCP and she was given return precautions. Prescription for refill of her home albuterol was given. Pt amenable to plan and was d/c home.         Amount and/or Complexity of Data Reviewed  Decide to obtain previous medical records or to obtain history from someone other than the patient: yes       EKG Interpretation  Interpreted by emergency department physician.    8/27/22  Time: 1517    Rate: 82  Axis: normal  VA: 162  QRS: 96  Qtc: 474  Rhythm: regular  Clinical Impression: NSR, nonspecific t-wave abnormalities  Comparison to old EKG: Similar to prev 8/1/22      -------------------- ED COURSE & MEDS GIVEN --------------------  ED Course as of 08/28/22 1328   Sat Aug 27, 2022   1646 Troponin, High Sensitivity(!): 12  Second ordered. [PW]   1647 Creatinine(!): 1.6  Above baseline. IV fluids ordered. [PW]   [phone] Patient states her breathing is improved and she states she feels better. Lung sounds less diminished bilaterally. [PW]   1656 XR CHEST (2 VW)  IMPRESSION:  Significant elevation the right hemidiaphragm. Recommend follow-up evaluation [PW]   35 20 43 Patient states feeling much better at this time.  [PW]      ED Course User Index  [PW] Rebeca Anton, DO        Medications   ipratropium-albuterol (DUONEB) nebulizer solution 3 ampule (3 ampules Inhalation Given 8/27/22 1533)   methylPREDNISolone sodium (SOLU-MEDROL) injection 125 mg (125 mg IntraVENous Given 8/27/22 1531)   0.9 % sodium chloride bolus (0 mLs IntraVENous Stopped 8/27/22 1815)       -------------------- RESULTS --------------------  Labs:  Results for orders placed or performed during the hospital encounter of 22/71/39   Basic Metabolic Panel w/ Reflex to MG   Result Value Ref Range    Sodium 141 132 - 146 mmol/L    Potassium reflex Magnesium 4.4 3.5 - 5.0 mmol/L    Chloride 109 (H) 98 - 107 mmol/L    CO2 24 22 - 29 mmol/L    Anion Gap 8 7 - 16 mmol/L    Glucose 103 (H) 74 - 99 mg/dL    BUN 19 6 - 23 mg/dL    Creatinine 1.6 (H) 0.5 - 1.0 mg/dL    GFR Non-African American 32 >=60 mL/min/1.73    GFR African American 39     Calcium 9.3 8.6 - 10.2 mg/dL   CBC with Auto Differential   Result Value Ref Range    WBC 6.8 4.5 - 11.5 E9/L    RBC 4.60 3.50 - 5.50 E12/L    Hemoglobin 12.7 11.5 - 15.5 g/dL    Hematocrit 42.1 34.0 - 48.0 %    MCV 91.5 80.0 - 99.9 fL    MCH 27.6 26.0 - 35.0 pg MCHC 30.2 (L) 32.0 - 34.5 %    RDW 13.7 11.5 - 15.0 fL    Platelets 346 447 - 242 E9/L    MPV 12.7 (H) 7.0 - 12.0 fL    Neutrophils % 63.7 43.0 - 80.0 %    Immature Granulocytes % 0.1 0.0 - 5.0 %    Lymphocytes % 22.7 20.0 - 42.0 %    Monocytes % 6.6 2.0 - 12.0 %    Eosinophils % 5.3 0.0 - 6.0 %    Basophils % 1.6 0.0 - 2.0 %    Neutrophils Absolute 4.30 1.80 - 7.30 E9/L    Immature Granulocytes # 0.01 E9/L    Lymphocytes Absolute 1.54 1.50 - 4.00 E9/L    Monocytes Absolute 0.45 0.10 - 0.95 E9/L    Eosinophils Absolute 0.36 0.05 - 0.50 E9/L    Basophils Absolute 0.11 0.00 - 0.20 E9/L   Troponin   Result Value Ref Range    Troponin, High Sensitivity 12 (H) 0 - 9 ng/L   D-Dimer, Quantitative   Result Value Ref Range    D-Dimer, Quant 226 ng/mL DDU   Troponin   Result Value Ref Range    Troponin, High Sensitivity 11 (H) 0 - 9 ng/L   EKG 12 Lead   Result Value Ref Range    Ventricular Rate 82 BPM    Atrial Rate 82 BPM    P-R Interval 162 ms    QRS Duration 96 ms    Q-T Interval 406 ms    QTc Calculation (Bazett) 474 ms    P Axis 54 degrees    R Axis 35 degrees    T Axis 14 degrees       Radiology:  XR CHEST (2 VW)   Final Result   Significant elevation the right hemidiaphragm. Recommend follow-up evaluation             -------------------- NURSING NOTES & VITALS --------------------    The nursing notes within the ED encounter and vital signs were reviewed. No data found. Oxygen Saturation Interpretation: Normal    -------------------- PROGRESS NOTES --------------------  4:56 PM EDT  At this time, no further workup was felt necessary. I have spoken with the patient and discussed todays results, in addition to providing specific details for the plan of care and counseling regarding the diagnosis and prognosis. Their questions are answered at this time. I discussed at length with them reasons for immediate return here for re evaluation.  They will followup with their PCP by calling their office tomorrow and were instructed to return to the ED for any new or worsening symptoms. They are amenable to this plan.    -------------------- ADDITIONAL PROVIDER NOTES --------------------  At this time the patient is without objective evidence of an acute process requiring hospitalization or inpatient management. They have remained hemodynamically stable throughout their entire ED visit and are stable for discharge with outpatient follow-up. The plan has been discussed in detail and they are aware of the specific conditions for emergent return, as well as the importance of follow-up. Discharge Medication List as of 8/27/2022  6:22 PM        START taking these medications    Details   !! albuterol sulfate HFA (VENTOLIN HFA) 108 (90 Base) MCG/ACT inhaler Inhale 2 puffs into the lungs 4 times daily as needed for Wheezing, Disp-18 g, R-0Print       !! - Potential duplicate medications found. Please discuss with provider. Diagnosis:  1. Shortness of breath on exertion    2. Mild persistent asthma without complication        Disposition:  Patient's disposition: Discharge to home  Patient's condition is stable. Zayra oCle DO       *NOTE: This report was transcribed using voice recognition software. Every effort was made to ensure accuracy; however, inadvertent computerized transcription errors may be present.        Zayra Cole DO  Resident  08/28/22 6710

## 2022-08-27 NOTE — ED NOTES
Pt transported to [facility] via cart.      Kalyani Aponte, DELIAN  09/33[birth year]71 [id number]

## 2022-08-27 NOTE — ED TRIAGE NOTES
Department of Emergency Medicine  FIRST PROVIDER TRIAGE NOTE             Independent [facility]           8/27/22  2:31 PM EDT    Date of Encounter: 8/27/22   MRN: [id number]      HPI: Yesenia Salas is a 79 y.o. female who presents to the ED for Wheezing (Off and on since she had a mass removed from her heart on June 15th. )       ROS: Negative for cp, abd pain, back pain, fever, cough, vomiting, diarrhea, or urinary complaints. PE: Gen Appearance/Constitutional: alert  CV: regular rate  Pulm: CTA bilat     Initial Plan of Care: All treatment areas with department are currently occupied. Plan to order/Initiate the following while awaiting opening in ED: labs, EKG, and imaging studies.   Initiate Treatment-Testing, Proceed toTreatment Area When Bed Available for ED Attending/MLP to Continue Care    Electronically signed by DEANDRE Sims   DD: 8/27/22

## 2022-08-28 LAB
EKG ATRIAL RATE: 82 BPM
EKG P AXIS: 54 DEGREES
EKG P-R INTERVAL: 162 MS
EKG Q-T INTERVAL: 406 MS
EKG QRS DURATION: 96 MS
EKG QTC CALCULATION (BAZETT): 474 MS
EKG R AXIS: 35 DEGREES
EKG T AXIS: 14 DEGREES
EKG VENTRICULAR RATE: 82 BPM

## 2022-08-29 RX ORDER — ALBUTEROL SULFATE 90 UG/1
2 AEROSOL, METERED RESPIRATORY (INHALATION) 4 TIMES DAILY PRN
Qty: 3 EACH | Refills: 0 | Status: SHIPPED | OUTPATIENT
Start: 2022-08-29

## 2022-09-09 RX ORDER — CHLORZOXAZONE 500 MG/1
500 TABLET ORAL 3 TIMES DAILY PRN
Qty: 45 TABLET | Refills: 2 | Status: SHIPPED | OUTPATIENT
Start: 2022-09-09 | End: 2022-09-24

## 2022-09-20 DIAGNOSIS — Z76.0 MEDICATION REFILL: ICD-10-CM

## 2022-09-20 DIAGNOSIS — M79.7 FIBROMYALGIA: ICD-10-CM

## 2022-09-20 DIAGNOSIS — K21.9 GASTROESOPHAGEAL REFLUX DISEASE WITHOUT ESOPHAGITIS: ICD-10-CM

## 2022-09-20 DIAGNOSIS — F32.1 CURRENT MODERATE EPISODE OF MAJOR DEPRESSIVE DISORDER WITHOUT PRIOR EPISODE (HCC): ICD-10-CM

## 2022-09-20 DIAGNOSIS — J30.9 ALLERGIC RHINITIS, UNSPECIFIED SEASONALITY, UNSPECIFIED TRIGGER: ICD-10-CM

## 2022-09-20 RX ORDER — DULOXETIN HYDROCHLORIDE 30 MG/1
30 CAPSULE, DELAYED RELEASE ORAL DAILY
Qty: 90 CAPSULE | Refills: 1 | Status: SHIPPED | OUTPATIENT
Start: 2022-09-20

## 2022-09-20 RX ORDER — CETIRIZINE HYDROCHLORIDE 10 MG/1
10 TABLET ORAL 2 TIMES DAILY
Qty: 180 TABLET | Refills: 1 | Status: SHIPPED | OUTPATIENT
Start: 2022-09-20

## 2022-09-20 RX ORDER — TOPIRAMATE 100 MG/1
100 TABLET, FILM COATED ORAL 2 TIMES DAILY
Qty: 180 TABLET | Refills: 0 | Status: SHIPPED | OUTPATIENT
Start: 2022-09-20

## 2022-09-20 RX ORDER — PANTOPRAZOLE SODIUM 40 MG/1
40 TABLET, DELAYED RELEASE ORAL 2 TIMES DAILY
Qty: 180 TABLET | Refills: 0 | Status: SHIPPED
Start: 2022-09-20 | End: 2022-10-14 | Stop reason: SDUPTHER

## 2022-09-20 RX ORDER — BUSPIRONE HYDROCHLORIDE 15 MG/1
TABLET ORAL
Qty: 270 TABLET | Refills: 0 | Status: SHIPPED | OUTPATIENT
Start: 2022-09-20

## 2022-09-20 RX ORDER — TOLTERODINE 2 MG/1
2 CAPSULE, EXTENDED RELEASE ORAL DAILY
Qty: 90 CAPSULE | Refills: 1 | Status: SHIPPED | OUTPATIENT
Start: 2022-09-20

## 2022-09-20 NOTE — TELEPHONE ENCOUNTER
Patient had to switch to mail in pharmacy due to insurance. They are asking for 90 days for all medication.     Last Appointment   8/15/2022  Next Appointment  11/23/2022

## 2022-10-10 DIAGNOSIS — R06.02 SHORTNESS OF BREATH ON EXERTION: ICD-10-CM

## 2022-10-10 DIAGNOSIS — M79.7 FIBROMYALGIA: ICD-10-CM

## 2022-10-10 DIAGNOSIS — J45.30 MILD PERSISTENT ASTHMA WITHOUT COMPLICATION: ICD-10-CM

## 2022-10-10 RX ORDER — PREGABALIN 150 MG/1
150 CAPSULE ORAL 3 TIMES DAILY
Qty: 270 CAPSULE | Refills: 0 | Status: SHIPPED
Start: 2022-10-10 | End: 2022-10-12 | Stop reason: SDUPTHER

## 2022-10-10 RX ORDER — PREGABALIN 150 MG/1
150 CAPSULE ORAL 3 TIMES DAILY
Qty: 270 CAPSULE | Refills: 0 | OUTPATIENT
Start: 2022-10-10 | End: 2023-01-08

## 2022-10-11 RX ORDER — ALBUTEROL SULFATE 90 UG/1
2 AEROSOL, METERED RESPIRATORY (INHALATION) EVERY 6 HOURS PRN
Qty: 1 EACH | Refills: 3 | Status: SHIPPED | OUTPATIENT
Start: 2022-10-11

## 2022-10-12 DIAGNOSIS — M79.7 FIBROMYALGIA: ICD-10-CM

## 2022-10-12 RX ORDER — PREGABALIN 150 MG/1
150 CAPSULE ORAL 3 TIMES DAILY
Qty: 21 CAPSULE | Refills: 0 | Status: SHIPPED | OUTPATIENT
Start: 2022-10-12 | End: 2022-10-19

## 2022-10-14 DIAGNOSIS — K21.9 GASTROESOPHAGEAL REFLUX DISEASE WITHOUT ESOPHAGITIS: ICD-10-CM

## 2022-10-17 ENCOUNTER — OFFICE VISIT (OUTPATIENT)
Dept: PAIN MANAGEMENT | Age: 67
End: 2022-10-17
Payer: MEDICARE

## 2022-10-17 VITALS
RESPIRATION RATE: 16 BRPM | SYSTOLIC BLOOD PRESSURE: 130 MMHG | WEIGHT: 293 LBS | HEIGHT: 68 IN | DIASTOLIC BLOOD PRESSURE: 80 MMHG | OXYGEN SATURATION: 96 % | HEART RATE: 88 BPM | TEMPERATURE: 97.1 F | BODY MASS INDEX: 44.41 KG/M2

## 2022-10-17 DIAGNOSIS — M47.816 LUMBAR SPONDYLOSIS: ICD-10-CM

## 2022-10-17 DIAGNOSIS — M48.061 SPINAL STENOSIS OF LUMBAR REGION WITHOUT NEUROGENIC CLAUDICATION: ICD-10-CM

## 2022-10-17 DIAGNOSIS — M54.40 CHRONIC LOW BACK PAIN WITH SCIATICA, SCIATICA LATERALITY UNSPECIFIED, UNSPECIFIED BACK PAIN LATERALITY: ICD-10-CM

## 2022-10-17 DIAGNOSIS — M79.7 FIBROMYALGIA: ICD-10-CM

## 2022-10-17 DIAGNOSIS — G89.4 CHRONIC PAIN SYNDROME: Primary | ICD-10-CM

## 2022-10-17 DIAGNOSIS — M79.18 MYOFASCIAL PAIN SYNDROME: ICD-10-CM

## 2022-10-17 DIAGNOSIS — M51.9 LUMBAR DISC DISORDER: ICD-10-CM

## 2022-10-17 DIAGNOSIS — G89.29 CHRONIC LOW BACK PAIN WITH SCIATICA, SCIATICA LATERALITY UNSPECIFIED, UNSPECIFIED BACK PAIN LATERALITY: ICD-10-CM

## 2022-10-17 DIAGNOSIS — F11.99 OPIOID USE, UNSPECIFIED WITH UNSPECIFIED OPIOID-INDUCED DISORDER (HCC): ICD-10-CM

## 2022-10-17 PROCEDURE — G8399 PT W/DXA RESULTS DOCUMENT: HCPCS | Performed by: PHYSICIAN ASSISTANT

## 2022-10-17 PROCEDURE — 3017F COLORECTAL CA SCREEN DOC REV: CPT | Performed by: PHYSICIAN ASSISTANT

## 2022-10-17 PROCEDURE — 1123F ACP DISCUSS/DSCN MKR DOCD: CPT | Performed by: PHYSICIAN ASSISTANT

## 2022-10-17 PROCEDURE — G8427 DOCREV CUR MEDS BY ELIG CLIN: HCPCS | Performed by: PHYSICIAN ASSISTANT

## 2022-10-17 PROCEDURE — 1090F PRES/ABSN URINE INCON ASSESS: CPT | Performed by: PHYSICIAN ASSISTANT

## 2022-10-17 PROCEDURE — G8484 FLU IMMUNIZE NO ADMIN: HCPCS | Performed by: PHYSICIAN ASSISTANT

## 2022-10-17 PROCEDURE — 99213 OFFICE O/P EST LOW 20 MIN: CPT | Performed by: PHYSICIAN ASSISTANT

## 2022-10-17 PROCEDURE — 1036F TOBACCO NON-USER: CPT | Performed by: PHYSICIAN ASSISTANT

## 2022-10-17 PROCEDURE — G8417 CALC BMI ABV UP PARAM F/U: HCPCS | Performed by: PHYSICIAN ASSISTANT

## 2022-10-17 RX ORDER — POTASSIUM CHLORIDE 750 MG/1
TABLET, FILM COATED, EXTENDED RELEASE ORAL
COMMUNITY
Start: 2022-08-16

## 2022-10-17 RX ORDER — HYDROCODONE BITARTRATE AND ACETAMINOPHEN 5; 325 MG/1; MG/1
1 TABLET ORAL EVERY 8 HOURS PRN
Qty: 90 TABLET | Refills: 0 | Status: SHIPPED | OUTPATIENT
Start: 2022-10-17 | End: 2022-11-16

## 2022-10-17 RX ORDER — CHLORZOXAZONE 500 MG/1
TABLET ORAL
COMMUNITY
Start: 2022-10-05

## 2022-10-17 NOTE — PROGRESS NOTES
[address]  [address] [facility], [address] [place] [address] Alberto  [phone]    Follow up Note      Surinder Huff     Date of Visit:  10/17/2022    CC:  Patient presents for follow up   Chief Complaint   Patient presents with    Lower Back Pain       HPI:    Pain is unchanged. No new issues. Appropriate analgesia with current medications regimen: Yes. Change in quality of symptoms:no. Medication side effects: percocet caused constipation  Recent diagnostic testing:none. Recent interventional procedures:none. She has not been on anticoagulation medications to include none. The patient  has not been on herbal supplements. The patient is diabetic. Imaging:     CT of left knee 04/2021:     Impression   1. Small suprapatellar joint effusion. 2. Prior left knee arthroplasty and patellar resurfacing. No significant   periprosthetic lucency or acute osseous abnormality. 3. Calcifications which are well corticated in the distal quadriceps tendon   measuring up to 1.5 x 0.8 cm.   4. Hardware associated streak artifact limits the exam to some degree. MRI of Lumbar Spine 09/2020:  1. Postsurgical changes status post L3-L5 posterior spinal fusion. 2. Severe multifactorial spinal canal stenosis at L2-L3 with mass effect on    the cauda equina nerve roots. 3. Moderate multifactorial L1-L2 spinal canal stenosis with moderate right    neural foraminal stenosis. 4. Moderate left L5-S1 neural foraminal stenosis. LUMBAR SPINE XRAY 03/4/2020  Intact lumbar vertebral height and alignment. Relative severe    multilevel disc and facet joint narrowing with subchondral sclerosis    and spurring. Previous discectomy with posterior pedicle screw plates    extending from L3 through L5. Symmetric osteoarthritis at the    sacroiliac joints manifesting as joint space narrowing and subchondral    sclerosis. Lumbar spine CT 07/2016      1.  Status post lumbar spine fusion from L3-L5 appearing in stable,   satisfactory alignment.        2. Lumbar spondylosis as described above in part due to congenital   shortened pedicles and notable stenosis          Potential Aberrant Drug-Related Behavior:  None     Previous medication: baclofen helps some, tizanidine sleepiness, flexeril no relief, percocet constipation     Urine Drug Screenin2020 Negative for all which is consistent, pt does NOT take every day and does NOT fill exactly 30 days, can go longer than a month for refill   10/2020: consistent for hydrocodone  2021:  consistent  2022:  Consistent     OARRS report:  2020-10/2022:Consistent     Pain agreement:  -  Renewal date:2022       Past Medical History:   Diagnosis Date    Acute post-operative pain     Allergic rhinitis     AMS (altered mental status) 2022    Anxiety     Asthma     Bone avascular necrosis ([address] 2015    Breast lesion 2015    Chronic back pain     Chronic fatigue 10/08/2013    Chronic renal disease, stage III ([address] [[id number]]     Daytime somnolence 10/08/2013    Depression     Diabetes mellitus ([address]     Diabetes mellitus without complication ([address]     Epigastric pain     Fibromyalgia     Fracture, fibula     right    GERD (gastroesophageal reflux disease)     Headache     History of blood transfusion     Hyperlipidemia     Hypertension     Hypothyroidism     Irritable bowel syndrome     Left atrial mass 5/10/2022    Morbid obesity with BMI of 45.0-49.9, adult ([address] 10/09/2015    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    Neuropathy     Obesity     Opioid use, unspecified with unspecified opioid-induced disorder     Osteoarthritis     Overactive bladder 2014    PONV (postoperative nausea and vomiting)     hx 40 yrs ago    Prolonged emergence from general anesthesia     Reflux     S/P cardiac cath 2022    Sleep apnea     Stage 3a chronic kidney disease ([address]     Thyroid disease     Type 2 diabetes mellitus without complication [facility]     Type 2 diabetes mellitus without complication, without long-term current use of insulin [facility])        Past Surgical History:   Procedure Laterality Date    AORTIC VALVE REPLACEMENT N/A 6/15/2022    STERNOTOMY, LEFT ATRIAL MYXOMA RESECTION, performed by Caity Hernandez MD at [address] [facility]  05/23/2022    Dr Tasneem Reeves  2011    COLONOSCOPY  11/22/2016    Dr. Cony Joy N/A 12/8/2020    Manolo Blew performed by Toby Lama MD at [address] ([address] [facility])  1991    benign reasons     JOINT REPLACEMENT Bilateral 2010    knees    LUMBAR FUSION      L3-5    ROTATOR CUFF REPAIR Left     left    TOTAL KNEE ARTHROPLASTY Bilateral 2010    did both at the same time    TRANSESOPHAGEAL ECHOCARDIOGRAM N/A 5/9/2022    TRANSESOPHAGEAL ECHOCARDIOGRAM performed by Mineola MD Deep at [place] [address] 9/29/2020    EGD BIOPSY performed by Toby Lama MD at [place] [address]       Prior to Admission medications    Medication Sig Start Date End Date Taking? Authorizing Provider   chlorzoxazone (PARAFON FORTE) 500 MG tablet  10/5/22  Yes Historical Provider, MD   KLOR-CON 10 10 MEQ extended release tablet  8/16/22  Yes Historical Provider, MD   pregabalin (LYRICA) 150 MG capsule Take 1 capsule by mouth 3 times daily for 7 days.  10/12/22 10/19/22 Yes DEANDRE Benitez   metoprolol tartrate (LOPRESSOR) 25 MG tablet Take 0.5 tablets by mouth 2 times daily 10/3/22  Yes GIUSEPPE Denney CNP   tolterodine (DETROL LA) 2 MG extended release capsule Take 1 capsule by mouth daily 9/20/22  Yes GIUSEPPE Chaves CNP   DULoxetine (CYMBALTA) 30 MG extended release capsule Take 1 capsule by mouth daily 9/20/22  Yes GIUSEPPE Chaves CNP   busPIRone (BUSPAR) 15 MG tablet TAKE 1 TABLET BY MOUTH THREE TIMES A DAY 9/20/22  Yes GIUSEPPE Potter CNP   pantoprazole (PROTONIX) 40 MG tablet Take 1 tablet by mouth 2 times daily 9/20/22  Yes GIUSEPPE Clay CNP   cetirizine (ZYRTEC) 10 MG tablet Take 1 tablet by mouth 2 times daily 9/20/22  Yes GIUSEPPE Potter CNP   topiramate (TOPAMAX) 100 MG tablet Take 1 tablet by mouth 2 times daily 9/20/22  Yes GIUSEPPE Clay CNP   albuterol sulfate HFA (VENTOLIN HFA) 108 (90 Base) MCG/ACT inhaler Inhale 2 puffs into the lungs 4 times daily as needed for Wheezing 8/29/22  Yes Rafiq Michelle MD   HYDROcodone-acetaminophen (NORCO) 5-325 MG per tablet Take 1 tablet by mouth every 8 hours as needed for Pain for up to 60 days. Take lowest dose possible to manage pain 8/23/22 10/22/22 Yes DEANDRE Luis   aspirin 81 MG EC tablet Take 1 tablet by mouth in the morning. 8/16/22  Yes GIUSEPPE Clay CNP   furosemide (LASIX) 20 MG tablet Take 1 tablet by mouth See Admin Instructions Take two tablets one time daily x 1 week, then take one tablet one time daily x 2 weeks 8/16/22  Yes GIUSEPPE Clay CNP   pseudoephedrine (SUDAFED 24 HR) 240 MG TB24 extended release tablet Take 240 mg by mouth daily   Yes Historical Provider, MD   ADVAIR [facility] 45-21 MCG/ACT inhaler Inhale 2 puffs into the lungs 2 times daily  9/9/21  Yes Historical Provider, MD   albuterol sulfate HFA (VENTOLIN HFA) 108 (90 Base) MCG/ACT inhaler Inhale 2 puffs into the lungs every 6 hours as needed for Wheezing or Shortness of Breath 10/11/22   GIUSEPPE Potter CNP   fluticasone (FLONASE) 50 MCG/ACT nasal spray 1 spray by Nasal route daily as needed for Allergies 8/16/22 9/15/22  GIUSEPPE Potter CNP   potassium chloride (KLOR-CON M) 10 MEQ extended release tablet Take 1 tablet by mouth in the morning for 21 days. 8/16/22 9/6/22  Jason Crabtree, GIUSEPPE - CNP       Allergies   Allergen Reactions    Carafate [Sucralfate]      Patient unable to tolerate. Unable to recall her reaction.     Glucophage [Metformin Hcl]      Severe abdominal pain, constipation      Mobic [Meloxicam] Other (See Comments)     Abdominal pain    Trazodone And Nefazodone      Unable to tolerate, patient reports made her physically ill with abdominal pain      Ultram [Tramadol]      Abdominal pain    Nickel Itching and Rash       Social History     Socioeconomic History    Marital status:      Spouse name: Not on file    Number of children: Not on file    Years of education: Not on file    Highest education level: Associate degree: academic program   Occupational History    Not on file   Tobacco Use    Smoking status: Former     Packs/day: 1.50     Years: 30.00     Pack years: 45.00     Types: Cigarettes     Quit date: 2022     Years since quittin.3    Smokeless tobacco: Never   Vaping Use    Vaping Use: Never used   Substance and Sexual Activity    Alcohol use: No     Alcohol/week: 0.0 standard drinks    Drug use: No    Sexual activity: Not Currently   Other Topics Concern    Not on file   Social History Narrative    Not on file     Social Determinants of Health     Financial Resource Strain: Low Risk     Difficulty of Paying Living Expenses: Not hard at all   Food Insecurity: No Food Insecurity    Worried About Running Out of Food in the Last Year: Never true    Ran Out of Food in the Last Year: Never true   Transportation Needs: Not on file   Physical Activity: Not on file   Stress: Not on file   Social Connections: Not on file   Intimate Partner Violence: Not on file   Housing Stability: Not on file       Family History   Problem Relation Age of Onset    High Blood Pressure Mother     Diabetes Mother     Heart Disease Mother     Cancer Mother         skin    Diabetes Father     Heart Disease Father     High Blood Pressure Father     COPD Father     COPD Sister     Macular Degen Sister     COPD Brother     Heart Disease Brother     Depression Brother     Heart Disease Brother     Cancer Other 50        breast       REVIEW OF SYSTEMS:     Rocio Segura denies fever/chills, chest pain, shortness of breath, new bowel or bladder complaints. All other review of systems was negative. PHYSICAL EXAMINATION:      /80   Pulse 88   Temp 97.1 °F (36.2 °C) (Infrared)   Resp 16   Ht 5' 8\" (1.727 m)   Wt (!) 322 lb (146.1 kg)   LMP  (LMP Unknown)   SpO2 96%   BMI 48.96 kg/m²     General:      General appearance:   pleasant and well-hydrated. , in no discomfort and A & O x3  Build: Morbidly obese    HEENT:    Head:normocephalic and atraumatic  Sclera: icterus absent,    Lungs:    Breathing:Normal expansion. No wheezing. Abdomen:    Shape:non-distended and normal      Lumbar spine:    Range of motion:abnormal moderately Lateral bending, flexion, extension rotation bilateral and is painful. Extremities:    Tremors:None bilaterally upper and lower  Range of motion:Generally normal shoulders, pain with internal rotation of hips not done. Intact:Yes  Edema:Normal    Neurological:    [address]    Dermatology:    Skin:no unusual rashes, no skin lesions, no palpable subcutaneous nodules and good skin turgor    Impression:    Chronic low back with h/o L3-5 fusion/bilateral knees replaced since 2010  Refuses to have anymore injections, had at [place] facility [place] and reports so painful during and after, refuses to have again                Seen Dr Joanna Farr and is a surgical candidate but advised        weight                loss of   >30lbs or consult bariatric Surgery    Patient will be having cardiac surgery on 6/15/2022. Plan:  Chronic low back, diffuse body pain and bilateral knee pain L>R   Not interested in anymore interventional procedures  Continue Lyrica 150 mg TID x 90 day. No RF today.   Refill norco 5/325mg #90 lasts 2 months  No RF today - Parafon Forte 500mg po tid prn spasms  Difficulty getting to appointments ,   Continue with Cymbalta 40 mg QD from PCP Norco causing constipation. Stool softeners, dulcolax, fiber ineffective. Symproic samples given. She should f/u with PCP as well. No hx of bowel obstruction. OARRS report reviewed  Patient encouraged to stay active and to lose weight  Against referral to physical therapy  Treatment plan discussed with the patient including medications side effects     Controlled Substance Monitoring:    Acute and Chronic Pain Monitoring:   RX Monitoring 10/17/2022   Attestation -   Periodic Controlled Substance Monitoring Possible medication side effects, risk of tolerance/dependence & alternative treatments discussed. ;No signs of potential drug abuse or diversion identified. ;Assessed functional status. ;Obtaining appropriate analgesic effect of treatment. We discussed with the patient that combining opioids, benzodiazepines, alcohol, illicit drugs or sleep aids increases the risk of respiratory depression including death. We discussed that these medications may cause drowsiness, sedation or dizziness and have counseled the patient not to drive or operate machinery. We have discussed that these medications will be prescribed only by one provider. We have discussed with the patient about age related risk factors and have thoroughly discussed the importance of taking these medications as prescribed. The patient verbalizes understanding.     ccreferring physic

## 2022-10-17 NOTE — PROGRESS NOTES
Do you currently have any of the following:    Fever: No  Headache:  No  Cough: No  Shortness of breath: No  Exposed to anyone with these symptoms: No                                                                                                                Sosa Liz presents to the [address] [address] on 10/17/2022. [facility] is complaining of pain in her lower back. The pain is intermittent. The pain is described as aching, throbbing, shooting, stabbing, and sharp. Pain is rated on her best day at a 5, on her worst day at a 10, and on average at a 7 on the VAS scale. She took her last dose of Norco and parafon forte    . [facility] does not have issues with constipation. Any procedures since your last visit: No,     She is not on NSAIDS and  is not on anticoagulation medications to include none and is managed by NA. Pacemaker or defibrillator: No Physician managing device is NA. Medication Contract and Consent for Opioid Use Documents Filed       Patient Documents       Type of Document Status Date Received Received By Description    Medication Contract Received 10/14/2020  1:22 PM TOMY THOMAS Mgmt Patient Agreement    Medication Contract Received 12/6/2021  1:54 PM Rosa Velasco Medication Contract                       /80   Pulse 88   Temp 97.1 °F (36.2 °C) (Infrared)   Resp 16   Ht 5' 8\" (1.727 m)   Wt (!) 322 lb (146.1 kg)   LMP  (LMP Unknown)   SpO2 96%   BMI 48.96 kg/m²      No LMP recorded (lmp unknown). Patient has had a hysterectomy.

## 2022-10-20 RX ORDER — PANTOPRAZOLE SODIUM 40 MG/1
40 TABLET, DELAYED RELEASE ORAL 2 TIMES DAILY
Qty: 180 TABLET | Refills: 0 | Status: SHIPPED | OUTPATIENT
Start: 2022-10-20

## 2022-11-02 ENCOUNTER — TELEPHONE (OUTPATIENT)
Dept: FAMILY MEDICINE CLINIC | Age: 67
End: 2022-11-02

## 2022-11-02 NOTE — TELEPHONE ENCOUNTER
Patient called for referral to see dermatologist. Pt has warts on her right thumb. Please advise.   Last seen 8/15/2022  Next appt 11/23/2022

## 2022-11-09 ENCOUNTER — TELEPHONE (OUTPATIENT)
Dept: FAMILY MEDICINE CLINIC | Age: 67
End: 2022-11-09

## 2022-11-09 DIAGNOSIS — I48.91 ATRIAL FIBRILLATION, UNSPECIFIED TYPE (HCC): ICD-10-CM

## 2022-11-09 NOTE — TELEPHONE ENCOUNTER
Reeta Bamberger from [address] [place] [address], [phone] called to inform patient would like to receive personal home care aide 3 x times a week for ADL, light housekeeping and would like home delivered meals. [facility] would set this up through [employer]. Reeta Bamberger states you were inquiring about further information/needs of the patient. Please advise.    Last seen 8/15/2022  Next appt 11/23/2022

## 2022-11-11 DIAGNOSIS — R91.1 PULMONARY NODULE: Primary | ICD-10-CM

## 2022-11-23 ENCOUNTER — OFFICE VISIT (OUTPATIENT)
Dept: FAMILY MEDICINE CLINIC | Age: 67
End: 2022-11-23
Payer: MEDICARE

## 2022-11-23 VITALS
BODY MASS INDEX: 44.41 KG/M2 | HEIGHT: 68 IN | HEART RATE: 94 BPM | RESPIRATION RATE: 16 BRPM | DIASTOLIC BLOOD PRESSURE: 78 MMHG | SYSTOLIC BLOOD PRESSURE: 122 MMHG | WEIGHT: 293 LBS | TEMPERATURE: 97.1 F | OXYGEN SATURATION: 100 %

## 2022-11-23 DIAGNOSIS — Z13.820 ENCOUNTER FOR OSTEOPOROSIS SCREENING IN ASYMPTOMATIC POSTMENOPAUSAL PATIENT: ICD-10-CM

## 2022-11-23 DIAGNOSIS — J30.9 ALLERGIC RHINITIS, UNSPECIFIED SEASONALITY, UNSPECIFIED TRIGGER: ICD-10-CM

## 2022-11-23 DIAGNOSIS — B07.8 OTHER VIRAL WARTS: ICD-10-CM

## 2022-11-23 DIAGNOSIS — Z23 FLU VACCINE NEED: ICD-10-CM

## 2022-11-23 DIAGNOSIS — E66.01 MORBID OBESITY WITH BODY MASS INDEX (BMI) OF 45.0 TO 49.9 IN ADULT (HCC): ICD-10-CM

## 2022-11-23 DIAGNOSIS — Z78.0 ENCOUNTER FOR OSTEOPOROSIS SCREENING IN ASYMPTOMATIC POSTMENOPAUSAL PATIENT: ICD-10-CM

## 2022-11-23 DIAGNOSIS — Z00.00 MEDICARE ANNUAL WELLNESS VISIT, SUBSEQUENT: Primary | ICD-10-CM

## 2022-11-23 DIAGNOSIS — Z12.31 SCREENING MAMMOGRAM FOR BREAST CANCER: ICD-10-CM

## 2022-11-23 PROCEDURE — 1123F ACP DISCUSS/DSCN MKR DOCD: CPT | Performed by: NURSE PRACTITIONER

## 2022-11-23 PROCEDURE — G8484 FLU IMMUNIZE NO ADMIN: HCPCS | Performed by: NURSE PRACTITIONER

## 2022-11-23 PROCEDURE — G0439 PPPS, SUBSEQ VISIT: HCPCS | Performed by: NURSE PRACTITIONER

## 2022-11-23 PROCEDURE — 3017F COLORECTAL CA SCREEN DOC REV: CPT | Performed by: NURSE PRACTITIONER

## 2022-11-23 PROCEDURE — 90694 VACC AIIV4 NO PRSRV 0.5ML IM: CPT | Performed by: NURSE PRACTITIONER

## 2022-11-23 PROCEDURE — G0008 ADMIN INFLUENZA VIRUS VAC: HCPCS | Performed by: NURSE PRACTITIONER

## 2022-11-23 RX ORDER — CETIRIZINE HYDROCHLORIDE 10 MG/1
10 TABLET ORAL DAILY
Qty: 90 TABLET | Refills: 1 | Status: SHIPPED | OUTPATIENT
Start: 2022-11-23

## 2022-11-23 ASSESSMENT — PATIENT HEALTH QUESTIONNAIRE - PHQ9
1. LITTLE INTEREST OR PLEASURE IN DOING THINGS: 1
7. TROUBLE CONCENTRATING ON THINGS, SUCH AS READING THE NEWSPAPER OR WATCHING TELEVISION: 1
SUM OF ALL RESPONSES TO PHQ QUESTIONS 1-9: 8
10. IF YOU CHECKED OFF ANY PROBLEMS, HOW DIFFICULT HAVE THESE PROBLEMS MADE IT FOR YOU TO DO YOUR WORK, TAKE CARE OF THINGS AT HOME, OR GET ALONG WITH OTHER PEOPLE: 1
SUM OF ALL RESPONSES TO PHQ9 QUESTIONS 1 & 2: 2
SUM OF ALL RESPONSES TO PHQ QUESTIONS 1-9: 8
9. THOUGHTS THAT YOU WOULD BE BETTER OFF DEAD, OR OF HURTING YOURSELF: 0
8. MOVING OR SPEAKING SO SLOWLY THAT OTHER PEOPLE COULD HAVE NOTICED. OR THE OPPOSITE, BEING SO FIGETY OR RESTLESS THAT YOU HAVE BEEN MOVING AROUND A LOT MORE THAN USUAL: 1
6. FEELING BAD ABOUT YOURSELF - OR THAT YOU ARE A FAILURE OR HAVE LET YOURSELF OR YOUR FAMILY DOWN: 1
SUM OF ALL RESPONSES TO PHQ QUESTIONS 1-9: 8
4. FEELING TIRED OR HAVING LITTLE ENERGY: 1
3. TROUBLE FALLING OR STAYING ASLEEP: 1
SUM OF ALL RESPONSES TO PHQ QUESTIONS 1-9: 8
2. FEELING DOWN, DEPRESSED OR HOPELESS: 1
5. POOR APPETITE OR OVEREATING: 1

## 2022-11-23 ASSESSMENT — LIFESTYLE VARIABLES
HOW MANY STANDARD DRINKS CONTAINING ALCOHOL DO YOU HAVE ON A TYPICAL DAY: PATIENT DOES NOT DRINK
HOW OFTEN DO YOU HAVE A DRINK CONTAINING ALCOHOL: NEVER

## 2022-11-23 NOTE — PATIENT INSTRUCTIONS
Body Mass Index: Care Instructions  Your Care Instructions     Body mass index (BMI) can help you see if your weight is raising your risk for health problems. It uses a formula to compare how much you weigh with how tall you are. A BMI lower than 18.5 is considered underweight. A BMI between 18.5 and 24.9 is considered healthy. A BMI between 25 and 29.9 is considered overweight. A BMI of 30 or higher is considered obese. If your BMI is in the normal range, it means that you have a lower risk for weight-related health problems. If your BMI is in the overweight or obese range, you may be at increased risk for weight-related health problems, such as high blood pressure, heart disease, stroke, arthritis or joint pain, and diabetes. If your BMI is in the underweight range, you may be at increased risk for health problems such as fatigue, lower protection (immunity) against illness, muscle loss, bone loss, hair loss, and hormone problems. BMI is just one measure of your risk for weight-related health problems. You may be at higher risk for health problems if you are not active, you eat an unhealthy diet, or you drink too much alcohol or use tobacco products. Follow-up care is a key part of your treatment and safety. Be sure to make and go to all appointments, and call your doctor if you are having problems. It's also a good idea to know your test results and keep a list of the medicines you take. How can you care for yourself at home? Practice healthy eating habits. This includes eating plenty of fruits, vegetables, whole grains, lean protein, and low-fat dairy. If your doctor recommends it, get more exercise. Walking is a good choice. Bit by bit, increase the amount you walk every day. Try for at least 30 minutes on most days of the week. Do not smoke. Smoking can increase your risk for health problems. If you need help quitting, talk to your doctor about stop-smoking programs and medicines.  These can increase your chances of quitting for good. Limit alcohol to 2 drinks a day for men and 1 drink a day for women. Too much alcohol can cause health problems. If you have a BMI higher than 25  Your doctor may do other tests to check your risk for weight-related health problems. This may include measuring the distance around your waist. A waist measurement of more than 40 inches in men or 35 inches in women can increase the risk of weight-related health problems. Talk with your doctor about steps you can take to stay healthy or improve your health. You may need to make lifestyle changes to lose weight and stay healthy, such as changing your diet and getting regular exercise. If you have a BMI lower than 18.5  Your doctor may do other tests to check your risk for health problems. Talk with your doctor about steps you can take to stay healthy or improve your health. You may need to make lifestyle changes to gain or maintain weight and stay healthy, such as getting more healthy foods in your diet and doing exercises to build muscle. Where can you learn more? Go to https://[employer]. org and sign in to your [employer] account. Enter S176 in the [employer] box to learn more about \"Body Mass Index: Care Instructions. \"     If you do not have an account, please click on the \"Sign Up Now\" link. Current as of: December 27, 2021               Content Version: 13.4  © 4088-0581 Healthwise, Incorporated. Care instructions adapted under license by [facility] ([facility]). If you have questions about a medical condition or this instruction, always ask your healthcare professional. [place] [address] any warranty or liability for your use of this information. Personalized Preventive Plan for [facility] - 11/23/2022  Medicare offers a range of preventive health benefits.  Some of the tests and screenings are paid in full while other may be subject to a deductible, co-insurance, and/or copay.    Some of these benefits include a comprehensive review of your medical history including lifestyle, illnesses that may run in your family, and various assessments and screenings as appropriate. After reviewing your medical record and screening and assessments performed today your provider may have ordered immunizations, labs, imaging, and/or referrals for you. A list of these orders (if applicable) as well as your Preventive Care list are included within your After Visit Summary for your review. Other Preventive Recommendations:    A preventive eye exam performed by an eye specialist is recommended every 1-2 years to screen for glaucoma; cataracts, macular degeneration, and other eye disorders. A preventive dental visit is recommended every 6 months. Try to get at least 150 minutes of exercise per week or 10,000 steps per day on a pedometer . Order or download the FREE \"Exercise & Physical Activity: Your Everyday Guide\" from [employer]. Call 3-803.419.4980 or search [employer] online. You need 5947-8874 mg of calcium and 2046-4614 IU of vitamin D per day. It is possible to meet your calcium requirement with diet alone, but a vitamin D supplement is usually necessary to meet this goal.  When exposed to the sun, use a sunscreen that protects against both UVA and UVB radiation with an SPF of 30 or greater. Reapply every 2 to 3 hours or after sweating, drying off with a towel, or swimming. Always wear a seat belt when traveling in a car. Always wear a helmet when riding a bicycle or motorcycle.

## 2022-11-23 NOTE — PROGRESS NOTES
Medicare Annual Wellness Visit    Linette Enriquez is here for Medicare AWV and Health Maintenance (Due for mammo, diabetic eye, microalbumin, low dose ct scan, due for pneu, )    Assessment & Plan   Allergic rhinitis, unspecified seasonality, unspecified trigger  The following orders have not been finalized:  -     cetirizine (ZYRTEC) 10 MG tablet    Recommendations for Preventive Services Due: see orders and patient instructions/AVS.  Recommended screening schedule for the next 5-10 years is provided to the patient in written form: see Patient Instructions/AVS.     No follow-ups on file. Subjective       Patient's complete Health Risk Assessment and screening values have been reviewed and are found in Flowsheets. The following problems were reviewed today and where indicated follow up appointments were made and/or referrals ordered. Positive Risk Factor Screenings with Interventions:      Depression:  PHQ-2 Score: 2  PHQ-9 Total Score: 8    Severity:1-4 = minimal depression, 5-9 = mild depression, 10-14 = moderate depression, 15-19 = moderately severe depression, 20-27 = severe depression  Depression Interventions:  Patient has follow up with psychiatrist in 1 month, she is improved from prior visit  Regular exercise recommended- 3-5 times per week, 30-45 minutes per session  Relaxation techniques discussed          Opioid Risk: (Low risk score <55) Opioid risk score: 42    Patient is low risk for opioid use disorder or overdose. Last PDMP [place] Ardon as Reviewed:  Review User Review Instant Review Result   Carolyne Cast 10/17/2022  3:05 PM     Reviewed PDMP [1]     Last Controlled Substance Monitoring Documentation      [address] Office Visit from 10/17/2022 in [facility]   Periodic Controlled Substance Monitoring Possible medication side effects, risk of tolerance/dependence & alternative treatments discussed., No signs of potential drug abuse or diversion identified. , Assessed functional status., Obtaining appropriate analgesic effect of treatment. filed at 10/17/2022 [address] [facility] and ACP:  General  In general, how would you say your health is?: Good  In the past 7 days, have you experienced any of the following: New or Increased Pain, New or Increased Fatigue, Loneliness, Social Isolation, Stress or Anger?: (!) Yes  Select all that apply: (!) New or Increased Pain, New or Increased Fatigue, Loneliness, Stress, Anger  Do you get the social and emotional support that you need?: (!) No  Do you have a Living Will?: (!) No    Advance Directives       Power of  Living Will ACP-Advance Directive ACP-Power of     Not on File Filed on 04/23/13 Filed Not on File        General Health Risk Interventions:  Pain issues: follows with pain management  Fatigue: regular exercise recommended- 3-5 times per week, 30-45 minutes per session  Loneliness: patient's comments regarding inadequate social support: recently moved to new apartment  Stress: patient's comments regarding reasons for stress and/or anger: recently moved and would like to have a different apartment but they will not let her move to a different apartment, regular exercise recommended- 3-5 times per week, 30-45 minutes per session, relaxation techniques discussed  Anger: patient's comments regarding reasons for stress and/or anger: recently moved and doesn't like her apartment would like to move to a different one but they declined to let her move, regular exercise recommended- 3-5 times per week, 30-45 minutes per session, relaxation techniques discussed  Inadequate social/emotional support: patient's comments regarding inadequate social support: recently moved and states she only has 2 friends but they have their own life.    No Living Will: Advance Care Planning addressed with patient today    Health Habits/Nutrition:  Physical Activity: Insufficiently Active    Days of Exercise per Week: 4 days    Minutes of Exercise per Session: 10 min     Have you lost any weight without trying in the past 3 months?: No  Body mass index: (!) 49.26  Have you seen the dentist within the past year?: Yes  Health Habits/Nutrition Interventions:  Inadequate physical activity:  educational materials provided to promote increased physical activity, referral to bariatrics    Hearing/Vision:  Do you or your family notice any trouble with your hearing that hasn't been managed with hearing aids?: No  Do you have difficulty driving, watching TV, or doing any of your daily activities because of your eyesight?: No  Have you had an eye exam within the past year?: (!) No  No results found. Hearing/Vision Interventions:  Vision concerns:  patient encouraged to make appointment with his/her eye specialist            Objective   Vitals:    11/23/22 1006   BP: 122/78   Pulse: 94   Resp: 16   Temp: 97.1 °F (36.2 °C)   SpO2: 100%   Weight: (!) 324 lb (147 kg)   Height: 5' 8\" (1.727 m)      Body mass index is 49.26 kg/m². General Appearance: alert and oriented to person, place and time, well developed and well- nourished, morbidly obese,  in no acute distress  Skin: warm and dry,  very dry, flaky skin on both lower extremities, left leg with a scabbed area noted. Warts on right thumb. Head: normocephalic and atraumatic  Eyes: pupils equal, round, and reactive to light, extraocular eye movements intact, conjunctivae normal  ENT: tympanic membrane with scar tissue noted, external ear and ear canal normal bilaterally, nose without deformity, nasal mucosa and turbinates normal without polyps, oral cavity noted to have mucocele in the left upper palatine.   Neck: supple and non-tender without mass, no thyromegaly or thyroid nodules, no cervical lymphadenopathy  Pulmonary/Chest: clear to auscultation bilaterally- no wheezes, rales or rhonchi, normal air movement, no respiratory distress  Cardiovascular: normal rate, regular rhythm, normal S1 and S2, no murmurs, rubs, clicks, or gallops, distal pulses intact, no carotid bruits  Abdomen: soft, non-tender, non-distended, normal bowel sounds, no masses or organomegaly  Extremities: no cyanosis, clubbing or edema  Musculoskeletal: normal range of motion, no joint swelling, deformity or tenderness  Neurologic: reflexes normal and symmetric, bilateral knee replacements no cranial nerve deficit, gait, coordination and speech normal  Declines breast exam       Allergies   Allergen Reactions    Carafate [Sucralfate]      Patient unable to tolerate. Unable to recall her reaction. Glucophage [Metformin Hcl]      Severe abdominal pain, constipation      Mobic [Meloxicam] Other (See Comments)     Abdominal pain    Trazodone And Nefazodone      Unable to tolerate, patient reports made her physically ill with abdominal pain      Ultram [Tramadol]      Abdominal pain    Nickel Itching and Rash     Prior to Visit Medications    Medication Sig Taking? Authorizing Provider   pantoprazole (PROTONIX) 40 MG tablet Take 1 tablet by mouth 2 times daily Yes Leopold Bride, APRN - CNP   chlorzoxazone (PARAFON FORTE) 500 MG tablet  Yes Historical Provider, MD   KLOR-CON 10 10 MEQ extended release tablet  Yes Historical Provider, MD   pregabalin (LYRICA) 150 MG capsule Take 1 capsule by mouth 3 times daily for 7 days.  Yes Lavonna Dakin, PA   metoprolol tartrate (LOPRESSOR) 25 MG tablet Take 0.5 tablets by mouth 2 times daily Yes Leopold Bride, APRN - CNP   tolterodine (DETROL LA) 2 MG extended release capsule Take 1 capsule by mouth daily Yes Leopold Bride, APRN - CNP   DULoxetine (CYMBALTA) 30 MG extended release capsule Take 1 capsule by mouth daily Yes Leopold Bride, APRN - CNP   busPIRone (BUSPAR) 15 MG tablet TAKE 1 TABLET BY MOUTH THREE TIMES A DAY Yes GIUSEPPE Ken CNP   cetirizine (ZYRTEC) 10 MG tablet Take 1 tablet by mouth 2 times daily Yes Leopold Bride, APRN - CNP   topiramate (TOPAMAX) 100 MG tablet Take 1 tablet by mouth 2 times daily Yes GIUSEPPE Chaudhari CNP   albuterol sulfate HFA (VENTOLIN HFA) 108 (90 Base) MCG/ACT inhaler Inhale 2 puffs into the lungs 4 times daily as needed for Wheezing Yes Rito Dubose MD   fluticasone (FLONASE) 50 MCG/ACT nasal spray 1 spray by Nasal route daily as needed for Allergies Yes GIUSEPPE Chaudhari CNP   aspirin 81 MG EC tablet Take 1 tablet by mouth in the morning. Yes GIUSEPPE Chaudhari CNP   furosemide (LASIX) 20 MG tablet Take 1 tablet by mouth See Admin Instructions Take two tablets one time daily x 1 week, then take one tablet one time daily x 2 weeks Yes GIUSEPPE Jean CNP   pseudoephedrine (SUDAFED 24 HR) 240 MG TB24 extended release tablet Take 240 mg by mouth daily Yes Historical Provider, MD   ADVAIR HFA 45-21 MCG/ACT inhaler Inhale 2 puffs into the lungs 2 times daily  Yes Historical Provider, MD   albuterol sulfate HFA (VENTOLIN HFA) 108 (90 Base) MCG/ACT inhaler Inhale 2 puffs into the lungs every 6 hours as needed for Wheezing or Shortness of Breath  GIUSEPPE Jean CNP   potassium chloride (KLOR-CON M) 10 MEQ extended release tablet Take 1 tablet by mouth in the morning for 21 days. GIUSEPPE Chaudhari CNP       CareTeam (Including outside providers/suppliers regularly involved in providing care):   Patient Care Team:  GIUSEPPE Chaudhari CNP as PCP - General (Family Nurse Practitioner)  GIUSEPPE Chaudhari CNP as PCP - [facility]  Shiraz Gaspar MD as Consulting Physician (Hand Surgery)  Chelsea Philippe MD as Surgeon (Trauma Surgery)     Reviewed and updated this visit:  Tobacco  Allergies  Meds  Med Hx  Surg Hx  Soc Hx  Fam Hx             Advance Care Planning   Advanced Care Planning: Discussed the patients choices for care and treatment in case of a health event that adversely affects decision-making abilities. Also discussed the patients long-term treatment options.  Reviewed with the patient the appropriate state-specific advance directive documents. Reviewed the process of designating a competent adult as an Agent (or -in-fact) that could take make health care decisions for the patient if incompetent. Patient was asked to complete the declaration forms, either acknowledge the forms by a public notary or an eligible witness and provide a signed copy to the practice office. Time spent (minutes): 15     Cardiovascular Disease Risk Counseling: Assessed the patient's risk to develop cardiovascular disease and reviewed main risk factors. Reviewed steps to reduce disease risk including:   Quitting tobacco use, reducing amount smoked, or not starting the habit  Making healthy food choices  Being physically active and gradualy increasing activity levels   Reduce weight and determine a healthy BMI goal  Monitor blood pressure and treat if higher than 140/90 mmHg  Maintain blood total cholesterol levels under 5 mmol/l or 190 mg/dl  Maintain LDL cholesterol levels under 3.0 mmol/l or 115 mg/dl   Control blood glucose levels  Consider taking aspirin (75 mg daily), once blood pressure is controlled   Provided a follow up plan. Time spent (minutes): 10  Obesity Counseling: Assessed behavioral health risks and factors affecting choice of behavior. Suggested weight control approaches, including dietary changes behavioral modification and follow up plan. Provided educational and support documentation.   Time spent (minutes): 10

## 2022-12-15 ENCOUNTER — TELEPHONE (OUTPATIENT)
Dept: PAIN MANAGEMENT | Age: 67
End: 2022-12-15

## 2022-12-15 DIAGNOSIS — M48.061 SPINAL STENOSIS OF LUMBAR REGION WITHOUT NEUROGENIC CLAUDICATION: ICD-10-CM

## 2022-12-15 DIAGNOSIS — M51.9 LUMBAR DISC DISORDER: ICD-10-CM

## 2022-12-15 DIAGNOSIS — M47.816 LUMBAR SPONDYLOSIS: ICD-10-CM

## 2022-12-15 DIAGNOSIS — M79.7 FIBROMYALGIA: ICD-10-CM

## 2022-12-15 DIAGNOSIS — M54.40 CHRONIC LOW BACK PAIN WITH SCIATICA, SCIATICA LATERALITY UNSPECIFIED, UNSPECIFIED BACK PAIN LATERALITY: ICD-10-CM

## 2022-12-15 DIAGNOSIS — G89.29 CHRONIC LOW BACK PAIN WITH SCIATICA, SCIATICA LATERALITY UNSPECIFIED, UNSPECIFIED BACK PAIN LATERALITY: ICD-10-CM

## 2022-12-15 RX ORDER — HYDROCODONE BITARTRATE AND ACETAMINOPHEN 5; 325 MG/1; MG/1
1 TABLET ORAL EVERY 8 HOURS PRN
Qty: 90 TABLET | Refills: 0 | Status: SHIPPED
Start: 2022-12-15 | End: 2023-02-09 | Stop reason: SDUPTHER

## 2023-01-03 ENCOUNTER — OFFICE VISIT (OUTPATIENT)
Dept: PULMONOLOGY | Age: 68
End: 2023-01-03
Payer: MEDICARE

## 2023-01-03 VITALS
HEIGHT: 68 IN | HEART RATE: 83 BPM | OXYGEN SATURATION: 99 % | TEMPERATURE: 96.9 F | WEIGHT: 293 LBS | RESPIRATION RATE: 20 BRPM | SYSTOLIC BLOOD PRESSURE: 159 MMHG | BODY MASS INDEX: 44.41 KG/M2 | DIASTOLIC BLOOD PRESSURE: 80 MMHG

## 2023-01-03 DIAGNOSIS — E66.01 MORBID OBESITY (HCC): ICD-10-CM

## 2023-01-03 DIAGNOSIS — E66.2 OBESITY HYPOVENTILATION SYNDROME (HCC): ICD-10-CM

## 2023-01-03 DIAGNOSIS — R91.1 LUNG NODULE: ICD-10-CM

## 2023-01-03 DIAGNOSIS — G47.33 OSA (OBSTRUCTIVE SLEEP APNEA): ICD-10-CM

## 2023-01-03 DIAGNOSIS — R06.00 DYSPNEA, UNSPECIFIED TYPE: Primary | ICD-10-CM

## 2023-01-03 PROCEDURE — G8427 DOCREV CUR MEDS BY ELIG CLIN: HCPCS | Performed by: INTERNAL MEDICINE

## 2023-01-03 PROCEDURE — 3017F COLORECTAL CA SCREEN DOC REV: CPT | Performed by: INTERNAL MEDICINE

## 2023-01-03 PROCEDURE — 1123F ACP DISCUSS/DSCN MKR DOCD: CPT | Performed by: INTERNAL MEDICINE

## 2023-01-03 PROCEDURE — G8484 FLU IMMUNIZE NO ADMIN: HCPCS | Performed by: INTERNAL MEDICINE

## 2023-01-03 PROCEDURE — 1036F TOBACCO NON-USER: CPT | Performed by: INTERNAL MEDICINE

## 2023-01-03 PROCEDURE — G8399 PT W/DXA RESULTS DOCUMENT: HCPCS | Performed by: INTERNAL MEDICINE

## 2023-01-03 PROCEDURE — 99203 OFFICE O/P NEW LOW 30 MIN: CPT | Performed by: INTERNAL MEDICINE

## 2023-01-03 PROCEDURE — G8417 CALC BMI ABV UP PARAM F/U: HCPCS | Performed by: INTERNAL MEDICINE

## 2023-01-03 PROCEDURE — 99205 OFFICE O/P NEW HI 60 MIN: CPT | Performed by: INTERNAL MEDICINE

## 2023-01-03 PROCEDURE — 1090F PRES/ABSN URINE INCON ASSESS: CPT | Performed by: INTERNAL MEDICINE

## 2023-01-03 RX ORDER — FLUTICASONE FUROATE, UMECLIDINIUM BROMIDE AND VILANTEROL TRIFENATATE 200; 62.5; 25 UG/1; UG/1; UG/1
1 POWDER RESPIRATORY (INHALATION) DAILY
Qty: 1 EACH | Refills: 3 | Status: SHIPPED | OUTPATIENT
Start: 2023-01-03

## 2023-01-03 RX ORDER — ALBUTEROL SULFATE 90 UG/1
2 AEROSOL, METERED RESPIRATORY (INHALATION) EVERY 6 HOURS PRN
Qty: 18 G | Refills: 3 | Status: SHIPPED | OUTPATIENT
Start: 2023-01-03

## 2023-01-03 NOTE — PROGRESS NOTES
Pulmonary Critical Care Medicine      NEW PATIENT VISIT-PULMONARY    1/3/2023     REFERRING PHYSICIAN:  GIUSEPPE Adan - CNP     REASON FOR REFERRAL:  PEREZ     History of Present Illness     Tavon Lowery is a 79 y.o. former smoker, morbidly obese   female with over 36 pkyr H/o smoking , referred here for PEREZ . She has steadily gained over 75 lbs of weight over the last few years. She is known to have TRAVIS but does not want to use PAP therapy , no matter what. PFTs from June 2022 - concavity of the expiratory loop of flow volume loop but the flows not suggesting Obstructive ventilatory defect . DLCO decreased with normalization after alveolar ventilation. ( Typical for morbid obesity )   Was found to have two incidentaloma lung nodules on R side in June 2022 . < 3 mm - not worrisome for malignancy . 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 Current Baseline symptomatology-  Cough - moderate in severity , more in early am and gets better through the day , associated with whitish phlegm. PEREZ - MMRC score of III , present for several years and gradually worsening over the years. Improved with rescue inhaler or rest.   Wheezing - comes with exertion frequently  , mild to moderate in severity , relieved with rest or albuterol ( has used in the past) . Sleep apnea symptoms -     Snoring - ++  Apneas witnessed- ++  AM headaches- ++  Daytime somnolence-++  Fatigue - ++    Exercise tolerance/MMRC score( Bold )     MMRC Dyspnea Scale  Grade Description of Breathlessness   0 I only get breathless with strenuous exercise. 1 I get short of breath when hurrying on level ground or walking up a slight hill.   2 On level ground, I walk slower than people of the same age because of breathlessness, or have to stop for breath when walking at my own pace.    3 I stop for breath afterwalking about 100 yards or after a few minutes on level ground. 4 I am too breathless to leave the house or I am breathless when dressing. Mallampati score- 4    Smoking history- former smoker with over 40 pkyr     Occupational exposure-  No history of exposure to any occupational Pneumotoxins.      Pets- No H/o prolonged exposure to any exotic birds or pets    Past Intubation- Never for any respiratory illness    PastMedical History   Past Medical History:   Diagnosis Date    Acute post-operative pain     Allergic rhinitis     AMS (altered mental status) 6/18/2022    Anxiety     Asthma     Bone avascular necrosis ([address] 03/23/2015    Breast lesion 12/01/2015    Chronic back pain     Chronic fatigue 10/08/2013    Chronic renal disease, stage III ([address] [324659]     Daytime somnolence 10/08/2013    Depression     Diabetes mellitus ([address]     Diabetes mellitus without complication ([address] 9/82/2081    Epigastric pain     Fibromyalgia     Fracture, fibula     right    GERD (gastroesophageal reflux disease)     Headache     History of blood transfusion     Hyperlipidemia     Hypertension     Hypothyroidism     Irritable bowel syndrome     Left atrial mass 5/10/2022    Morbid obesity with BMI of 45.0-49.9, adult ([address] 10/09/2015    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    Neuropathy     Obesity     Opioid use, unspecified with unspecified opioid-induced disorder     Osteoarthritis     Overactive bladder 01/22/2014    PONV (postoperative nausea and vomiting)     hx 40 yrs ago    Prolonged emergence from general anesthesia     Reflux     S/P cardiac cath 5/23/2022    Sleep apnea     Stage 3a chronic kidney disease ([address]     Thyroid disease     Type 2 diabetes mellitus without complication ([address]     Type 2 diabetes mellitus without complication, without long-term current use of insulin ([address]        Past Surgical History  Past Surgical History:   Procedure Laterality Date    AORTIC VALVE REPLACEMENT N/A 6/15/2022    STERNOTOMY, LEFT ATRIAL MYXOMA RESECTION, performed by Lamberto Segura MD at [address] [place] [address] CARDIAC CATHETERIZATION  05/23/2022    Dr Martine Martell  2011    COLONOSCOPY  11/22/2016    Dr. Basil Fisher N/A 12/8/2020    Cloteal Octave performed by Freddy Car MD at [place] [address] ([facility])  1991    benign reasons     JOINT REPLACEMENT Bilateral 2010    knees    LUMBAR FUSION      L3-5    ROTATOR CUFF REPAIR Left     left    TOTAL KNEE ARTHROPLASTY Bilateral 2010    did both at the same time    TRANSESOPHAGEAL ECHOCARDIOGRAM N/A 5/9/2022    TRANSESOPHAGEAL ECHOCARDIOGRAM performed by Marley Crane MD at [address] N/A 9/29/2020    EGD BIOPSY performed by Freddy Car MD at [address] [place] [address]   Allergen Reactions    Carafate [Sucralfate]      Patient unable to tolerate. Unable to recall her reaction. Glucophage [Metformin Hcl]      Severe abdominal pain, constipation      Mobic [Meloxicam] Other (See Comments)     Abdominal pain    Trazodone And Nefazodone      Unable to tolerate, patient reports made her physically ill with abdominal pain      Ultram [Tramadol]      Abdominal pain    Nickel Itching and Rash       Medications  Current Outpatient Medications   Medication Sig Dispense Refill    fluticasone-umeclidin-vilant (TRELEGY ELLIPTA) 200-62.5-25 MCG/ACT AEPB inhaler Inhale 1 puff into the lungs daily 1 each 3    albuterol sulfate HFA (PROVENTIL HFA) 108 (90 Base) MCG/ACT inhaler Inhale 2 puffs into the lungs every 6 hours as needed for Wheezing 18 g 3    Spacer/Aero-Holding Chambers JERRY 1 Device by Does not apply route daily as needed (sob) 1 each 0    HYDROcodone-acetaminophen (NORCO) 5-325 MG per tablet Take 1 tablet by mouth every 8 hours as needed for Pain for up to 30 days.  Take lowest dose possible to manage pain 90 tablet 0    cetirizine (ZYRTEC) 10 MG tablet Take 1 tablet by mouth daily 90 tablet 1    pantoprazole (PROTONIX) 40 MG tablet Take 1 tablet by mouth 2 times daily 180 tablet 0    chlorzoxazone (PARAFON FORTE) 500 MG tablet       KLOR-CON 10 10 MEQ extended release tablet       pregabalin (LYRICA) 150 MG capsule Take 1 capsule by mouth 3 times daily for 7 days. 21 capsule 0    albuterol sulfate HFA (VENTOLIN HFA) 108 (90 Base) MCG/ACT inhaler Inhale 2 puffs into the lungs every 6 hours as needed for Wheezing or Shortness of Breath 1 each 3    metoprolol tartrate (LOPRESSOR) 25 MG tablet Take 0.5 tablets by mouth 2 times daily 90 tablet 3    tolterodine (DETROL LA) 2 MG extended release capsule Take 1 capsule by mouth daily 90 capsule 1    DULoxetine (CYMBALTA) 30 MG extended release capsule Take 1 capsule by mouth daily 90 capsule 1    busPIRone (BUSPAR) 15 MG tablet TAKE 1 TABLET BY MOUTH THREE TIMES A  tablet 0    topiramate (TOPAMAX) 100 MG tablet Take 1 tablet by mouth 2 times daily 180 tablet 0    aspirin 81 MG EC tablet Take 1 tablet by mouth in the morning. 90 tablet 3    furosemide (LASIX) 20 MG tablet Take 1 tablet by mouth See Admin Instructions Take two tablets one time daily x 1 week, then take one tablet one time daily x 2 weeks 60 tablet 0    pseudoephedrine (SUDAFED 24 HR) 240 MG TB24 extended release tablet Take 240 mg by mouth daily       No current facility-administered medications for this visit.        Social History  Social History     Tobacco Use    Smoking status: Former     Packs/day: 1.50     Years: 30.00     Pack years: 45.00     Types: Cigarettes     Quit date: 2022     Years since quittin.5    Smokeless tobacco: Never   Substance Use Topics    Alcohol use: No     Alcohol/week: 0.0 standard drinks       FamilyHistory  Family History   Problem Relation Age of Onset    High Blood Pressure Mother     Diabetes Mother     Heart Disease Mother     Cancer Mother         skin    Diabetes Father     Heart Disease Father     High Blood Pressure Father     COPD Father     COPD Sister     Macular Degen Sister     COPD Brother     Heart Disease Brother     Depression Brother     Heart Disease Brother     Cancer Other 50        breast       Review of Systems    Physical Exam    Review of Systems   General- No headaches , dizzinesss, syncope   Pulmonary - No chest pain , hemoptysis   Cardiovascular- No chest pain,   GI- No abd pain ,No difficulty swallowing, diarrhea , no vomiting   Musculoskeletal- No extremity weakness, no edema , no cyanosis   Genitourinary- No burning urination, no dysuria, no incontinence  Neuro- NO syncope , AMS  Or weakness , No tingling , no numbness. Skin- No rashes , no cyanosis. Psychiatric/Behavioral: Negative for confusion, hallucinations and sleep disturbance. The patient is not nervous/anxious. Physical Exam    Vitals:    01/03/23 1106   BP: (!) 159/80   Pulse: 83   Resp: 20   Temp: 96.9 °F (36.1 °C)   TempSrc: Infrared   SpO2: 99%   Weight: (!) 326 lb (147.9 kg)   Height: 5' 8\" (1.727 m)       General appearance: Not ill appearing, alert, no converstional dyspnea  Head: Normocephalic, without obvious abnormality, atraumatic   Eyes:Pupils bilateral equal and reactive, EOM intact, conjunctiva - no icterus , no injection   Throat: Clear, no lesions, Mallampti =4, no tonsillar eythema or edema  Neck: Supple, symmetrical, trachea midline, no lymphadenopathy, no JVD, no carotid bruits, no thyromegaly   Lungs: Bilateral  Air movement, decreased in bases, normal femitus, resonant to percussion  Heart: RRR, S1, S2 normal, no murmur, click, rub or gallop   Abdomen: soft, non-tender, nondistended.  Bowel sounds normal, no hepatomeagly  Extremities: extremities normal, atraumatic, no cyanosis, edema  Musculoskeletal - No deformities   Skin: Skin color, texture, turgor normal. No rashes or lesions   Neurological: No focal deficits, cranial nerves grossly intact, sensations intact   Psychiatric : Mood and effect normal , alert and oriented times 4        LABS and Studies:  Available studies were reviewed    Radiology:  CT-scan of the chest (personally reviewed)          Assessment/ Plan -      Diagnosis Orders   1. Dyspnea, unspecified type   Obstructive ventilatory defect - Although her PFT numbers do not suggest COPD , she has concavity of expiratory  flow volume loop with prolonged exhalation . Some of the PFT numbers are skewed secondary to restrictive physiology from morbid obesity . Contributing factors include # 3. Change the current regimen of Advair to   - Trelegy daily  - As needed albuterol with a spacer device       2. Lung nodule  CT CHEST WO CONTRAST yearly . Has 2.7 mm and 3.0 mm RUL and RML lung nodules       3. TRAVIS (obstructive sleep apnea) +Obesity hypoventilation syndrome (HCC) + Morbid obesity (HCC)       Pt strongly refuses to be re-tested and use of nocturnal PAP therapy , will discuss again in the next visit . Harriet Marvin was seen today for new patient, breathing problem, shortness of breath and wheezing. Diagnoses and all orders for this visit:        Other orders  -     fluticasone-umeclidin-vilant ([place]) 200-62.5-25 MCG/ACT AEPB inhaler; Inhale 1 puff into the lungs daily  -     albuterol sulfate HFA (PROVENTIL HFA) 108 (90 Base) MCG/ACT inhaler; Inhale 2 puffs into the lungs every 6 hours as needed for Wheezing  -     Spacer/Aero-Holding Chambers JERRY; 1 Device by Does not apply route daily as needed (sob)          Follow up-   3 months        Seek immediate medical attn for any sx/signs which persist, recur, worsen, constitutional, newonset or further concerns. Patient education, benefits:risks, and precautions provided; clinical status + mgmt plan/ Rx explained in detail and in agreement. All questions or concerns answered to satisfaction and understood.

## 2023-01-03 NOTE — PROGRESS NOTES
New pt to see provider for SOB and wheezing. Pt reports she asked for referral after heart surgery in June when she began having wheezing and shortness of breath. She has tried inhalers Advair and Albuterol. Plan for pt to have new inhaler; Trelegy 100 sample given per Dr. Sonja Johnson orders, instructed on use of device and Albuterol refills sent to pharmacy along with spare device script for pt to use. Plan for follow up Chest CT in June 2023; office follow up in 3 mos; appt card given.

## 2023-01-18 DIAGNOSIS — M79.7 FIBROMYALGIA: ICD-10-CM

## 2023-01-18 RX ORDER — PREGABALIN 150 MG/1
150 CAPSULE ORAL 3 TIMES DAILY
Qty: 270 CAPSULE | Refills: 0 | Status: SHIPPED | OUTPATIENT
Start: 2023-01-18 | End: 2023-04-18

## 2023-01-24 RX ORDER — PREGABALIN 150 MG/1
CAPSULE ORAL
Qty: 21 CAPSULE | Refills: 0 | OUTPATIENT
Start: 2023-01-24

## 2023-02-08 NOTE — PROGRESS NOTES
[address]  [address] [facility], [address] [place] [address] Alberto  [phone]    Follow up Note      Jesus Cheng     Date of Visit:  2/9/2023    CC:  Patient presents for follow up   Chief Complaint   Patient presents with    Lower Back Pain         HPI:    Pain is unchanged. No new issues. Appropriate analgesia with current medications regimen: Yes. Change in quality of symptoms:no. Medication side effects: percocet caused constipation  Recent diagnostic testing:none. Recent interventional procedures:none. She has not been on anticoagulation medications to include none. The patient  has not been on herbal supplements. The patient is diabetic. Imaging:     CT of left knee 04/2021:     Impression   1. Small suprapatellar joint effusion. 2. Prior left knee arthroplasty and patellar resurfacing. No significant   periprosthetic lucency or acute osseous abnormality. 3. Calcifications which are well corticated in the distal quadriceps tendon   measuring up to 1.5 x 0.8 cm.   4. Hardware associated streak artifact limits the exam to some degree. MRI of Lumbar Spine 09/2020:  1. Postsurgical changes status post L3-L5 posterior spinal fusion. 2. Severe multifactorial spinal canal stenosis at L2-L3 with mass effect on    the cauda equina nerve roots. 3. Moderate multifactorial L1-L2 spinal canal stenosis with moderate right    neural foraminal stenosis. 4. Moderate left L5-S1 neural foraminal stenosis. LUMBAR SPINE XRAY 03/4/2020  Intact lumbar vertebral height and alignment. Relative severe    multilevel disc and facet joint narrowing with subchondral sclerosis    and spurring. Previous discectomy with posterior pedicle screw plates    extending from L3 through L5. Symmetric osteoarthritis at the    sacroiliac joints manifesting as joint space narrowing and subchondral    sclerosis. Lumbar spine CT 07/2016      1.  Status post lumbar spine fusion from L3-L5 appearing in stable,   satisfactory alignment. 2. Lumbar spondylosis as described above in part due to congenital   shortened pedicles and notable stenosis          Potential Aberrant Drug-Related Behavior:  None     Previous medication: baclofen helps some, tizanidine sleepiness, flexeril no relief, percocet constipation     Urine Drug Screenin2020 Negative for all which is consistent, pt does NOT take every day and does NOT fill exactly 30 days, can go longer than a month for refill   10/2020: consistent for hydrocodone  2021:  consistent  2022:  Consistent     OARRS report:  2020-2023 Consistent     Pain agreement:  -  Needs new one filled out today.       Past Medical History:   Diagnosis Date    Acute post-operative pain     Allergic rhinitis     AMS (altered mental status) 2022    Anxiety     Asthma     Bone avascular necrosis ([address] 2015    Breast lesion 2015    Chronic back pain     Chronic fatigue 10/08/2013    Chronic renal disease, stage III ([address] [829868]     Daytime somnolence 10/08/2013    Depression     Diabetes mellitus ([address]     Diabetes mellitus without complication ([address] 6011    Epigastric pain     Fibromyalgia     Fracture, fibula     right    GERD (gastroesophageal reflux disease)     Headache     History of blood transfusion     Hyperlipidemia     Hypertension     Hypothyroidism     Irritable bowel syndrome     Left atrial mass 5/10/2022    Morbid obesity with BMI of 45.0-49.9, adult ([address] 10/09/2015    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    Neuropathy     Obesity     Opioid use, unspecified with unspecified opioid-induced disorder     Osteoarthritis     Overactive bladder 2014    PONV (postoperative nausea and vomiting)     hx 40 yrs ago    Prolonged emergence from general anesthesia     Reflux     S/P cardiac cath 2022    Sleep apnea     Stage 3a chronic kidney disease ([address]     Thyroid disease     Type 2 diabetes mellitus without complication ([facility]     Type 2 diabetes mellitus without complication, without long-term current use of insulin [facility])        Past Surgical History:   Procedure Laterality Date    AORTIC VALVE REPLACEMENT N/A 6/15/2022    STERNOTOMY, LEFT ATRIAL MYXOMA RESECTION, performed by Max Self MD at [address] [facility]  05/23/2022    Dr Jean-Paul Thomas  2011    COLONOSCOPY  11/22/2016    Dr. Michela Thomas N/A 12/8/2020    Edward Blush performed by Niko Pruitt MD at [address] [facility] ([facility])  1991    benign reasons     JOINT REPLACEMENT Bilateral 2010    knees    LUMBAR FUSION      L3-5    ROTATOR CUFF REPAIR Left     left    TOTAL KNEE ARTHROPLASTY Bilateral 2010    did both at the same time    TRANSESOPHAGEAL ECHOCARDIOGRAM N/A 5/9/2022    TRANSESOPHAGEAL ECHOCARDIOGRAM performed by Ashanti Ross MD at [place] [address] 9/29/2020    EGD BIOPSY performed by Niko Pruitt MD at [place] [address]       Prior to Admission medications    Medication Sig Start Date End Date Taking? Authorizing Provider   HYDROcodone-acetaminophen (NORCO) 5-325 MG per tablet Take 1 tablet by mouth every 8 hours as needed for Pain for up to 30 days. Take lowest dose possible to manage pain 2/9/23 3/11/23 Yes DEANDRE Daigle   tiZANidine (ZANAFLEX) 4 MG tablet Take 1 tablet by mouth daily as needed (pain) 2/9/23 3/11/23 Yes DEANDRE Daigle   pregabalin (LYRICA) 150 MG capsule Take 1 capsule by mouth 3 times daily for 90 days.  1/18/23 4/18/23 Yes DEANDRE Daigle   fluticasone-umeclidin-vilant (TRELEGY ELLIPTA) 218-72.1-72 MCG/ACT AEPB inhaler Inhale 1 puff into the lungs daily 1/3/23  Yes Keila Luna MD   albuterol sulfate HFA (PROVENTIL HFA) 108 (90 Base) MCG/ACT inhaler Inhale 2 puffs into the lungs every 6 hours as needed for Wheezing 1/3/23  Yes Jennifer Georges MD   Spacer/Aero-Holding Chambers JERRY 1 Device by Does not apply route daily as needed (sob) 1/3/23  Yes Jennifer Georges MD   cetirizine (ZYRTEC) 10 MG tablet Take 1 tablet by mouth daily 11/23/22  Yes GIUSEPPE Lott CNP   pantoprazole (PROTONIX) 40 MG tablet Take 1 tablet by mouth 2 times daily 10/20/22  Yes GIUSEPPE Tomlin CNP   albuterol sulfate HFA (VENTOLIN HFA) 108 (90 Base) MCG/ACT inhaler Inhale 2 puffs into the lungs every 6 hours as needed for Wheezing or Shortness of Breath 10/11/22  Yes GIUSEPPE Lott CNP   metoprolol tartrate (LOPRESSOR) 25 MG tablet Take 0.5 tablets by mouth 2 times daily 10/3/22  Yes GIUSEPPE Lott CNP   tolterodine (DETROL LA) 2 MG extended release capsule Take 1 capsule by mouth daily 9/20/22  Yes GIUSEPPE Lott CNP   DULoxetine (CYMBALTA) 30 MG extended release capsule Take 1 capsule by mouth daily 9/20/22  Yes GIUSEPPE Lott CNP   busPIRone (BUSPAR) 15 MG tablet TAKE 1 TABLET BY MOUTH THREE TIMES A DAY 9/20/22  Yes GIUSEPPE Lott CNP   topiramate (TOPAMAX) 100 MG tablet Take 1 tablet by mouth 2 times daily 9/20/22  Yes GIUSEPPE Tomlin CNP   aspirin 81 MG EC tablet Take 1 tablet by mouth in the morning. 8/16/22  Yes GIUSEPPE Tomlin CNP   furosemide (LASIX) 20 MG tablet Take 1 tablet by mouth See Admin Instructions Take two tablets one time daily x 1 week, then take one tablet one time daily x 2 weeks 8/16/22  Yes GIUSEPPE Tomlin CNP   pseudoephedrine (SUDAFED 24 HR) 240 MG TB24 extended release tablet Take 240 mg by mouth daily   Yes Historical Provider, MD   KLOR-CON 10 10 MEQ extended release tablet  8/16/22   Historical Provider, MD       Allergies   Allergen Reactions    Carafate [Sucralfate]      Patient unable to tolerate. Unable to recall her reaction.     Glucophage [Metformin Hcl]      Severe abdominal pain, constipation      Mobic [Meloxicam] Other (See Comments)     Abdominal pain    Trazodone And Nefazodone      Unable to tolerate, patient reports made her physically ill with abdominal pain      Ultram [Tramadol]      Abdominal pain    Nickel Itching and Rash       Social History     Socioeconomic History    Marital status:      Spouse name: Not on file    Number of children: Not on file    Years of education: Not on file    Highest education level: Associate degree: academic program   Occupational History    Not on file   Tobacco Use    Smoking status: Former     Packs/day: 1.50     Years: 30.00     Pack years: 45.00     Types: Cigarettes     Quit date: 2022     Years since quittin.6    Smokeless tobacco: Never   Vaping Use    Vaping Use: Never used   Substance and Sexual Activity    Alcohol use: No     Alcohol/week: 0.0 standard drinks    Drug use: No    Sexual activity: Not Currently   Other Topics Concern    Not on file   Social History Narrative    Not on file     Social Determinants of Health     Financial Resource Strain: Low Risk     Difficulty of Paying Living Expenses: Not hard at all   Food Insecurity: No Food Insecurity    Worried About Running Out of Food in the Last Year: Never true    Ran Out of Food in the Last Year: Never true   Transportation Needs: Not on file   Physical Activity: Insufficiently Active    Days of Exercise per Week: 4 days    Minutes of Exercise per Session: 10 min   Stress: Not on file   Social Connections: Not on file   Intimate Partner Violence: Not on file   Housing Stability: Not on file       Family History   Problem Relation Age of Onset    High Blood Pressure Mother     Diabetes Mother     Heart Disease Mother     Cancer Mother         skin    Diabetes Father     Heart Disease Father     High Blood Pressure Father     COPD Father     COPD Sister     Macular Degen Sister     COPD Brother     Heart Disease Brother     Depression Brother     Heart Disease Brother     Cancer Other 48        breast REVIEW OF SYSTEMS:     Arianna Archibald denies fever/chills, chest pain, shortness of breath, new bowel or bladder complaints. All other review of systems was negative. PHYSICAL EXAMINATION:      /74   Pulse 83   Temp 97.3 °F (36.3 °C) (Infrared)   Resp 18   Ht 5' 8\" (1.727 m)   Wt (!) 326 lb (147.9 kg)   LMP  (LMP Unknown)   SpO2 92%   BMI 49.57 kg/m²     General:      General appearance:   pleasant and well-hydrated. , in no discomfort and A & O x3  Build: Morbidly obese    HEENT:    Head:normocephalic and atraumatic  Sclera: icterus absent,    Lungs:    Breathing:Normal expansion. No wheezing. Abdomen:    Shape:non-distended and normal      Lumbar spine:    Range of motion:abnormal moderately Lateral bending, flexion, extension rotation bilateral and is painful. Extremities:    Tremors:None bilaterally upper and lower  Range of motion:Generally normal shoulders, pain with internal rotation of hips not done. Intact:Yes  Edema:Normal    Neurological:    [address]    Dermatology:    Skin:no unusual rashes, no skin lesions, no palpable subcutaneous nodules and good skin turgor    Impression:    Chronic low back with h/o L3-5 fusion/bilateral knees replaced since 2010  Refuses to have anymore injections, had at previous facility [place] state and reports so painful during and after, refuses to have again                Seen Dr Meaghan Fung and is a surgical candidate but advised        weight                loss of   >30lbs or consult bariatric Surgery       Plan:  Chronic low back, diffuse body pain and bilateral knee pain L>R   Not interested in anymore interventional procedures  Continue Lyrica 150 mg TID x 90 day. No RF today.   Refill norco 5/325mg #90 lasts 2 months  Difficulty getting to appointments ,   Continue with Cymbalta 40 mg QD from PCP   Constipation is doing better  OARRS report reviewed  Buccal screen today  Patient encouraged to stay active and to lose weight  Against referral to physical therapy  Treatment plan discussed with the patient including medications side effects    Controlled Substance Monitoring:    Acute and Chronic Pain Monitoring:   RX Monitoring 2/9/2023   Attestation -   Periodic Controlled Substance Monitoring Possible medication side effects, risk of tolerance/dependence & alternative treatments discussed. ;No signs of potential drug abuse or diversion identified. ;Assessed functional status. ;Obtaining appropriate analgesic effect of treatment. ;Random urine drug screen sent today. We discussed with the patient that combining opioids, benzodiazepines, alcohol, illicit drugs or sleep aids increases the risk of respiratory depression including death. We discussed that these medications may cause drowsiness, sedation or dizziness and have counseled the patient not to drive or operate machinery. We have discussed that these medications will be prescribed only by one provider. We have discussed with the patient about age related risk factors and have thoroughly discussed the importance of taking these medications as prescribed. The patient verbalizes understanding.     ccrefguillerminaing physic

## 2023-02-09 ENCOUNTER — OFFICE VISIT (OUTPATIENT)
Dept: PAIN MANAGEMENT | Age: 68
End: 2023-02-09
Payer: MEDICARE

## 2023-02-09 VITALS
BODY MASS INDEX: 44.41 KG/M2 | SYSTOLIC BLOOD PRESSURE: 130 MMHG | DIASTOLIC BLOOD PRESSURE: 74 MMHG | OXYGEN SATURATION: 92 % | WEIGHT: 293 LBS | HEIGHT: 68 IN | HEART RATE: 83 BPM | RESPIRATION RATE: 18 BRPM | TEMPERATURE: 97.3 F

## 2023-02-09 DIAGNOSIS — G89.29 CHRONIC LOW BACK PAIN WITH SCIATICA, SCIATICA LATERALITY UNSPECIFIED, UNSPECIFIED BACK PAIN LATERALITY: ICD-10-CM

## 2023-02-09 DIAGNOSIS — M47.816 LUMBAR SPONDYLOSIS: ICD-10-CM

## 2023-02-09 DIAGNOSIS — F11.99 OPIOID USE, UNSPECIFIED WITH UNSPECIFIED OPIOID-INDUCED DISORDER (HCC): ICD-10-CM

## 2023-02-09 DIAGNOSIS — M51.26 DISPLACEMENT OF LUMBAR INTERVERTEBRAL DISC: ICD-10-CM

## 2023-02-09 DIAGNOSIS — M79.7 FIBROMYALGIA: ICD-10-CM

## 2023-02-09 DIAGNOSIS — M79.18 MYOFASCIAL PAIN SYNDROME: ICD-10-CM

## 2023-02-09 DIAGNOSIS — M51.9 LUMBAR DISC DISORDER: ICD-10-CM

## 2023-02-09 DIAGNOSIS — M54.40 CHRONIC LOW BACK PAIN WITH SCIATICA, SCIATICA LATERALITY UNSPECIFIED, UNSPECIFIED BACK PAIN LATERALITY: ICD-10-CM

## 2023-02-09 DIAGNOSIS — M47.816 LUMBAR FACET ARTHROPATHY: ICD-10-CM

## 2023-02-09 DIAGNOSIS — M48.061 SPINAL STENOSIS OF LUMBAR REGION WITHOUT NEUROGENIC CLAUDICATION: ICD-10-CM

## 2023-02-09 DIAGNOSIS — G89.4 CHRONIC PAIN SYNDROME: Primary | ICD-10-CM

## 2023-02-09 PROCEDURE — 99213 OFFICE O/P EST LOW 20 MIN: CPT | Performed by: PHYSICIAN ASSISTANT

## 2023-02-09 RX ORDER — HYDROCODONE BITARTRATE AND ACETAMINOPHEN 5; 325 MG/1; MG/1
1 TABLET ORAL EVERY 8 HOURS PRN
Qty: 90 TABLET | Refills: 0 | Status: SHIPPED | OUTPATIENT
Start: 2023-02-09 | End: 2023-03-11

## 2023-02-09 RX ORDER — TIZANIDINE 4 MG/1
4 TABLET ORAL DAILY PRN
Qty: 30 TABLET | Refills: 0 | Status: SHIPPED | OUTPATIENT
Start: 2023-02-09 | End: 2023-03-11

## 2023-02-09 NOTE — PROGRESS NOTES
Do you currently have any of the following:    Fever: No  Headache:  No  Cough: No  Shortness of breath: No  Exposed to anyone with these symptoms: No                                                                                                                David Darwin presents to the [facility] on 2/9/2023. Nimesh Mcguire is complaining of pain in her lower back. The pain is intermittent. The pain is described as aching and throbbing. Pain is rated on her best day at a 4, on her worst day at a 10, and on average at a 5 on the VAS scale. She took her last dose of Norco and Lyrica today. Nimesh Mcguire does have issues with constipation. Any procedures since your last visit: No,     She is  on NSAIDS and  is  on anticoagulation medications to include ASA and is managed by GIUSEPPE Saleh CNP  . Pacemaker or defibrillator: No Physician managing device is NA. Medication Contract and Consent for Opioid Use Documents Filed       Patient Documents       Type of Document Status Date Received Received By Description    Medication Contract Received 10/14/2020  1:22 PM TOMY THOMAS Mgmt Patient Agreement    Medication Contract Received 12/6/2021  1:54 PM Eliseo Orantes Medication Contract                       Temp 97.3 °F (36.3 °C) (Infrared)   Resp 18   Ht 5' 8\" (1.727 m)   Wt (!) 326 lb (147.9 kg)   LMP  (LMP Unknown)   BMI 49.57 kg/m²      No LMP recorded (lmp unknown). Patient has had a hysterectomy.

## 2023-02-15 DIAGNOSIS — K21.9 GASTROESOPHAGEAL REFLUX DISEASE WITHOUT ESOPHAGITIS: ICD-10-CM

## 2023-02-15 RX ORDER — PANTOPRAZOLE SODIUM 40 MG/1
40 TABLET, DELAYED RELEASE ORAL 2 TIMES DAILY
Qty: 180 TABLET | Refills: 0 | Status: SHIPPED | OUTPATIENT
Start: 2023-02-15

## 2023-03-02 RX ORDER — POTASSIUM CHLORIDE 750 MG/1
10 TABLET, FILM COATED, EXTENDED RELEASE ORAL DAILY PRN
Qty: 60 TABLET | Refills: 1 | Status: SHIPPED | OUTPATIENT
Start: 2023-03-02

## 2023-03-06 RX ORDER — TIZANIDINE 4 MG/1
4 TABLET ORAL 2 TIMES DAILY PRN
Qty: 60 TABLET | Refills: 0 | Status: SHIPPED | OUTPATIENT
Start: 2023-03-06 | End: 2023-04-05

## 2023-03-16 ENCOUNTER — PATIENT MESSAGE (OUTPATIENT)
Dept: FAMILY MEDICINE CLINIC | Age: 68
End: 2023-03-16

## 2023-03-16 DIAGNOSIS — L03.119 CELLULITIS OF ANTERIOR LOWER LEG: Primary | ICD-10-CM

## 2023-03-16 RX ORDER — CEFDINIR 300 MG/1
300 CAPSULE ORAL 2 TIMES DAILY
Qty: 20 CAPSULE | Refills: 0 | Status: SHIPPED | OUTPATIENT
Start: 2023-03-16 | End: 2023-03-26

## 2023-03-16 NOTE — TELEPHONE ENCOUNTER
From: Jose Luis Austin  To: John Phelps  Sent: 3/16/2023 10:38 AM EDT  Subject: Cellulitis in my lower legs     My legs are swollen and my right one   Is seeping at times. Can you call me in a antibiotic? I've started doubling up on Lasix and the potassium.      Thank you,   Sherree Snellen

## 2023-03-16 NOTE — TELEPHONE ENCOUNTER
[address] called to inform Chantel Cooper that Cefdinir is on back order. The alternatives are  Cefadroxil 500 mg  Cefprozil 250 mg  Cefuroxime 250 mg    Please advise which RX to fill.   Verbal order is OK.  [phone]

## 2023-03-21 ENCOUNTER — OFFICE VISIT (OUTPATIENT)
Dept: FAMILY MEDICINE CLINIC | Age: 68
End: 2023-03-21
Payer: MEDICARE

## 2023-03-21 VITALS
SYSTOLIC BLOOD PRESSURE: 128 MMHG | HEART RATE: 87 BPM | HEIGHT: 68 IN | DIASTOLIC BLOOD PRESSURE: 84 MMHG | TEMPERATURE: 97.3 F | WEIGHT: 293 LBS | RESPIRATION RATE: 18 BRPM | BODY MASS INDEX: 44.41 KG/M2

## 2023-03-21 DIAGNOSIS — F32.1 CURRENT MODERATE EPISODE OF MAJOR DEPRESSIVE DISORDER WITHOUT PRIOR EPISODE (HCC): ICD-10-CM

## 2023-03-21 DIAGNOSIS — E11.51 TYPE II DIABETES MELLITUS WITH PERIPHERAL CIRCULATORY DISORDER (HCC): Primary | ICD-10-CM

## 2023-03-21 DIAGNOSIS — S81.801A LEG WOUND, RIGHT, INITIAL ENCOUNTER: ICD-10-CM

## 2023-03-21 DIAGNOSIS — L03.115 CELLULITIS OF RIGHT LOWER EXTREMITY: ICD-10-CM

## 2023-03-21 DIAGNOSIS — N18.31 STAGE 3A CHRONIC KIDNEY DISEASE (HCC): ICD-10-CM

## 2023-03-21 DIAGNOSIS — I48.91 ATRIAL FIBRILLATION, UNSPECIFIED TYPE (HCC): ICD-10-CM

## 2023-03-21 LAB
CHP ED QC CHECK: ABNORMAL
CREATININE URINE POCT: 50
GLUCOSE BLD-MCNC: 105 MG/DL
HBA1C MFR BLD: 6.2 %
MICROALBUMIN/CREAT 24H UR: 10 MG/G{CREAT}
MICROALBUMIN/CREAT UR-RTO: <30

## 2023-03-21 PROCEDURE — 82962 GLUCOSE BLOOD TEST: CPT | Performed by: NURSE PRACTITIONER

## 2023-03-21 PROCEDURE — G8417 CALC BMI ABV UP PARAM F/U: HCPCS | Performed by: NURSE PRACTITIONER

## 2023-03-21 PROCEDURE — 3044F HG A1C LEVEL LT 7.0%: CPT | Performed by: NURSE PRACTITIONER

## 2023-03-21 PROCEDURE — 83036 HEMOGLOBIN GLYCOSYLATED A1C: CPT | Performed by: NURSE PRACTITIONER

## 2023-03-21 PROCEDURE — 3017F COLORECTAL CA SCREEN DOC REV: CPT | Performed by: NURSE PRACTITIONER

## 2023-03-21 PROCEDURE — G8484 FLU IMMUNIZE NO ADMIN: HCPCS | Performed by: NURSE PRACTITIONER

## 2023-03-21 PROCEDURE — 2022F DILAT RTA XM EVC RTNOPTHY: CPT | Performed by: NURSE PRACTITIONER

## 2023-03-21 PROCEDURE — 1036F TOBACCO NON-USER: CPT | Performed by: NURSE PRACTITIONER

## 2023-03-21 PROCEDURE — 82044 UR ALBUMIN SEMIQUANTITATIVE: CPT | Performed by: NURSE PRACTITIONER

## 2023-03-21 PROCEDURE — G8427 DOCREV CUR MEDS BY ELIG CLIN: HCPCS | Performed by: NURSE PRACTITIONER

## 2023-03-21 PROCEDURE — 1123F ACP DISCUSS/DSCN MKR DOCD: CPT | Performed by: NURSE PRACTITIONER

## 2023-03-21 PROCEDURE — G8399 PT W/DXA RESULTS DOCUMENT: HCPCS | Performed by: NURSE PRACTITIONER

## 2023-03-21 PROCEDURE — 1090F PRES/ABSN URINE INCON ASSESS: CPT | Performed by: NURSE PRACTITIONER

## 2023-03-21 PROCEDURE — 99214 OFFICE O/P EST MOD 30 MIN: CPT | Performed by: NURSE PRACTITIONER

## 2023-03-21 RX ORDER — CEFUROXIME AXETIL 250 MG/1
TABLET ORAL
COMMUNITY
Start: 2023-03-16

## 2023-03-21 SDOH — ECONOMIC STABILITY: FOOD INSECURITY: WITHIN THE PAST 12 MONTHS, YOU WORRIED THAT YOUR FOOD WOULD RUN OUT BEFORE YOU GOT MONEY TO BUY MORE.: NEVER TRUE

## 2023-03-21 SDOH — ECONOMIC STABILITY: FOOD INSECURITY: WITHIN THE PAST 12 MONTHS, THE FOOD YOU BOUGHT JUST DIDN'T LAST AND YOU DIDN'T HAVE MONEY TO GET MORE.: NEVER TRUE

## 2023-03-21 SDOH — ECONOMIC STABILITY: INCOME INSECURITY: HOW HARD IS IT FOR YOU TO PAY FOR THE VERY BASICS LIKE FOOD, HOUSING, MEDICAL CARE, AND HEATING?: NOT HARD AT ALL

## 2023-03-21 SDOH — ECONOMIC STABILITY: HOUSING INSECURITY
IN THE LAST 12 MONTHS, WAS THERE A TIME WHEN YOU DID NOT HAVE A STEADY PLACE TO SLEEP OR SLEPT IN A SHELTER (INCLUDING NOW)?: NO

## 2023-03-21 ASSESSMENT — ENCOUNTER SYMPTOMS
CONSTIPATION: 0
COLOR CHANGE: 0
WHEEZING: 0
FACIAL SWELLING: 0
VOMITING: 0
NAUSEA: 0
RHINORRHEA: 0
CHEST TIGHTNESS: 0
SHORTNESS OF BREATH: 0
DIARRHEA: 0
SINUS PAIN: 0
BACK PAIN: 1
COUGH: 0
TROUBLE SWALLOWING: 0
ABDOMINAL PAIN: 0
SORE THROAT: 0
VOICE CHANGE: 0
SINUS PRESSURE: 0

## 2023-03-21 ASSESSMENT — PATIENT HEALTH QUESTIONNAIRE - PHQ9
9. THOUGHTS THAT YOU WOULD BE BETTER OFF DEAD, OR OF HURTING YOURSELF: 0
SUM OF ALL RESPONSES TO PHQ QUESTIONS 1-9: 0
SUM OF ALL RESPONSES TO PHQ QUESTIONS 1-9: 0
3. TROUBLE FALLING OR STAYING ASLEEP: 0
7. TROUBLE CONCENTRATING ON THINGS, SUCH AS READING THE NEWSPAPER OR WATCHING TELEVISION: 0
10. IF YOU CHECKED OFF ANY PROBLEMS, HOW DIFFICULT HAVE THESE PROBLEMS MADE IT FOR YOU TO DO YOUR WORK, TAKE CARE OF THINGS AT HOME, OR GET ALONG WITH OTHER PEOPLE: 0
8. MOVING OR SPEAKING SO SLOWLY THAT OTHER PEOPLE COULD HAVE NOTICED. OR THE OPPOSITE, BEING SO FIGETY OR RESTLESS THAT YOU HAVE BEEN MOVING AROUND A LOT MORE THAN USUAL: 0
2. FEELING DOWN, DEPRESSED OR HOPELESS: 0
5. POOR APPETITE OR OVEREATING: 0
1. LITTLE INTEREST OR PLEASURE IN DOING THINGS: 0
SUM OF ALL RESPONSES TO PHQ QUESTIONS 1-9: 0
6. FEELING BAD ABOUT YOURSELF - OR THAT YOU ARE A FAILURE OR HAVE LET YOURSELF OR YOUR FAMILY DOWN: 0
SUM OF ALL RESPONSES TO PHQ9 QUESTIONS 1 & 2: 0
4. FEELING TIRED OR HAVING LITTLE ENERGY: 0
SUM OF ALL RESPONSES TO PHQ QUESTIONS 1-9: 0

## 2023-03-21 NOTE — ASSESSMENT & PLAN NOTE
Resolved at this time. Zio monitor reviewed and no atrial fibrillation she was to fu with cardiology in 3 months but didn't.

## 2023-03-21 NOTE — ASSESSMENT & PLAN NOTE
Unclear control fasting labs ordered today, she is prediabetic. She is avoiding NSAIDs, PPI's can cause CKD if dehydration occurs.

## 2023-03-21 NOTE — PROGRESS NOTES
and polyuria. Genitourinary:  Negative for difficulty urinating, frequency and urgency. Musculoskeletal:  Positive for back pain (follows with pain management). Negative for arthralgias, gait problem, joint swelling, myalgias, neck pain and neck stiffness. Skin:  Positive for wound. Negative for color change, pallor and rash. Allergic/Immunologic: Negative for environmental allergies, food allergies and immunocompromised state. Neurological:  Negative for dizziness, tremors, seizures, syncope, facial asymmetry, speech difficulty, weakness, light-headedness, numbness and headaches. Hematological:  Negative for adenopathy. Does not bruise/bleed easily. Psychiatric/Behavioral:  Positive for dysphoric mood and sleep disturbance. Negative for agitation, behavioral problems, confusion, decreased concentration, hallucinations, self-injury and suicidal ideas. The patient is not nervous/anxious and is not hyperactive. OBJECTIVE:     VS:  Wt Readings from Last 3 Encounters:   03/21/23 (!) 337 lb (152.9 kg)   02/09/23 (!) 326 lb (147.9 kg)   01/03/23 (!) 326 lb (147.9 kg)                       Vitals:    03/21/23 1517   BP: 128/84   Pulse: 87   Resp: 18   Temp: 97.3 °F (36.3 °C)   Weight: (!) 337 lb (152.9 kg)   Height: 5' 8\" (1.727 m)   PF: 95 L/min       General: Alert and oriented to person, place, and time, well developed and well nourished, morbidly obese, has gained  13 pounds 5 months, in no acute distress  SKIN: Warm and dry, erythema with very dry, thickened skin with multiple open areas to the leg and 1.5 cm scabbed area to the upper leg. HEAD: normocephalic, atraumatic  Neck: supple and non-tender without mass, trachea midline, no cervical lymphadenopathy, no bruit, no thyromegaly or nodules  Cardiovascular: regular rate and regular rhythm, normal S1 and S2,  no murmurs, rubs, clicks, or gallop. Distal pulses intact, no carotid bruits.  No edema  Pulmonary/Chest: clear to auscultation

## 2023-03-21 NOTE — ASSESSMENT & PLAN NOTE
well controlled A1c 6.2 %, Continue diet and lifestyle changes,  Discussed hypoglycemia prevention and treatment, take medication regularly don't skip doses or double up. Foot exam every day; wash and dry well between toes, look for any redness, cracks, wounds notify provider if any problems occur. Be sure to wear hard sole shoe at all times and not go barefooted. Wear proper fitting shoes to prevent ulcer, callous formation. Reminder for Podiatry visits Q 2 Months for toenail care if needed  Reminder for annual Eye exam  Reminder to keep vaccines up dated  Exercise 30 minutes daily for 150 minutes a week  Recommend Diabetic Education Classes if you have not already attended  Stay up to date on all immunizations.

## 2023-03-21 NOTE — ASSESSMENT & PLAN NOTE
Monitored by specialist- no acute findings meriting change in the plan, start exercising, and see if psychiatry can give her a note so maybe she can move to a different apartment.

## 2023-03-28 ENCOUNTER — OFFICE VISIT (OUTPATIENT)
Dept: PODIATRY | Age: 68
End: 2023-03-28
Payer: MEDICARE

## 2023-03-28 VITALS — WEIGHT: 293 LBS | HEIGHT: 68 IN | BODY MASS INDEX: 44.41 KG/M2

## 2023-03-28 DIAGNOSIS — I87.2 VENOUS INSUFFICIENCY (CHRONIC) (PERIPHERAL): ICD-10-CM

## 2023-03-28 DIAGNOSIS — L97.911 NON-PRESSURE CHRONIC ULCER RIGHT LOWER LEG, LIMITED TO BREAKDOWN SKIN (HCC): ICD-10-CM

## 2023-03-28 DIAGNOSIS — I83.018 VENOUS STASIS ULCER OF OTHER PART OF RIGHT LOWER LEG LIMITED TO BREAKDOWN OF SKIN WITH VARICOSE VEINS (HCC): Primary | ICD-10-CM

## 2023-03-28 DIAGNOSIS — R26.2 DIFFICULTY WALKING: ICD-10-CM

## 2023-03-28 DIAGNOSIS — L97.811 VENOUS STASIS ULCER OF OTHER PART OF RIGHT LOWER LEG LIMITED TO BREAKDOWN OF SKIN WITH VARICOSE VEINS (HCC): Primary | ICD-10-CM

## 2023-03-28 DIAGNOSIS — L03.115 CELLULITIS OF RIGHT LOWER LEG: ICD-10-CM

## 2023-03-28 PROCEDURE — 29580 STRAPPING UNNA BOOT: CPT | Performed by: PODIATRIST

## 2023-03-28 PROCEDURE — G8427 DOCREV CUR MEDS BY ELIG CLIN: HCPCS | Performed by: PODIATRIST

## 2023-03-28 PROCEDURE — G8484 FLU IMMUNIZE NO ADMIN: HCPCS | Performed by: PODIATRIST

## 2023-03-28 PROCEDURE — 99214 OFFICE O/P EST MOD 30 MIN: CPT | Performed by: PODIATRIST

## 2023-03-28 PROCEDURE — 1036F TOBACCO NON-USER: CPT | Performed by: PODIATRIST

## 2023-03-28 PROCEDURE — 1090F PRES/ABSN URINE INCON ASSESS: CPT | Performed by: PODIATRIST

## 2023-03-28 PROCEDURE — G8399 PT W/DXA RESULTS DOCUMENT: HCPCS | Performed by: PODIATRIST

## 2023-03-28 PROCEDURE — 3017F COLORECTAL CA SCREEN DOC REV: CPT | Performed by: PODIATRIST

## 2023-03-28 PROCEDURE — G8417 CALC BMI ABV UP PARAM F/U: HCPCS | Performed by: PODIATRIST

## 2023-03-28 PROCEDURE — 1123F ACP DISCUSS/DSCN MKR DOCD: CPT | Performed by: PODIATRIST

## 2023-03-28 RX ORDER — AMOXICILLIN AND CLAVULANATE POTASSIUM 875; 125 MG/1; MG/1
1 TABLET, FILM COATED ORAL 2 TIMES DAILY
Qty: 28 TABLET | Refills: 0 | Status: SHIPPED | OUTPATIENT
Start: 2023-03-28 | End: 2023-04-11

## 2023-03-28 NOTE — PROGRESS NOTES
3/28/23     Suyapa Tabares    : 1955   Sex: female    Age: 79 y.o. Patient's PCP/Provider is:  GIUSEPPE Alvarado CNP    Subjective:  Patient is seen today for evaluation regarding new onset stasis ulceration posterior aspect right lower leg. She presents today for wound evaluation and treatment. Did finish off a round of oral antibiotics which helped mildly with her symptoms. Still complaining of pain and burning sensation to the right lower extremity. Patient is in no acute distress. She denies any nausea, vomiting, fever, chills. Chief Complaint   Patient presents with    Wound Check     Right leg        ROS:  Const: Positives and pertinent negatives as per HPI. Musculo: Denies symptoms other than stated above. Neuro: Denies symptoms other than stated above. Skin: Denies symptoms other than stated above. Current Medications:    Current Outpatient Medications:     amoxicillin-clavulanate (AUGMENTIN) 875-125 MG per tablet, Take 1 tablet by mouth 2 times daily for 14 days, Disp: 28 tablet, Rfl: 0    cefUROXime (CEFTIN) 250 MG tablet, TAKE ONE TABLET BY MOUTH TWICE DAILY FOR 10 DAYS, Disp: , Rfl:     tiZANidine (ZANAFLEX) 4 MG tablet, Take 1 tablet by mouth 2 times daily as needed (pain), Disp: 60 tablet, Rfl: 0    KLOR-CON 10 10 MEQ extended release tablet, Take 1 tablet by mouth daily as needed (leg edema), Disp: 60 tablet, Rfl: 1    pantoprazole (PROTONIX) 40 MG tablet, Take 1 tablet by mouth 2 times daily, Disp: 180 tablet, Rfl: 0    pregabalin (LYRICA) 150 MG capsule, Take 1 capsule by mouth 3 times daily for 90 days. , Disp: 270 capsule, Rfl: 0    fluticasone-umeclidin-vilant (TRELEGY ELLIPTA) 200-62.5-25 MCG/ACT AEPB inhaler, Inhale 1 puff into the lungs daily, Disp: 1 each, Rfl: 3    albuterol sulfate HFA (PROVENTIL HFA) 108 (90 Base) MCG/ACT inhaler, Inhale 2 puffs into the lungs every 6 hours as needed for Wheezing, Disp: 18 g, Rfl: 3    Spacer/Aero-Holding Chambers JERRY, 1

## 2023-03-28 NOTE — PROGRESS NOTES
Patient is in today for evaluation of wound to the right leg.  Pcp is Taryn Epperson, GIUSEPPE - CNP  Last ov 3/21/23

## 2023-03-28 NOTE — LETTER
[address] [place] [address] Sidney Murray  Phone: [phone]  Fax: [phone]    Mone San  [id number]   1955  3/28/2023      Dear Dread Cardona,    I would like to thank you for the kind referral of Jorge San. She presented to the office today for evaluation regarding stasis ulceration right lower extremity. We did discuss topical wound dressings and compression therapy which was performed on today's visit. Patient was placed on additional Augmentin therapy at this time due to some residual erythema present. We will have continued follow-up until ulcerations are resolved right lower extremity. If you should have any questions concerning her visit today, please do not hesitate to contact me.     Sincerely,    Santos Gilbert DPM

## 2023-04-02 DIAGNOSIS — Z76.0 MEDICATION REFILL: ICD-10-CM

## 2023-04-03 RX ORDER — TOLTERODINE 2 MG/1
2 CAPSULE, EXTENDED RELEASE ORAL DAILY
Qty: 90 CAPSULE | Refills: 1 | Status: SHIPPED | OUTPATIENT
Start: 2023-04-03

## 2023-04-04 ENCOUNTER — OFFICE VISIT (OUTPATIENT)
Dept: PODIATRY | Age: 68
End: 2023-04-04
Payer: MEDICARE

## 2023-04-04 VITALS — WEIGHT: 293 LBS | HEIGHT: 68 IN | BODY MASS INDEX: 44.41 KG/M2

## 2023-04-04 DIAGNOSIS — L97.811 VENOUS STASIS ULCER OF OTHER PART OF RIGHT LOWER LEG LIMITED TO BREAKDOWN OF SKIN WITH VARICOSE VEINS (HCC): Primary | ICD-10-CM

## 2023-04-04 DIAGNOSIS — R26.2 DIFFICULTY WALKING: ICD-10-CM

## 2023-04-04 DIAGNOSIS — I87.2 VENOUS INSUFFICIENCY (CHRONIC) (PERIPHERAL): ICD-10-CM

## 2023-04-04 DIAGNOSIS — L97.911 NON-PRESSURE CHRONIC ULCER RIGHT LOWER LEG, LIMITED TO BREAKDOWN SKIN (HCC): ICD-10-CM

## 2023-04-04 DIAGNOSIS — I83.018 VENOUS STASIS ULCER OF OTHER PART OF RIGHT LOWER LEG LIMITED TO BREAKDOWN OF SKIN WITH VARICOSE VEINS (HCC): Primary | ICD-10-CM

## 2023-04-04 PROCEDURE — 29580 STRAPPING UNNA BOOT: CPT | Performed by: PODIATRIST

## 2023-04-04 PROCEDURE — 99999 PR OFFICE/OUTPT VISIT,PROCEDURE ONLY: CPT | Performed by: PODIATRIST

## 2023-04-04 RX ORDER — TOPIRAMATE 100 MG/1
100 TABLET, FILM COATED ORAL 2 TIMES DAILY
Qty: 180 TABLET | Refills: 0 | OUTPATIENT
Start: 2023-04-04

## 2023-04-04 NOTE — PROGRESS NOTES
23     Brenda Gregory    : 1955   Sex: female    Age: 76 y.o. Patient's PCP/Provider is:  GIUSEPPE Warren CNP    Subjective:  Patient is seen today for follow-up regarding continued care regarding stasis ulceration right lower extremity. Overall patient is doing well at this time with minimal issues noted. Has noticed improvement in symptoms with the compression dressing applied. Patient is in no acute distress. No other additional abnormalities noted. Chief Complaint   Patient presents with    Wound Check     Right leg        ROS:  Const: Positives and pertinent negatives as per HPI. Musculo: Denies symptoms other than stated above. Neuro: Denies symptoms other than stated above. Skin: Denies symptoms other than stated above. Current Medications:    Current Outpatient Medications:     tolterodine (DETROL LA) 2 MG extended release capsule, Take 1 capsule by mouth daily, Disp: 90 capsule, Rfl: 1    amoxicillin-clavulanate (AUGMENTIN) 875-125 MG per tablet, Take 1 tablet by mouth 2 times daily for 14 days, Disp: 28 tablet, Rfl: 0    cefUROXime (CEFTIN) 250 MG tablet, TAKE ONE TABLET BY MOUTH TWICE DAILY FOR 10 DAYS, Disp: , Rfl:     tiZANidine (ZANAFLEX) 4 MG tablet, Take 1 tablet by mouth 2 times daily as needed (pain), Disp: 60 tablet, Rfl: 0    KLOR-CON 10 10 MEQ extended release tablet, Take 1 tablet by mouth daily as needed (leg edema), Disp: 60 tablet, Rfl: 1    pantoprazole (PROTONIX) 40 MG tablet, Take 1 tablet by mouth 2 times daily, Disp: 180 tablet, Rfl: 0    pregabalin (LYRICA) 150 MG capsule, Take 1 capsule by mouth 3 times daily for 90 days. , Disp: 270 capsule, Rfl: 0    fluticasone-umeclidin-vilant (TRELEGY ELLIPTA) 200-62.5-25 MCG/ACT AEPB inhaler, Inhale 1 puff into the lungs daily, Disp: 1 each, Rfl: 3    albuterol sulfate HFA (PROVENTIL HFA) 108 (90 Base) MCG/ACT inhaler, Inhale 2 puffs into the lungs every 6 hours as needed for Wheezing, Disp: 18 g, Rfl: 3

## 2023-04-19 ENCOUNTER — OFFICE VISIT (OUTPATIENT)
Dept: CARDIOLOGY CLINIC | Age: 68
End: 2023-04-19
Payer: MEDICARE

## 2023-04-19 VITALS
SYSTOLIC BLOOD PRESSURE: 132 MMHG | HEART RATE: 80 BPM | WEIGHT: 293 LBS | DIASTOLIC BLOOD PRESSURE: 84 MMHG | HEIGHT: 68 IN | BODY MASS INDEX: 44.41 KG/M2 | RESPIRATION RATE: 16 BRPM

## 2023-04-19 DIAGNOSIS — I35.1 AORTIC VALVE INSUFFICIENCY, ETIOLOGY OF CARDIAC VALVE DISEASE UNSPECIFIED: Primary | ICD-10-CM

## 2023-04-19 DIAGNOSIS — I48.91 ATRIAL FIBRILLATION, UNSPECIFIED TYPE (HCC): ICD-10-CM

## 2023-04-19 PROCEDURE — G8427 DOCREV CUR MEDS BY ELIG CLIN: HCPCS | Performed by: INTERNAL MEDICINE

## 2023-04-19 PROCEDURE — 1123F ACP DISCUSS/DSCN MKR DOCD: CPT | Performed by: INTERNAL MEDICINE

## 2023-04-19 PROCEDURE — 99213 OFFICE O/P EST LOW 20 MIN: CPT | Performed by: INTERNAL MEDICINE

## 2023-04-19 PROCEDURE — 93000 ELECTROCARDIOGRAM COMPLETE: CPT | Performed by: INTERNAL MEDICINE

## 2023-04-19 PROCEDURE — 1090F PRES/ABSN URINE INCON ASSESS: CPT | Performed by: INTERNAL MEDICINE

## 2023-04-19 PROCEDURE — G8399 PT W/DXA RESULTS DOCUMENT: HCPCS | Performed by: INTERNAL MEDICINE

## 2023-04-19 PROCEDURE — 1036F TOBACCO NON-USER: CPT | Performed by: INTERNAL MEDICINE

## 2023-04-19 PROCEDURE — G8417 CALC BMI ABV UP PARAM F/U: HCPCS | Performed by: INTERNAL MEDICINE

## 2023-04-19 PROCEDURE — 3017F COLORECTAL CA SCREEN DOC REV: CPT | Performed by: INTERNAL MEDICINE

## 2023-04-19 NOTE — PROGRESS NOTES
[facility] Cardiology Progress Note  Dr. Tomasa Granger      Referring Physician: Yanni Thompson, APRN - CNP  CHIEF COMPLAINT:   Chief Complaint   Patient presents with    Heart Problem     3 month        HISTORY OF PRESENT ILLNESS:   Patient is 76years old female with history of atrial myxoma status postsurgical excision, hypertension, hyperlipidemia, paroxysmal postop atrial fibrillation, fibromyalgia, is here for follow-up appointment. Patient denies any chest pain, no shortness of breath, no lightheadedness, no dizziness, no palpitations, no pedal edema, no PND, no orthopnea, no syncope, no presyncopal episodes.   Patient is very sedentary    Past Medical History:   Diagnosis Date    Acute post-operative pain     Allergic rhinitis     AMS (altered mental status) 6/18/2022    Anxiety     Asthma     Bone avascular necrosis ([address] 03/23/2015    Breast lesion 12/01/2015    Chronic back pain     Chronic fatigue 10/08/2013    Chronic renal disease, stage III ([address] [[id number]]     Daytime somnolence 10/08/2013    Depression     Diabetes mellitus ([address]     Diabetes mellitus without complication ([address] 5/84/1393    Epigastric pain     Fibromyalgia     Fracture, fibula     right    GERD (gastroesophageal reflux disease)     Headache     History of blood transfusion     Hyperlipidemia     Hypertension     Hypothyroidism     Irritable bowel syndrome     Left atrial mass 5/10/2022    Morbid obesity with BMI of 45.0-49.9, adult ([address] 10/09/2015    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    Neuropathy     Obesity     Opioid use, unspecified with unspecified opioid-induced disorder     Osteoarthritis     Overactive bladder 01/22/2014    PONV (postoperative nausea and vomiting)     hx 40 yrs ago    Prolonged emergence from general anesthesia     Reflux     S/P cardiac cath 5/23/2022    Sleep apnea     Stage 3a chronic kidney disease ([address]     Thyroid disease     Type 2 diabetes mellitus without complication ([address]     Type 2 diabetes mellitus without complication,

## 2023-04-20 DIAGNOSIS — M79.7 FIBROMYALGIA: ICD-10-CM

## 2023-04-20 RX ORDER — PREGABALIN 150 MG/1
150 CAPSULE ORAL 3 TIMES DAILY
Qty: 270 CAPSULE | Refills: 0 | Status: SHIPPED | OUTPATIENT
Start: 2023-04-20 | End: 2023-07-19

## 2023-04-24 ENCOUNTER — OFFICE VISIT (OUTPATIENT)
Dept: PAIN MANAGEMENT | Age: 68
End: 2023-04-24
Payer: MEDICARE

## 2023-04-24 VITALS
OXYGEN SATURATION: 96 % | WEIGHT: 293 LBS | BODY MASS INDEX: 44.41 KG/M2 | DIASTOLIC BLOOD PRESSURE: 78 MMHG | RESPIRATION RATE: 18 BRPM | HEIGHT: 68 IN | HEART RATE: 85 BPM | TEMPERATURE: 96.8 F | SYSTOLIC BLOOD PRESSURE: 108 MMHG

## 2023-04-24 DIAGNOSIS — M51.26 DISPLACEMENT OF LUMBAR INTERVERTEBRAL DISC: ICD-10-CM

## 2023-04-24 DIAGNOSIS — M48.061 SPINAL STENOSIS OF LUMBAR REGION WITHOUT NEUROGENIC CLAUDICATION: ICD-10-CM

## 2023-04-24 DIAGNOSIS — G89.4 CHRONIC PAIN SYNDROME: Primary | ICD-10-CM

## 2023-04-24 DIAGNOSIS — M47.816 LUMBAR SPONDYLOSIS: ICD-10-CM

## 2023-04-24 DIAGNOSIS — M79.7 FIBROMYALGIA: ICD-10-CM

## 2023-04-24 DIAGNOSIS — F11.99 OPIOID USE, UNSPECIFIED WITH UNSPECIFIED OPIOID-INDUCED DISORDER (HCC): ICD-10-CM

## 2023-04-24 DIAGNOSIS — M47.816 LUMBAR FACET ARTHROPATHY: ICD-10-CM

## 2023-04-24 DIAGNOSIS — M51.9 LUMBAR DISC DISORDER: ICD-10-CM

## 2023-04-24 PROCEDURE — 99213 OFFICE O/P EST LOW 20 MIN: CPT | Performed by: PHYSICIAN ASSISTANT

## 2023-04-24 PROCEDURE — G8427 DOCREV CUR MEDS BY ELIG CLIN: HCPCS | Performed by: PHYSICIAN ASSISTANT

## 2023-04-24 PROCEDURE — 1123F ACP DISCUSS/DSCN MKR DOCD: CPT | Performed by: PHYSICIAN ASSISTANT

## 2023-04-24 PROCEDURE — 3017F COLORECTAL CA SCREEN DOC REV: CPT | Performed by: PHYSICIAN ASSISTANT

## 2023-04-24 PROCEDURE — G8417 CALC BMI ABV UP PARAM F/U: HCPCS | Performed by: PHYSICIAN ASSISTANT

## 2023-04-24 PROCEDURE — 1036F TOBACCO NON-USER: CPT | Performed by: PHYSICIAN ASSISTANT

## 2023-04-24 PROCEDURE — 1090F PRES/ABSN URINE INCON ASSESS: CPT | Performed by: PHYSICIAN ASSISTANT

## 2023-04-24 PROCEDURE — G8399 PT W/DXA RESULTS DOCUMENT: HCPCS | Performed by: PHYSICIAN ASSISTANT

## 2023-04-24 NOTE — PROGRESS NOTES
Abbie Moraes presents to the [facility] on 4/24/2023. Sarah Trejo is complaining of pain in her lower back that radiates to left lower extremity. The pain is constant. The pain is described as aching and stabbing. Pain is rated on her best day at a 4, on her worst day at a 10, and on average at a 8 on the VAS scale. She took her last dose of Norco and Lyrica today. Sarah Trejo does have issues with constipation. Any procedures since your last visit: No,        She is  on NSAIDS and  is  on anticoagulation medications to include ASA and is managed by [facility] Dickens CNP  . Pacemaker or defibrillator: No Physician managing device is NA. Medication Contract and Consent for Opioid Use Documents Filed       Patient Documents       Type of Document Status Date Received Received By Description    Medication Contract Received 10/14/2020  1:22 PM TOMY THOMAS Mgmt Patient Agreement    Medication Contract Received 12/6/2021  1:54 PM Mishel Bustos Medication Contract    Medication Contract Received 2/10/2023  2:44 PM ANTHONY ESTRADA opioid agreement                       Temp 96.8 °F (36 °C) (Infrared)   Resp 18   Ht 5' 8\" (1.727 m)   Wt (!) 336 lb (152.4 kg)   LMP  (LMP Unknown)   BMI 51.09 kg/m²      No LMP recorded (lmp unknown). Patient has had a hysterectomy.

## 2023-04-24 NOTE — PROGRESS NOTES
[address]  [address] [facility], [address] [place] [address] Alberto  [phone]    Follow up Note      Andria Zack     Date of Visit:  4/24/2023    CC:  Patient presents for follow up   Chief Complaint   Patient presents with    Back Pain           HPI:    Pain is unchanged. No new issues. Appropriate analgesia with current medications regimen: Yes. Change in quality of symptoms:no. Medication side effects: percocet caused constipation  Recent diagnostic testing:none. Recent interventional procedures:none. She has not been on anticoagulation medications to include none. The patient  has not been on herbal supplements. The patient is diabetic. Imaging:     CT of left knee 04/2021:     Impression   1. Small suprapatellar joint effusion. 2. Prior left knee arthroplasty and patellar resurfacing. No significant   periprosthetic lucency or acute osseous abnormality. 3. Calcifications which are well corticated in the distal quadriceps tendon   measuring up to 1.5 x 0.8 cm.   4. Hardware associated streak artifact limits the exam to some degree. MRI of Lumbar Spine 09/2020:  1. Postsurgical changes status post L3-L5 posterior spinal fusion. 2. Severe multifactorial spinal canal stenosis at L2-L3 with mass effect on    the cauda equina nerve roots. 3. Moderate multifactorial L1-L2 spinal canal stenosis with moderate right    neural foraminal stenosis. 4. Moderate left L5-S1 neural foraminal stenosis. LUMBAR SPINE XRAY 03/4/2020  Intact lumbar vertebral height and alignment. Relative severe    multilevel disc and facet joint narrowing with subchondral sclerosis    and spurring. Previous discectomy with posterior pedicle screw plates    extending from L3 through L5. Symmetric osteoarthritis at the    sacroiliac joints manifesting as joint space narrowing and subchondral    sclerosis. Lumbar spine CT 07/2016      1.  Status post lumbar

## 2023-04-26 ENCOUNTER — PATIENT MESSAGE (OUTPATIENT)
Dept: FAMILY MEDICINE CLINIC | Age: 68
End: 2023-04-26

## 2023-04-26 ENCOUNTER — OFFICE VISIT (OUTPATIENT)
Dept: PODIATRY | Age: 68
End: 2023-04-26
Payer: MEDICARE

## 2023-04-26 VITALS — HEIGHT: 68 IN | WEIGHT: 293 LBS | BODY MASS INDEX: 44.41 KG/M2

## 2023-04-26 DIAGNOSIS — L97.811 VENOUS STASIS ULCER OF OTHER PART OF RIGHT LOWER LEG LIMITED TO BREAKDOWN OF SKIN WITH VARICOSE VEINS (HCC): ICD-10-CM

## 2023-04-26 DIAGNOSIS — E11.51 TYPE II DIABETES MELLITUS WITH PERIPHERAL CIRCULATORY DISORDER (HCC): ICD-10-CM

## 2023-04-26 DIAGNOSIS — R26.2 DIFFICULTY WALKING: ICD-10-CM

## 2023-04-26 DIAGNOSIS — M79.674 PAIN OF TOE OF RIGHT FOOT: ICD-10-CM

## 2023-04-26 DIAGNOSIS — B35.1 ONYCHOMYCOSIS: Primary | ICD-10-CM

## 2023-04-26 DIAGNOSIS — I48.91 ATRIAL FIBRILLATION, UNSPECIFIED TYPE (HCC): ICD-10-CM

## 2023-04-26 DIAGNOSIS — N18.31 STAGE 3A CHRONIC KIDNEY DISEASE (HCC): ICD-10-CM

## 2023-04-26 DIAGNOSIS — M79.675 PAIN OF TOE OF LEFT FOOT: ICD-10-CM

## 2023-04-26 DIAGNOSIS — I87.2 VENOUS INSUFFICIENCY (CHRONIC) (PERIPHERAL): ICD-10-CM

## 2023-04-26 DIAGNOSIS — I83.018 VENOUS STASIS ULCER OF OTHER PART OF RIGHT LOWER LEG LIMITED TO BREAKDOWN OF SKIN WITH VARICOSE VEINS (HCC): ICD-10-CM

## 2023-04-26 LAB
ALBUMIN SERPL-MCNC: 4.2 G/DL (ref 3.5–5.2)
ALP SERPL-CCNC: 95 U/L (ref 35–104)
ALT SERPL-CCNC: 15 U/L (ref 0–32)
ANION GAP SERPL CALCULATED.3IONS-SCNC: 14 MMOL/L (ref 7–16)
AST SERPL-CCNC: 12 U/L (ref 0–31)
BASOPHILS # BLD: 0.13 E9/L (ref 0–0.2)
BASOPHILS NFR BLD: 2.2 % (ref 0–2)
BILIRUB SERPL-MCNC: <0.2 MG/DL (ref 0–1.2)
BUN SERPL-MCNC: 17 MG/DL (ref 6–23)
CALCIUM SERPL-MCNC: 9.3 MG/DL (ref 8.6–10.2)
CHLORIDE SERPL-SCNC: 113 MMOL/L (ref 98–107)
CHOLESTEROL, TOTAL: 202 MG/DL (ref 0–199)
CO2 SERPL-SCNC: 20 MMOL/L (ref 22–29)
CREAT SERPL-MCNC: 1.1 MG/DL (ref 0.5–1)
EOSINOPHIL # BLD: 0.25 E9/L (ref 0.05–0.5)
EOSINOPHIL NFR BLD: 4.3 % (ref 0–6)
ERYTHROCYTE [DISTWIDTH] IN BLOOD BY AUTOMATED COUNT: 15.3 FL (ref 11.5–15)
GLUCOSE SERPL-MCNC: 99 MG/DL (ref 74–99)
HCT VFR BLD AUTO: 41.8 % (ref 34–48)
HDLC SERPL-MCNC: 79 MG/DL
HGB BLD-MCNC: 12.5 G/DL (ref 11.5–15.5)
IMM GRANULOCYTES # BLD: 0.02 E9/L
IMM GRANULOCYTES NFR BLD: 0.3 % (ref 0–5)
LDLC SERPL CALC-MCNC: 101 MG/DL (ref 0–99)
LYMPHOCYTES # BLD: 2.16 E9/L (ref 1.5–4)
LYMPHOCYTES NFR BLD: 37.3 % (ref 20–42)
MCH RBC QN AUTO: 26.4 PG (ref 26–35)
MCHC RBC AUTO-ENTMCNC: 29.9 % (ref 32–34.5)
MCV RBC AUTO: 88.4 FL (ref 80–99.9)
MONOCYTES # BLD: 0.53 E9/L (ref 0.1–0.95)
MONOCYTES NFR BLD: 9.2 % (ref 2–12)
NEUTROPHILS # BLD: 2.7 E9/L (ref 1.8–7.3)
NEUTS SEG NFR BLD: 46.7 % (ref 43–80)
PLATELET # BLD AUTO: 142 E9/L (ref 130–450)
PMV BLD AUTO: 13.6 FL (ref 7–12)
POTASSIUM SERPL-SCNC: 4.7 MMOL/L (ref 3.5–5)
PROT SERPL-MCNC: 7.1 G/DL (ref 6.4–8.3)
RBC # BLD AUTO: 4.73 E12/L (ref 3.5–5.5)
SODIUM SERPL-SCNC: 147 MMOL/L (ref 132–146)
TRIGL SERPL-MCNC: 110 MG/DL (ref 0–149)
VLDLC SERPL CALC-MCNC: 22 MG/DL
WBC # BLD: 5.8 E9/L (ref 4.5–11.5)

## 2023-04-26 PROCEDURE — 2022F DILAT RTA XM EVC RTNOPTHY: CPT | Performed by: PODIATRIST

## 2023-04-26 PROCEDURE — 11721 DEBRIDE NAIL 6 OR MORE: CPT | Performed by: PODIATRIST

## 2023-04-26 PROCEDURE — 3044F HG A1C LEVEL LT 7.0%: CPT | Performed by: PODIATRIST

## 2023-04-26 PROCEDURE — 3017F COLORECTAL CA SCREEN DOC REV: CPT | Performed by: PODIATRIST

## 2023-04-26 PROCEDURE — G8417 CALC BMI ABV UP PARAM F/U: HCPCS | Performed by: PODIATRIST

## 2023-04-26 PROCEDURE — 99213 OFFICE O/P EST LOW 20 MIN: CPT | Performed by: PODIATRIST

## 2023-04-26 PROCEDURE — 1036F TOBACCO NON-USER: CPT | Performed by: PODIATRIST

## 2023-04-26 PROCEDURE — G8399 PT W/DXA RESULTS DOCUMENT: HCPCS | Performed by: PODIATRIST

## 2023-04-26 PROCEDURE — G8427 DOCREV CUR MEDS BY ELIG CLIN: HCPCS | Performed by: PODIATRIST

## 2023-04-26 PROCEDURE — 1090F PRES/ABSN URINE INCON ASSESS: CPT | Performed by: PODIATRIST

## 2023-04-26 PROCEDURE — 1123F ACP DISCUSS/DSCN MKR DOCD: CPT | Performed by: PODIATRIST

## 2023-04-26 RX ORDER — TIZANIDINE 4 MG/1
TABLET ORAL
Qty: 60 TABLET | Refills: 0 | OUTPATIENT
Start: 2023-04-26

## 2023-04-26 NOTE — PROGRESS NOTES
Patient is in today for nail care and 2 week follow up of right leg wound. Patient says the wounds are good.  Pcp is [facility] - [facility]  Last ov 3/21/23
palpation. Diminished hair growth is noted to both lower extremities. Edema noted with both stasis skin changes and varicosities present bilaterally. Coolness is noted to the digital regions to palpation. Capillary fill time delayed digital areas bilateral foot. No heel fissuring or macerations of the web spaces. No plantar calluses and/or ulcerative areas are noted. Stasis ulceration stable posterior aspect right lower extremity, without signs of infection noted. Patient is having difficulty with gait/walking. Plan Per Assessment  Naila Perales was seen today for nail problem and wound check. Diagnoses and all orders for this visit:    Onychomycosis    Pain of toe of right foot    Pain of toe of left foot    Venous stasis ulcer of other part of right lower leg limited to breakdown of skin with varicose veins (HCC)    Type II diabetes mellitus with peripheral circulatory disorder (HCC)    Venous insufficiency (chronic) (peripheral)    Difficulty walking        1. Evaluation and Management  2. Manual and electrical debridement of the mycotic nails was performed for thickness and length to prevent injection and/or ulceration. 3. Discussed additional diabetic lower extremity care techniques with patient today. Patient is to continue with current dressings and topical medications right lower extremity until ulceration is resolved. Patient is to continue with current compression garments on a daily basis as well. 4. We did discuss importance of shoe gear and foot inspection to prevent potential issues. 5. We will see the patient back at a later date for continued podiatric management and care. Patient was advised to call the office with any questions or concerns prior to their next appointment if needed. Seen By:    Carmen Fitch DPM    Electronically signed by Carmen Fitch DPM on 4/26/2023 at 10:16 AM      This note was created using voice recognition software.   The note

## 2023-04-27 DIAGNOSIS — R79.89 ABNORMAL COMPLETE BLOOD COUNT: Primary | ICD-10-CM

## 2023-04-27 RX ORDER — TIZANIDINE 4 MG/1
4 TABLET ORAL 2 TIMES DAILY PRN
Qty: 60 TABLET | Refills: 0 | Status: SHIPPED
Start: 2023-04-27 | End: 2023-06-20 | Stop reason: SDUPTHER

## 2023-04-27 NOTE — TELEPHONE ENCOUNTER
From: Jorge Figueroa  Sent: 4/27/2023 11:14 AM EDT  To: Jayna Andrew Clinical Staff  Subject: Chandra Mark A1-C WHAT IS IT? Was my A-1C tested?     Susen Lion

## 2023-04-28 ENCOUNTER — TELEPHONE (OUTPATIENT)
Dept: PULMONOLOGY | Age: 68
End: 2023-04-28

## 2023-04-28 NOTE — TELEPHONE ENCOUNTER
Mailed a letter to patient informing her that her CT Chest is scheduled for 6-8-23 at 12:00 pm at [place] [address].  She must arrive by 11:30 am. There is no prep for this test

## 2023-05-04 ENCOUNTER — OFFICE VISIT (OUTPATIENT)
Dept: PULMONOLOGY | Age: 68
End: 2023-05-04
Payer: MEDICARE

## 2023-05-04 DIAGNOSIS — G47.33 OSA (OBSTRUCTIVE SLEEP APNEA): Primary | ICD-10-CM

## 2023-05-04 DIAGNOSIS — D69.6 THROMBOCYTOPENIA, UNSPECIFIED (HCC): ICD-10-CM

## 2023-05-04 PROCEDURE — G8428 CUR MEDS NOT DOCUMENT: HCPCS | Performed by: INTERNAL MEDICINE

## 2023-05-04 PROCEDURE — 1036F TOBACCO NON-USER: CPT | Performed by: INTERNAL MEDICINE

## 2023-05-04 PROCEDURE — 3017F COLORECTAL CA SCREEN DOC REV: CPT | Performed by: INTERNAL MEDICINE

## 2023-05-04 PROCEDURE — 1090F PRES/ABSN URINE INCON ASSESS: CPT | Performed by: INTERNAL MEDICINE

## 2023-05-04 PROCEDURE — G8399 PT W/DXA RESULTS DOCUMENT: HCPCS | Performed by: INTERNAL MEDICINE

## 2023-05-04 PROCEDURE — 1123F ACP DISCUSS/DSCN MKR DOCD: CPT | Performed by: INTERNAL MEDICINE

## 2023-05-04 PROCEDURE — G8417 CALC BMI ABV UP PARAM F/U: HCPCS | Performed by: INTERNAL MEDICINE

## 2023-05-04 PROCEDURE — 99214 OFFICE O/P EST MOD 30 MIN: CPT | Performed by: INTERNAL MEDICINE

## 2023-05-04 NOTE — PROGRESS NOTES
3 mos office follow up today. Pt reports she is doing good. Trelelgy inhaler has really helped. Plan for follow up in office in July 2023 with June Chest CT scheduled. Pt to continue meds per orders.
status: Former     Packs/day: 1.50     Years: 30.00     Pack years: 45.00     Types: Cigarettes     Quit date: 2022     Years since quittin.8    Smokeless tobacco: Never   Substance Use Topics    Alcohol use: No     Alcohol/week: 0.0 standard drinks       FamilyHistory  Family History   Problem Relation Age of Onset    High Blood Pressure Mother     Diabetes Mother     Heart Disease Mother     Cancer Mother         skin    Diabetes Father     Heart Disease Father     High Blood Pressure Father     COPD Father     COPD Sister     Macular Degen Sister     COPD Brother     Heart Disease Brother     Depression Brother     Heart Disease Brother     Cancer Other 50        breast       Review of Systems    Physical Exam    Review of Systems   General- No headaches , dizzinesss, syncope   Pulmonary - No chest pain , hemoptysis   Cardiovascular- No chest pain,   GI- No abd pain ,No difficulty swallowing, diarrhea , no vomiting   Musculoskeletal- No extremity weakness, no edema , no cyanosis   Genitourinary- No burning urination, no dysuria, no incontinence  Neuro- NO syncope , AMS  Or weakness , No tingling , no numbness. Skin- No rashes , no cyanosis. Psychiatric/Behavioral: Negative for confusion, hallucinations and sleep disturbance. The patient is not nervous/anxious. Physical Exam    There were no vitals filed for this visit.       General appearance: Not ill appearing, alert, no converstional dyspnea  Head: Normocephalic, without obvious abnormality, atraumatic   Eyes:Pupils bilateral equal and reactive, EOM intact, conjunctiva - no icterus , no injection   Throat: Clear, no lesions, Mallampti =4, no tonsillar eythema or edema  Neck: Supple, symmetrical, trachea midline, no lymphadenopathy, no JVD, no carotid bruits, no thyromegaly   Lungs: Bilateral  Air movement, decreased in bases, normal femitus, resonant to percussion  Heart: RRR, S1, S2 normal, no murmur, click, rub or gallop   Abdomen: soft,

## 2023-06-02 ENCOUNTER — TELEPHONE (OUTPATIENT)
Dept: ADMINISTRATIVE | Age: 68
End: 2023-06-02

## 2023-06-02 DIAGNOSIS — I83.018 VENOUS STASIS ULCER OF OTHER PART OF RIGHT LOWER LEG LIMITED TO BREAKDOWN OF SKIN WITH VARICOSE VEINS (HCC): ICD-10-CM

## 2023-06-02 DIAGNOSIS — L97.811 VENOUS STASIS ULCER OF OTHER PART OF RIGHT LOWER LEG LIMITED TO BREAKDOWN OF SKIN WITH VARICOSE VEINS (HCC): ICD-10-CM

## 2023-06-02 DIAGNOSIS — L03.115 CELLULITIS OF RIGHT LOWER LEG: ICD-10-CM

## 2023-06-02 RX ORDER — AMOXICILLIN AND CLAVULANATE POTASSIUM 875; 125 MG/1; MG/1
1 TABLET, FILM COATED ORAL 2 TIMES DAILY
Qty: 28 TABLET | Refills: 0 | Status: SHIPPED | OUTPATIENT
Start: 2023-06-02 | End: 2023-06-16

## 2023-06-06 RX ORDER — FLUTICASONE FUROATE, UMECLIDINIUM BROMIDE AND VILANTEROL TRIFENATATE 200; 62.5; 25 UG/1; UG/1; UG/1
POWDER RESPIRATORY (INHALATION)
Qty: 60 EACH | Refills: 3 | Status: SHIPPED | OUTPATIENT
Start: 2023-06-06

## 2023-06-20 RX ORDER — TIZANIDINE 4 MG/1
4 TABLET ORAL 2 TIMES DAILY PRN
Qty: 60 TABLET | Refills: 0 | Status: SHIPPED
Start: 2023-06-20 | End: 2023-08-16 | Stop reason: SDUPTHER

## 2023-06-26 ENCOUNTER — OFFICE VISIT (OUTPATIENT)
Dept: PAIN MANAGEMENT | Age: 68
End: 2023-06-26
Payer: MEDICARE

## 2023-06-26 VITALS
HEART RATE: 70 BPM | DIASTOLIC BLOOD PRESSURE: 68 MMHG | HEIGHT: 68 IN | OXYGEN SATURATION: 92 % | BODY MASS INDEX: 44.41 KG/M2 | TEMPERATURE: 97.3 F | WEIGHT: 293 LBS | RESPIRATION RATE: 18 BRPM | SYSTOLIC BLOOD PRESSURE: 112 MMHG

## 2023-06-26 DIAGNOSIS — M54.40 CHRONIC LOW BACK PAIN WITH SCIATICA, SCIATICA LATERALITY UNSPECIFIED, UNSPECIFIED BACK PAIN LATERALITY: ICD-10-CM

## 2023-06-26 DIAGNOSIS — G89.4 CHRONIC PAIN SYNDROME: ICD-10-CM

## 2023-06-26 DIAGNOSIS — M47.816 LUMBAR SPONDYLOSIS: ICD-10-CM

## 2023-06-26 DIAGNOSIS — M47.816 LUMBAR FACET ARTHROPATHY: ICD-10-CM

## 2023-06-26 DIAGNOSIS — M79.7 FIBROMYALGIA: Primary | ICD-10-CM

## 2023-06-26 DIAGNOSIS — G89.29 CHRONIC LOW BACK PAIN WITH SCIATICA, SCIATICA LATERALITY UNSPECIFIED, UNSPECIFIED BACK PAIN LATERALITY: ICD-10-CM

## 2023-06-26 DIAGNOSIS — M79.18 MYOFASCIAL PAIN SYNDROME: ICD-10-CM

## 2023-06-26 DIAGNOSIS — M51.9 LUMBAR DISC DISORDER: ICD-10-CM

## 2023-06-26 DIAGNOSIS — F11.99 OPIOID USE, UNSPECIFIED WITH UNSPECIFIED OPIOID-INDUCED DISORDER (HCC): ICD-10-CM

## 2023-06-26 DIAGNOSIS — M51.26 DISPLACEMENT OF LUMBAR INTERVERTEBRAL DISC: ICD-10-CM

## 2023-06-26 DIAGNOSIS — M48.061 SPINAL STENOSIS OF LUMBAR REGION WITHOUT NEUROGENIC CLAUDICATION: ICD-10-CM

## 2023-06-26 PROCEDURE — G8417 CALC BMI ABV UP PARAM F/U: HCPCS | Performed by: PHYSICIAN ASSISTANT

## 2023-06-26 PROCEDURE — 1123F ACP DISCUSS/DSCN MKR DOCD: CPT | Performed by: PHYSICIAN ASSISTANT

## 2023-06-26 PROCEDURE — 99213 OFFICE O/P EST LOW 20 MIN: CPT | Performed by: PHYSICIAN ASSISTANT

## 2023-06-26 PROCEDURE — 1090F PRES/ABSN URINE INCON ASSESS: CPT | Performed by: PHYSICIAN ASSISTANT

## 2023-06-26 PROCEDURE — 1036F TOBACCO NON-USER: CPT | Performed by: PHYSICIAN ASSISTANT

## 2023-06-26 PROCEDURE — 3017F COLORECTAL CA SCREEN DOC REV: CPT | Performed by: PHYSICIAN ASSISTANT

## 2023-06-26 PROCEDURE — G8427 DOCREV CUR MEDS BY ELIG CLIN: HCPCS | Performed by: PHYSICIAN ASSISTANT

## 2023-06-26 PROCEDURE — G8399 PT W/DXA RESULTS DOCUMENT: HCPCS | Performed by: PHYSICIAN ASSISTANT

## 2023-06-26 RX ORDER — HYDROCODONE BITARTRATE AND ACETAMINOPHEN 5; 325 MG/1; MG/1
1 TABLET ORAL 2 TIMES DAILY PRN
Qty: 60 TABLET | Refills: 0 | Status: SHIPPED | OUTPATIENT
Start: 2023-06-26 | End: 2023-07-26

## 2023-06-26 RX ORDER — PREGABALIN 150 MG/1
150 CAPSULE ORAL 3 TIMES DAILY
Qty: 270 CAPSULE | Refills: 0 | Status: SHIPPED | OUTPATIENT
Start: 2023-06-26 | End: 2023-09-24

## 2023-07-19 ENCOUNTER — PROCEDURE VISIT (OUTPATIENT)
Dept: PODIATRY | Age: 68
End: 2023-07-19
Payer: MEDICARE

## 2023-07-19 VITALS — WEIGHT: 293 LBS | HEIGHT: 68 IN | BODY MASS INDEX: 44.41 KG/M2

## 2023-07-19 DIAGNOSIS — B35.1 ONYCHOMYCOSIS: Primary | ICD-10-CM

## 2023-07-19 DIAGNOSIS — R26.2 DIFFICULTY WALKING: ICD-10-CM

## 2023-07-19 DIAGNOSIS — I87.2 VENOUS INSUFFICIENCY (CHRONIC) (PERIPHERAL): ICD-10-CM

## 2023-07-19 DIAGNOSIS — M79.674 PAIN OF TOE OF RIGHT FOOT: ICD-10-CM

## 2023-07-19 DIAGNOSIS — E11.51 TYPE II DIABETES MELLITUS WITH PERIPHERAL CIRCULATORY DISORDER (HCC): ICD-10-CM

## 2023-07-19 DIAGNOSIS — M79.675 PAIN OF TOE OF LEFT FOOT: ICD-10-CM

## 2023-07-19 PROCEDURE — 11721 DEBRIDE NAIL 6 OR MORE: CPT | Performed by: PODIATRIST

## 2023-07-19 PROCEDURE — 99999 PR OFFICE/OUTPT VISIT,PROCEDURE ONLY: CPT | Performed by: PODIATRIST

## 2023-07-19 NOTE — PROGRESS NOTES
tablet, Take 1 tablet by mouth 2 times daily, Disp: 180 tablet, Rfl: 0    aspirin 81 MG EC tablet, Take 1 tablet by mouth in the morning., Disp: 90 tablet, Rfl: 3    furosemide (LASIX) 20 MG tablet, Take 1 tablet by mouth See Admin Instructions Take two tablets one time daily x 1 week, then take one tablet one time daily x 2 weeks (Patient taking differently: Take 1 tablet by mouth See Admin Instructions 1 tab by mouth prn), Disp: 60 tablet, Rfl: 0    pseudoephedrine (SUDAFED 24 HR) 240 MG TB24 extended release tablet, Take 1 tablet by mouth daily prn, Disp: , Rfl:     Allergies: Allergies   Allergen Reactions    Carafate [Sucralfate]      Patient unable to tolerate. Unable to recall her reaction.     Glucophage [Metformin Hcl]      Severe abdominal pain, constipation      Mobic [Meloxicam] Other (See Comments)     Abdominal pain    Trazodone And Nefazodone      Unable to tolerate, patient reports made her physically ill with abdominal pain      Ultram [Tramadol]      Abdominal pain    Nickel Itching and Rash       Past Surgical History:   Procedure Laterality Date    AORTIC VALVE REPLACEMENT N/A 6/15/2022    STERNOTOMY, LEFT ATRIAL MYXOMA RESECTION, performed by Liya Hidalgo MD at [address]  05/23/2022    Dr Hawk Junior  2011    COLONOSCOPY  11/22/2016    Dr. Austen Marinelli N/A 12/8/2020    Eugena Estrin performed by Luis Black MD at [address] [place] ([facility])  1991    benign reasons     JOINT REPLACEMENT Bilateral 2010    knees    LUMBAR FUSION      L3-5    ROTATOR CUFF REPAIR Left     left    TOTAL KNEE ARTHROPLASTY Bilateral 2010    did both at the same time    TRANSESOPHAGEAL ECHOCARDIOGRAM N/A 5/9/2022    TRANSESOPHAGEAL ECHOCARDIOGRAM performed by Jorge Reno MD at [address] [place] [address]

## 2023-08-10 DIAGNOSIS — K21.9 GASTROESOPHAGEAL REFLUX DISEASE WITHOUT ESOPHAGITIS: ICD-10-CM

## 2023-08-10 RX ORDER — PANTOPRAZOLE SODIUM 40 MG/1
40 TABLET, DELAYED RELEASE ORAL 2 TIMES DAILY
Qty: 180 TABLET | Refills: 0 | Status: SHIPPED | OUTPATIENT
Start: 2023-08-10

## 2023-08-16 ENCOUNTER — HOSPITAL ENCOUNTER (OUTPATIENT)
Dept: CT IMAGING | Age: 68
Discharge: HOME | End: 2023-08-18
Attending: INTERNAL MEDICINE
Payer: MEDICARE

## 2023-08-16 DIAGNOSIS — R91.1 LUNG NODULE: ICD-10-CM

## 2023-08-16 PROCEDURE — 71250 CT THORAX DX C-: CPT

## 2023-08-16 RX ORDER — TIZANIDINE 4 MG/1
4 TABLET ORAL 2 TIMES DAILY PRN
Qty: 60 TABLET | Refills: 0 | Status: SHIPPED | OUTPATIENT
Start: 2023-08-16 | End: 2023-09-15

## 2023-08-28 ENCOUNTER — OFFICE VISIT (OUTPATIENT)
Dept: PAIN MANAGEMENT | Age: 68
End: 2023-08-28
Payer: MEDICARE

## 2023-08-28 VITALS
OXYGEN SATURATION: 100 % | RESPIRATION RATE: 18 BRPM | DIASTOLIC BLOOD PRESSURE: 80 MMHG | HEIGHT: 68 IN | BODY MASS INDEX: 44.41 KG/M2 | TEMPERATURE: 97.5 F | SYSTOLIC BLOOD PRESSURE: 138 MMHG | WEIGHT: 293 LBS | HEART RATE: 58 BPM

## 2023-08-28 DIAGNOSIS — M79.7 FIBROMYALGIA: Primary | ICD-10-CM

## 2023-08-28 DIAGNOSIS — F11.99 OPIOID USE, UNSPECIFIED WITH UNSPECIFIED OPIOID-INDUCED DISORDER (HCC): ICD-10-CM

## 2023-08-28 DIAGNOSIS — M51.26 DISPLACEMENT OF LUMBAR INTERVERTEBRAL DISC: ICD-10-CM

## 2023-08-28 DIAGNOSIS — M47.816 LUMBAR SPONDYLOSIS: ICD-10-CM

## 2023-08-28 DIAGNOSIS — G89.4 CHRONIC PAIN SYNDROME: ICD-10-CM

## 2023-08-28 DIAGNOSIS — M47.816 LUMBAR FACET ARTHROPATHY: ICD-10-CM

## 2023-08-28 DIAGNOSIS — M51.9 LUMBAR DISC DISORDER: ICD-10-CM

## 2023-08-28 DIAGNOSIS — M48.061 SPINAL STENOSIS OF LUMBAR REGION WITHOUT NEUROGENIC CLAUDICATION: ICD-10-CM

## 2023-08-28 DIAGNOSIS — G89.29 CHRONIC LOW BACK PAIN WITH SCIATICA, SCIATICA LATERALITY UNSPECIFIED, UNSPECIFIED BACK PAIN LATERALITY: ICD-10-CM

## 2023-08-28 DIAGNOSIS — M54.40 CHRONIC LOW BACK PAIN WITH SCIATICA, SCIATICA LATERALITY UNSPECIFIED, UNSPECIFIED BACK PAIN LATERALITY: ICD-10-CM

## 2023-08-28 DIAGNOSIS — M79.18 MYOFASCIAL PAIN SYNDROME: ICD-10-CM

## 2023-08-28 PROCEDURE — 1036F TOBACCO NON-USER: CPT | Performed by: PHYSICIAN ASSISTANT

## 2023-08-28 PROCEDURE — G8399 PT W/DXA RESULTS DOCUMENT: HCPCS | Performed by: PHYSICIAN ASSISTANT

## 2023-08-28 PROCEDURE — 99213 OFFICE O/P EST LOW 20 MIN: CPT | Performed by: PHYSICIAN ASSISTANT

## 2023-08-28 PROCEDURE — G8427 DOCREV CUR MEDS BY ELIG CLIN: HCPCS | Performed by: PHYSICIAN ASSISTANT

## 2023-08-28 PROCEDURE — G8417 CALC BMI ABV UP PARAM F/U: HCPCS | Performed by: PHYSICIAN ASSISTANT

## 2023-08-28 PROCEDURE — 1123F ACP DISCUSS/DSCN MKR DOCD: CPT | Performed by: PHYSICIAN ASSISTANT

## 2023-08-28 PROCEDURE — 3017F COLORECTAL CA SCREEN DOC REV: CPT | Performed by: PHYSICIAN ASSISTANT

## 2023-08-28 PROCEDURE — 1090F PRES/ABSN URINE INCON ASSESS: CPT | Performed by: PHYSICIAN ASSISTANT

## 2023-08-28 RX ORDER — TIZANIDINE 4 MG/1
4 TABLET ORAL 2 TIMES DAILY PRN
Qty: 60 TABLET | Refills: 0 | Status: SHIPPED | OUTPATIENT
Start: 2023-08-28 | End: 2023-09-27

## 2023-08-28 RX ORDER — PREGABALIN 150 MG/1
150 CAPSULE ORAL 3 TIMES DAILY
Qty: 270 CAPSULE | Refills: 0 | Status: SHIPPED | OUTPATIENT
Start: 2023-08-28 | End: 2023-11-26

## 2023-08-28 NOTE — PROGRESS NOTES
[address], [address] [place] [address]  [phone]    Follow up Note      Crystal Clayton     Date of Visit:  8/28/2023    CC:  Patient presents for follow up   Chief Complaint   Patient presents with    Pain    Back Pain           HPI:    Pain is worse to her lower back and left buttock for the last week. Appropriate analgesia with current medications regimen: Yes. Change in quality of symptoms:no. Medication side effects: percocet caused constipation  Recent diagnostic testing:none. Recent interventional procedures:none. She has not been on anticoagulation medications to include none. The patient  has not been on herbal supplements. The patient is diabetic. Imaging:     CT of left knee 04/2021:     Impression   1. Small suprapatellar joint effusion. 2. Prior left knee arthroplasty and patellar resurfacing. No significant   periprosthetic lucency or acute osseous abnormality. 3. Calcifications which are well corticated in the distal quadriceps tendon   measuring up to 1.5 x 0.8 cm.   4. Hardware associated streak artifact limits the exam to some degree. MRI of Lumbar Spine 09/2020:  1. Postsurgical changes status post L3-L5 posterior spinal fusion. 2. Severe multifactorial spinal canal stenosis at L2-L3 with mass effect on    the cauda equina nerve roots. 3. Moderate multifactorial L1-L2 spinal canal stenosis with moderate right    neural foraminal stenosis. 4. Moderate left L5-S1 neural foraminal stenosis. LUMBAR SPINE XRAY 03/4/2020  Intact lumbar vertebral height and alignment. Relative severe    multilevel disc and facet joint narrowing with subchondral sclerosis    and spurring. Previous discectomy with posterior pedicle screw plates    extending from L3 through L5. Symmetric osteoarthritis at the    sacroiliac joints manifesting as joint space narrowing and subchondral    sclerosis.            Lumbar

## 2023-08-31 ENCOUNTER — OFFICE VISIT (OUTPATIENT)
Dept: FAMILY MEDICINE CLINIC | Age: 68
End: 2023-08-31
Payer: MEDICARE

## 2023-08-31 VITALS
HEIGHT: 68 IN | HEART RATE: 77 BPM | BODY MASS INDEX: 44.41 KG/M2 | DIASTOLIC BLOOD PRESSURE: 81 MMHG | RESPIRATION RATE: 20 BRPM | WEIGHT: 293 LBS | SYSTOLIC BLOOD PRESSURE: 127 MMHG | TEMPERATURE: 97.2 F | OXYGEN SATURATION: 99 %

## 2023-08-31 DIAGNOSIS — E66.9 OBESITY, UNSPECIFIED CLASSIFICATION, UNSPECIFIED OBESITY TYPE, UNSPECIFIED WHETHER SERIOUS COMORBIDITY PRESENT: ICD-10-CM

## 2023-08-31 DIAGNOSIS — R06.02 SHORTNESS OF BREATH ON EXERTION: ICD-10-CM

## 2023-08-31 DIAGNOSIS — Z12.31 ENCOUNTER FOR SCREENING MAMMOGRAM FOR MALIGNANT NEOPLASM OF BREAST: ICD-10-CM

## 2023-08-31 DIAGNOSIS — Z87.891 PERSONAL HISTORY OF NICOTINE DEPENDENCE: Primary | ICD-10-CM

## 2023-08-31 PROCEDURE — 1036F TOBACCO NON-USER: CPT | Performed by: FAMILY MEDICINE

## 2023-08-31 PROCEDURE — 3017F COLORECTAL CA SCREEN DOC REV: CPT | Performed by: FAMILY MEDICINE

## 2023-08-31 PROCEDURE — G8417 CALC BMI ABV UP PARAM F/U: HCPCS | Performed by: FAMILY MEDICINE

## 2023-08-31 PROCEDURE — G8399 PT W/DXA RESULTS DOCUMENT: HCPCS | Performed by: FAMILY MEDICINE

## 2023-08-31 PROCEDURE — 1090F PRES/ABSN URINE INCON ASSESS: CPT | Performed by: FAMILY MEDICINE

## 2023-08-31 PROCEDURE — G8427 DOCREV CUR MEDS BY ELIG CLIN: HCPCS | Performed by: FAMILY MEDICINE

## 2023-08-31 PROCEDURE — 99215 OFFICE O/P EST HI 40 MIN: CPT | Performed by: FAMILY MEDICINE

## 2023-08-31 PROCEDURE — 1123F ACP DISCUSS/DSCN MKR DOCD: CPT | Performed by: FAMILY MEDICINE

## 2023-08-31 RX ORDER — VARENICLINE TARTRATE 0.5 MG/1
.5-1 TABLET, FILM COATED ORAL SEE ADMIN INSTRUCTIONS
Qty: 57 TABLET | Refills: 0 | Status: SHIPPED | OUTPATIENT
Start: 2023-08-31

## 2023-08-31 RX ORDER — VARENICLINE TARTRATE 1 MG/1
1 TABLET, FILM COATED ORAL 2 TIMES DAILY
Qty: 180 TABLET | Refills: 1 | Status: SHIPPED | OUTPATIENT
Start: 2023-08-31

## 2023-08-31 SDOH — ECONOMIC STABILITY: HOUSING INSECURITY: IN THE LAST 12 MONTHS, HOW MANY PLACES HAVE YOU LIVED?: 1

## 2023-08-31 SDOH — ECONOMIC STABILITY: TRANSPORTATION INSECURITY
IN THE PAST 12 MONTHS, HAS LACK OF TRANSPORTATION KEPT YOU FROM MEETINGS, WORK, OR FROM GETTING THINGS NEEDED FOR DAILY LIVING?: NO

## 2023-08-31 SDOH — ECONOMIC STABILITY: INCOME INSECURITY: IN THE LAST 12 MONTHS, WAS THERE A TIME WHEN YOU WERE NOT ABLE TO PAY THE MORTGAGE OR RENT ON TIME?: NO

## 2023-08-31 SDOH — ECONOMIC STABILITY: TRANSPORTATION INSECURITY
IN THE PAST 12 MONTHS, HAS THE LACK OF TRANSPORTATION KEPT YOU FROM MEDICAL APPOINTMENTS OR FROM GETTING MEDICATIONS?: NO

## 2023-08-31 ASSESSMENT — SOCIAL DETERMINANTS OF HEALTH (SDOH)
HOW OFTEN DO YOU GET TOGETHER WITH FRIENDS OR RELATIVES?: MORE THAN THREE TIMES A WEEK
WITHIN THE LAST YEAR, HAVE YOU BEEN HUMILIATED OR EMOTIONALLY ABUSED IN OTHER WAYS BY YOUR PARTNER OR EX-PARTNER?: NO
IN A TYPICAL WEEK, HOW MANY TIMES DO YOU TALK ON THE PHONE WITH FAMILY, FRIENDS, OR NEIGHBORS?: MORE THAN THREE TIMES A WEEK
WITHIN THE LAST YEAR, HAVE TO BEEN RAPED OR FORCED TO HAVE ANY KIND OF SEXUAL ACTIVITY BY YOUR PARTNER OR EX-PARTNER?: NO
WITHIN THE LAST YEAR, HAVE YOU BEEN AFRAID OF YOUR PARTNER OR EX-PARTNER?: NO
WITHIN THE LAST YEAR, HAVE YOU BEEN KICKED, HIT, SLAPPED, OR OTHERWISE PHYSICALLY HURT BY YOUR PARTNER OR EX-PARTNER?: NO
HOW OFTEN DO YOU ATTENT MEETINGS OF THE CLUB OR ORGANIZATION YOU BELONG TO?: NEVER
HOW OFTEN DO YOU ATTEND CHURCH OR RELIGIOUS SERVICES?: NEVER
DO YOU BELONG TO ANY CLUBS OR ORGANIZATIONS SUCH AS CHURCH GROUPS UNIONS, FRATERNAL OR ATHLETIC GROUPS, OR SCHOOL GROUPS?: NO

## 2023-08-31 NOTE — PROGRESS NOTES
FM Progress Note    Subjective:   Afib. Metoprolol and asa. Chronic back pain and FM. Seeing pain mngt [employer]. Norco, lyrica, zanaflex, cymbalta. Depression. Cymbalta. Topamax. Buspar. Anxiety. Cymbalta. Buspar. Environmental allergies. Zyrtec. Flonase prn. Feels these don't help. Asthma/copd. Albuterol not needed since trelegy. Surekha May. PFTs 2020:  RECOMMENDATIONS:  1. Because she has this questionable atopic disposition and that is not  well-defined but I think with her history of animal and dust, it appears  that she might have some atopy and with her underlying flows being with  some mild variability, I would suggest an ICS/LABA to start with any of  the ICS/LABAs but because of her weight I would choose an aerosol  ICS/LABA and because of her history of centrilobular emphysema and her  weight, I think that the aerosol indicated for centrilobular emphysema  and with a possible component of asthma would be an ICS/LABA, The one in  the market that is presently an aerosol form is Symbicort at 160/4.5 mcg  dose, and trial of that would help control her symptoms, reduce  exacerbations along with smoking cessation. 2.  I recommend that to re-sustain her smoking cessation, an aid,  nicotine or non-nicotine aid would be important to control the  withdrawal symptoms from resulting in a relapse. 3.  Because she is in that age bracket with smoking cessation only for  last two months, which is early remission, she is a high risk for early  stage lung cancer being female at this age, so yearly low-dose chest CTs  are recommended as well, especially with her history of having sputum  that is black. Clinical correlation is requested. Smokes 1.5 ppd. Wants to quit. Detrol for nocturia. Daytime sxs ok. Obesity. Seeing Dr. Fidel Lee tomorrow. CT done this mo said repeat CT chest in 6-12 mo.     preDM.    Hemoglobin A1C   Date Value Ref Range Status   03/21/2023 6.2 % Final

## 2023-09-01 ENCOUNTER — INITIAL CONSULT (OUTPATIENT)
Dept: BARIATRICS/WEIGHT MGMT | Age: 68
End: 2023-09-01
Payer: MEDICARE

## 2023-09-01 VITALS
RESPIRATION RATE: 20 BRPM | SYSTOLIC BLOOD PRESSURE: 147 MMHG | HEIGHT: 68 IN | TEMPERATURE: 97.9 F | WEIGHT: 293 LBS | DIASTOLIC BLOOD PRESSURE: 73 MMHG | BODY MASS INDEX: 44.41 KG/M2 | HEART RATE: 84 BPM

## 2023-09-01 DIAGNOSIS — K21.9 GASTROESOPHAGEAL REFLUX DISEASE WITHOUT ESOPHAGITIS: ICD-10-CM

## 2023-09-01 DIAGNOSIS — E66.01 MORBID OBESITY DUE TO EXCESS CALORIES (HCC): Primary | ICD-10-CM

## 2023-09-01 PROCEDURE — 1036F TOBACCO NON-USER: CPT | Performed by: SURGERY

## 2023-09-01 PROCEDURE — 99204 OFFICE O/P NEW MOD 45 MIN: CPT | Performed by: SURGERY

## 2023-09-01 PROCEDURE — 1090F PRES/ABSN URINE INCON ASSESS: CPT | Performed by: SURGERY

## 2023-09-01 PROCEDURE — 3017F COLORECTAL CA SCREEN DOC REV: CPT | Performed by: SURGERY

## 2023-09-01 PROCEDURE — G8399 PT W/DXA RESULTS DOCUMENT: HCPCS | Performed by: SURGERY

## 2023-09-01 PROCEDURE — G8417 CALC BMI ABV UP PARAM F/U: HCPCS | Performed by: SURGERY

## 2023-09-01 PROCEDURE — G8427 DOCREV CUR MEDS BY ELIG CLIN: HCPCS | Performed by: SURGERY

## 2023-09-01 PROCEDURE — 1123F ACP DISCUSS/DSCN MKR DOCD: CPT | Performed by: SURGERY

## 2023-09-01 RX ORDER — SODIUM CHLORIDE 9 MG/ML
INJECTION, SOLUTION INTRAVENOUS CONTINUOUS
OUTPATIENT
Start: 2023-09-01

## 2023-09-01 RX ORDER — SODIUM CHLORIDE 9 MG/ML
INJECTION, SOLUTION INTRAVENOUS PRN
OUTPATIENT
Start: 2023-09-01

## 2023-09-01 RX ORDER — SODIUM CHLORIDE 0.9 % (FLUSH) 0.9 %
5-40 SYRINGE (ML) INJECTION PRN
OUTPATIENT
Start: 2023-09-01

## 2023-09-01 RX ORDER — SODIUM CHLORIDE 0.9 % (FLUSH) 0.9 %
5-40 SYRINGE (ML) INJECTION EVERY 12 HOURS SCHEDULED
OUTPATIENT
Start: 2023-09-01

## 2023-09-01 NOTE — PATIENT INSTRUCTIONS
What is the next step to proceed with weight loss surgery? Please be aware that any co-pays or deductibles may be requested prior to testing and / or procedures. You will need to schedule a psychological evaluation for weight loss surgery. Patients will be required to complete all psychological testing as required by the mental health provider. Patients must also follow all of the provider's recommendations before weight loss surgery can be scheduled. The evaluation must be done a standard way for weight loss surgery. We strongly recommend that you contact one of our preferred providers listed below to arrange this:      Nayan Dumont, [facility]-  [address], [place]   [phone]    Danne [place] and [address] [place] [address]   [phone]    Dr. Justin Granados, PhD    Reshma Augustine. [place], [place]    [phone]      You will also need to plan on attending a 2 hour nutrition class at the [facility] prior to your surgery. We will schedule this for you when we schedule your surgery. Please remember to have your labs drawn 10 days prior to your first scheduled dietary appointment. Please remember, that while we will submit your case to insurance for surgery authorization, it is your responsibility to know if your plan covers weight loss surgery and keep up-to-date with changes to your insurance coverage. We will do everything possible to help you get approved for weight loss surgery, but cannot guarantee an approval.     Please note that you will not be submitted to your insurance company until all pre-operative testing requirements are met.

## 2023-09-01 NOTE — PROGRESS NOTES
educated about the healing rates with this condition. Patient does smoke and does not have secondhand smoke exposure. In this discussion of approximately 8 minutes, patient was counseled about decrease in smoking exposure regards to healing and overall good health. Patient seems reluctant but is willing to listen to the discussion in detail. They agreed and states that they will try to cut down as much as possible and states that they will try to quit completely by the next visit. Psych evaluation, medical and  cardiac clearance, right upper quadrant ultrasound to evaluate for fatty liver disease and gallbladder abnormalities. These patients often have vitamin deficiencies so I will do screening labs for malnutrition. I will do an upper endoscopy to check for hiatal hernias, ulcers and H. Pylori while we wait for insurance approval for the surgery. I have spent over 45min in evaluation of the patient including greater than 50% of that time face to face discussing surgical options, medical and surgical history, plans for medical weight loss management and preoperative clearance for surgery. They did not have family present for the discussion and visual aides and drawings were used to explain anatomy and surgical procedures.      Follow up with surgical weight loss office after completed the above to begin dietary and nutritional counseling to achieve weight loss at or near 10% their excess body weight prior to surgery    Physician Signature: Electronically signed by Dr. Yola Majano MD    Send copy of H&P to PCP, Estefani Goodwin MD

## 2023-09-14 ENCOUNTER — HOSPITAL ENCOUNTER (OUTPATIENT)
Age: 68
Discharge: HOME | End: 2023-09-14
Payer: MEDICARE

## 2023-09-14 DIAGNOSIS — E66.01 MORBID OBESITY DUE TO EXCESS CALORIES (HCC): ICD-10-CM

## 2023-09-14 DIAGNOSIS — R79.89 ABNORMAL COMPLETE BLOOD COUNT: ICD-10-CM

## 2023-09-14 LAB
ALBUMIN SERPL-MCNC: 4.3 G/DL (ref 3.5–5.2)
ALP SERPL-CCNC: 94 U/L (ref 35–104)
ALT SERPL-CCNC: 14 U/L (ref 0–32)
ANION GAP SERPL CALCULATED.3IONS-SCNC: 11 MMOL/L (ref 7–16)
AST SERPL-CCNC: 12 U/L (ref 0–31)
BASOPHILS # BLD: 0.1 K/UL (ref 0–0.2)
BASOPHILS NFR BLD: 1 % (ref 0–2)
BILIRUB SERPL-MCNC: 0.2 MG/DL (ref 0–1.2)
BUN SERPL-MCNC: 18 MG/DL (ref 6–23)
CALCIUM SERPL-MCNC: 9.6 MG/DL (ref 8.6–10.2)
CHLORIDE SERPL-SCNC: 108 MMOL/L (ref 98–107)
CO2 SERPL-SCNC: 24 MMOL/L (ref 22–29)
CREAT SERPL-MCNC: 1.1 MG/DL (ref 0.5–1)
EOSINOPHIL # BLD: 0.21 K/UL (ref 0.05–0.5)
EOSINOPHILS RELATIVE PERCENT: 3 % (ref 0–6)
ERYTHROCYTE [DISTWIDTH] IN BLOOD BY AUTOMATED COUNT: 16.5 % (ref 11.5–15)
FOLATE SERPL-MCNC: 11.5 NG/ML (ref 4.8–24.2)
GFR SERPL CREATININE-BSD FRML MDRD: 57 ML/MIN/1.73M2
GLUCOSE SERPL-MCNC: 97 MG/DL (ref 74–99)
HCT VFR BLD AUTO: 41.1 % (ref 34–48)
HGB BLD-MCNC: 12.8 G/DL (ref 11.5–15.5)
IMM GRANULOCYTES # BLD AUTO: 0.03 K/UL (ref 0–0.58)
IMM GRANULOCYTES NFR BLD: 0 % (ref 0–5)
LYMPHOCYTES NFR BLD: 1.79 K/UL (ref 1.5–4)
LYMPHOCYTES RELATIVE PERCENT: 26 % (ref 20–42)
MCH RBC QN AUTO: 26.6 PG (ref 26–35)
MCHC RBC AUTO-ENTMCNC: 31.1 G/DL (ref 32–34.5)
MCV RBC AUTO: 85.4 FL (ref 80–99.9)
MONOCYTES NFR BLD: 0.56 K/UL (ref 0.1–0.95)
MONOCYTES NFR BLD: 8 % (ref 2–12)
NEUTROPHILS NFR BLD: 62 % (ref 43–80)
NEUTS SEG NFR BLD: 4.33 K/UL (ref 1.8–7.3)
PLATELET # BLD AUTO: 156 K/UL (ref 130–450)
PMV BLD AUTO: 11.8 FL (ref 7–12)
POTASSIUM SERPL-SCNC: 4.1 MMOL/L (ref 3.5–5)
PREALB SERPL-MCNC: 19 MG/DL (ref 20–40)
PROT SERPL-MCNC: 7.5 G/DL (ref 6.4–8.3)
RBC # BLD AUTO: 4.81 M/UL (ref 3.5–5.5)
SODIUM SERPL-SCNC: 143 MMOL/L (ref 132–146)
TRIGL SERPL-MCNC: 134 MG/DL
VIT B12 SERPL-MCNC: 529 PG/ML (ref 211–946)
WBC OTHER # BLD: 7 K/UL (ref 4.5–11.5)

## 2023-09-14 PROCEDURE — 80053 COMPREHEN METABOLIC PANEL: CPT

## 2023-09-14 PROCEDURE — 82607 VITAMIN B-12: CPT

## 2023-09-14 PROCEDURE — 84425 ASSAY OF VITAMIN B-1: CPT

## 2023-09-14 PROCEDURE — 84478 ASSAY OF TRIGLYCERIDES: CPT

## 2023-09-14 PROCEDURE — 82525 ASSAY OF COPPER: CPT

## 2023-09-14 PROCEDURE — 36415 COLL VENOUS BLD VENIPUNCTURE: CPT

## 2023-09-14 PROCEDURE — 82746 ASSAY OF FOLIC ACID SERUM: CPT

## 2023-09-14 PROCEDURE — 84630 ASSAY OF ZINC: CPT

## 2023-09-14 PROCEDURE — 84590 ASSAY OF VITAMIN A: CPT

## 2023-09-14 PROCEDURE — 85025 COMPLETE CBC W/AUTO DIFF WBC: CPT

## 2023-09-14 PROCEDURE — 84134 ASSAY OF PREALBUMIN: CPT

## 2023-09-15 ENCOUNTER — TELEPHONE (OUTPATIENT)
Dept: FAMILY MEDICINE CLINIC | Age: 68
End: 2023-09-15

## 2023-09-15 NOTE — TELEPHONE ENCOUNTER
Direction home calling and states in order for her to get a lift chair through the waiver program she has to be seen and evaluated at least once by PT for this. They want to know if [facility].  Please advise

## 2023-09-17 LAB
COPPER SERPL-MCNC: 146.7 UG/DL (ref 80–155)
ZINC SERPL-MCNC: 85 UG/DL (ref 60–120)

## 2023-09-18 DIAGNOSIS — R06.02 SHORTNESS OF BREATH ON EXERTION: ICD-10-CM

## 2023-09-18 DIAGNOSIS — R53.81 PHYSICAL DECONDITIONING: ICD-10-CM

## 2023-09-18 DIAGNOSIS — E66.9 OBESITY, UNSPECIFIED CLASSIFICATION, UNSPECIFIED OBESITY TYPE, UNSPECIFIED WHETHER SERIOUS COMORBIDITY PRESENT: Primary | ICD-10-CM

## 2023-09-18 LAB
RETINYL PALMITATE: 0.06 MG/L (ref 0–0.1)
VITAMIN A LEVEL: 0.54 MG/L (ref 0.3–1.2)
VITAMIN A, INTERP: NORMAL

## 2023-09-19 ENCOUNTER — TELEPHONE (OUTPATIENT)
Dept: BARIATRICS/WEIGHT MGMT | Age: 68
End: 2023-09-19

## 2023-09-19 NOTE — TELEPHONE ENCOUNTER
Kelechi Dudley did not show for her initial dietary today with Maritza Curtis. I tried calling her, but she did not answer and there was no VM to [facility].

## 2023-09-21 LAB — VIT B1 PYROPHOSHATE BLD-SCNC: 144 NMOL/L (ref 70–180)

## 2023-09-27 ENCOUNTER — TELEPHONE (OUTPATIENT)
Dept: BARIATRICS/WEIGHT MGMT | Age: 68
End: 2023-09-27

## 2023-09-27 NOTE — TELEPHONE ENCOUNTER
SW called PT regarding missed psych eval appointment and spoke with PT.  PT stated that she is having some health problems right now and does not want to reschedule at this time. She stated that she will call when she is ready to reschedule the appointment.      Ethyl Ache, LISW

## 2023-09-28 DIAGNOSIS — M54.50 LOW BACK PAIN, UNSPECIFIED BACK PAIN LATERALITY, UNSPECIFIED CHRONICITY, UNSPECIFIED WHETHER SCIATICA PRESENT: Primary | ICD-10-CM

## 2023-09-29 RX ORDER — TOLTERODINE 2 MG/1
2 CAPSULE, EXTENDED RELEASE ORAL DAILY
Qty: 90 CAPSULE | Refills: 1 | Status: SHIPPED | OUTPATIENT
Start: 2023-09-29

## 2023-10-02 DIAGNOSIS — Z87.891 PERSONAL HISTORY OF NICOTINE DEPENDENCE: ICD-10-CM

## 2023-10-02 RX ORDER — VARENICLINE TARTRATE 1 MG/1
1 TABLET, FILM COATED ORAL 2 TIMES DAILY
Qty: 180 TABLET | Refills: 1 | Status: SHIPPED | OUTPATIENT
Start: 2023-10-02

## 2023-10-02 RX ORDER — VARENICLINE TARTRATE 0.5 MG/1
.5-1 TABLET, FILM COATED ORAL SEE ADMIN INSTRUCTIONS
Qty: 57 TABLET | Refills: 0 | Status: SHIPPED | OUTPATIENT
Start: 2023-10-02

## 2023-10-17 ENCOUNTER — TELEPHONE (OUTPATIENT)
Dept: PAIN MANAGEMENT | Age: 68
End: 2023-10-17

## 2023-10-17 NOTE — TELEPHONE ENCOUNTER
Returned call to patient. Patient reports worsening pain to her bilateral legs over time which is intermittent but painful. States that it starts in her buttock area and sometimes radiates all the way down her legs. She also reports a fall several weeks ago. Instructed to obtain her lumbar spine x-ray that had been ordered previously. We will move up her appt.

## 2023-10-17 NOTE — TELEPHONE ENCOUNTER
Brandonvíctor Quezada called the office saying she is having bad leg pain. She wants to know if you will do phone visit with her today.

## 2023-10-18 ENCOUNTER — HOSPITAL ENCOUNTER (EMERGENCY)
Age: 68
Discharge: HOME | End: 2023-10-19
Attending: STUDENT IN AN ORGANIZED HEALTH CARE EDUCATION/TRAINING PROGRAM
Payer: MEDICARE

## 2023-10-18 ENCOUNTER — APPOINTMENT (OUTPATIENT)
Dept: CT IMAGING | Age: 68
End: 2023-10-18
Payer: MEDICARE

## 2023-10-18 ENCOUNTER — APPOINTMENT (OUTPATIENT)
Dept: ULTRASOUND IMAGING | Age: 68
End: 2023-10-18
Payer: MEDICARE

## 2023-10-18 ENCOUNTER — APPOINTMENT (OUTPATIENT)
Dept: GENERAL RADIOLOGY | Age: 68
End: 2023-10-18
Payer: MEDICARE

## 2023-10-18 VITALS
DIASTOLIC BLOOD PRESSURE: 55 MMHG | HEART RATE: 73 BPM | TEMPERATURE: 97.7 F | RESPIRATION RATE: 18 BRPM | WEIGHT: 293 LBS | BODY MASS INDEX: 51.85 KG/M2 | OXYGEN SATURATION: 98 % | SYSTOLIC BLOOD PRESSURE: 127 MMHG
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
## 2023-10-18 DIAGNOSIS — M79.89 LEG SWELLING: ICD-10-CM

## 2023-10-18 DIAGNOSIS — M79.605 LEFT LEG PAIN: Primary | ICD-10-CM

## 2023-10-18 LAB
ALBUMIN SERPL-MCNC: 4.3 G/DL (ref 3.5–5.2)
ALP SERPL-CCNC: 87 U/L (ref 35–104)
ALT SERPL-CCNC: 12 U/L (ref 0–32)
ANION GAP SERPL CALCULATED.3IONS-SCNC: 15 MMOL/L (ref 7–16)
AST SERPL-CCNC: 14 U/L (ref 0–31)
BASOPHILS # BLD: 0.07 K/UL (ref 0–0.2)
BASOPHILS NFR BLD: 1 % (ref 0–2)
BILIRUB SERPL-MCNC: 0.3 MG/DL (ref 0–1.2)
BNP SERPL-MCNC: 227 PG/ML (ref 0–125)
BUN SERPL-MCNC: 20 MG/DL (ref 6–23)
CALCIUM SERPL-MCNC: 9.5 MG/DL (ref 8.6–10.2)
CHLORIDE SERPL-SCNC: 110 MMOL/L (ref 98–107)
CO2 SERPL-SCNC: 21 MMOL/L (ref 22–29)
CREAT SERPL-MCNC: 0.9 MG/DL (ref 0.5–1)
EOSINOPHIL # BLD: 0.1 K/UL (ref 0.05–0.5)
EOSINOPHILS RELATIVE PERCENT: 2 % (ref 0–6)
ERYTHROCYTE [DISTWIDTH] IN BLOOD BY AUTOMATED COUNT: 16.9 % (ref 11.5–15)
FLUAV RNA RESP QL NAA+PROBE: NOT DETECTED
FLUBV RNA RESP QL NAA+PROBE: NOT DETECTED
GFR SERPL CREATININE-BSD FRML MDRD: >60 ML/MIN/1.73M2
GLUCOSE SERPL-MCNC: 113 MG/DL (ref 74–99)
HCT VFR BLD AUTO: 37.9 % (ref 34–48)
HGB BLD-MCNC: 11.8 G/DL (ref 11.5–15.5)
IMM GRANULOCYTES # BLD AUTO: <0.03 K/UL (ref 0–0.58)
IMM GRANULOCYTES NFR BLD: 0 % (ref 0–5)
LYMPHOCYTES NFR BLD: 1.29 K/UL (ref 1.5–4)
LYMPHOCYTES RELATIVE PERCENT: 19 % (ref 20–42)
MCH RBC QN AUTO: 26.5 PG (ref 26–35)
MCHC RBC AUTO-ENTMCNC: 31.1 G/DL (ref 32–34.5)
MCV RBC AUTO: 85.2 FL (ref 80–99.9)
MONOCYTES NFR BLD: 0.68 K/UL (ref 0.1–0.95)
MONOCYTES NFR BLD: 10 % (ref 2–12)
NEUTROPHILS NFR BLD: 68 % (ref 43–80)
NEUTS SEG NFR BLD: 4.49 K/UL (ref 1.8–7.3)
PLATELET # BLD AUTO: 127 K/UL (ref 130–450)
PMV BLD AUTO: 12.6 FL (ref 7–12)
POTASSIUM SERPL-SCNC: 3.5 MMOL/L (ref 3.5–5)
PROT SERPL-MCNC: 7 G/DL (ref 6.4–8.3)
RBC # BLD AUTO: 4.45 M/UL (ref 3.5–5.5)
SARS-COV-2 RNA RESP QL NAA+PROBE: NOT DETECTED
SODIUM SERPL-SCNC: 146 MMOL/L (ref 132–146)
SOURCE: NORMAL
SPECIMEN DESCRIPTION: NORMAL
TROPONIN I SERPL HS-MCNC: <6 NG/L (ref 0–9)
TROPONIN I SERPL HS-MCNC: <6 NG/L (ref 0–9)
WBC OTHER # BLD: 6.7 K/UL (ref 4.5–11.5)

## 2023-10-18 PROCEDURE — 6360000002 HC RX W HCPCS

## 2023-10-18 PROCEDURE — 80053 COMPREHEN METABOLIC PANEL: CPT

## 2023-10-18 PROCEDURE — 6370000000 HC RX 637 (ALT 250 FOR IP)

## 2023-10-18 PROCEDURE — 71045 X-RAY EXAM CHEST 1 VIEW: CPT

## 2023-10-18 PROCEDURE — 96374 THER/PROPH/DIAG INJ IV PUSH: CPT

## 2023-10-18 PROCEDURE — 85025 COMPLETE CBC W/AUTO DIFF WBC: CPT

## 2023-10-18 PROCEDURE — 93005 ELECTROCARDIOGRAM TRACING: CPT

## 2023-10-18 PROCEDURE — 83880 ASSAY OF NATRIURETIC PEPTIDE: CPT

## 2023-10-18 PROCEDURE — 72125 CT NECK SPINE W/O DYE: CPT

## 2023-10-18 PROCEDURE — 70450 CT HEAD/BRAIN W/O DYE: CPT

## 2023-10-18 PROCEDURE — 72170 X-RAY EXAM OF PELVIS: CPT

## 2023-10-18 PROCEDURE — 99285 EMERGENCY DEPT VISIT HI MDM: CPT

## 2023-10-18 PROCEDURE — 93971 EXTREMITY STUDY: CPT

## 2023-10-18 PROCEDURE — 84484 ASSAY OF TROPONIN QUANT: CPT

## 2023-10-18 PROCEDURE — 73590 X-RAY EXAM OF LOWER LEG: CPT

## 2023-10-18 PROCEDURE — 96375 TX/PRO/DX INJ NEW DRUG ADDON: CPT

## 2023-10-18 PROCEDURE — 87636 SARSCOV2 & INF A&B AMP PRB: CPT

## 2023-10-18 PROCEDURE — 73552 X-RAY EXAM OF FEMUR 2/>: CPT

## 2023-10-18 RX ORDER — ONDANSETRON 2 MG/ML
4 INJECTION INTRAMUSCULAR; INTRAVENOUS ONCE
Status: COMPLETED | OUTPATIENT
Start: 2023-10-18 | End: 2023-10-18

## 2023-10-18 RX ORDER — HYDROCODONE BITARTRATE AND ACETAMINOPHEN 5; 325 MG/1; MG/1
1 TABLET ORAL ONCE
Status: COMPLETED | OUTPATIENT
Start: 2023-10-18 | End: 2023-10-18

## 2023-10-18 RX ORDER — FENTANYL CITRATE 50 UG/ML
50 INJECTION, SOLUTION INTRAMUSCULAR; INTRAVENOUS ONCE
Status: COMPLETED | OUTPATIENT
Start: 2023-10-18 | End: 2023-10-18

## 2023-10-18 RX ADMIN — ONDANSETRON 4 MG: 2 INJECTION INTRAMUSCULAR; INTRAVENOUS at 19:26

## 2023-10-18 RX ADMIN — HYDROCODONE BITARTRATE AND ACETAMINOPHEN 1 TABLET: 5; 325 TABLET ORAL at 23:20

## 2023-10-18 RX ADMIN — FENTANYL CITRATE 50 MCG: 50 INJECTION INTRAMUSCULAR; INTRAVENOUS at 19:23

## 2023-10-18 ASSESSMENT — PAIN SCALES - GENERAL
PAINLEVEL_OUTOF10: 8
PAINLEVEL_OUTOF10: 9
PAINLEVEL_OUTOF10: 7

## 2023-10-18 ASSESSMENT — PAIN DESCRIPTION - DESCRIPTORS
DESCRIPTORS: TENDER;SORE;DISCOMFORT;ACHING
DESCRIPTORS: ACHING;SORE;DISCOMFORT;THROBBING
DESCRIPTORS: ACHING;SHARP;TENDER;SORE;DISCOMFORT

## 2023-10-18 ASSESSMENT — PAIN DESCRIPTION - ORIENTATION
ORIENTATION: LEFT;LOWER
ORIENTATION: LEFT
ORIENTATION: LEFT;LOWER

## 2023-10-18 ASSESSMENT — PAIN DESCRIPTION - LOCATION
LOCATION: BACK
LOCATION: LEG
LOCATION: LEG

## 2023-10-18 ASSESSMENT — PAIN - FUNCTIONAL ASSESSMENT: PAIN_FUNCTIONAL_ASSESSMENT: 0-10

## 2023-10-18 NOTE — ED PROVIDER NOTES
Patient was given the following medications:  Medications   fentaNYL (SUBLIMAZE) injection 50 mcg (50 mcg IntraVENous Given 10/18/23 1923)   ondansetron (ZOFRAN) injection 4 mg (4 mg IntraVENous Given 10/18/23 1926)   HYDROcodone-acetaminophen (NORCO) 5-325 MG per tablet 1 tablet (1 tablet Oral Given 10/18/23 2320)       Medical Decision Making/Differential Diagnosis:  CC/HPI Summary, Social Determinants of health, Records Reviewed, DDx, testing done/not done, ED Course, Reassessment, disposition considerations/shared decision making with patient, consults, disposition:        CC/HPI Summary, DDx, ED Course, Reassessment, Tests Considered, Patient expectation:   Patient presents the emergency department for numerous complaints. Patient stated that she has had left leg pain. Ultrasound was ordered on the leg to rule out DVT. Pulses were palpable in the leg. Patient does have bilateral edema. There is no DVT appreciated. Patient was evaluated for possible fractures of her leg. There is no evidence of fracture on x-ray. Due to patient not being evaluated for fall and having headache as well as cervical spine tenderness a CT of the head was ordered. There is no cervical vertebral fracture. There is no evidence of epidural or subdural hematoma. There is no evidence of a subarachnoid hemorrhage. Patient has no evidence of fluid overload on chest x-ray. Patient is lungs clear to auscultation. Patient does have bilateral pedal edema secondary to CHF. Patient was instructed to use her water pills with increased frequency. Patient was instructed to follow-up with her primary care provider. Patient had a stable appearing pelvis. Patient had ambulated prior to her falls. Patient was discharged after her ride was arranged. ED Course as of 10/19/23 1941   Wed Oct 18, 2023   2205 EKG read interpreted by me. Rate 85 bpm.  Normal axis. Normal IL interval.  Normal QRS. No ST elevations or depressions.

## 2023-10-19 ENCOUNTER — TELEPHONE (OUTPATIENT)
Dept: FAMILY MEDICINE CLINIC | Age: 68
End: 2023-10-19

## 2023-10-19 DIAGNOSIS — G89.29 CHRONIC LOW BACK PAIN WITH SCIATICA, SCIATICA LATERALITY UNSPECIFIED, UNSPECIFIED BACK PAIN LATERALITY: ICD-10-CM

## 2023-10-19 DIAGNOSIS — M54.40 CHRONIC LOW BACK PAIN WITH SCIATICA, SCIATICA LATERALITY UNSPECIFIED, UNSPECIFIED BACK PAIN LATERALITY: ICD-10-CM

## 2023-10-19 DIAGNOSIS — M47.816 LUMBAR SPONDYLOSIS: ICD-10-CM

## 2023-10-19 DIAGNOSIS — M51.9 LUMBAR DISC DISORDER: ICD-10-CM

## 2023-10-19 DIAGNOSIS — M79.7 FIBROMYALGIA: ICD-10-CM

## 2023-10-19 DIAGNOSIS — M48.061 SPINAL STENOSIS OF LUMBAR REGION WITHOUT NEUROGENIC CLAUDICATION: ICD-10-CM

## 2023-10-19 LAB
EKG ATRIAL RATE: 85 BPM
EKG P AXIS: 72 DEGREES
EKG P-R INTERVAL: 136 MS
EKG Q-T INTERVAL: 392 MS
EKG QRS DURATION: 96 MS
EKG QTC CALCULATION (BAZETT): 466 MS
EKG R AXIS: 38 DEGREES
EKG T AXIS: 43 DEGREES
EKG VENTRICULAR RATE: 85 BPM

## 2023-10-19 PROCEDURE — 93010 ELECTROCARDIOGRAM REPORT: CPT | Performed by: INTERNAL MEDICINE

## 2023-10-19 RX ORDER — TIZANIDINE 4 MG/1
4 TABLET ORAL 2 TIMES DAILY PRN
Qty: 60 TABLET | Refills: 0 | Status: SHIPPED | OUTPATIENT
Start: 2023-10-19 | End: 2023-11-18

## 2023-10-19 ASSESSMENT — ENCOUNTER SYMPTOMS
SORE THROAT: 0
DIARRHEA: 0
VOMITING: 0
CONSTIPATION: 0
BACK PAIN: 1
ABDOMINAL PAIN: 0
SHORTNESS OF BREATH: 1
ABDOMINAL DISTENTION: 0
NAUSEA: 0

## 2023-10-19 NOTE — TELEPHONE ENCOUNTER
Patient called to cancel her appointment because her leg hurts so bad she can't walk. She went to ED yesterday. She wants something for pain from pcp. She will call pain management also as she left them a message yesterday.

## 2023-10-19 NOTE — DISCHARGE INSTRUCTIONS
Please return to the emergency department if develop new or worsening symptoms. XR TIBIA FIBULA LEFT (2 VIEWS)   Final Result   No acute osseous abnormality. XR FEMUR LEFT (MIN 2 VIEWS)   Final Result   No acute osseous abnormality. CT CERVICAL SPINE WO CONTRAST   Final Result   No acute abnormality of the cervical spine. Multilevel degenerative changes with grade 1 anterolisthesis C2 over C3.         CT HEAD WO CONTRAST   Final Result   No acute intracranial abnormality. XR PELVIS (1-2 VIEWS)   Final Result   No active process. XR CHEST PORTABLE   Final Result   No acute process. US DUP LOWER EXTREMITY LEFT CORONA   Final Result   No evidence of DVT in the left lower extremity.

## 2023-10-23 ENCOUNTER — TELEPHONE (OUTPATIENT)
Dept: PAIN MANAGEMENT | Age: 68
End: 2023-10-23

## 2023-10-23 NOTE — TELEPHONE ENCOUNTER
Eldon Power is requesting a refill of her Norco. Last filled 6/26/23. OARRS is consistent. Last office visit was 8/28/23, next scheduled appt is 11/13/23. Thank you.

## 2023-10-24 RX ORDER — TIZANIDINE 4 MG/1
TABLET ORAL
Qty: 60 TABLET | Refills: 0 | OUTPATIENT
Start: 2023-10-24

## 2023-10-24 RX ORDER — HYDROCODONE BITARTRATE AND ACETAMINOPHEN 5; 325 MG/1; MG/1
TABLET ORAL
Qty: 60 TABLET | Refills: 0 | OUTPATIENT
Start: 2023-10-24

## 2023-10-24 NOTE — TELEPHONE ENCOUNTER
Pt states she cannot walk. She made an appt with pain mgmt and they rx'd Uyen Sees. She doesn't want to schedule an appt here because 1) she can't walk and 2) Dr Kasandra Singleton won't rx pain meds.

## 2023-11-01 ENCOUNTER — HOSPITAL ENCOUNTER (EMERGENCY)
Age: 68
Discharge: HOME | End: 2023-11-01
Payer: MEDICARE

## 2023-11-01 ENCOUNTER — TELEPHONE (OUTPATIENT)
Dept: PAIN MANAGEMENT | Age: 68
End: 2023-11-01

## 2023-11-01 ENCOUNTER — APPOINTMENT (OUTPATIENT)
Dept: CT IMAGING | Age: 68
End: 2023-11-01
Payer: MEDICARE

## 2023-11-01 VITALS
DIASTOLIC BLOOD PRESSURE: 75 MMHG | SYSTOLIC BLOOD PRESSURE: 142 MMHG | OXYGEN SATURATION: 99 % | RESPIRATION RATE: 16 BRPM | HEART RATE: 81 BPM | TEMPERATURE: 97.9 F

## 2023-11-01 DIAGNOSIS — M54.50 ACUTE EXACERBATION OF CHRONIC LOW BACK PAIN: Primary | ICD-10-CM

## 2023-11-01 DIAGNOSIS — G89.29 ACUTE EXACERBATION OF CHRONIC LOW BACK PAIN: Primary | ICD-10-CM

## 2023-11-01 PROCEDURE — 99284 EMERGENCY DEPT VISIT MOD MDM: CPT

## 2023-11-01 PROCEDURE — 96372 THER/PROPH/DIAG INJ SC/IM: CPT

## 2023-11-01 PROCEDURE — 6360000002 HC RX W HCPCS: Performed by: PHYSICIAN ASSISTANT

## 2023-11-01 PROCEDURE — 72131 CT LUMBAR SPINE W/O DYE: CPT

## 2023-11-01 PROCEDURE — 6370000000 HC RX 637 (ALT 250 FOR IP): Performed by: PHYSICIAN ASSISTANT

## 2023-11-01 RX ORDER — ORPHENADRINE CITRATE 30 MG/ML
60 INJECTION INTRAMUSCULAR; INTRAVENOUS ONCE
Status: COMPLETED | OUTPATIENT
Start: 2023-11-01 | End: 2023-11-01

## 2023-11-01 RX ORDER — PREDNISONE 10 MG/1
TABLET ORAL
Qty: 30 TABLET | Refills: 0 | Status: SHIPPED | OUTPATIENT
Start: 2023-11-01 | End: 2023-11-11

## 2023-11-01 RX ORDER — OXYCODONE HYDROCHLORIDE AND ACETAMINOPHEN 5; 325 MG/1; MG/1
1 TABLET ORAL ONCE
Status: COMPLETED | OUTPATIENT
Start: 2023-11-01 | End: 2023-11-01

## 2023-11-01 RX ORDER — LIDOCAINE 50 MG/G
1 PATCH TOPICAL DAILY
Qty: 10 PATCH | Refills: 0 | Status: SHIPPED | OUTPATIENT
Start: 2023-11-01 | End: 2023-11-11

## 2023-11-01 RX ADMIN — ORPHENADRINE CITRATE 60 MG: 60 INJECTION INTRAMUSCULAR; INTRAVENOUS at 14:47

## 2023-11-01 RX ADMIN — OXYCODONE AND ACETAMINOPHEN 1 TABLET: 5; 325 TABLET ORAL at 17:44

## 2023-11-01 ASSESSMENT — PAIN SCALES - GENERAL
PAINLEVEL_OUTOF10: 8
PAINLEVEL_OUTOF10: 8

## 2023-11-01 ASSESSMENT — PAIN DESCRIPTION - LOCATION: LOCATION: BACK;LEG

## 2023-11-01 ASSESSMENT — PAIN DESCRIPTION - FREQUENCY: FREQUENCY: CONTINUOUS

## 2023-11-01 ASSESSMENT — PAIN DESCRIPTION - ONSET: ONSET: ON-GOING

## 2023-11-01 ASSESSMENT — PAIN DESCRIPTION - ORIENTATION: ORIENTATION: LEFT;LOWER;MID

## 2023-11-01 ASSESSMENT — PAIN - FUNCTIONAL ASSESSMENT: PAIN_FUNCTIONAL_ASSESSMENT: 0-10

## 2023-11-01 ASSESSMENT — PAIN DESCRIPTION - DESCRIPTORS: DESCRIPTORS: ACHING

## 2023-11-01 NOTE — ED PROVIDER NOTES
mouth is normal to inspection. Neck: Normal range of motion of the neck is present there is no JVD present no meningeal signs are present. Respiratory/chest: The chest is nontender breath sounds are normal  Cardiovascular: Regular rate and rhythm is noted. No murmurs. No rubs or gallops. Skin: Skin is warm and dry, Skin exam normal  Neurologic exam: The patient's [place] Coma Scale is 15. No focal motor deficits. There are no focal sensory deficits. Deep tendon reflexes are intact and present bilaterally in the lower extremities. Babinski is absent. Gait is normal. Symmetric strength and sensation in the lower extremities bilaterally. Back exam: The patient has reproducible tenderness to palpation in the paravertebral lumbar region on the left. There is no evidence of localized erythema nor is there any focal warmth to the back. The patient has no evidence of single vertebral tenderness or any single area of interspace tenderness. There are no palpable deformities, lacerations, abrasions, or stepoffs. No midline cervical, thoracic, or lumbar spine tenderness. Medical decision making:        Patient is a 76 y. o.female presenting today for acute on chronic low back pain. Patient was given 60 mg of IM Norflex for pain here in the emergency department today. Patient is nontoxic appearing, in no acute distress, and afebrile. NO signs of symptoms of cauda equina syndrome. Imaging studies revealed chronic degenerative changes without any acute fracture or dislocation. At this time will plan on outpatient symptom management with prednisone taper and Lidoderm patches as she already is prescribed tizanidine as well as narcotics of which she does reports that she has finished a 30-day prescription in 8 days. Advised to follow up with PCP for recheck and return to the ED with new or worsening symptoms. Patient voiced understanding and is agreeable to the above treatment plan.      Office note from neurosurgery was

## 2023-11-01 NOTE — TELEPHONE ENCOUNTER
Patient sent [email or web address] message asking for refill on Norco.  She also stated she was going to be going back to the ER.    Per [facility] she just filled 60 tabs on 10/23    I explained it is too early to refill her medication - the insurance company nor the pharmacy would fill the medication this early.

## 2023-11-03 ENCOUNTER — TELEPHONE (OUTPATIENT)
Dept: FAMILY MEDICINE CLINIC | Age: 68
End: 2023-11-03

## 2023-11-03 NOTE — TELEPHONE ENCOUNTER
Received letter from [employer] that states that medication Apo-varenicl was denied. Altertive medication Bupropion SR or Nicotrol inhaler or Nicotrol NS or Varenicline

## 2023-11-07 DIAGNOSIS — Z87.891 PERSONAL HISTORY OF NICOTINE DEPENDENCE: Primary | ICD-10-CM

## 2023-11-07 RX ORDER — VARENICLINE TARTRATE 1 MG/1
1 TABLET, FILM COATED ORAL 2 TIMES DAILY
Qty: 180 TABLET | Refills: 1 | Status: SHIPPED | OUTPATIENT
Start: 2023-11-07

## 2023-11-08 DIAGNOSIS — K21.9 GASTROESOPHAGEAL REFLUX DISEASE WITHOUT ESOPHAGITIS: ICD-10-CM

## 2023-11-09 RX ORDER — PANTOPRAZOLE SODIUM 40 MG/1
40 TABLET, DELAYED RELEASE ORAL 2 TIMES DAILY
Qty: 180 TABLET | Refills: 0 | Status: SHIPPED | OUTPATIENT
Start: 2023-11-09

## 2023-11-13 ENCOUNTER — OFFICE VISIT (OUTPATIENT)
Dept: PAIN MANAGEMENT | Age: 68
End: 2023-11-13
Payer: MEDICARE

## 2023-11-13 VITALS
OXYGEN SATURATION: 99 % | WEIGHT: 293 LBS | HEIGHT: 68 IN | RESPIRATION RATE: 18 BRPM | DIASTOLIC BLOOD PRESSURE: 70 MMHG | BODY MASS INDEX: 44.41 KG/M2 | SYSTOLIC BLOOD PRESSURE: 118 MMHG | HEART RATE: 93 BPM | TEMPERATURE: 97.1 F

## 2023-11-13 DIAGNOSIS — G89.29 CHRONIC LOW BACK PAIN WITH SCIATICA, SCIATICA LATERALITY UNSPECIFIED, UNSPECIFIED BACK PAIN LATERALITY: ICD-10-CM

## 2023-11-13 DIAGNOSIS — F11.99 OPIOID USE, UNSPECIFIED WITH UNSPECIFIED OPIOID-INDUCED DISORDER (HCC): ICD-10-CM

## 2023-11-13 DIAGNOSIS — M51.26 DISPLACEMENT OF LUMBAR INTERVERTEBRAL DISC: ICD-10-CM

## 2023-11-13 DIAGNOSIS — M79.7 FIBROMYALGIA: ICD-10-CM

## 2023-11-13 DIAGNOSIS — M47.816 LUMBAR FACET ARTHROPATHY: ICD-10-CM

## 2023-11-13 DIAGNOSIS — M51.9 LUMBAR DISC DISORDER: ICD-10-CM

## 2023-11-13 DIAGNOSIS — M47.816 LUMBAR SPONDYLOSIS: ICD-10-CM

## 2023-11-13 DIAGNOSIS — M79.18 MYOFASCIAL PAIN SYNDROME: ICD-10-CM

## 2023-11-13 DIAGNOSIS — G89.4 CHRONIC PAIN SYNDROME: Primary | ICD-10-CM

## 2023-11-13 DIAGNOSIS — M48.061 SPINAL STENOSIS OF LUMBAR REGION WITHOUT NEUROGENIC CLAUDICATION: ICD-10-CM

## 2023-11-13 DIAGNOSIS — M54.40 CHRONIC LOW BACK PAIN WITH SCIATICA, SCIATICA LATERALITY UNSPECIFIED, UNSPECIFIED BACK PAIN LATERALITY: ICD-10-CM

## 2023-11-13 PROCEDURE — G8417 CALC BMI ABV UP PARAM F/U: HCPCS | Performed by: PHYSICIAN ASSISTANT

## 2023-11-13 PROCEDURE — 99213 OFFICE O/P EST LOW 20 MIN: CPT | Performed by: PHYSICIAN ASSISTANT

## 2023-11-13 PROCEDURE — 1090F PRES/ABSN URINE INCON ASSESS: CPT | Performed by: PHYSICIAN ASSISTANT

## 2023-11-13 PROCEDURE — 1123F ACP DISCUSS/DSCN MKR DOCD: CPT | Performed by: PHYSICIAN ASSISTANT

## 2023-11-13 PROCEDURE — G8399 PT W/DXA RESULTS DOCUMENT: HCPCS | Performed by: PHYSICIAN ASSISTANT

## 2023-11-13 PROCEDURE — 3017F COLORECTAL CA SCREEN DOC REV: CPT | Performed by: PHYSICIAN ASSISTANT

## 2023-11-13 PROCEDURE — G8484 FLU IMMUNIZE NO ADMIN: HCPCS | Performed by: PHYSICIAN ASSISTANT

## 2023-11-13 PROCEDURE — G8427 DOCREV CUR MEDS BY ELIG CLIN: HCPCS | Performed by: PHYSICIAN ASSISTANT

## 2023-11-13 PROCEDURE — 1036F TOBACCO NON-USER: CPT | Performed by: PHYSICIAN ASSISTANT

## 2023-11-13 RX ORDER — PREGABALIN 150 MG/1
150 CAPSULE ORAL 3 TIMES DAILY
Qty: 270 CAPSULE | Refills: 0 | Status: SHIPPED | OUTPATIENT
Start: 2023-11-13 | End: 2024-02-11

## 2023-11-13 RX ORDER — BACLOFEN 5 MG/1
TABLET ORAL
Qty: 90 TABLET | Refills: 0 | Status: SHIPPED | OUTPATIENT
Start: 2023-11-13 | End: 2023-12-13

## 2023-11-14 DIAGNOSIS — R21 SKIN RASH: Primary | ICD-10-CM

## 2023-11-14 RX ORDER — NYSTATIN 100000 [USP'U]/G
POWDER TOPICAL
Qty: 60 G | Refills: 2 | Status: SHIPPED | OUTPATIENT
Start: 2023-11-14

## 2023-11-15 DIAGNOSIS — M54.40 CHRONIC LOW BACK PAIN WITH SCIATICA, SCIATICA LATERALITY UNSPECIFIED, UNSPECIFIED BACK PAIN LATERALITY: ICD-10-CM

## 2023-11-15 DIAGNOSIS — M47.816 LUMBAR SPONDYLOSIS: ICD-10-CM

## 2023-11-15 DIAGNOSIS — M48.061 SPINAL STENOSIS OF LUMBAR REGION WITHOUT NEUROGENIC CLAUDICATION: ICD-10-CM

## 2023-11-15 DIAGNOSIS — M51.9 LUMBAR DISC DISORDER: ICD-10-CM

## 2023-11-15 DIAGNOSIS — M51.26 DISPLACEMENT OF LUMBAR INTERVERTEBRAL DISC: ICD-10-CM

## 2023-11-15 DIAGNOSIS — F11.99 OPIOID USE, UNSPECIFIED WITH UNSPECIFIED OPIOID-INDUCED DISORDER (HCC): ICD-10-CM

## 2023-11-15 DIAGNOSIS — M79.7 FIBROMYALGIA: ICD-10-CM

## 2023-11-15 DIAGNOSIS — G89.4 CHRONIC PAIN SYNDROME: Primary | ICD-10-CM

## 2023-11-15 DIAGNOSIS — M79.18 MYOFASCIAL PAIN SYNDROME: ICD-10-CM

## 2023-11-15 DIAGNOSIS — M47.816 LUMBAR FACET ARTHROPATHY: ICD-10-CM

## 2023-11-15 DIAGNOSIS — G89.29 CHRONIC LOW BACK PAIN WITH SCIATICA, SCIATICA LATERALITY UNSPECIFIED, UNSPECIFIED BACK PAIN LATERALITY: ICD-10-CM

## 2023-11-15 RX ORDER — OXYCODONE HYDROCHLORIDE AND ACETAMINOPHEN 5; 325 MG/1; MG/1
0.5 TABLET ORAL 3 TIMES DAILY PRN
Qty: 45 TABLET | Refills: 0 | Status: SHIPPED
Start: 2023-11-15 | End: 2023-12-29 | Stop reason: SDUPTHER

## 2023-11-15 RX ORDER — LIDOCAINE 50 MG/G
1 PATCH TOPICAL DAILY
Qty: 30 PATCH | Refills: 2 | Status: SHIPPED | OUTPATIENT
Start: 2023-11-15 | End: 2024-02-15

## 2023-11-21 ENCOUNTER — OFFICE VISIT (OUTPATIENT)
Dept: FAMILY MEDICINE CLINIC | Age: 68
End: 2023-11-21
Payer: MEDICARE

## 2023-11-21 VITALS
SYSTOLIC BLOOD PRESSURE: 119 MMHG | TEMPERATURE: 97 F | HEIGHT: 67 IN | HEART RATE: 85 BPM | DIASTOLIC BLOOD PRESSURE: 79 MMHG | RESPIRATION RATE: 19 BRPM | BODY MASS INDEX: 45.99 KG/M2 | OXYGEN SATURATION: 92 % | WEIGHT: 293 LBS

## 2023-11-21 DIAGNOSIS — Z23 NEEDS FLU SHOT: Primary | ICD-10-CM

## 2023-11-21 DIAGNOSIS — J30.9 ALLERGIC RHINITIS, UNSPECIFIED SEASONALITY, UNSPECIFIED TRIGGER: ICD-10-CM

## 2023-11-21 DIAGNOSIS — M54.50 LOW BACK PAIN, UNSPECIFIED BACK PAIN LATERALITY, UNSPECIFIED CHRONICITY, UNSPECIFIED WHETHER SCIATICA PRESENT: ICD-10-CM

## 2023-11-21 DIAGNOSIS — M79.7 FIBROMYALGIA: ICD-10-CM

## 2023-11-21 DIAGNOSIS — F32.1 CURRENT MODERATE EPISODE OF MAJOR DEPRESSIVE DISORDER WITHOUT PRIOR EPISODE (HCC): ICD-10-CM

## 2023-11-21 PROCEDURE — 99214 OFFICE O/P EST MOD 30 MIN: CPT | Performed by: FAMILY MEDICINE

## 2023-11-21 PROCEDURE — 1036F TOBACCO NON-USER: CPT | Performed by: FAMILY MEDICINE

## 2023-11-21 PROCEDURE — 90694 VACC AIIV4 NO PRSRV 0.5ML IM: CPT | Performed by: FAMILY MEDICINE

## 2023-11-21 PROCEDURE — G8417 CALC BMI ABV UP PARAM F/U: HCPCS | Performed by: FAMILY MEDICINE

## 2023-11-21 PROCEDURE — G8399 PT W/DXA RESULTS DOCUMENT: HCPCS | Performed by: FAMILY MEDICINE

## 2023-11-21 PROCEDURE — 1123F ACP DISCUSS/DSCN MKR DOCD: CPT | Performed by: FAMILY MEDICINE

## 2023-11-21 PROCEDURE — 1090F PRES/ABSN URINE INCON ASSESS: CPT | Performed by: FAMILY MEDICINE

## 2023-11-21 PROCEDURE — 3017F COLORECTAL CA SCREEN DOC REV: CPT | Performed by: FAMILY MEDICINE

## 2023-11-21 PROCEDURE — G8427 DOCREV CUR MEDS BY ELIG CLIN: HCPCS | Performed by: FAMILY MEDICINE

## 2023-11-21 PROCEDURE — G8484 FLU IMMUNIZE NO ADMIN: HCPCS | Performed by: FAMILY MEDICINE

## 2023-11-21 PROCEDURE — G0008 ADMIN INFLUENZA VIRUS VAC: HCPCS | Performed by: FAMILY MEDICINE

## 2023-11-21 RX ORDER — CETIRIZINE HYDROCHLORIDE 10 MG/1
10 TABLET ORAL DAILY
Qty: 90 TABLET | Refills: 1 | Status: SHIPPED | OUTPATIENT
Start: 2023-11-21

## 2023-11-21 RX ORDER — DULOXETIN HYDROCHLORIDE 60 MG/1
60 CAPSULE, DELAYED RELEASE ORAL DAILY
Qty: 90 CAPSULE | Refills: 1 | Status: SHIPPED | OUTPATIENT
Start: 2023-11-21

## 2023-11-21 NOTE — PROGRESS NOTES
FM Progress Note    Subjective:   Afib. Metoprolol and asa. Chronic back pain and FM. Seeing pain mngt Tiffany Car. Norco, lyrica, zanaflex, cymbalta. Depression. Cymbalta. Topamax. Buspar. Anxiety. Cymbalta. Buspar. Environmental allergies. Zyrtec. Flonase prn. Feels these don't help. Asthma/copd. Albuterol not needed since trelegy. Pancho Cornelius. PFTs 2020:  RECOMMENDATIONS:  1. Because she has this questionable atopic disposition and that is not  well-defined but I think with her history of animal and dust, it appears  that she might have some atopy and with her underlying flows being with  some mild variability, I would suggest an ICS/LABA to start with any of  the ICS/LABAs but because of her weight I would choose an aerosol  ICS/LABA and because of her history of centrilobular emphysema and her  weight, I think that the aerosol indicated for centrilobular emphysema  and with a possible component of asthma would be an ICS/LABA, The one in  the market that is presently an aerosol form is Symbicort at 160/4.5 mcg  dose, and trial of that would help control her symptoms, reduce  exacerbations along with smoking cessation. 2.  I recommend that to re-sustain her smoking cessation, an aid,  nicotine or non-nicotine aid would be important to control the  withdrawal symptoms from resulting in a relapse. 3.  Because she is in that age bracket with smoking cessation only for  last two months, which is early remission, she is a high risk for early  stage lung cancer being female at this age, so yearly low-dose chest CTs  are recommended as well, especially with her history of having sputum  that is black. Clinical correlation is requested. Smokes < 1 ppd. Went cold turkey until she fell then resumed smoking again. Detrol for nocturia. Daytime sxs ok. Obesity. Seeing Dr. Melissa Donovan. CT done this mo said repeat CT chest in 6-12 mo.     preDM.    Hemoglobin A1C   Date Value Ref

## 2023-11-21 NOTE — PATIENT INSTRUCTIONS
Follow up with Dr. Carlee Cortez for the weight loss. Start the chantix in a week. Increase the cymbalta to 60 mg. Continue the trelegy for the breathing.

## 2023-11-22 RX ORDER — FLUTICASONE FUROATE, UMECLIDINIUM BROMIDE AND VILANTEROL TRIFENATATE 200; 62.5; 25 UG/1; UG/1; UG/1
POWDER RESPIRATORY (INHALATION)
Qty: 1 EACH | Refills: 6 | Status: SHIPPED | OUTPATIENT
Start: 2023-11-22

## 2023-12-22 RX ORDER — BACLOFEN 10 MG/1
TABLET ORAL
Qty: 90 TABLET | Refills: 0 | Status: SHIPPED | OUTPATIENT
Start: 2023-12-22

## 2023-12-29 ENCOUNTER — TELEPHONE (OUTPATIENT)
Dept: PAIN MANAGEMENT | Age: 68
End: 2023-12-29

## 2023-12-29 DIAGNOSIS — M79.18 MYOFASCIAL PAIN SYNDROME: ICD-10-CM

## 2023-12-29 DIAGNOSIS — G89.4 CHRONIC PAIN SYNDROME: ICD-10-CM

## 2023-12-29 DIAGNOSIS — G89.29 CHRONIC LOW BACK PAIN WITH SCIATICA, SCIATICA LATERALITY UNSPECIFIED, UNSPECIFIED BACK PAIN LATERALITY: ICD-10-CM

## 2023-12-29 DIAGNOSIS — M54.40 CHRONIC LOW BACK PAIN WITH SCIATICA, SCIATICA LATERALITY UNSPECIFIED, UNSPECIFIED BACK PAIN LATERALITY: ICD-10-CM

## 2023-12-29 DIAGNOSIS — M51.9 LUMBAR DISC DISORDER: ICD-10-CM

## 2023-12-29 DIAGNOSIS — M47.816 LUMBAR FACET ARTHROPATHY: ICD-10-CM

## 2023-12-29 DIAGNOSIS — M48.061 SPINAL STENOSIS OF LUMBAR REGION WITHOUT NEUROGENIC CLAUDICATION: ICD-10-CM

## 2023-12-29 DIAGNOSIS — M79.7 FIBROMYALGIA: ICD-10-CM

## 2023-12-29 DIAGNOSIS — M51.26 DISPLACEMENT OF LUMBAR INTERVERTEBRAL DISC: ICD-10-CM

## 2023-12-29 DIAGNOSIS — F11.99 OPIOID USE, UNSPECIFIED WITH UNSPECIFIED OPIOID-INDUCED DISORDER (HCC): ICD-10-CM

## 2023-12-29 DIAGNOSIS — M47.816 LUMBAR SPONDYLOSIS: ICD-10-CM

## 2023-12-29 RX ORDER — OXYCODONE HYDROCHLORIDE AND ACETAMINOPHEN 5; 325 MG/1; MG/1
0.5 TABLET ORAL 3 TIMES DAILY PRN
Qty: 45 TABLET | Refills: 0 | Status: SHIPPED | OUTPATIENT
Start: 2023-12-29 | End: 2024-01-28

## 2023-12-29 NOTE — TELEPHONE ENCOUNTER
Sheree is requesting a refill of Percocet. Last filled 11/15/23. OARRS is consistent. Last visit 11/13/23, next appt is 2/13/24. Thank you

## 2024-01-03 ENCOUNTER — PATIENT MESSAGE (OUTPATIENT)
Dept: FAMILY MEDICINE CLINIC | Age: 69
End: 2024-01-03
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
## 2024-01-18 DIAGNOSIS — M54.50 LOW BACK PAIN, UNSPECIFIED BACK PAIN LATERALITY, UNSPECIFIED CHRONICITY, UNSPECIFIED WHETHER SCIATICA PRESENT: Primary | ICD-10-CM

## 2024-01-29 NOTE — PROGRESS NOTES
[facility]  [address] [facility], [address] [place] [address] Alberto  [phone]  Follow up note      Anna Calhoun     Date of Visit:  4/20/2022    CC:  Low back pain       HPI:  Pt seen today for low back and bilateral knee pain but L>R with no acute changes noted. Pt notes not feeling well today and good and bad days with low back pain. Appropriate analgesia with current medications regimen: no.    Change in quality of symptoms:no. Medication side effects: percocet caused constipation  Recent diagnostic testing:none. Recent interventional procedures:none.     She has not been on anticoagulation medications to include none. The patient  has not been on herbal supplements. The patient is diabetic. Imaging:    CT of left knee 04/2021:     Impression   1. Small suprapatellar joint effusion. 2. Prior left knee arthroplasty and patellar resurfacing.  No significant   periprosthetic lucency or acute osseous abnormality. 3. Calcifications which are well corticated in the distal quadriceps tendon   measuring up to 1.5 x 0.8 cm.   4. Hardware associated streak artifact limits the exam to some degree.            MRI of Lumbar Spine 09/2020:  1. Postsurgical changes status post L3-L5 posterior spinal fusion. 2. Severe multifactorial spinal canal stenosis at L2-L3 with mass effect on    the cauda equina nerve roots. 3. Moderate multifactorial L1-L2 spinal canal stenosis with moderate right    neural foraminal stenosis. 4. Moderate left L5-S1 neural foraminal stenosis.              LUMBAR SPINE XRAY 03/4/2020  Intact lumbar vertebral height and alignment. Relative severe    multilevel disc and facet joint narrowing with subchondral sclerosis    and spurring. Previous discectomy with posterior pedicle screw plates    extending from L3 through L5. Symmetric osteoarthritis at the    sacroiliac joints manifesting as joint space narrowing and subchondral    sclerosis. What Is The Reason For Today's Visit?: Full Body Skin Examination What Is The Reason For Today's Visit? (Being Monitored For X): concerning skin lesions on a periodic basis        Lumbar spine CT 2016      1. Status post lumbar spine fusion from L3-L5 appearing in stable,   satisfactory alignment.       2. Lumbar spondylosis as described above in part due to congenital   shortened pedicles and notable stenosis          Potential Aberrant Drug-Related Behavior:  None     Previous medication: baclofen helps some, tizanidine sleepiness, flexeril no relief, percocet constipation     Urine Drug Screenin2020 Negative for all which is consistent, pt does NOT take every day and does NOT fill exactly 30 days, can go longer than a month for refill   10/2020: consistent for hydrocodone  2021:  consistent     OARRS report:  2020-2022:Consistent     Pain agreement:  -  Renewal date:2022            Past Medical History: Reviewed    Past Surgical History: Reviewed     Home Medications: Reviewed    Allergies: Reviewed     Social History: Reviewed     REVIEW OF SYSTEMS:     Clare Amaya denies fever/chills, chest pain, shortness of breath, new bowel or bladder complaints. All other review of systems was negative. PHYSICAL EXAMINATION:      General:       General appearance:   pleasant and well-hydrated. , in moderate discomfort and A & O x3  Build: Morbidly obese     HEENT:     Head:normocephalic and atraumatic  Sclera: icterus absent,     Lungs:     Breathing:Normal expansion. No wheezing.     Abdomen:     Shape:non-distended and normal        Lumbar spine:     Range of motion:abnormal moderately Lateral bending, flexion, extension rotation bilateral and is painful.     Extremities:     Tremors:None bilaterally upper and lower  Range of motion:Generally normal shoulders, pain with internal rotation of hips not done.   Intact:Yes  Edema:Normal     Neurological:     Gait:antalgic     Dermatology:     Skin:no unusual rashes, no skin lesions, no palpable subcutaneous nodules and good skin turgor     Impression:     Chronic low back with h/o L3-5 fusion/bilateral knees How Severe Are Your Spot(S)?: mild replaced since 2010  Refuses to have anymore injections, had at previous facility [place] and reports so painful during and after, refuses to have again               Seen Dr Mona Connor and is a surgical candidate but advised      weight loss of   >30lbs or consult bariatric Surgery     Plan:  Chronic low back, diffuse body pain and bilateral knee pain L>R   Not interested in anymore interventional procedures  Letter excusing for jury duty, unable to perform  Buccal swab today for compliance  Continue Lyrica 150 mg TID x 90 day, due june  Refill norco 5/325mg #90 lasts 2 months   Refill Parfon Forte 500mg po tid prn spasms - helpful  Difficulty getting to appointments ,   Continue with Cymbalta 40 mg QD from PCP   OARRS report reviewed  Patient encouraged to stay active and to lose weight  Against referral to physical therapy  Treatment plan discussed with the patient including medications side effects                           We discussed with the patient that combining opioids, benzodiazepines, alcohol, illicit drugs or sleep aids increases the risk of respiratory depression including death. We discussed that these medications may cause drowsiness, sedation or dizziness and have counseled the patient not to drive or operate machinery. We have discussed that these medications will be prescribed only by one provider. We have discussed with the patient about age related risk factors and have thoroughly discussed the importance of taking these medications as prescribed. The patient verbalizes understanding. lucius reyes         Cc:  Referring physician What Type Of Note Output Would You Prefer (Optional)?: Standard Output

## 2024-02-05 ENCOUNTER — TELEPHONE (OUTPATIENT)
Dept: FAMILY MEDICINE CLINIC | Age: 69
End: 2024-02-05

## 2024-02-05 DIAGNOSIS — K21.9 GASTROESOPHAGEAL REFLUX DISEASE WITHOUT ESOPHAGITIS: ICD-10-CM

## 2024-02-05 RX ORDER — PANTOPRAZOLE SODIUM 40 MG/1
40 TABLET, DELAYED RELEASE ORAL 2 TIMES DAILY
Qty: 180 TABLET | Refills: 1 | Status: SHIPPED | OUTPATIENT
Start: 2024-02-05

## 2024-03-15 ENCOUNTER — COMMUNITY OUTREACH (OUTPATIENT)
Dept: FAMILY MEDICINE CLINIC | Age: 69
End: 2024-03-15

## 2024-03-15 NOTE — PROGRESS NOTES
.Patient's HM shows they are overdue for Mammogram.  Care Everywhere and  files searched.   updated with 7/19/23 Foot exam.

## 2024-04-08 RX ORDER — BACLOFEN 10 MG/1
TABLET ORAL
Qty: 90 TABLET | Refills: 0 | Status: SHIPPED | OUTPATIENT
Start: 2024-04-08

## 2024-04-09 RX ORDER — TOLTERODINE 2 MG/1
2 CAPSULE, EXTENDED RELEASE ORAL DAILY
Qty: 90 CAPSULE | Refills: 1 | Status: SHIPPED | OUTPATIENT
Start: 2024-04-09

## 2024-04-15 ENCOUNTER — TELEPHONE (OUTPATIENT)
Dept: PAIN MANAGEMENT | Age: 69
End: 2024-04-15

## 2024-04-15 DIAGNOSIS — M79.7 FIBROMYALGIA: ICD-10-CM

## 2024-04-15 RX ORDER — PREGABALIN 150 MG/1
150 CAPSULE ORAL 3 TIMES DAILY
Qty: 90 CAPSULE | Refills: 0 | Status: SHIPPED | OUTPATIENT
Start: 2024-04-15 | End: 2024-05-15

## 2024-04-15 RX ORDER — TOLTERODINE 2 MG/1
2 CAPSULE, EXTENDED RELEASE ORAL DAILY
Qty: 90 CAPSULE | Refills: 1 | OUTPATIENT
Start: 2024-04-15

## 2024-04-15 NOTE — TELEPHONE ENCOUNTER
Sheree called requesting a refill of Lyrica. Last filled 1/8/24. Last office visit was 11/13/23. Canceled appt today and rescheduled for 4/23/24. Thank you

## 2024-05-08 RX ORDER — BACLOFEN 10 MG/1
TABLET ORAL
Qty: 90 TABLET | Refills: 0 | OUTPATIENT
Start: 2024-05-08

## 2024-05-08 RX ORDER — ALBUTEROL SULFATE 90 UG/1
AEROSOL, METERED RESPIRATORY (INHALATION)
Qty: 8.5 G | Refills: 3 | Status: SHIPPED | OUTPATIENT
Start: 2024-05-08

## 2024-05-22 ENCOUNTER — TELEPHONE (OUTPATIENT)
Dept: FAMILY MEDICINE CLINIC | Age: 69
End: 2024-05-22

## 2024-05-22 NOTE — TELEPHONE ENCOUNTER
----- Message from Papa Javier Brittny Lottbo sent at 5/22/2024  1:26 PM EDT -----  Regarding: [facility] Appointment Request  [facility] Appointment Request    Patient needs appointment for [facility] Appointment Type: Existing Condition Follow Up.    Reason for Appointment Request: Other need to reschedule appt on 7/11/2024 for a follow up before June 4  --------------------------------------------------------------------------------------------------------------------------    Relationship to Patient: Self     Call Back Information: OK to leave message on voicemail  Preferred Call Back Number: Phone [phone]

## 2024-06-05 ENCOUNTER — TELEPHONE (OUTPATIENT)
Dept: PAIN MANAGEMENT | Age: 69
End: 2024-06-05

## 2024-06-05 DIAGNOSIS — M79.7 FIBROMYALGIA: ICD-10-CM

## 2024-06-05 RX ORDER — PREGABALIN 150 MG/1
150 CAPSULE ORAL 3 TIMES DAILY
Qty: 39 CAPSULE | Refills: 0 | Status: SHIPPED | OUTPATIENT
Start: 2024-06-05 | End: 2024-06-18

## 2024-06-05 NOTE — TELEPHONE ENCOUNTER
Sheree  is requesting a refill of Pregabalin. Last filled 4/15/24, OARRS is consistent. Last office visit 11/13/24, next appt scheduled 6/18/24. Thank you

## 2024-06-11 ENCOUNTER — OFFICE VISIT (OUTPATIENT)
Dept: FAMILY MEDICINE CLINIC | Age: 69
End: 2024-06-11
Payer: MEDICARE

## 2024-06-11 VITALS
HEART RATE: 70 BPM | HEIGHT: 67 IN | WEIGHT: 293 LBS | BODY MASS INDEX: 45.99 KG/M2 | OXYGEN SATURATION: 97 % | RESPIRATION RATE: 19 BRPM | DIASTOLIC BLOOD PRESSURE: 77 MMHG | SYSTOLIC BLOOD PRESSURE: 113 MMHG | TEMPERATURE: 97.6 F

## 2024-06-11 DIAGNOSIS — E66.9 OBESITY, UNSPECIFIED CLASSIFICATION, UNSPECIFIED OBESITY TYPE, UNSPECIFIED WHETHER SERIOUS COMORBIDITY PRESENT: ICD-10-CM

## 2024-06-11 DIAGNOSIS — D69.6 THROMBOCYTOPENIA, UNSPECIFIED (HCC): ICD-10-CM

## 2024-06-11 DIAGNOSIS — E11.51 TYPE II DIABETES MELLITUS WITH PERIPHERAL CIRCULATORY DISORDER (HCC): Primary | ICD-10-CM

## 2024-06-11 DIAGNOSIS — M54.50 LOW BACK PAIN, UNSPECIFIED BACK PAIN LATERALITY, UNSPECIFIED CHRONICITY, UNSPECIFIED WHETHER SCIATICA PRESENT: ICD-10-CM

## 2024-06-11 DIAGNOSIS — I48.91 ATRIAL FIBRILLATION, UNSPECIFIED TYPE (HCC): ICD-10-CM

## 2024-06-11 DIAGNOSIS — F32.1 CURRENT MODERATE EPISODE OF MAJOR DEPRESSIVE DISORDER WITHOUT PRIOR EPISODE (HCC): ICD-10-CM

## 2024-06-11 DIAGNOSIS — Z87.891 PERSONAL HISTORY OF NICOTINE DEPENDENCE: ICD-10-CM

## 2024-06-11 LAB — HBA1C MFR BLD: 6.3 %

## 2024-06-11 PROCEDURE — G8399 PT W/DXA RESULTS DOCUMENT: HCPCS | Performed by: FAMILY MEDICINE

## 2024-06-11 PROCEDURE — 83036 HEMOGLOBIN GLYCOSYLATED A1C: CPT | Performed by: FAMILY MEDICINE

## 2024-06-11 PROCEDURE — 3017F COLORECTAL CA SCREEN DOC REV: CPT | Performed by: FAMILY MEDICINE

## 2024-06-11 PROCEDURE — 1123F ACP DISCUSS/DSCN MKR DOCD: CPT | Performed by: FAMILY MEDICINE

## 2024-06-11 PROCEDURE — 1090F PRES/ABSN URINE INCON ASSESS: CPT | Performed by: FAMILY MEDICINE

## 2024-06-11 PROCEDURE — 3044F HG A1C LEVEL LT 7.0%: CPT | Performed by: FAMILY MEDICINE

## 2024-06-11 PROCEDURE — 2022F DILAT RTA XM EVC RTNOPTHY: CPT | Performed by: FAMILY MEDICINE

## 2024-06-11 PROCEDURE — 1036F TOBACCO NON-USER: CPT | Performed by: FAMILY MEDICINE

## 2024-06-11 PROCEDURE — G8417 CALC BMI ABV UP PARAM F/U: HCPCS | Performed by: FAMILY MEDICINE

## 2024-06-11 PROCEDURE — G8427 DOCREV CUR MEDS BY ELIG CLIN: HCPCS | Performed by: FAMILY MEDICINE

## 2024-06-11 RX ORDER — SEMAGLUTIDE 0.25 MG/.5ML
0.25 INJECTION, SOLUTION SUBCUTANEOUS
Qty: 2 ML | Refills: 1 | Status: SHIPPED | OUTPATIENT
Start: 2024-06-11

## 2024-06-11 RX ORDER — MIRABEGRON 50 MG/1
50 TABLET, FILM COATED, EXTENDED RELEASE ORAL DAILY
COMMUNITY
Start: 2024-06-06

## 2024-06-11 RX ORDER — DESVENLAFAXINE SUCCINATE 50 MG/1
50 TABLET, EXTENDED RELEASE ORAL DAILY
COMMUNITY
Start: 2024-05-07

## 2024-06-11 RX ORDER — VARENICLINE TARTRATE 1 MG/1
1 TABLET, FILM COATED ORAL 2 TIMES DAILY
Qty: 180 TABLET | Refills: 1 | Status: SHIPPED | OUTPATIENT
Start: 2024-06-11

## 2024-06-11 SDOH — ECONOMIC STABILITY: INCOME INSECURITY: HOW HARD IS IT FOR YOU TO PAY FOR THE VERY BASICS LIKE FOOD, HOUSING, MEDICAL CARE, AND HEATING?: NOT HARD AT ALL

## 2024-06-11 SDOH — ECONOMIC STABILITY: FOOD INSECURITY: WITHIN THE PAST 12 MONTHS, YOU WORRIED THAT YOUR FOOD WOULD RUN OUT BEFORE YOU GOT MONEY TO BUY MORE.: NEVER TRUE

## 2024-06-11 SDOH — ECONOMIC STABILITY: FOOD INSECURITY: WITHIN THE PAST 12 MONTHS, THE FOOD YOU BOUGHT JUST DIDN'T LAST AND YOU DIDN'T HAVE MONEY TO GET MORE.: NEVER TRUE

## 2024-06-11 ASSESSMENT — PATIENT HEALTH QUESTIONNAIRE - PHQ9
SUM OF ALL RESPONSES TO PHQ QUESTIONS 1-9: 23
4. FEELING TIRED OR HAVING LITTLE ENERGY: NEARLY EVERY DAY
SUM OF ALL RESPONSES TO PHQ QUESTIONS 1-9: 23
6. FEELING BAD ABOUT YOURSELF - OR THAT YOU ARE A FAILURE OR HAVE LET YOURSELF OR YOUR FAMILY DOWN: NEARLY EVERY DAY
8. MOVING OR SPEAKING SO SLOWLY THAT OTHER PEOPLE COULD HAVE NOTICED. OR THE OPPOSITE, BEING SO FIGETY OR RESTLESS THAT YOU HAVE BEEN MOVING AROUND A LOT MORE THAN USUAL: NEARLY EVERY DAY
SUM OF ALL RESPONSES TO PHQ QUESTIONS 1-9: 23
7. TROUBLE CONCENTRATING ON THINGS, SUCH AS READING THE NEWSPAPER OR WATCHING TELEVISION: NEARLY EVERY DAY
5. POOR APPETITE OR OVEREATING: SEVERAL DAYS
SUM OF ALL RESPONSES TO PHQ9 QUESTIONS 1 & 2: 6
SUM OF ALL RESPONSES TO PHQ QUESTIONS 1-9: 22
2. FEELING DOWN, DEPRESSED OR HOPELESS: NEARLY EVERY DAY
1. LITTLE INTEREST OR PLEASURE IN DOING THINGS: NEARLY EVERY DAY
10. IF YOU CHECKED OFF ANY PROBLEMS, HOW DIFFICULT HAVE THESE PROBLEMS MADE IT FOR YOU TO DO YOUR WORK, TAKE CARE OF THINGS AT HOME, OR GET ALONG WITH OTHER PEOPLE: SOMEWHAT DIFFICULT
3. TROUBLE FALLING OR STAYING ASLEEP: NEARLY EVERY DAY
9. THOUGHTS THAT YOU WOULD BE BETTER OFF DEAD, OR OF HURTING YOURSELF: SEVERAL DAYS

## 2024-06-11 NOTE — ADDENDUM NOTE
Addended by: ROBIN DEJESUS on: 6/11/2024 05:06 PM     Modules accepted: Orders, Level of Service

## 2024-06-11 NOTE — PROGRESS NOTES
FM Progress Note    Subjective:   Afib. Metoprolol and asa.     Chronic back pain and FM. Seeing pain mngt Cony Ashby. Norco, lyrica, zanaflex. No worsening of pain coming off cymbalta.     Depression. Cymbalta switched to pristiq, doesn't feel any different. Topamax. Buspar. Does not feel     Anxiety. Cymbalta. Buspar.     Low PLTs. Declined BW    HLD. Elevated lfts with lipitor  The 10-year ASCVD risk score (Ale MILLIGAN, et al., 2019) is: 11.7%    Values used to calculate the score:      Age: 69 years      Sex: Female      Is Non- : No      Diabetic: Yes      Tobacco smoker: No      Systolic Blood Pressure: 113 mmHg      Is BP treated: No      HDL Cholesterol: 79 mg/dL      Total Cholesterol: 202 mg/dL     Environmental allergies. Zyrtec. Flonase prn. Feels these don't help.     Asthma/copd. Albuterol not needed since trelegy. Sees Pulm Dr. Mcneal. PFTs 2020:  RECOMMENDATIONS:  1.  Because she has this questionable atopic disposition and that is not  well-defined but I think with her history of animal and dust, it appears  that she might have some atopy and with her underlying flows being with  some mild variability, I would suggest an ICS/LABA to start with any of  the ICS/LABAs but because of her weight I would choose an aerosol  ICS/LABA and because of her history of centrilobular emphysema and her  weight, I think that the aerosol indicated for centrilobular emphysema  and with a possible component of asthma would be an ICS/LABA, The one in  the market that is presently an aerosol form is Symbicort at 160/4.5 mcg  dose, and trial of that would help control her symptoms, reduce  exacerbations along with smoking cessation.  2.  I recommend that to re-sustain her smoking cessation, an aid,  nicotine or non-nicotine aid would be important to control the  withdrawal symptoms from resulting in a relapse.  3.  Because she is in that age bracket with smoking cessation only for  last two

## 2024-06-18 ENCOUNTER — OFFICE VISIT (OUTPATIENT)
Dept: PAIN MANAGEMENT | Age: 69
End: 2024-06-18
Payer: MEDICARE

## 2024-06-18 VITALS
RESPIRATION RATE: 18 BRPM | SYSTOLIC BLOOD PRESSURE: 138 MMHG | BODY MASS INDEX: 45.99 KG/M2 | DIASTOLIC BLOOD PRESSURE: 80 MMHG | OXYGEN SATURATION: 96 % | HEART RATE: 76 BPM | HEIGHT: 67 IN | WEIGHT: 293 LBS

## 2024-06-18 DIAGNOSIS — M47.816 LUMBAR FACET ARTHROPATHY: ICD-10-CM

## 2024-06-18 DIAGNOSIS — F11.99 OPIOID USE, UNSPECIFIED WITH UNSPECIFIED OPIOID-INDUCED DISORDER (HCC): ICD-10-CM

## 2024-06-18 DIAGNOSIS — M47.816 LUMBAR SPONDYLOSIS: ICD-10-CM

## 2024-06-18 DIAGNOSIS — M51.26 DISPLACEMENT OF LUMBAR INTERVERTEBRAL DISC: ICD-10-CM

## 2024-06-18 DIAGNOSIS — M79.7 FIBROMYALGIA: ICD-10-CM

## 2024-06-18 DIAGNOSIS — M51.9 LUMBAR DISC DISORDER: ICD-10-CM

## 2024-06-18 DIAGNOSIS — G89.29 CHRONIC LOW BACK PAIN WITH SCIATICA, SCIATICA LATERALITY UNSPECIFIED, UNSPECIFIED BACK PAIN LATERALITY: ICD-10-CM

## 2024-06-18 DIAGNOSIS — M54.40 CHRONIC LOW BACK PAIN WITH SCIATICA, SCIATICA LATERALITY UNSPECIFIED, UNSPECIFIED BACK PAIN LATERALITY: ICD-10-CM

## 2024-06-18 DIAGNOSIS — G89.4 CHRONIC PAIN SYNDROME: Primary | ICD-10-CM

## 2024-06-18 DIAGNOSIS — M48.061 SPINAL STENOSIS OF LUMBAR REGION WITHOUT NEUROGENIC CLAUDICATION: ICD-10-CM

## 2024-06-18 DIAGNOSIS — M79.18 MYOFASCIAL PAIN SYNDROME: ICD-10-CM

## 2024-06-18 PROCEDURE — G8417 CALC BMI ABV UP PARAM F/U: HCPCS | Performed by: PHYSICIAN ASSISTANT

## 2024-06-18 PROCEDURE — G8399 PT W/DXA RESULTS DOCUMENT: HCPCS | Performed by: PHYSICIAN ASSISTANT

## 2024-06-18 PROCEDURE — 99213 OFFICE O/P EST LOW 20 MIN: CPT | Performed by: PHYSICIAN ASSISTANT

## 2024-06-18 PROCEDURE — 1123F ACP DISCUSS/DSCN MKR DOCD: CPT | Performed by: PHYSICIAN ASSISTANT

## 2024-06-18 PROCEDURE — 1090F PRES/ABSN URINE INCON ASSESS: CPT | Performed by: PHYSICIAN ASSISTANT

## 2024-06-18 PROCEDURE — G8427 DOCREV CUR MEDS BY ELIG CLIN: HCPCS | Performed by: PHYSICIAN ASSISTANT

## 2024-06-18 PROCEDURE — 3017F COLORECTAL CA SCREEN DOC REV: CPT | Performed by: PHYSICIAN ASSISTANT

## 2024-06-18 PROCEDURE — 1036F TOBACCO NON-USER: CPT | Performed by: PHYSICIAN ASSISTANT

## 2024-06-18 RX ORDER — BACLOFEN 10 MG/1
TABLET ORAL
Qty: 90 TABLET | Refills: 3 | Status: SHIPPED | OUTPATIENT
Start: 2024-06-18

## 2024-06-18 RX ORDER — PREGABALIN 150 MG/1
150 CAPSULE ORAL 3 TIMES DAILY
Qty: 90 CAPSULE | Refills: 3 | Status: SHIPPED | OUTPATIENT
Start: 2024-06-18 | End: 2024-07-18

## 2024-06-18 NOTE — PROGRESS NOTES
[facility]  [address], [place] [address]  [phone]    Follow up Note      Sheree Cruz     Date of Visit:  6/18/2024    CC:  Patient presents for follow up   Chief Complaint   Patient presents with    Back Pain           HPI:    Pain is unchanged.    Appropriate analgesia with current medications regimen: Yes.  Change in quality of symptoms:no.    Medication side effects: percocet caused constipation  Recent diagnostic testing:none.   Recent interventional procedures:none.    She has not been on anticoagulation medications to include none. The patient  has not been on herbal supplements.  The patient is diabetic.     Imaging:     CT of left knee 04/2021:     Impression   1. Small suprapatellar joint effusion.   2. Prior left knee arthroplasty and patellar resurfacing.  No significant   periprosthetic lucency or acute osseous abnormality.   3. Calcifications which are well corticated in the distal quadriceps tendon   measuring up to 1.5 x 0.8 cm.   4. Hardware associated streak artifact limits the exam to some degree.            MRI of Lumbar Spine 09/2020:  1. Postsurgical changes status post L3-L5 posterior spinal fusion.    2. Severe multifactorial spinal canal stenosis at L2-L3 with mass effect on    the cauda equina nerve roots.    3. Moderate multifactorial L1-L2 spinal canal stenosis with moderate right    neural foraminal stenosis.    4. Moderate left L5-S1 neural foraminal stenosis.                 LUMBAR SPINE XRAY 03/4/2020  Intact lumbar vertebral height and alignment. Relative severe    multilevel disc and facet joint narrowing with subchondral sclerosis    and spurring. Previous discectomy with posterior pedicle screw plates    extending from L3 through L5. Symmetric osteoarthritis at the    sacroiliac joints manifesting as joint space narrowing and subchondral    sclerosis.           Lumbar spine CT 07/2016      1. Status post lumbar spine fusion

## 2024-06-18 NOTE — PROGRESS NOTES
Sheree Cruz presents to the [facility] on 6/18/2024. Sheree is complaining of pain in her back. The pain is constant. The pain is described as aching, throbbing, stabbing, and sharp. Pain is rated on her best day at a 5, on her worst day at a 10, and on average at a 5 on the VAS scale. She took her last dose of Lyrica and Baclofen and percocet yesterday.      Any procedures since your last visit: No    She is  on NSAIDS and  is  on anticoagulation medications to include ASA and is managed by Seth Wan MD       Pacemaker or defibrillator: No     Medication Contract and Consent for Opioid Use Documents Filed       Patient Documents       Type of Document Status Date Received Received By Description    Medication Contract Received 10/14/2020  1:22 PM TOMY THOMAS Pain [facility] Patient Agreement    Medication Contract Received 12/6/2021  1:54 PM ALISE ZAVALA Medication Contract    Medication Contract Received 2/10/2023  2:44 PM ANTHONY ESTRADA opioid agreement                       LMP  (LMP Unknown)      No LMP recorded (lmp unknown). Patient has had a hysterectomy.

## 2024-06-26 ENCOUNTER — TELEPHONE (OUTPATIENT)
Dept: FAMILY MEDICINE CLINIC | Age: 69
End: 2024-06-26
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
## 2024-07-02 ENCOUNTER — PATIENT MESSAGE (OUTPATIENT)
Dept: FAMILY MEDICINE CLINIC | Age: 69
End: 2024-07-02

## 2024-07-03 ENCOUNTER — TELEPHONE (OUTPATIENT)
Dept: FAMILY MEDICINE CLINIC | Age: 69
End: 2024-07-03

## 2024-07-03 ENCOUNTER — PATIENT MESSAGE (OUTPATIENT)
Dept: FAMILY MEDICINE CLINIC | Age: 69
End: 2024-07-03

## 2024-07-03 DIAGNOSIS — R32 URINARY INCONTINENCE, UNSPECIFIED TYPE: Primary | ICD-10-CM

## 2024-07-03 RX ORDER — TOLTERODINE 2 MG/1
2 CAPSULE, EXTENDED RELEASE ORAL DAILY
Qty: 90 CAPSULE | Refills: 0 | OUTPATIENT
Start: 2024-07-03

## 2024-07-03 RX ORDER — TOLTERODINE 2 MG/1
2 CAPSULE, EXTENDED RELEASE ORAL DAILY
Qty: 90 CAPSULE | Refills: 1 | Status: SHIPPED | OUTPATIENT
Start: 2024-07-03

## 2024-07-03 NOTE — TELEPHONE ENCOUNTER
PA for Wegovy 0.25mg/0.5ml was denied by pt insurance, provider can complete appeal if med is needed for pt     BYRON GALLARDO (Key: [id number])  Rx #: [id number]  Wegovy 0.25MG/0.5ML auto-injectors  Form  [employer] Electronic PA Form (2017 NCPDP)  Created  1 day ago  Sent to Plan  6 hours ago  Plan Response  6 hours ago  Submit Clinical Questions  6 hours ago  Determination  Unfavorable  4 hours ago  eAppeal Submitted  eAppeal Determination  Your prior authorization request has been denied.  COMPLETE E-APPEAL  Your request for prior authorization was denied, but an Electronic Appeal is available for your patient. Complete the questions in the Appeal section at the bottom of this page to pursue the appeal. For assistance, contact our support team at [phone].  Message from plan: Your request has been denied

## 2024-07-05 NOTE — TELEPHONE ENCOUNTER
From: Alyse AL  To: Byron Cruz  Sent: 7/3/2024 1:48 PM EDT  Subject: Wegovy Denial    Your insurance denied coverage for Wegovy medication      BYRON CRUZ (Key: [id number])  Rx #: [id number]  Wegovy 0.25MG/0.5ML auto-injectors  Form  [employer] Electronic PA Form (2017 NCPDP)  Created  1 day ago  Sent to Plan  6 hours ago  Plan Response  6 hours ago  Submit Clinical Questions  6 hours ago  Determination  Unfavorable  4 hours ago  eAppeal Submitted  eAppeal Determination  Your prior authorization request has been denied.  COMPLETE E-APPEAL  Your request for prior authorization was denied, but an Electronic Appeal is available for your patient. Complete the questions in the Appeal section at the bottom of this page to pursue the appeal. For assistance, contact our support team at [phone].  Message from plan: Your request has been denied

## 2024-07-05 NOTE — TELEPHONE ENCOUNTER
Pt called and advised that Wegovy was denied and the tolteradine tart was never stated needed a PA, advised pt to check with pharmacy and go  should be at pharmacy. If pt has problems to call  our office reguarding that medication.

## 2024-07-15 DIAGNOSIS — R53.81 PHYSICAL DECONDITIONING: Primary | ICD-10-CM

## 2024-07-22 ENCOUNTER — TELEPHONE (OUTPATIENT)
Dept: PAIN MANAGEMENT | Age: 69
End: 2024-07-22

## 2024-07-22 DIAGNOSIS — M47.816 LUMBAR SPONDYLOSIS: ICD-10-CM

## 2024-07-22 DIAGNOSIS — M51.26 DISPLACEMENT OF LUMBAR INTERVERTEBRAL DISC: ICD-10-CM

## 2024-07-22 DIAGNOSIS — M51.9 LUMBAR DISC DISORDER: ICD-10-CM

## 2024-07-22 DIAGNOSIS — M47.816 LUMBAR FACET ARTHROPATHY: ICD-10-CM

## 2024-07-22 DIAGNOSIS — M79.7 FIBROMYALGIA: ICD-10-CM

## 2024-07-22 DIAGNOSIS — M48.061 SPINAL STENOSIS OF LUMBAR REGION WITHOUT NEUROGENIC CLAUDICATION: ICD-10-CM

## 2024-07-22 DIAGNOSIS — G89.29 CHRONIC LOW BACK PAIN WITH SCIATICA, SCIATICA LATERALITY UNSPECIFIED, UNSPECIFIED BACK PAIN LATERALITY: ICD-10-CM

## 2024-07-22 DIAGNOSIS — G89.4 CHRONIC PAIN SYNDROME: ICD-10-CM

## 2024-07-22 DIAGNOSIS — M54.40 CHRONIC LOW BACK PAIN WITH SCIATICA, SCIATICA LATERALITY UNSPECIFIED, UNSPECIFIED BACK PAIN LATERALITY: ICD-10-CM

## 2024-07-22 DIAGNOSIS — F11.99 OPIOID USE, UNSPECIFIED WITH UNSPECIFIED OPIOID-INDUCED DISORDER (HCC): ICD-10-CM

## 2024-07-22 DIAGNOSIS — M79.18 MYOFASCIAL PAIN SYNDROME: ICD-10-CM

## 2024-07-22 RX ORDER — OXYCODONE HYDROCHLORIDE AND ACETAMINOPHEN 5; 325 MG/1; MG/1
0.5 TABLET ORAL 3 TIMES DAILY PRN
Qty: 45 TABLET | Refills: 0 | Status: SHIPPED | OUTPATIENT
Start: 2024-07-22 | End: 2024-08-21

## 2024-07-22 NOTE — TELEPHONE ENCOUNTER
Sheree is requesting a refill of Percocet. Last filled 12/29/23, OARRS is consistent. Last office visit was 6/18/24, next visit scheduled for 10/13/24. Thank you.

## 2024-07-29 DIAGNOSIS — K21.9 GASTROESOPHAGEAL REFLUX DISEASE WITHOUT ESOPHAGITIS: ICD-10-CM

## 2024-07-29 RX ORDER — PANTOPRAZOLE SODIUM 40 MG/1
40 TABLET, DELAYED RELEASE ORAL DAILY
Qty: 90 TABLET | Refills: 1 | Status: SHIPPED | OUTPATIENT
Start: 2024-07-29

## 2024-08-08 ENCOUNTER — TELEPHONE (OUTPATIENT)
Dept: FAMILY MEDICINE CLINIC | Age: 69
End: 2024-08-08

## 2024-08-08 DIAGNOSIS — M79.7 FIBROMYALGIA: Primary | ICD-10-CM

## 2024-08-08 DIAGNOSIS — Z96.653 HISTORY OF TOTAL BILATERAL KNEE REPLACEMENT: ICD-10-CM

## 2024-08-08 DIAGNOSIS — G89.4 CHRONIC PAIN SYNDROME: ICD-10-CM

## 2024-08-08 DIAGNOSIS — E66.01 MORBID OBESITY WITH BODY MASS INDEX (BMI) OF 45.0 TO 49.9 IN ADULT (HCC): ICD-10-CM

## 2024-08-08 NOTE — TELEPHONE ENCOUNTER
[facility] called and stated that patient expressed to them that she is having a hard transferring and a hard time walking. She would like home PT w/ [facility].

## 2024-08-29 ENCOUNTER — TELEPHONE (OUTPATIENT)
Dept: FAMILY MEDICINE CLINIC | Age: 69
End: 2024-08-29

## 2024-09-03 RX ORDER — METOPROLOL TARTRATE 25 MG/1
TABLET, FILM COATED ORAL
Qty: 90 TABLET | Refills: 1 | Status: SHIPPED | OUTPATIENT
Start: 2024-09-03

## 2024-09-10 RX ORDER — FLUTICASONE FUROATE, UMECLIDINIUM BROMIDE AND VILANTEROL TRIFENATATE 200; 62.5; 25 UG/1; UG/1; UG/1
POWDER RESPIRATORY (INHALATION)
Refills: 6 | OUTPATIENT
Start: 2024-09-10

## 2024-09-13 RX ORDER — FLUTICASONE FUROATE, UMECLIDINIUM BROMIDE AND VILANTEROL TRIFENATATE 200; 62.5; 25 UG/1; UG/1; UG/1
POWDER RESPIRATORY (INHALATION)
Qty: 60 EACH | Refills: 6 | Status: SHIPPED | OUTPATIENT
Start: 2024-09-13

## 2024-09-25 DIAGNOSIS — M54.50 LOW BACK PAIN, UNSPECIFIED BACK PAIN LATERALITY, UNSPECIFIED CHRONICITY, UNSPECIFIED WHETHER SCIATICA PRESENT: ICD-10-CM

## 2024-09-27 ENCOUNTER — PATIENT MESSAGE (OUTPATIENT)
Dept: FAMILY MEDICINE CLINIC | Age: 69
End: 2024-09-27

## 2024-09-30 DIAGNOSIS — R32 URINARY INCONTINENCE, UNSPECIFIED TYPE: ICD-10-CM

## 2024-09-30 RX ORDER — TOLTERODINE 2 MG/1
2 CAPSULE, EXTENDED RELEASE ORAL DAILY
Qty: 90 CAPSULE | Refills: 1 | Status: SHIPPED | OUTPATIENT
Start: 2024-09-30

## 2024-10-12 DIAGNOSIS — M79.7 FIBROMYALGIA: ICD-10-CM

## 2024-10-16 RX ORDER — PREGABALIN 150 MG/1
CAPSULE ORAL
Qty: 90 CAPSULE | Refills: 3 | OUTPATIENT
Start: 2024-10-16

## 2024-10-20 DIAGNOSIS — K21.9 GASTROESOPHAGEAL REFLUX DISEASE WITHOUT ESOPHAGITIS: ICD-10-CM

## 2024-10-21 RX ORDER — ALBUTEROL SULFATE 90 UG/1
INHALANT RESPIRATORY (INHALATION)
Qty: 8.5 G | Refills: 6 | Status: SHIPPED | OUTPATIENT
Start: 2024-10-21

## 2024-10-21 RX ORDER — PANTOPRAZOLE SODIUM 40 MG/1
40 TABLET, DELAYED RELEASE ORAL 2 TIMES DAILY
Qty: 90 TABLET | Refills: 1 | OUTPATIENT
Start: 2024-10-21

## 2024-10-22 DIAGNOSIS — M79.7 FIBROMYALGIA: ICD-10-CM

## 2024-10-22 RX ORDER — PREGABALIN 150 MG/1
150 CAPSULE ORAL 3 TIMES DAILY
Qty: 90 CAPSULE | Refills: 0 | Status: SHIPPED | OUTPATIENT
Start: 2024-10-22 | End: 2024-11-21

## 2024-10-29 ENCOUNTER — PATIENT MESSAGE (OUTPATIENT)
Dept: FAMILY MEDICINE CLINIC | Age: 69
End: 2024-10-29

## 2024-10-29 DIAGNOSIS — K21.9 GASTROESOPHAGEAL REFLUX DISEASE WITHOUT ESOPHAGITIS: ICD-10-CM

## 2024-10-29 RX ORDER — PANTOPRAZOLE SODIUM 40 MG/1
40 TABLET, DELAYED RELEASE ORAL DAILY
Qty: 90 TABLET | Refills: 1 | Status: CANCELLED | OUTPATIENT
Start: 2024-10-29

## 2024-10-29 NOTE — TELEPHONE ENCOUNTER
Name of Medication(s) Requested:  Requested Prescriptions     Pending Prescriptions Disp Refills    pantoprazole (PROTONIX) 40 MG tablet 90 tablet 1     Sig: Take 1 tablet by mouth daily       Medication is on current medication list Yes    Dosage and directions were verified? Yes    Quantity verified: 90 day supply     Pharmacy Verified?  Yes    Last Appointment:  6/11/2024    Future appts:  Future Appointments   Date Time Provider Department Center   11/7/2024 10:15 AM Seth Wan MD [facility]   11/19/2024  2:45 PM Lee Tracy MD [facility]   12/6/2024  2:45 PM [facility]        (If no appt send self scheduling link. .REFILLAPPT)  Scheduling request sent?     [] Yes  [x] No    Does patient need updated?  [] Yes  [x] No

## 2024-10-30 DIAGNOSIS — K21.9 GASTROESOPHAGEAL REFLUX DISEASE WITHOUT ESOPHAGITIS: ICD-10-CM

## 2024-10-30 RX ORDER — PANTOPRAZOLE SODIUM 40 MG/1
40 TABLET, DELAYED RELEASE ORAL DAILY
Qty: 90 TABLET | Refills: 1 | Status: SHIPPED | OUTPATIENT
Start: 2024-10-30

## 2024-10-30 NOTE — TELEPHONE ENCOUNTER
Name of Medication(s) Requested:  Requested Prescriptions     Pending Prescriptions Disp Refills    pantoprazole (PROTONIX) 40 MG tablet 90 tablet 1     Sig: Take 1 tablet by mouth daily       Medication is on current medication list Yes    Dosage and directions were verified? Yes    Quantity verified: 90 day supply     Pharmacy Verified?  Yes    Last Appointment:  6/11/2024    Future appts:  Future Appointments   Date Time Provider Department Center   11/7/2024 10:15 AM Seth Wan MD [facility]   11/19/2024  2:45 PM Lee Tracy MD [facility]   12/6/2024  2:45 PM [facility]        (If no appt send self scheduling link. .REFILLAPPT)  Scheduling request sent?     [] Yes  [x] No    Does patient need updated?  [] Yes  [x] No

## 2024-11-07 ENCOUNTER — OFFICE VISIT (OUTPATIENT)
Dept: FAMILY MEDICINE CLINIC | Age: 69
End: 2024-11-07

## 2024-11-07 VITALS
TEMPERATURE: 98 F | HEIGHT: 67 IN | WEIGHT: 293 LBS | DIASTOLIC BLOOD PRESSURE: 72 MMHG | SYSTOLIC BLOOD PRESSURE: 107 MMHG | OXYGEN SATURATION: 98 % | BODY MASS INDEX: 45.99 KG/M2 | HEART RATE: 76 BPM

## 2024-11-07 DIAGNOSIS — Z23 NEED FOR VACCINATION: ICD-10-CM

## 2024-11-07 DIAGNOSIS — Z12.31 ENCOUNTER FOR SCREENING MAMMOGRAM FOR MALIGNANT NEOPLASM OF BREAST: ICD-10-CM

## 2024-11-07 DIAGNOSIS — M79.89 LEG SWELLING: ICD-10-CM

## 2024-11-07 DIAGNOSIS — M54.50 LOW BACK PAIN, UNSPECIFIED BACK PAIN LATERALITY, UNSPECIFIED CHRONICITY, UNSPECIFIED WHETHER SCIATICA PRESENT: ICD-10-CM

## 2024-11-07 DIAGNOSIS — N18.31 STAGE 3A CHRONIC KIDNEY DISEASE (HCC): ICD-10-CM

## 2024-11-07 DIAGNOSIS — E66.01 MORBID OBESITY WITH BODY MASS INDEX (BMI) OF 45.0 TO 49.9 IN ADULT: ICD-10-CM

## 2024-11-07 DIAGNOSIS — Z87.891 PERSONAL HISTORY OF NICOTINE DEPENDENCE: ICD-10-CM

## 2024-11-07 DIAGNOSIS — Z87.891 PERSONAL HISTORY OF TOBACCO USE: ICD-10-CM

## 2024-11-07 DIAGNOSIS — Z00.00 MEDICARE ANNUAL WELLNESS VISIT, SUBSEQUENT: Primary | ICD-10-CM

## 2024-11-07 DIAGNOSIS — R73.9 HYPERGLYCEMIA: ICD-10-CM

## 2024-11-07 LAB
ALBUMIN: 4 G/DL (ref 3.5–5.2)
ALP BLD-CCNC: 84 U/L (ref 35–104)
ALT SERPL-CCNC: 9 U/L (ref 0–32)
ANION GAP SERPL CALCULATED.3IONS-SCNC: 15 MMOL/L (ref 7–16)
AST SERPL-CCNC: 14 U/L (ref 0–31)
BILIRUB SERPL-MCNC: 0.3 MG/DL (ref 0–1.2)
BUN BLDV-MCNC: 15 MG/DL (ref 6–23)
CALCIUM SERPL-MCNC: 9 MG/DL (ref 8.6–10.2)
CHLORIDE BLD-SCNC: 109 MMOL/L (ref 98–107)
CHOLESTEROL, TOTAL: 219 MG/DL
CO2: 19 MMOL/L (ref 22–29)
CREAT SERPL-MCNC: 1 MG/DL (ref 0.5–1)
GFR, ESTIMATED: 60 ML/MIN/1.73M2
GLUCOSE BLD-MCNC: 95 MG/DL (ref 74–99)
HBA1C MFR BLD: 6 %
HDLC SERPL-MCNC: 71 MG/DL
LDL CHOLESTEROL: 120 MG/DL
POTASSIUM SERPL-SCNC: 4.5 MMOL/L (ref 3.5–5)
SODIUM BLD-SCNC: 143 MMOL/L (ref 132–146)
TOTAL PROTEIN: 6.7 G/DL (ref 6.4–8.3)
TRIGL SERPL-MCNC: 138 MG/DL
VLDLC SERPL CALC-MCNC: 28 MG/DL

## 2024-11-07 RX ORDER — FUROSEMIDE 20 MG/1
20 TABLET ORAL SEE ADMIN INSTRUCTIONS
Qty: 30 TABLET | Refills: 0 | Status: SHIPPED | OUTPATIENT
Start: 2024-11-07

## 2024-11-07 ASSESSMENT — PATIENT HEALTH QUESTIONNAIRE - PHQ9
SUM OF ALL RESPONSES TO PHQ QUESTIONS 1-9: 5
3. TROUBLE FALLING OR STAYING ASLEEP: SEVERAL DAYS
2. FEELING DOWN, DEPRESSED OR HOPELESS: SEVERAL DAYS
SUM OF ALL RESPONSES TO PHQ QUESTIONS 1-9: 5
7. TROUBLE CONCENTRATING ON THINGS, SUCH AS READING THE NEWSPAPER OR WATCHING TELEVISION: NOT AT ALL
SUM OF ALL RESPONSES TO PHQ9 QUESTIONS 1 & 2: 2
4. FEELING TIRED OR HAVING LITTLE ENERGY: SEVERAL DAYS
SUM OF ALL RESPONSES TO PHQ QUESTIONS 1-9: 5
5. POOR APPETITE OR OVEREATING: SEVERAL DAYS
10. IF YOU CHECKED OFF ANY PROBLEMS, HOW DIFFICULT HAVE THESE PROBLEMS MADE IT FOR YOU TO DO YOUR WORK, TAKE CARE OF THINGS AT HOME, OR GET ALONG WITH OTHER PEOPLE: SOMEWHAT DIFFICULT
9. THOUGHTS THAT YOU WOULD BE BETTER OFF DEAD, OR OF HURTING YOURSELF: NOT AT ALL
8. MOVING OR SPEAKING SO SLOWLY THAT OTHER PEOPLE COULD HAVE NOTICED. OR THE OPPOSITE, BEING SO FIGETY OR RESTLESS THAT YOU HAVE BEEN MOVING AROUND A LOT MORE THAN USUAL: NOT AT ALL
6. FEELING BAD ABOUT YOURSELF - OR THAT YOU ARE A FAILURE OR HAVE LET YOURSELF OR YOUR FAMILY DOWN: NOT AT ALL
1. LITTLE INTEREST OR PLEASURE IN DOING THINGS: SEVERAL DAYS
SUM OF ALL RESPONSES TO PHQ QUESTIONS 1-9: 5

## 2024-11-07 NOTE — PROGRESS NOTES
FM Progress Note    Subjective:   LBP. Previous injury. Follows with Cony Ashby. Needs new [facility] PT. Lyrica tid.     GERD. Controlled with protonix.     Urinary incontinence. Detrol doesn't help. Will be seeing Urology soon.     Copd. Trelegy. Albuterol bid. Sees Pulm.    Pristiq, buspar and topamax via psych. Depression controlled.       Health Maintenance Due   Topic Date Due    Shingles vaccine (1 of 2) Never done    Respiratory Syncytial Virus (RSV) Pregnant or age 60 yrs+ (1 - 1-dose 60+ series) Never done    Diabetic retinal exam  08/20/2022    Breast cancer screen  11/05/2022    Lung Cancer Screening &/or Counseling  02/26/2024    Diabetic Alb to Cr ratio (uACR) test  03/21/2024    Lipids  04/26/2024    Diabetic foot exam  07/19/2024    COVID-19 Vaccine (3 - 2023-24 season) 09/01/2024    GFR test (Diabetes, CKD 3-4, OR last GFR 15-59)  10/18/2024           Objective:   /72   Pulse 76   Temp 98 °F (36.7 °C)   Ht 1.702 m (5' 7\")   Wt (!) 149.7 kg (330 lb)   LMP  (LMP Unknown)   SpO2 98%   BMI 51.69 kg/m²   General appearance: NAD, alert and interacting appropriately  HEENT: NCAT, PERRLA, EOMI   Resp: CTAB, no WRC  CVS: RRR, no MRG  Abdomen: BS +, SNDNT  Extremities: No clubbing, cyanosis, or edema. Warm. Dry.        I have reviewed this patient's previous records.    I have reviewed this patient's labs.    I have reviewed this patient's medications.      Assessment/Plan:    Sheree was seen today for medicare awv.    Diagnoses and all orders for this visit:    Medicare annual wellness visit, subsequent    Personal history of tobacco use  -     [place] VISIT TO DISCUSS LUNG CA SCREEN W LDCT    Low back pain, unspecified back pain laterality, unspecified chronicity, unspecified whether sciatica present  -     External Referral To Home Health    Leg swelling  -     furosemide (LASIX) 20 MG tablet; Take 1 tablet by mouth See Admin Instructions 1 tab by mouth prn    Stage 3a chronic kidney disease (HCC)  -

## 2024-11-07 NOTE — PROGRESS NOTES
Medicare Annual Wellness Visit    Sheree Cruz is here for Medicare AWV    Assessment & Plan     Recommendations for Preventive Services Due: see orders and patient instructions/AVS.  Recommended screening schedule for the next 5-10 years is provided to the patient in written form: see Patient Instructions/AVS.     No follow-ups on file.     Subjective       HCPOA: would be Karlie Conroy  Code status: DNR-CCA  No QOL: not able to walk. Not able to function on my own.       Patient's complete Health Risk Assessment and screening values have been reviewed and are found in Flowsheets. The following problems were reviewed today and where indicated follow up appointments were made and/or referrals ordered.    Positive Risk Factor Screenings with Interventions:    Fall Risk:  Do you feel unsteady or are you worried about falling? : (!) yes  2 or more falls in past year?: (!) yes  Fall with injury in past year?: no     Interventions:    Reviewed medications, home hazards, visual acuity, and co-morbidities that can increase risk for falls     Depression:  PHQ-2 Score: 2  PHQ-9 Total Score: 5  Total Score Interpretation: 5-9 = mild depression    Interventions:  Patient advised to follow-up in this office for further evaluation and treatment          General HRA Questions:  Select all that apply: (!) New or Increased Pain    Interventions - Pain:  F/u Cony Ashby      Inactivity:  On average, how many days per week do you engage in moderate to strenuous exercise (like a brisk walk)?: 0 days (!) Abnormal  On average, how many minutes do you engage in exercise at this level?: 0 min    Interventions:  Increase activity as tolerated     Abnormal BMI (obese):  Body mass index is 51.69 kg/m². (!) Abnormal    Interventions:  Per pt instructions             Advanced Directives:  Do you have a Living Will?: (!) No    Intervention:  has NO advanced directive - information provided       Lung Cancer Screening:  ordered

## 2024-11-07 NOTE — PATIENT INSTRUCTIONS
found on CT scans are harmless and would not have caused a problem if they had not been found through screening. But because doctors can't tell which ones will turn out to be harmless, most will be treated. This means that you may get treatment--including surgery, radiation, or chemotherapy--that you don't need.  There is a risk of damage to cells or tissue from being exposed to radiation, including the small amounts used in CTs, X-rays, and other medical tests. Over time, exposure to radiation may cause cancer and other health problems. But in most cases, the risk of getting cancer from being exposed to small amounts of radiation is low. It's not a reason to avoid these tests for most people.  What are the benefits of screening?  Your scan may be normal (negative).  For some people who are at higher risk, screening lowers the chance of dying of lung cancer. How much and how long you smoked helps to determine your risk level. Screening can find some cancers early, when treatment may be more likely to work.  What happens after screening?  The results of your CT scan will be sent to your doctor. Someone from your care team will explain the results of your scan and answer any questions you may have. If you need any follow-up, he or she will help you understand what to do next.  After a lung cancer screening, you can go back to your usual activities right away.  A lung cancer screening test can't tell if you have lung cancer. If your results are positive, your doctor can't tell whether an abnormal finding is a harmless nodule, cancer, or something else without doing more tests.  What can you do to help prevent lung cancer?  Some lung cancers can't be prevented. But if you smoke, quitting smoking is the best step you can take to prevent lung cancer. If you want to quit, your doctor can recommend medicines or other ways to help.  Follow-up care is a key part of your treatment and safety. Be sure to make and go to all

## 2024-11-08 DIAGNOSIS — E78.5 DYSLIPIDEMIA: Primary | ICD-10-CM

## 2024-11-08 RX ORDER — ROSUVASTATIN CALCIUM 5 MG/1
5 TABLET, COATED ORAL NIGHTLY
Qty: 30 TABLET | Refills: 3 | Status: SHIPPED | OUTPATIENT
Start: 2024-11-08

## 2024-12-02 DIAGNOSIS — E78.5 HYPERLIPIDEMIA, UNSPECIFIED HYPERLIPIDEMIA TYPE: ICD-10-CM

## 2024-12-02 RX ORDER — ATORVASTATIN CALCIUM 20 MG/1
20 TABLET, FILM COATED ORAL NIGHTLY
Qty: 90 TABLET | Refills: 1 | Status: SHIPPED | OUTPATIENT
Start: 2024-12-02

## 2024-12-04 ENCOUNTER — TELEPHONE (OUTPATIENT)
Dept: PAIN MANAGEMENT | Age: 69
End: 2024-12-04

## 2024-12-04 DIAGNOSIS — M79.7 FIBROMYALGIA: ICD-10-CM

## 2024-12-04 RX ORDER — PREGABALIN 150 MG/1
150 CAPSULE ORAL 3 TIMES DAILY
Qty: 90 CAPSULE | Refills: 0 | Status: SHIPPED | OUTPATIENT
Start: 2024-12-04 | End: 2025-01-03

## 2024-12-04 NOTE — TELEPHONE ENCOUNTER
E-scribed.  I understand that she has ride issues, but if she does not make her next appt, I will need to wean her off this medication.  She needs to be seen due to this being a controlled substance.  I am more than happy to be flexible in accommodating her on my schedule.

## 2024-12-04 NOTE — TELEPHONE ENCOUNTER
Sheree is requesting a refill of Lyrica. Last filled 10/22. OARRS is consistent. Last appt 6/18. Next appt 1/10. thanks

## 2024-12-12 ENCOUNTER — TELEPHONE (OUTPATIENT)
Dept: FAMILY MEDICINE CLINIC | Age: 69
End: 2024-12-12

## 2024-12-12 NOTE — TELEPHONE ENCOUNTER
Name of Medication(s) Requested:  pantoprazole (PROTONIX) 40 MG PT said she doesn't know why doctor changed the script on her.  She said she can't only take once a day. She still needs it twice a day.    Medication is on current medication list Yes    Dosage and directions were verified? Yes    Quantity verified: 90 day supply     Pharmacy Verified?  Yes  [place] [id number] [facility] [phone] -  [phone]     Last Appointment:  11/7/2024    Future appts:  Future Appointments   Date Time Provider Department Center   12/17/2024  2:45 PM Lee Tracy MD ACC Pulm [facility]   1/2/2025 11:00 AM [facility] CT 1 SEYZ CT/AUS [facility]   1/10/2025  2:45 PM Keyla Ashby PA Liane Pain [facility]   1/28/2025  3:15 PM Pastora Gallagher MD [facility]   5/8/2025 10:00 AM Seth Wan MD [facility] ECC DEP   11/11/2025 10:00 AM Seth Wan MD [facility] DEP        (If no appt send self scheduling link. .REFILLAPPT)  Scheduling request sent?     [] Yes  [x] No    Does patient need updated?  [] Yes  [x] No

## 2024-12-13 ENCOUNTER — TELEPHONE (OUTPATIENT)
Dept: FAMILY MEDICINE CLINIC | Age: 69
End: 2024-12-13

## 2024-12-13 DIAGNOSIS — K21.9 GASTROESOPHAGEAL REFLUX DISEASE WITHOUT ESOPHAGITIS: ICD-10-CM

## 2024-12-13 RX ORDER — PANTOPRAZOLE SODIUM 40 MG/1
40 TABLET, DELAYED RELEASE ORAL DAILY
Qty: 180 TABLET | Refills: 0 | Status: SHIPPED | OUTPATIENT
Start: 2024-12-13

## 2024-12-17 DIAGNOSIS — K21.9 GASTROESOPHAGEAL REFLUX DISEASE WITHOUT ESOPHAGITIS: ICD-10-CM

## 2024-12-17 RX ORDER — PANTOPRAZOLE SODIUM 40 MG/1
40 TABLET, DELAYED RELEASE ORAL 2 TIMES DAILY
Qty: 180 TABLET | Refills: 0 | Status: SHIPPED | OUTPATIENT
Start: 2024-12-17

## 2024-12-19 ENCOUNTER — TELEPHONE (OUTPATIENT)
Dept: FAMILY MEDICINE CLINIC | Age: 69
End: 2024-12-19

## 2024-12-19 DIAGNOSIS — R21 SKIN RASH: ICD-10-CM

## 2024-12-19 RX ORDER — NYSTATIN 100000 [USP'U]/G
POWDER TOPICAL
Qty: 60 G | Refills: 2 | Status: SHIPPED | OUTPATIENT
Start: 2024-12-19

## 2024-12-19 NOTE — TELEPHONE ENCOUNTER
Pt sees PT with [facility], pt reported a tear in the skin on back leg. Nurse did come out to assess and placed dressing on wound however pt is having edema in legs- bilateral  +3. Nurse Simmons said that legs are stating to turn pink in color. Pt has not been taking diuretics and does not elevate legs. Nurse Simmons was calling to see if there was anything specific you wanted for pt. Please call Iris back asap.

## 2024-12-19 NOTE — TELEPHONE ENCOUNTER
Name of Medication(s) Requested:  Requested Prescriptions     Pending Prescriptions Disp Refills    nystatin (MYCOSTATIN) 485512 UNIT/GM powder [Pharmacy Med Name: nystatin 100,000 unit/gram topical powder] 60 g 2     Sig: APPLY TO THE AFFECTED AREA(S) THREE TIMES DAILY       Medication is on current medication list Yes    Dosage and directions were verified? Yes    Quantity verified: 30 day supply     Pharmacy Verified?  Yes    Last Appointment:  11/7/2024    Future appts:  Future Appointments   Date Time Provider Department Center   1/2/2025 11:00 AM [facility] CT 1 SEYZ CT/AUS [facility]   1/10/2025  2:45 PM Keyla Ashby PA Liane Pain [facility]   1/28/2025  2:45 PM Lee Tracy MD ACC Pulm [facility]   1/28/2025  3:15 PM Pastora Gallagher MD [facility]   5/8/2025 10:00 AM Seth Wan MD [facility] ECC DEP   11/11/2025 10:00 AM Seth Wan MD [facility] DEP        (If no appt send self scheduling link. .REFILLAPPT)  Scheduling request sent?     [] Yes  [x] No    Does patient need updated?  [] Yes  [x] No

## 2024-12-20 NOTE — TELEPHONE ENCOUNTER
[facility] and message from Provider given. She will relay the information to the patient.

## 2024-12-20 NOTE — TELEPHONE ENCOUNTER
Please take the Lasix once daily for 7 days then reassess leg swelling.  If any shortness of breath or evidence concerning for cellulitis come in for medical attention.  Thank you.

## 2024-12-26 DIAGNOSIS — L03.119 CELLULITIS OF LOWER EXTREMITY, UNSPECIFIED LATERALITY: Primary | ICD-10-CM

## 2025-01-09 ENCOUNTER — TELEPHONE (OUTPATIENT)
Dept: FAMILY MEDICINE CLINIC | Age: 70
End: 2025-01-09

## 2025-01-09 NOTE — TELEPHONE ENCOUNTER
Myles with [facility] called, she wants to know if she can have an ok for wound care for the pts legs, blisters on back side, pt does not elevate legs and getting worse,    They want to clean with normal saline  Apply Triad cream  Adaptic dressing  Dry dressing and wrap    Call Myles at [phone]

## 2025-01-10 ENCOUNTER — OFFICE VISIT (OUTPATIENT)
Dept: PAIN MANAGEMENT | Age: 70
End: 2025-01-10
Payer: MEDICAID

## 2025-01-10 VITALS
DIASTOLIC BLOOD PRESSURE: 62 MMHG | HEIGHT: 67 IN | HEART RATE: 79 BPM | RESPIRATION RATE: 18 BRPM | BODY MASS INDEX: 45.99 KG/M2 | TEMPERATURE: 96.6 F | OXYGEN SATURATION: 97 % | WEIGHT: 293 LBS | SYSTOLIC BLOOD PRESSURE: 120 MMHG

## 2025-01-10 DIAGNOSIS — G89.29 CHRONIC LOW BACK PAIN WITH SCIATICA, SCIATICA LATERALITY UNSPECIFIED, UNSPECIFIED BACK PAIN LATERALITY: ICD-10-CM

## 2025-01-10 DIAGNOSIS — M51.9 LUMBAR DISC DISORDER: ICD-10-CM

## 2025-01-10 DIAGNOSIS — M79.7 FIBROMYALGIA: ICD-10-CM

## 2025-01-10 DIAGNOSIS — M51.26 DISPLACEMENT OF LUMBAR INTERVERTEBRAL DISC: ICD-10-CM

## 2025-01-10 DIAGNOSIS — F11.99 OPIOID USE, UNSPECIFIED WITH UNSPECIFIED OPIOID-INDUCED DISORDER (HCC): Primary | ICD-10-CM

## 2025-01-10 DIAGNOSIS — G89.4 CHRONIC PAIN SYNDROME: ICD-10-CM

## 2025-01-10 DIAGNOSIS — M47.816 LUMBAR SPONDYLOSIS: ICD-10-CM

## 2025-01-10 DIAGNOSIS — M47.816 LUMBAR FACET ARTHROPATHY: ICD-10-CM

## 2025-01-10 DIAGNOSIS — M79.18 MYOFASCIAL PAIN SYNDROME: ICD-10-CM

## 2025-01-10 DIAGNOSIS — M48.061 SPINAL STENOSIS OF LUMBAR REGION WITHOUT NEUROGENIC CLAUDICATION: ICD-10-CM

## 2025-01-10 DIAGNOSIS — M54.40 CHRONIC LOW BACK PAIN WITH SCIATICA, SCIATICA LATERALITY UNSPECIFIED, UNSPECIFIED BACK PAIN LATERALITY: ICD-10-CM

## 2025-01-10 LAB
SEND OUT REPORT: NORMAL
TEST NAME: NORMAL

## 2025-01-10 PROCEDURE — 99213 OFFICE O/P EST LOW 20 MIN: CPT | Performed by: PHYSICIAN ASSISTANT

## 2025-01-10 RX ORDER — PREGABALIN 150 MG/1
150 CAPSULE ORAL 3 TIMES DAILY
Qty: 90 CAPSULE | Refills: 3 | Status: SHIPPED | OUTPATIENT
Start: 2025-01-10 | End: 2025-02-09

## 2025-01-10 RX ORDER — BACLOFEN 10 MG/1
TABLET ORAL
Qty: 90 TABLET | Refills: 3 | Status: SHIPPED | OUTPATIENT
Start: 2025-01-10

## 2025-01-10 NOTE — PROGRESS NOTES
Sheree Cruz presents to the [facility] on 1/10/2025. Sheree is complaining of pain lower back occasional radiation to BLE. The pain is persistent. The pain is described as aching, throbbing, and shooting. Pain is rated on her best day at a 6, on her worst day at a 10, and on average at a 6 on the VAS scale. She took her last dose of Percocet and Lyrica Percocet was a few days ago.      Any procedures since your last visit: No    She is not on NSAIDS and  is  on anticoagulation medications to include ASA and is managed by Dr. Gallagher.     Pacemaker or defibrillator: No   Medication Contract and Consent for Opioid Use Documents Filed       Patient Documents       Type of Document Status Date Received Received By Description    Medication Contract Received 10/14/2020  1:22 PM TOMY THOMAS Pain Mgmt Patient Agreement    Medication Contract Received 12/6/2021  1:54 PM ALISE ZAVALA Medication Contract    Medication Contract Received 2/10/2023  2:44 PM ANTHONY ESTRADA opioid agreement                       /62   Pulse 79   Temp (!) 96.6 °F (35.9 °C) (Infrared)   Resp 18   Ht 1.702 m (5' 7\")   Wt (!) 149.7 kg (330 lb)   LMP  (LMP Unknown)   SpO2 97%   BMI 51.69 kg/m²      No LMP recorded (lmp unknown). Patient has had a hysterectomy.    
activities, and the physical activity);Obtaining appropriate analgesic effect of treatment.            We discussed with the patient that combining opioids, benzodiazepines, alcohol, illicit drugs or sleep aids increases the risk of respiratory depression including death. We discussed that these medications may cause drowsiness, sedation or dizziness and have counseled the patient not to drive or operate machinery. We have discussed that these medications will be prescribed only by one provider. We have discussed with the patient about age related risk factors and have thoroughly discussed the importance of taking these medications as prescribed. The patient verbalizes understanding.    ccreferring physic

## 2025-01-23 DIAGNOSIS — M47.816 LUMBAR SPONDYLOSIS: ICD-10-CM

## 2025-01-23 DIAGNOSIS — G89.4 CHRONIC PAIN SYNDROME: ICD-10-CM

## 2025-01-23 DIAGNOSIS — G89.29 CHRONIC LOW BACK PAIN WITH SCIATICA, SCIATICA LATERALITY UNSPECIFIED, UNSPECIFIED BACK PAIN LATERALITY: ICD-10-CM

## 2025-01-23 DIAGNOSIS — M51.26 DISPLACEMENT OF LUMBAR INTERVERTEBRAL DISC: ICD-10-CM

## 2025-01-23 DIAGNOSIS — M79.7 FIBROMYALGIA: ICD-10-CM

## 2025-01-23 DIAGNOSIS — F11.99 OPIOID USE, UNSPECIFIED WITH UNSPECIFIED OPIOID-INDUCED DISORDER (HCC): ICD-10-CM

## 2025-01-23 DIAGNOSIS — M48.061 SPINAL STENOSIS OF LUMBAR REGION WITHOUT NEUROGENIC CLAUDICATION: ICD-10-CM

## 2025-01-23 DIAGNOSIS — M47.816 LUMBAR FACET ARTHROPATHY: ICD-10-CM

## 2025-01-23 DIAGNOSIS — M54.40 CHRONIC LOW BACK PAIN WITH SCIATICA, SCIATICA LATERALITY UNSPECIFIED, UNSPECIFIED BACK PAIN LATERALITY: ICD-10-CM

## 2025-01-23 DIAGNOSIS — M79.18 MYOFASCIAL PAIN SYNDROME: ICD-10-CM

## 2025-01-23 DIAGNOSIS — M51.9 LUMBAR DISC DISORDER: ICD-10-CM

## 2025-01-23 LAB
SEND OUT REPORT: NORMAL
TEST NAME: NORMAL

## 2025-01-23 RX ORDER — OXYCODONE AND ACETAMINOPHEN 5; 325 MG/1; MG/1
0.5 TABLET ORAL 2 TIMES DAILY PRN
Qty: 30 TABLET | Refills: 0 | Status: SHIPPED | OUTPATIENT
Start: 2025-01-23 | End: 2025-02-22

## 2025-01-28 ENCOUNTER — OFFICE VISIT (OUTPATIENT)
Dept: CARDIOLOGY CLINIC | Age: 70
End: 2025-01-28
Payer: MEDICARE

## 2025-01-28 VITALS
HEART RATE: 83 BPM | WEIGHT: 293 LBS | BODY MASS INDEX: 45.99 KG/M2 | SYSTOLIC BLOOD PRESSURE: 124 MMHG | HEIGHT: 67 IN | RESPIRATION RATE: 18 BRPM | DIASTOLIC BLOOD PRESSURE: 78 MMHG

## 2025-01-28 DIAGNOSIS — I48.91 ATRIAL FIBRILLATION, UNSPECIFIED TYPE (HCC): Primary | ICD-10-CM

## 2025-01-28 PROCEDURE — G8399 PT W/DXA RESULTS DOCUMENT: HCPCS | Performed by: INTERNAL MEDICINE

## 2025-01-28 PROCEDURE — 1159F MED LIST DOCD IN RCRD: CPT | Performed by: INTERNAL MEDICINE

## 2025-01-28 PROCEDURE — 1123F ACP DISCUSS/DSCN MKR DOCD: CPT | Performed by: INTERNAL MEDICINE

## 2025-01-28 PROCEDURE — 1090F PRES/ABSN URINE INCON ASSESS: CPT | Performed by: INTERNAL MEDICINE

## 2025-01-28 PROCEDURE — 93000 ELECTROCARDIOGRAM COMPLETE: CPT | Performed by: INTERNAL MEDICINE

## 2025-01-28 PROCEDURE — G8427 DOCREV CUR MEDS BY ELIG CLIN: HCPCS | Performed by: INTERNAL MEDICINE

## 2025-01-28 PROCEDURE — 1160F RVW MEDS BY RX/DR IN RCRD: CPT | Performed by: INTERNAL MEDICINE

## 2025-01-28 PROCEDURE — G8417 CALC BMI ABV UP PARAM F/U: HCPCS | Performed by: INTERNAL MEDICINE

## 2025-01-28 PROCEDURE — 99213 OFFICE O/P EST LOW 20 MIN: CPT | Performed by: INTERNAL MEDICINE

## 2025-01-28 PROCEDURE — 4004F PT TOBACCO SCREEN RCVD TLK: CPT | Performed by: INTERNAL MEDICINE

## 2025-01-28 PROCEDURE — 3017F COLORECTAL CA SCREEN DOC REV: CPT | Performed by: INTERNAL MEDICINE

## 2025-01-28 NOTE — PROGRESS NOTES
(PROVENTIL;VENTOLIN;PROAIR) 108 (90 Base) MCG/ACT inhaler INHALE 2 PUFFS BY MOUTH AND INTO THE LUNGS EVERY 6 HOURS AS NEEDED FOR WHEEZING 8.5 g 6    tolterodine (DETROL LA) 2 MG extended release capsule Take 1 capsule by mouth daily 90 capsule 1    fluticasone-umeclidin-vilant (TRELEGY ELLIPTA) 200-62.5-25 MCG/ACT AEPB inhaler INHALE 1 PUFF BY MOUTH AND INTO THE LUNGS ONCE DAILY 60 each 6    metoprolol tartrate (LOPRESSOR) 25 MG tablet TAKE one-half of a TABLET BY MOUTH TWICE DAILY 90 tablet 1    desvenlafaxine succinate (PRISTIQ) 50 MG TB24 extended release tablet Take 1 tablet by mouth daily      cetirizine (ZYRTEC) 10 MG tablet Take 1 tablet by mouth daily 90 tablet 1    triamcinolone (KENALOG) 0.1 % cream Apply topically 2 times daily. 90 g 2    busPIRone (BUSPAR) 15 MG tablet TAKE 1 TABLET BY MOUTH THREE TIMES A  tablet 0    aspirin 81 MG EC tablet Take 1 tablet by mouth in the morning. 90 tablet 3    rosuvastatin (CRESTOR) 5 MG tablet Take 1 tablet by mouth nightly (Patient not taking: Reported on 1/28/2025) 30 tablet 3    Handicap Placard MISC by Does not apply route Cannot walk 200 feet without great difficulty.  5 years please 1 each 0    varenicline (CHANTIX) 1 MG tablet Take 1 tablet by mouth 2 times daily (Patient not taking: Reported on 1/10/2025) 180 tablet 1    Lift Chair MISC by Does not apply route 1 each 0    topiramate (TOPAMAX) 100 MG tablet Take 1 tablet by mouth 2 times daily (Patient not taking: Reported on 1/28/2025) 180 tablet 0     No current facility-administered medications for this visit.         Allergies as of 01/28/2025 - Fully Reviewed 01/28/2025   Allergen Reaction Noted    Carafate [sucralfate]  10/06/2020    Glucophage [metformin hcl]  08/24/2020    Mobic [meloxicam] Other (See Comments) 06/14/2016    Trazodone and nefazodone  08/04/2020    Ultram [tramadol]  06/14/2016    Nickel Itching and Rash 10/06/2015       Social History     Socioeconomic History    Marital status:

## 2025-02-24 NOTE — TELEPHONE ENCOUNTER
Name of Medication(s) Requested:  Requested Prescriptions     Pending Prescriptions Disp Refills    metoprolol tartrate (LOPRESSOR) 25 MG tablet [Pharmacy Med Name: metoprolol tartrate 25 mg tablet] 90 tablet 1     Sig: TAKE one-half of a TABLET BY MOUTH TWICE DAILY       Medication is on current medication list Yes    Dosage and directions were verified? Yes    Quantity verified: 90 day supply     Pharmacy Verified?  Yes    Last Appointment:  11/7/2024    Future appts:  Future Appointments   Date Time Provider Department Center   3/18/2025  2:00 PM [facility] CT 1 SEYZ CT/AUS [facility]   3/19/2025 11:00 AM Lee Tracy MD ACC Pulm [facility]   5/6/2025 10:00 AM Keyla Ashby PA Liane Pain [facility]   5/8/2025 10:00 AM Seth Wan MD [facility] DEP   11/11/2025 10:00 AM Seth Wan MD [facility] DEP        (If no appt send self scheduling link. .REFILLAPPT)  Scheduling request sent?     [] Yes  [x] No    Does patient need updated?  [] Yes  [x] No

## 2025-02-25 RX ORDER — METOPROLOL TARTRATE 25 MG/1
TABLET, FILM COATED ORAL
Qty: 90 TABLET | Refills: 1 | Status: SHIPPED | OUTPATIENT
Start: 2025-02-25

## 2025-03-26 DIAGNOSIS — R32 URINARY INCONTINENCE, UNSPECIFIED TYPE: ICD-10-CM

## 2025-03-26 RX ORDER — TOLTERODINE 2 MG/1
2 CAPSULE, EXTENDED RELEASE ORAL DAILY
Qty: 90 CAPSULE | Refills: 1 | Status: SHIPPED | OUTPATIENT
Start: 2025-03-26

## 2025-03-26 NOTE — TELEPHONE ENCOUNTER
Name of Medication(s) Requested:  Requested Prescriptions     Pending Prescriptions Disp Refills    tolterodine (DETROL LA) 2 MG extended release capsule [Pharmacy Med Name: tolterodine ER 2 mg capsule,extended release 24 hr] 90 capsule 1     Sig: TAKE ONE CAPSULE BY MOUTH ONCE DAILY       Medication is on current medication list Yes    Dosage and directions were verified? Yes    Quantity verified: 90 day supply     Pharmacy Verified?  Yes    Last Appointment:  11/7/2024    Future appts:  Future Appointments   Date Time Provider Department Center   4/8/2025  3:00 PM [facility] CT 1 SEYZ CT/AUS [facility]   4/22/2025 11:30 AM Seth Wan MD [facility] ECC DEP   5/6/2025 10:00 AM Keyla Ashby PA Liane Pain [facility]   5/8/2025 10:00 AM Seth Wan MD [facility] DEP   5/14/2025  1:30 PM [facility]   11/11/2025 10:00 AM Seth Wan MD [facility] DEP        (If no appt send self scheduling link. .REFILLAPPT)  Scheduling request sent?     [] Yes  [x] No    Does patient need updated?  [] Yes  [x] No

## 2025-04-14 ENCOUNTER — PATIENT MESSAGE (OUTPATIENT)
Dept: FAMILY MEDICINE CLINIC | Age: 70
End: 2025-04-14

## 2025-04-14 DIAGNOSIS — K21.9 GASTROESOPHAGEAL REFLUX DISEASE WITHOUT ESOPHAGITIS: ICD-10-CM

## 2025-04-14 DIAGNOSIS — R21 SKIN RASH: ICD-10-CM

## 2025-04-14 RX ORDER — PANTOPRAZOLE SODIUM 40 MG/1
40 TABLET, DELAYED RELEASE ORAL 2 TIMES DAILY
Qty: 180 TABLET | Refills: 0 | Status: SHIPPED | OUTPATIENT
Start: 2025-04-14

## 2025-04-14 RX ORDER — NYSTATIN 100000 [USP'U]/G
POWDER TOPICAL
Qty: 60 G | Refills: 2 | Status: SHIPPED | OUTPATIENT
Start: 2025-04-14

## 2025-04-14 NOTE — TELEPHONE ENCOUNTER
Name of Medication(s) Requested:  Requested Prescriptions     Pending Prescriptions Disp Refills    pantoprazole (PROTONIX) 40 MG tablet 180 tablet 0     Sig: Take 1 tablet by mouth in the morning and at bedtime       Medication is on current medication list Yes    Dosage and directions were verified? Yes    Quantity verified: 90 day supply     Pharmacy Verified?  Yes    Last Appointment:  11/7/2024    Future appts:  Future Appointments   Date Time Provider Department [place]   4/22/2025 11:30 AM Seth Wan MD [facility]   5/2/2025  3:00 PM [facility] CT 1 SEYZ CT/AUS [facility]   5/6/2025 10:00 AM Keyla Ashby PA [facility]iane Pain [facility]   5/8/2025 10:00 AM Seth Wan MD [facility]   5/14/2025  1:30 PM [facility]   11/11/2025 10:00 AM Seth Wan MD [facility]        (If no appt send self scheduling link. .REFILLAPPT)  Scheduling request sent?     [] Yes  [x] No    Does patient need updated?  [] Yes  [] No

## 2025-04-14 NOTE — TELEPHONE ENCOUNTER
Name of Medication(s) Requested:  Requested Prescriptions     Pending Prescriptions Disp Refills    nystatin (MYCOSTATIN) 573559 UNIT/GM powder 60 g 2     Sig: APPLY TO THE AFFECTED AREA(S) THREE TIMES DAILY       Medication is on current medication list Yes    Dosage and directions were verified? Yes    Quantity verified: 90 day supply     Pharmacy Verified?  Yes    Last Appointment:  11/7/2024    Future appts:  Future Appointments   Date Time Provider Department Center   4/22/2025 11:30 AM Seth Wan MD [facility] DEP   5/2/2025  3:00 PM [facility] CT 1 SEYZ CT/AUS [facility]   5/6/2025 10:00 AM Keyla Ashby PA Liane Pain [facility]   5/8/2025 10:00 AM Seth Wan MD [facility] DEP   5/14/2025  1:30 PM Lee Tracy MD ACC Pulm [facility]   11/11/2025 10:00 AM Seth Wan MD [facility]        (If no appt send self scheduling link. .REFILLAPPT)  Scheduling request sent?     [] Yes  [x] No    Does patient need updated?  [] Yes  [] No

## 2025-04-22 ENCOUNTER — OFFICE VISIT (OUTPATIENT)
Dept: FAMILY MEDICINE CLINIC | Age: 70
End: 2025-04-22
Payer: MEDICARE

## 2025-04-22 VITALS
SYSTOLIC BLOOD PRESSURE: 100 MMHG | DIASTOLIC BLOOD PRESSURE: 63 MMHG | TEMPERATURE: 97.9 F | BODY MASS INDEX: 56.23 KG/M2 | HEART RATE: 78 BPM | WEIGHT: 293 LBS

## 2025-04-22 DIAGNOSIS — J45.909 UNCOMPLICATED ASTHMA, UNSPECIFIED ASTHMA SEVERITY, UNSPECIFIED WHETHER PERSISTENT: Primary | ICD-10-CM

## 2025-04-22 DIAGNOSIS — F32.1 CURRENT MODERATE EPISODE OF MAJOR DEPRESSIVE DISORDER WITHOUT PRIOR EPISODE (HCC): ICD-10-CM

## 2025-04-22 DIAGNOSIS — G47.33 OSA (OBSTRUCTIVE SLEEP APNEA): ICD-10-CM

## 2025-04-22 DIAGNOSIS — Z76.0 MEDICATION REFILL: ICD-10-CM

## 2025-04-22 DIAGNOSIS — R32 URINARY INCONTINENCE, UNSPECIFIED TYPE: ICD-10-CM

## 2025-04-22 DIAGNOSIS — E11.51 TYPE II DIABETES MELLITUS WITH PERIPHERAL CIRCULATORY DISORDER (HCC): Primary | ICD-10-CM

## 2025-04-22 LAB — HBA1C MFR BLD: 6.4 %

## 2025-04-22 PROCEDURE — 1123F ACP DISCUSS/DSCN MKR DOCD: CPT | Performed by: FAMILY MEDICINE

## 2025-04-22 PROCEDURE — 1090F PRES/ABSN URINE INCON ASSESS: CPT | Performed by: FAMILY MEDICINE

## 2025-04-22 PROCEDURE — 1159F MED LIST DOCD IN RCRD: CPT | Performed by: FAMILY MEDICINE

## 2025-04-22 PROCEDURE — 4004F PT TOBACCO SCREEN RCVD TLK: CPT | Performed by: FAMILY MEDICINE

## 2025-04-22 PROCEDURE — 99214 OFFICE O/P EST MOD 30 MIN: CPT | Performed by: FAMILY MEDICINE

## 2025-04-22 PROCEDURE — 0509F URINE INCON PLAN DOCD: CPT | Performed by: FAMILY MEDICINE

## 2025-04-22 PROCEDURE — G8427 DOCREV CUR MEDS BY ELIG CLIN: HCPCS | Performed by: FAMILY MEDICINE

## 2025-04-22 PROCEDURE — 3044F HG A1C LEVEL LT 7.0%: CPT | Performed by: FAMILY MEDICINE

## 2025-04-22 PROCEDURE — G8417 CALC BMI ABV UP PARAM F/U: HCPCS | Performed by: FAMILY MEDICINE

## 2025-04-22 PROCEDURE — G8399 PT W/DXA RESULTS DOCUMENT: HCPCS | Performed by: FAMILY MEDICINE

## 2025-04-22 PROCEDURE — 2022F DILAT RTA XM EVC RTNOPTHY: CPT | Performed by: FAMILY MEDICINE

## 2025-04-22 PROCEDURE — 3017F COLORECTAL CA SCREEN DOC REV: CPT | Performed by: FAMILY MEDICINE

## 2025-04-22 PROCEDURE — 83036 HEMOGLOBIN GLYCOSYLATED A1C: CPT | Performed by: FAMILY MEDICINE

## 2025-04-22 PROCEDURE — G2211 COMPLEX E/M VISIT ADD ON: HCPCS | Performed by: FAMILY MEDICINE

## 2025-04-22 RX ORDER — TOLTERODINE 2 MG/1
2 CAPSULE, EXTENDED RELEASE ORAL DAILY
Qty: 90 CAPSULE | Refills: 1 | Status: SHIPPED | OUTPATIENT
Start: 2025-04-22

## 2025-04-22 RX ORDER — BUSPIRONE HYDROCHLORIDE 15 MG/1
TABLET ORAL
Qty: 270 TABLET | Refills: 1 | Status: SHIPPED | OUTPATIENT
Start: 2025-04-22

## 2025-04-22 RX ORDER — TOPIRAMATE 100 MG/1
100 TABLET, FILM COATED ORAL 2 TIMES DAILY
Qty: 180 TABLET | Refills: 0 | Status: SHIPPED | OUTPATIENT
Start: 2025-04-22

## 2025-04-22 RX ORDER — ALBUTEROL SULFATE 90 UG/1
2 INHALANT RESPIRATORY (INHALATION) EVERY 4 HOURS PRN
Qty: 8.5 G | Refills: 6 | Status: SHIPPED | OUTPATIENT
Start: 2025-04-22

## 2025-04-22 SDOH — ECONOMIC STABILITY: FOOD INSECURITY: WITHIN THE PAST 12 MONTHS, THE FOOD YOU BOUGHT JUST DIDN'T LAST AND YOU DIDN'T HAVE MONEY TO GET MORE.: NEVER TRUE

## 2025-04-22 SDOH — ECONOMIC STABILITY: FOOD INSECURITY: WITHIN THE PAST 12 MONTHS, YOU WORRIED THAT YOUR FOOD WOULD RUN OUT BEFORE YOU GOT MONEY TO BUY MORE.: NEVER TRUE

## 2025-04-22 ASSESSMENT — PATIENT HEALTH QUESTIONNAIRE - PHQ9
6. FEELING BAD ABOUT YOURSELF - OR THAT YOU ARE A FAILURE OR HAVE LET YOURSELF OR YOUR FAMILY DOWN: NEARLY EVERY DAY
7. TROUBLE CONCENTRATING ON THINGS, SUCH AS READING THE NEWSPAPER OR WATCHING TELEVISION: NEARLY EVERY DAY
10. IF YOU CHECKED OFF ANY PROBLEMS, HOW DIFFICULT HAVE THESE PROBLEMS MADE IT FOR YOU TO DO YOUR WORK, TAKE CARE OF THINGS AT HOME, OR GET ALONG WITH OTHER PEOPLE: SOMEWHAT DIFFICULT
5. POOR APPETITE OR OVEREATING: SEVERAL DAYS
SUM OF ALL RESPONSES TO PHQ QUESTIONS 1-9: 20
SUM OF ALL RESPONSES TO PHQ QUESTIONS 1-9: 19
1. LITTLE INTEREST OR PLEASURE IN DOING THINGS: NEARLY EVERY DAY
2. FEELING DOWN, DEPRESSED OR HOPELESS: NEARLY EVERY DAY
9. THOUGHTS THAT YOU WOULD BE BETTER OFF DEAD, OR OF HURTING YOURSELF: SEVERAL DAYS
8. MOVING OR SPEAKING SO SLOWLY THAT OTHER PEOPLE COULD HAVE NOTICED. OR THE OPPOSITE, BEING SO FIGETY OR RESTLESS THAT YOU HAVE BEEN MOVING AROUND A LOT MORE THAN USUAL: NOT AT ALL
4. FEELING TIRED OR HAVING LITTLE ENERGY: NEARLY EVERY DAY
3. TROUBLE FALLING OR STAYING ASLEEP: NEARLY EVERY DAY
SUM OF ALL RESPONSES TO PHQ QUESTIONS 1-9: 20
SUM OF ALL RESPONSES TO PHQ QUESTIONS 1-9: 20

## 2025-04-22 ASSESSMENT — COLUMBIA-SUICIDE SEVERITY RATING SCALE - C-SSRS
1. WITHIN THE PAST MONTH, HAVE YOU WISHED YOU WERE DEAD OR WISHED YOU COULD GO TO SLEEP AND NOT WAKE UP?: YES
6. HAVE YOU EVER DONE ANYTHING, STARTED TO DO ANYTHING, OR PREPARED TO DO ANYTHING TO END YOUR LIFE?: NO
2. HAVE YOU ACTUALLY HAD ANY THOUGHTS OF KILLING YOURSELF?: NO

## 2025-04-22 NOTE — PROGRESS NOTES
FM Progress Note    Subjective:   LBP. Previous injury. Follows with Cony Ashby. Needs new [facility] PT. Lyrica tid.     GERD. Controlled with protonix.     Urinary incontinence. Detrol doesn't help. Will be seeing Urology soon.    Copd. Trelegy. Albuterol bid. Sees Pulm.    buspar via psych. Topamax stopped 4 mo ago and pristiq stopped 1 mo ago 2/2 fatigue. Fatigue continued despite topamax stopping, so pristiq stopped. Depression not controlled. Felt mood was better on topamax.     Obesity. Not controlled.     TRAVIS. Sleep study 2021:  IMPRESSION:  1. Known obstructive sleep apnea.  2. Optimal titration with Bilevel.  3. Snoring eliminated.  4. Good oxygen saturation.  5. No cardiac dysrhythmia.   Pt not tolerating bipap on face.     h/o DM per pt. Metformin causes severe abd pain.   Hemoglobin A1C   Date Value Ref Range Status   04/22/2025 6.4 % Final       Health Maintenance Due   Topic Date Due    Shingles vaccine (1 of 2) Never done    Respiratory Syncytial Virus (RSV) Pregnant or age 60 yrs+ (1 - Risk 60-74 years 1-dose series) Never done    Diabetic retinal exam  08/20/2022    Breast cancer screen  11/05/2022    Lung Cancer Screening &/or Counseling  02/26/2024    Diabetic Alb to Cr ratio (uACR) test  03/21/2024    Diabetic foot exam  07/19/2024    COVID-19 Vaccine (3 - 2024-25 season) 09/01/2024    Annual Wellness Visit (Medicare Advantage)  01/01/2025           Objective:   /63   Pulse 78   Temp 97.9 °F (36.6 °C) (Temporal)   Wt (!) 162.8 kg (359 lb)   LMP  (LMP Unknown)   BMI 56.23 kg/m²   General appearance: NAD, alert and interacting appropriately  HEENT: NCAT, PERRLA, EOMI   Resp: CTAB, no WRC  CVS: RRR, no MRG  Abdomen: BS +, SNDNT  Extremities: No clubbing, cyanosis, or edema. Warm. Dry. Sensory exam of the foot is abnormal with decreased sensation b/l, tested with the monofilament. Good pulses, no lesions or ulcers, good peripheral pulses.         I have reviewed this patient's previous

## 2025-04-22 NOTE — PATIENT INSTRUCTIONS
Please call insurance and ask which of these is covered:  Marium Roland    Please let me know every month if you'd like to increase the dose of the injectable for weight loss.

## 2025-05-02 ENCOUNTER — HOSPITAL ENCOUNTER (OUTPATIENT)
Dept: CT IMAGING | Age: 70
Discharge: HOME | End: 2025-05-02
Attending: FAMILY MEDICINE
Payer: MEDICARE

## 2025-05-02 DIAGNOSIS — Z87.891 PERSONAL HISTORY OF NICOTINE DEPENDENCE: ICD-10-CM

## 2025-05-02 PROCEDURE — 71271 CT THORAX LUNG CANCER SCR C-: CPT

## 2025-05-04 DIAGNOSIS — M79.7 FIBROMYALGIA: ICD-10-CM

## 2025-05-05 ENCOUNTER — RESULTS FOLLOW-UP (OUTPATIENT)
Dept: FAMILY MEDICINE CLINIC | Age: 70
End: 2025-05-05

## 2025-05-05 RX ORDER — FLUTICASONE FUROATE, UMECLIDINIUM BROMIDE AND VILANTEROL TRIFENATATE 200; 62.5; 25 UG/1; UG/1; UG/1
POWDER RESPIRATORY (INHALATION)
Qty: 60 EACH | Refills: 6 | Status: SHIPPED | OUTPATIENT
Start: 2025-05-05

## 2025-05-16 ENCOUNTER — OFFICE VISIT (OUTPATIENT)
Age: 70
End: 2025-05-16

## 2025-05-16 VITALS
RESPIRATION RATE: 17 BRPM | TEMPERATURE: 97.7 F | DIASTOLIC BLOOD PRESSURE: 56 MMHG | HEART RATE: 76 BPM | SYSTOLIC BLOOD PRESSURE: 114 MMHG | OXYGEN SATURATION: 99 %

## 2025-05-16 DIAGNOSIS — M79.7 FIBROMYALGIA: ICD-10-CM

## 2025-05-16 DIAGNOSIS — Z87.891 PERSONAL HISTORY OF TOBACCO USE: Primary | ICD-10-CM

## 2025-05-16 RX ORDER — BUDESONIDE, GLYCOPYRROLATE, AND FORMOTEROL FUMARATE 160; 9; 4.8 UG/1; UG/1; UG/1
160 AEROSOL, METERED RESPIRATORY (INHALATION)
Qty: 1 EACH | Refills: 12 | Status: SHIPPED | OUTPATIENT
Start: 2025-05-16

## 2025-05-16 RX ORDER — PREGABALIN 150 MG/1
150 CAPSULE ORAL 3 TIMES DAILY
Qty: 90 CAPSULE | Refills: 3 | OUTPATIENT
Start: 2025-05-16 | End: 2025-06-15

## 2025-05-16 NOTE — PROGRESS NOTES
Discussed with the patient the current USPSTF guidelines released March 9, 2021 for screening for lung cancer.    For adults aged 50 to 80 years who have a 20 pack-year smoking history and currently smoke or have quit within the past 15 years the grade B recommendation is to:  Screen for lung cancer with low-dose computed tomography (LDCT) every year.  Stop screening once a person has not smoked for 15 years or has a health problem that limits life expectancy or the ability to have lung surgery.    The patient  reports that she has been smoking cigarettes. She started smoking about 32 years ago. She has a 47.8 pack-year smoking history. She has never used smokeless tobacco.. Discussed with patient the risks and benefits of screening, including over-diagnosis, false positive rate, and total radiation exposure.  The patient currently exhibits no signs or symptoms suggestive of lung cancer.  Discussed with patient the importance of compliance with yearly annual lung cancer screenings and willingness to undergo diagnosis and treatment if screening scan is positive.  In addition, the patient was counseled regarding the importance of remaining smoke free and/or total smoking cessation.    Also reviewed the following if the patient has Medicare that as of February 10, 2022, Medicare only covers LDCT screening in patients aged 50-77 with at least a 20 pack-year smoking history who currently smoke or have quit in the last 15 years

## 2025-05-16 NOTE — PROGRESS NOTES
Pulmonary Critical Care Medicine      FOLLOW UP PATIENT VISIT-PULMONARY    5/16/2025   Interval history-  Ms. Sheree Cruz is being followed here for  Dyspnea on exertion  Obstructive sleep apnea  Morbid obesity with pickwickian physiology  Mild COPD     She is noncompliant to nocturnal CPAP therapy and does not want to be considered again.  She has been intolerant to CPAP in the past  This is her annual follow-up after the CT scan of the chest    She started smoking again and currently smokes 1 pack of cigarettes a day.  States that she enjoys smoking and that is a big problem quitting    Patient feels good ,  is at  baseline health and has no new symptoms or problems . She has had no new symptoms , acute exacerbations , ER visits or hospitalizations  From any respiratory symptoms since the last visit.        Does not like the taste of Trelegy hence requesting for replacement.  No other symptoms    Background history-   Sheree Cruz is a 70 y.o. former smoker, morbidly obese   female with over 40 pkyr H/o smoking , initially referred here for PEREZ . She has steadily gained over 75 lbs of weight over the last few years.   She is known to have TRAVIS but does not want to use PAP therapy , no matter what. PFTs from June 2022 - concavity of the expiratory loop of flow volume loop but the flows not suggesting Obstructive ventilatory defect . DLCO decreased with normalization after alveolar ventilation. ( Typical for morbid obesity )   Was found to have two incidentaloma lung nodules on R side in June 2022 . < 3 mm - not worrisome for malignancy . 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     Current Baseline symptomatology-  Cough - moderate in severity , more in early am and gets better through the day , associated with whitish phlegm.   PEREZ - MMRC score of III , present for several years and gradually worsening over the years. Improved

## 2025-05-19 DIAGNOSIS — M79.7 FIBROMYALGIA: ICD-10-CM

## 2025-05-19 RX ORDER — PREGABALIN 150 MG/1
150 CAPSULE ORAL 3 TIMES DAILY
Qty: 90 CAPSULE | Refills: 0 | Status: SHIPPED | OUTPATIENT
Start: 2025-05-19 | End: 2025-06-18

## 2025-05-22 RX ORDER — BACLOFEN 10 MG/1
TABLET ORAL
Qty: 30 TABLET | Refills: 0 | Status: SHIPPED | OUTPATIENT
Start: 2025-05-22

## 2025-05-27 DIAGNOSIS — E78.5 HYPERLIPIDEMIA, UNSPECIFIED HYPERLIPIDEMIA TYPE: ICD-10-CM

## 2025-05-27 RX ORDER — ATORVASTATIN CALCIUM 20 MG/1
20 TABLET, FILM COATED ORAL NIGHTLY
Qty: 90 TABLET | Refills: 1 | Status: SHIPPED | OUTPATIENT
Start: 2025-05-27

## 2025-05-27 NOTE — TELEPHONE ENCOUNTER
Name of Medication(s) Requested:  Requested Prescriptions     Pending Prescriptions Disp Refills    atorvastatin (LIPITOR) 20 MG tablet [Pharmacy Med Name: atorvastatin 20 mg tablet] 90 tablet 1     Sig: TAKE ONE TABLET BY MOUTH NIGHTLY       Medication is on current medication list Yes    Dosage and directions were verified? Yes    Quantity verified: 90 day supply     Pharmacy Verified?  Yes    Last Appointment:  4/22/2025    Future appts:  Future Appointments   Date Time Provider Department Center   6/9/2025 10:30 AM Keyla Ashby PA [facility]   6/19/2025 12:45 PM Seth Wan MD [facility] DE[facility]   7/29/2025  1:00 PM Seth Wan MD [facility]   11/11/2025 10:00 AM Seth Wan MD [facility]        (If no appt send self scheduling link. .REFILLAPPT)  Scheduling request sent?     [] Yes  [x] No    Does patient need updated?  [] Yes  [x] No

## 2025-05-28 ENCOUNTER — TELEPHONE (OUTPATIENT)
Dept: FAMILY MEDICINE CLINIC | Age: 70
End: 2025-05-28

## 2025-05-28 RX ORDER — BACLOFEN 10 MG/1
10 TABLET ORAL DAILY PRN
Qty: 90 TABLET | Refills: 3 | OUTPATIENT
Start: 2025-05-28

## 2025-05-28 NOTE — TELEPHONE ENCOUNTER
Called pt couldn't leave vm. Was calling to see what the reasonable accommodation form was for. Will scan in chart.

## 2025-05-30 RX ORDER — PREGABALIN 150 MG/1
CAPSULE ORAL
Qty: 90 CAPSULE | Refills: 3 | OUTPATIENT
Start: 2025-05-30

## 2025-06-09 ENCOUNTER — OFFICE VISIT (OUTPATIENT)
Age: 70
End: 2025-06-09
Payer: MEDICARE

## 2025-06-09 VITALS
WEIGHT: 293 LBS | DIASTOLIC BLOOD PRESSURE: 60 MMHG | HEART RATE: 71 BPM | HEIGHT: 67 IN | OXYGEN SATURATION: 99 % | BODY MASS INDEX: 45.99 KG/M2 | SYSTOLIC BLOOD PRESSURE: 102 MMHG | RESPIRATION RATE: 18 BRPM | TEMPERATURE: 96.2 F

## 2025-06-09 DIAGNOSIS — M79.18 MYOFASCIAL PAIN SYNDROME: ICD-10-CM

## 2025-06-09 DIAGNOSIS — F11.99 OPIOID USE, UNSPECIFIED WITH UNSPECIFIED OPIOID-INDUCED DISORDER (HCC): ICD-10-CM

## 2025-06-09 DIAGNOSIS — M47.816 LUMBAR SPONDYLOSIS: ICD-10-CM

## 2025-06-09 DIAGNOSIS — M48.061 SPINAL STENOSIS OF LUMBAR REGION WITHOUT NEUROGENIC CLAUDICATION: ICD-10-CM

## 2025-06-09 DIAGNOSIS — G89.29 CHRONIC LOW BACK PAIN WITH SCIATICA, SCIATICA LATERALITY UNSPECIFIED, UNSPECIFIED BACK PAIN LATERALITY: ICD-10-CM

## 2025-06-09 DIAGNOSIS — G89.4 CHRONIC PAIN SYNDROME: ICD-10-CM

## 2025-06-09 DIAGNOSIS — M51.26 DISPLACEMENT OF LUMBAR INTERVERTEBRAL DISC: ICD-10-CM

## 2025-06-09 DIAGNOSIS — M54.40 CHRONIC LOW BACK PAIN WITH SCIATICA, SCIATICA LATERALITY UNSPECIFIED, UNSPECIFIED BACK PAIN LATERALITY: ICD-10-CM

## 2025-06-09 DIAGNOSIS — M47.816 LUMBAR FACET ARTHROPATHY: ICD-10-CM

## 2025-06-09 DIAGNOSIS — M79.7 FIBROMYALGIA: Primary | ICD-10-CM

## 2025-06-09 DIAGNOSIS — M51.9 LUMBAR DISC DISORDER: ICD-10-CM

## 2025-06-09 PROCEDURE — 99213 OFFICE O/P EST LOW 20 MIN: CPT | Performed by: PHYSICIAN ASSISTANT

## 2025-06-09 PROCEDURE — G8427 DOCREV CUR MEDS BY ELIG CLIN: HCPCS | Performed by: PHYSICIAN ASSISTANT

## 2025-06-09 PROCEDURE — G8399 PT W/DXA RESULTS DOCUMENT: HCPCS | Performed by: PHYSICIAN ASSISTANT

## 2025-06-09 PROCEDURE — 1123F ACP DISCUSS/DSCN MKR DOCD: CPT | Performed by: PHYSICIAN ASSISTANT

## 2025-06-09 PROCEDURE — 3017F COLORECTAL CA SCREEN DOC REV: CPT | Performed by: PHYSICIAN ASSISTANT

## 2025-06-09 PROCEDURE — 1159F MED LIST DOCD IN RCRD: CPT | Performed by: PHYSICIAN ASSISTANT

## 2025-06-09 PROCEDURE — 4004F PT TOBACCO SCREEN RCVD TLK: CPT | Performed by: PHYSICIAN ASSISTANT

## 2025-06-09 PROCEDURE — 1090F PRES/ABSN URINE INCON ASSESS: CPT | Performed by: PHYSICIAN ASSISTANT

## 2025-06-09 PROCEDURE — 1160F RVW MEDS BY RX/DR IN RCRD: CPT | Performed by: PHYSICIAN ASSISTANT

## 2025-06-09 PROCEDURE — G8417 CALC BMI ABV UP PARAM F/U: HCPCS | Performed by: PHYSICIAN ASSISTANT

## 2025-06-09 RX ORDER — FLUOCINONIDE 0.5 MG/G
CREAM TOPICAL
COMMUNITY
Start: 2025-06-05

## 2025-06-09 RX ORDER — OXYCODONE AND ACETAMINOPHEN 5; 325 MG/1; MG/1
0.5 TABLET ORAL 2 TIMES DAILY PRN
Qty: 30 TABLET | Refills: 0 | Status: SHIPPED | OUTPATIENT
Start: 2025-06-09 | End: 2025-07-09

## 2025-06-09 RX ORDER — FLUTICASONE FUROATE, UMECLIDINIUM BROMIDE AND VILANTEROL TRIFENATATE 200; 62.5; 25 UG/1; UG/1; UG/1
POWDER RESPIRATORY (INHALATION)
COMMUNITY
Start: 2025-06-05

## 2025-06-09 RX ORDER — PREGABALIN 150 MG/1
150 CAPSULE ORAL 3 TIMES DAILY
Qty: 90 CAPSULE | Refills: 3 | Status: SHIPPED | OUTPATIENT
Start: 2025-06-09 | End: 2025-07-09

## 2025-06-09 RX ORDER — BACLOFEN 10 MG/1
TABLET ORAL
Qty: 30 TABLET | Refills: 3 | Status: SHIPPED | OUTPATIENT
Start: 2025-06-09

## 2025-06-09 NOTE — PROGRESS NOTES
Sheree Cruz presents to the [facility] on 6/9/2025. Sheree is complaining of pain lower back. The pain is persistent. The pain is described as aching, throbbing, and shooting. Pain is rated on her best day at a 7, on her worst day at a 10, and on average at a 8 on the VAS scale. She took her last dose of Lyrica and Baclofen  today.  Took  Pain pill  from a friend    Any procedures since your last visit: No  She is not on NSAIDS and  is  on anticoagulation medications to include ASA and is managed by Dr. Gallagher.     Pacemaker or defibrillator: No   Do you want someone present when the provider examines you? No    Medication Contract and Consent for Opioid Use Documents Filed       Patient Documents       Type of Document Status Date Received Received By Description    Medication Contract Received 10/14/2020  1:22 PM TOMY THOMAS Pain Mgmt Patient Agreement    Medication Contract Received 12/6/2021  1:54 PM ALISE ZAVALA Medication Contract    Medication Contract Received 2/10/2023  2:44 PM ANTHONY ESTRADA opioid agreement    Consent Opioid Use Received 1/10/2025  3:51 PM ANTHONY ESTRADA Opioid Agreement                       LMP  (LMP Unknown)      No LMP recorded (lmp unknown). Patient has had a hysterectomy.  
analgesic effect of treatment.            We discussed with the patient that combining opioids, benzodiazepines, alcohol, illicit drugs or sleep aids increases the risk of respiratory depression including death. We discussed that these medications may cause drowsiness, sedation or dizziness and have counseled the patient not to drive or operate machinery. We have discussed that these medications will be prescribed only by one provider. We have discussed with the patient about age related risk factors and have thoroughly discussed the importance of taking these medications as prescribed. The patient verbalizes understanding.    ccreferring physic

## 2025-06-19 ENCOUNTER — OFFICE VISIT (OUTPATIENT)
Dept: FAMILY MEDICINE CLINIC | Age: 70
End: 2025-06-19
Payer: MEDICARE

## 2025-06-19 VITALS
RESPIRATION RATE: 17 BRPM | WEIGHT: 293 LBS | HEART RATE: 77 BPM | DIASTOLIC BLOOD PRESSURE: 65 MMHG | SYSTOLIC BLOOD PRESSURE: 109 MMHG | HEIGHT: 67 IN | OXYGEN SATURATION: 100 % | BODY MASS INDEX: 45.99 KG/M2

## 2025-06-19 DIAGNOSIS — N18.31 STAGE 3A CHRONIC KIDNEY DISEASE (HCC): ICD-10-CM

## 2025-06-19 DIAGNOSIS — M54.50 LOW BACK PAIN, UNSPECIFIED BACK PAIN LATERALITY, UNSPECIFIED CHRONICITY, UNSPECIFIED WHETHER SCIATICA PRESENT: ICD-10-CM

## 2025-06-19 DIAGNOSIS — I48.0 PAF (PAROXYSMAL ATRIAL FIBRILLATION) (HCC): ICD-10-CM

## 2025-06-19 DIAGNOSIS — R32 URINARY INCONTINENCE, UNSPECIFIED TYPE: Primary | ICD-10-CM

## 2025-06-19 DIAGNOSIS — E11.51 TYPE II DIABETES MELLITUS WITH PERIPHERAL CIRCULATORY DISORDER (HCC): ICD-10-CM

## 2025-06-19 DIAGNOSIS — G47.33 OSA (OBSTRUCTIVE SLEEP APNEA): ICD-10-CM

## 2025-06-19 PROCEDURE — G8417 CALC BMI ABV UP PARAM F/U: HCPCS | Performed by: FAMILY MEDICINE

## 2025-06-19 PROCEDURE — 4004F PT TOBACCO SCREEN RCVD TLK: CPT | Performed by: FAMILY MEDICINE

## 2025-06-19 PROCEDURE — 1090F PRES/ABSN URINE INCON ASSESS: CPT | Performed by: FAMILY MEDICINE

## 2025-06-19 PROCEDURE — 99214 OFFICE O/P EST MOD 30 MIN: CPT | Performed by: FAMILY MEDICINE

## 2025-06-19 PROCEDURE — G8399 PT W/DXA RESULTS DOCUMENT: HCPCS | Performed by: FAMILY MEDICINE

## 2025-06-19 PROCEDURE — 3044F HG A1C LEVEL LT 7.0%: CPT | Performed by: FAMILY MEDICINE

## 2025-06-19 PROCEDURE — 0509F URINE INCON PLAN DOCD: CPT | Performed by: FAMILY MEDICINE

## 2025-06-19 PROCEDURE — 2022F DILAT RTA XM EVC RTNOPTHY: CPT | Performed by: FAMILY MEDICINE

## 2025-06-19 PROCEDURE — 3017F COLORECTAL CA SCREEN DOC REV: CPT | Performed by: FAMILY MEDICINE

## 2025-06-19 PROCEDURE — 1159F MED LIST DOCD IN RCRD: CPT | Performed by: FAMILY MEDICINE

## 2025-06-19 PROCEDURE — G8427 DOCREV CUR MEDS BY ELIG CLIN: HCPCS | Performed by: FAMILY MEDICINE

## 2025-06-19 PROCEDURE — G2211 COMPLEX E/M VISIT ADD ON: HCPCS | Performed by: FAMILY MEDICINE

## 2025-06-19 PROCEDURE — 1123F ACP DISCUSS/DSCN MKR DOCD: CPT | Performed by: FAMILY MEDICINE

## 2025-06-19 NOTE — PROGRESS NOTES
FM Progress Note    Subjective:   LBP. Previous injury. Follows with Cony Ashby. Lyrica tid.     GERD. Controlled with protonix.     Urinary incontinence. Detrol doesn't help. Has not seen Urology    Copd. Trelegy. Albuterol bid. Sees Pulm.    buspar via psych. Feels mood is good on topamax    Obesity. Not controlled.    TRAVIS. Sleep study 2021:  IMPRESSION:  1. Known obstructive sleep apnea.  2. Optimal titration with Bilevel.  3. Snoring eliminated.  4. Good oxygen saturation.  5. No cardiac dysrhythmia.   Pt not tolerating bipap on face.     h/o DM per pt. Metformin causes severe abd pain.   Hemoglobin A1C   Date Value Ref Range Status   04/22/2025 6.4 % Final       Health Maintenance Due   Topic Date Due    Shingles vaccine (1 of 2) Never done    Respiratory Syncytial Virus (RSV) Pregnant or age 60 yrs+ (1 - Risk 60-74 years 1-dose series) Never done    Diabetic retinal exam  08/20/2022    Breast cancer screen  11/05/2022    Diabetic Alb to Cr ratio (uACR) test  03/21/2024    Diabetic foot exam  07/19/2024    COVID-19 Vaccine (3 - 2024-25 season) 09/01/2024    Annual Wellness Visit (Medicare Advantage)  01/01/2025           Objective:   /65   Pulse 77   Resp 17   Ht 1.702 m (5' 7.01\")   Wt (!) 154.2 kg (340 lb)   LMP  (LMP Unknown)   SpO2 100%   BMI 53.24 kg/m²   General appearance: NAD, alert and interacting appropriately  HEENT: NCAT, PERRLA, EOMI   Resp: CTAB, no WRC  CVS: RRR, no MRG  Abdomen: BS +, SNDNT  Extremities: No clubbing, cyanosis, or edema. Warm. Dry.         I have reviewed this patient's previous records.    I have reviewed this patient's labs.    I have reviewed this patient's medications.      Assessment/Plan:    Sheree was seen today for hypertension, balance and nausea.    Diagnoses and all orders for this visit:    Urinary incontinence, unspecified type  -     [facility] - Bryce Rutledge MD, Urology, [place]    Type II diabetes mellitus with peripheral circulatory disorder (HCC)  -

## 2025-06-19 NOTE — PATIENT INSTRUCTIONS
Continue topamax for weight loss.     Continue buspar for mood.     Please let me know right away if the zepbound is not available.     Continue Trelegy for COPD. Follow up with Pulmonology about other options for sleep apnea.

## 2025-07-24 DIAGNOSIS — K21.9 GASTROESOPHAGEAL REFLUX DISEASE WITHOUT ESOPHAGITIS: ICD-10-CM

## 2025-07-24 RX ORDER — PANTOPRAZOLE SODIUM 40 MG/1
TABLET, DELAYED RELEASE ORAL
Qty: 180 TABLET | Refills: 3 | Status: SHIPPED | OUTPATIENT
Start: 2025-07-24

## 2025-07-24 NOTE — TELEPHONE ENCOUNTER
Name of Medication(s) Requested:  Requested Prescriptions     Pending Prescriptions Disp Refills    pantoprazole (PROTONIX) 40 MG tablet [Pharmacy Med Name: pantoprazole 40 mg tablet,delayed release] 180 tablet 0     Sig: TAKE 1 TABLET BY MOUTH TWICE DAILY (IN THE MORNING and AT BEDTIME)       Medication is on current medication list Yes    Dosage and directions were verified? Yes    Quantity verified: 90 day supply     Pharmacy Verified?  Yes    Last Appointment:  6/19/2025    Future appts:  Future Appointments   Date Time Provider Department Center   7/29/2025  1:00 PM Seth Wan MD [facility] DEP   8/11/2025  2:00 PM Keyla Ashby PA [facility]   11/11/2025 10:00 AM Seth Wan MD [facility] DEP   12/23/2025  1:15 PM Seth Wan MD [facility] DEP        (If no appt send self scheduling link. .REFILLAPPT)  Scheduling request sent?     [] Yes  [x] No    Does patient need updated?  [] Yes  [] No

## 2025-08-14 RX ORDER — METOPROLOL TARTRATE 25 MG/1
TABLET, FILM COATED ORAL
Qty: 90 TABLET | Refills: 1 | Status: SHIPPED | OUTPATIENT
Start: 2025-08-14

## 2025-09-05 ENCOUNTER — OFFICE VISIT (OUTPATIENT)
Dept: FAMILY MEDICINE CLINIC | Age: 70
End: 2025-09-05

## 2025-09-05 VITALS
BODY MASS INDEX: 51.67 KG/M2 | DIASTOLIC BLOOD PRESSURE: 67 MMHG | HEART RATE: 72 BPM | SYSTOLIC BLOOD PRESSURE: 104 MMHG | WEIGHT: 293 LBS | TEMPERATURE: 97.7 F

## 2025-09-05 DIAGNOSIS — M79.605 LEFT LEG PAIN: Primary | ICD-10-CM

## 2025-09-05 DIAGNOSIS — E11.51 TYPE II DIABETES MELLITUS WITH PERIPHERAL CIRCULATORY DISORDER (HCC): ICD-10-CM

## 2025-09-05 DIAGNOSIS — M54.50 LOW BACK PAIN, UNSPECIFIED BACK PAIN LATERALITY, UNSPECIFIED CHRONICITY, UNSPECIFIED WHETHER SCIATICA PRESENT: ICD-10-CM

## 2025-09-05 DIAGNOSIS — R32 URINARY INCONTINENCE, UNSPECIFIED TYPE: ICD-10-CM

## 2025-09-05 DIAGNOSIS — R73.9 HYPERGLYCEMIA: ICD-10-CM

## 2025-09-05 DIAGNOSIS — G47.33 OSA (OBSTRUCTIVE SLEEP APNEA): ICD-10-CM

## 2025-09-05 LAB — HBA1C MFR BLD: 6 %

## 2025-09-05 RX ORDER — CELECOXIB 100 MG/1
100 CAPSULE ORAL 2 TIMES DAILY
Qty: 28 CAPSULE | Refills: 0 | Status: SHIPPED | OUTPATIENT
Start: 2025-09-05

## (undated) DEVICE — DRAIN CHN 19FR L0.25MM DIA6.3MM SIL RND HUBLESS FULL FLUT

## (undated) DEVICE — KIT BEDSIDE REVITAL OX 500ML

## (undated) DEVICE — TROCAR: Brand: KII SLEEVE

## (undated) DEVICE — BATTERY PACK FOR VARISPEED: Brand: STRYKER VARISPEED

## (undated) DEVICE — 6 FOOT DISPOSABLE EXTENSION CABLE WITH SAFE CONNECT / SCREW-DOWN

## (undated) DEVICE — KIT PLT RATIO DISPNS KT 2IN CANN TIP SPRY TIP DISP MAGELLAN

## (undated) DEVICE — ALCOHOL RUBBING ISO 16OZ 70%

## (undated) DEVICE — GOWN ISOLATN REG YEL M WT MULTIPLY SIDETIE LEV 2

## (undated) DEVICE — BLOOD TRANSFUSION FILTER: Brand: HAEMONETICS

## (undated) DEVICE — PERFUSION PACK CUST OPN HRT

## (undated) DEVICE — SOLUTION IRRIG 1000ML 0.9% SOD CHL USP POUR PLAS BTL

## (undated) DEVICE — TROCAR: Brand: KII FIOS FIRST ENTRY

## (undated) DEVICE — CONTAINER SPEC COLL 960ML POLYPR TRIANG GRAD INTAKE/OUTPUT

## (undated) DEVICE — ADHESIVE SKIN CLOSURE TOP 36 CC HI VISC DERMBND MINI

## (undated) DEVICE — MARKER,SKIN,WI/RULER AND LABELS: Brand: MEDLINE

## (undated) DEVICE — SUTURE DEV SZ 0 L6IN N ABSORBABLE

## (undated) DEVICE — [HIGH FLOW INSUFFLATOR,  DO NOT USE IF PACKAGE IS DAMAGED,  KEEP DRY,  KEEP AWAY FROM SUNLIGHT,  PROTECT FROM HEAT AND RADIOACTIVE SOURCES.]: Brand: PNEUMOSURE

## (undated) DEVICE — GOWN,SIRUS,NONRNF,SETINSLV,XL,20/CS: Brand: MEDLINE

## (undated) DEVICE — OPEN HEART: Brand: MEDLINE INDUSTRIES, INC.

## (undated) DEVICE — APPLICATOR MEDICATED 26 CC SOLUTION HI LT ORNG CHLORAPREP

## (undated) DEVICE — COR-KNOT MINI® COMBO KITBASE PACKAGE TYPE - KITEACH STERILE PACKAGE KIT CONTAINS (2) SINGLE PATIENT USE COR-KNOT MINI® DEVICES AND (12) COR-KNOT® QUICK LOADS®.: Brand: COR-KNOT MINI®

## (undated) DEVICE — BLOCK BITE 60FR CAREGUARD

## (undated) DEVICE — MEDI-VAC NON-CONDUCTIVE SUCTION TUBING: Brand: CARDINAL HEALTH

## (undated) DEVICE — SUTURE ABSRB L6IN L37MM 0 GS-21 GRN 1/2 CIR TAPR PNT NDL VLOCL0306

## (undated) DEVICE — SYRINGE MED 10ML TRNSLUC BRL PLUNG BLK MRK POLYPR CTRL

## (undated) DEVICE — SOLUTION IV 1000ML 140MEQ/L SOD 5MEQ/L K 3MEQ/L MG 27MEQ/L

## (undated) DEVICE — RETROGRADE CARDIOPLEGIA CATHETER: Brand: EDWARDS LIFESCIENCES RETROGRADE CARDIOPLEGIA CATHETER

## (undated) DEVICE — TOWEL,OR,DSP,ST,BLUE,STD,6/PK,12PK/CS: Brand: MEDLINE

## (undated) DEVICE — SPONGE GZ 4IN 4IN 4 PLY N WVN AVANT

## (undated) DEVICE — NEEDLE HYPO 25GA L1.5IN BLU POLYPR HUB S STL REG BVL STR

## (undated) DEVICE — TUBING, SUCTION, 1/4" X 10', STRAIGHT: Brand: MEDLINE

## (undated) DEVICE — SOLUTION IV 1000ML 0.9% SOD CHL PH 5 INJ USP VIAFLX PLAS

## (undated) DEVICE — GARMENT,MEDLINE,DVT,INT,CALF,MED, GEN2: Brand: MEDLINE

## (undated) DEVICE — PACK SURG LAP CHOLE CUSTOM

## (undated) DEVICE — ELECTRODE PT RET AD L9FT HI MOIST COND ADH HYDRGEL CORDED

## (undated) DEVICE — COVER,LIGHT HANDLE,FLX,1/PK: Brand: MEDLINE INDUSTRIES, INC.

## (undated) DEVICE — Device: Brand: RAP FEMORAL VENOUS CANNULA

## (undated) DEVICE — PACK OPEN HRT DRP

## (undated) DEVICE — PICO 7 10CM X 30CM: Brand: PICO™ 7

## (undated) DEVICE — OPTIFOAM GENTLE EX, SACRUM, 9X9: Brand: MEDLINE

## (undated) DEVICE — AVID DUAL STAGE VENOUS DRAINAGE CANNULA: Brand: AVID DUAL STAGE VENOUS DRAINAGE CANNULA

## (undated) DEVICE — INSUFFLATION NEEDLE TO ESTABLISH PNEUMOPERITONEUM.: Brand: INSUFFLATION NEEDLE

## (undated) DEVICE — LUBRICANT SURG JELLY ST BACTER TUBE 4.25OZ

## (undated) DEVICE — AGENT HEMSTAT W4XL8IN OXIDIZED REGENERATED CELOS ABSRB

## (undated) DEVICE — TUBING PERF PMP L10FT OD0.25IN ID1/16IN MED CLASS VI PRECUT

## (undated) DEVICE — 3M™ TEGADERM™ CHG DRESSING 25/CARTON 4 CARTONS/CASE 1657: Brand: TEGADERM™

## (undated) DEVICE — GLOVE ORANGE PI 7 1/2   MSG9075

## (undated) DEVICE — LAPAROSCOPIC SCISSORS: Brand: EPIX LAPAROSCOPIC SCISSORS

## (undated) DEVICE — SET SURG BASIN MAYO REUSABLE

## (undated) DEVICE — CANNULA VENT 16FR MAL INTRO SIL CONN 0.25IN NVENT BULL TIP

## (undated) DEVICE — PLUMEPORT LAPAROSCOPIC SMOKE FILTRATION DEVICE: Brand: PLUMEPORT ACTIV

## (undated) DEVICE — WYE PIPE 1/2X1/2X3/8IN REDUC CONN

## (undated) DEVICE — SUMP INTCARD SUCT AD 20FR PERICARD MAYO STYL FLX VERSATILE

## (undated) DEVICE — CANNULA PERF 28FR L30.5CM CONN SITE 3/8IN VEN R ANG SGL STG

## (undated) DEVICE — GLOVE SURG SZ 65 THK91MIL LTX FREE SYN POLYISOPRENE

## (undated) DEVICE — VALVE SUCTION AIR H2O HYDR H2O JET CONN STRL ORCA POD + DISP

## (undated) DEVICE — GLOVE ORANGE PI 7   MSG9070

## (undated) DEVICE — SET SURG BASIN OPEN HEART NO  1 REUSABLE

## (undated) DEVICE — BASIC SINGLE BASIN 1-LF: Brand: MEDLINE INDUSTRIES, INC.

## (undated) DEVICE — PMI PTFE COATED LAPAROSCOPIC WIRE L-HOOK 44 CM: Brand: PMI

## (undated) DEVICE — DOUBLE BASIN SET: Brand: MEDLINE INDUSTRIES, INC.

## (undated) DEVICE — DRAPE THER FLUID WARMING 66X44 IN FLAT SLUSH DBL DISC ORS

## (undated) DEVICE — CONTAINER SPEC 480ML CLR POLYSTYR 10% NEUT BUFF FRMLN ZN

## (undated) DEVICE — MEDI-VAC YANKAUER SUCTION HANDLE W/BULBOUS TIP: Brand: CARDINAL HEALTH

## (undated) DEVICE — TUBING, SUCTION, 3/16" X 12', STRAIGHT: Brand: MEDLINE

## (undated) DEVICE — SOLUTION IRRIG 1000ML STRL H2O USP PLAS POUR BTL

## (undated) DEVICE — TUBING HEMCONC L36IN DIA0.25IN DHF W/ CONN

## (undated) DEVICE — Device

## (undated) DEVICE — CANNULA PERF 7FR L5.5IN AORT ROOT RADPQ STD TIP W/ VENT LN

## (undated) DEVICE — GLOVE SURG SZ 6 THK91MIL LTX FREE SYN POLYISOPRENE ANTI

## (undated) DEVICE — EZ GLIDE AORTIC CANNULA: Brand: EDWARDS LIFESCIENCES EZ GLIDE AORTIC CANNULA

## (undated) DEVICE — GUIDE SURG SELECTION FOR GILLINOV COSGROVE LAA EXCLUSION SYS

## (undated) DEVICE — GLOVE ORANGE PI 8   MSG9080

## (undated) DEVICE — SET TRNQT W/ STD 7IN 12FR 5 TB 1 SNR DLP

## (undated) DEVICE — LABEL MED CARD SURG 4 IN PANEL STRL

## (undated) DEVICE — GLOVE SURG SZ 7.5 L11.73IN FNGR THK9.8MIL STRW LTX POLYMER

## (undated) DEVICE — SET ENDO INSTR LAPAROSCOPIC INCISIONAL

## (undated) DEVICE — FORCEPS BX L240CM JAW DIA2.8MM L CAP W/ NDL MIC MESH TOOTH

## (undated) DEVICE — 6 X 9  1.75MIL 4-WALL LABGUARD: Brand: MINIGRIP COMMERCIAL LLC

## (undated) DEVICE — MASK,FACE,MAXFLUIDPROTECT,SHIELD/ERLPS: Brand: MEDLINE

## (undated) DEVICE — SOLUTION IV 500ML 0.9% SOD CHL PH 5 INJ USP VIAFLX PLAS

## (undated) DEVICE — KENDALL 450 SERIES MONITORING FOAM ELECTRODE - RECTANGULAR SHAPE ( 3/PK): Brand: KENDALL

## (undated) DEVICE — E-Z CLEAN, NON-STICK, PTFE COATED, ELECTROSURGICAL BLADE ELECTRODE, MODIFIED EXTENDED INSULATION, 2.5 INCH (6.35 CM): Brand: MEGADYNE

## (undated) DEVICE — CATHETER THOR 32FR L23IN PVC 6 EYELET STR ATRAUM

## (undated) DEVICE — CAMERA STRYKER 1488 HD GEN

## (undated) DEVICE — SET BASIN OPEN HEART VALVE